# Patient Record
Sex: MALE | Race: WHITE | Employment: OTHER
[De-identification: names, ages, dates, MRNs, and addresses within clinical notes are randomized per-mention and may not be internally consistent; named-entity substitution may affect disease eponyms.]

---

## 2017-01-06 ENCOUNTER — OFFICE VISIT (OUTPATIENT)
Dept: FAMILY MEDICINE CLINIC | Facility: CLINIC | Age: 72
End: 2017-01-06

## 2017-01-06 VITALS
HEART RATE: 68 BPM | HEIGHT: 70 IN | WEIGHT: 198 LBS | BODY MASS INDEX: 28.35 KG/M2 | OXYGEN SATURATION: 97 % | TEMPERATURE: 97.8 F | DIASTOLIC BLOOD PRESSURE: 90 MMHG | SYSTOLIC BLOOD PRESSURE: 152 MMHG

## 2017-01-06 DIAGNOSIS — N40.1 BENIGN NON-NODULAR PROSTATIC HYPERPLASIA WITH LOWER URINARY TRACT SYMPTOMS: ICD-10-CM

## 2017-01-06 DIAGNOSIS — R39.11 URINARY HESITANCY: ICD-10-CM

## 2017-01-06 DIAGNOSIS — R39.15 URINARY URGENCY: Primary | ICD-10-CM

## 2017-01-06 LAB
BILIRUBIN, POC: NORMAL
BLOOD URINE, POC: NORMAL
CLARITY, POC: CLEAR
COLOR, POC: NORMAL
GLUCOSE URINE, POC: NORMAL
KETONES, POC: NORMAL
LEUKOCYTE EST, POC: NORMAL
NITRITE, POC: NORMAL
PH, POC: 6
PROTEIN, POC: NORMAL
SPECIFIC GRAVITY, POC: 1.01
UROBILINOGEN, POC: NORMAL

## 2017-01-06 PROCEDURE — 81002 URINALYSIS NONAUTO W/O SCOPE: CPT | Performed by: NURSE PRACTITIONER

## 2017-01-06 PROCEDURE — 99214 OFFICE O/P EST MOD 30 MIN: CPT | Performed by: NURSE PRACTITIONER

## 2017-01-06 RX ORDER — HYDROCODONE BITARTRATE AND ACETAMINOPHEN 5; 325 MG/1; MG/1
1 TABLET ORAL
COMMUNITY
End: 2017-01-06 | Stop reason: SDUPTHER

## 2017-01-06 RX ORDER — AMLODIPINE BESYLATE 5 MG/1
TABLET ORAL
COMMUNITY
Start: 2004-01-19 | End: 2017-05-18 | Stop reason: SDUPTHER

## 2017-01-06 RX ORDER — TAMSULOSIN HYDROCHLORIDE 0.4 MG/1
0.8 CAPSULE ORAL DAILY
Qty: 60 CAPSULE | Refills: 2 | Status: SHIPPED | OUTPATIENT
Start: 2017-01-06 | End: 2017-05-18 | Stop reason: SDUPTHER

## 2017-01-06 ASSESSMENT — ENCOUNTER SYMPTOMS
VOMITING: 0
NAUSEA: 0

## 2017-01-09 ENCOUNTER — OFFICE VISIT (OUTPATIENT)
Dept: UROLOGY | Facility: CLINIC | Age: 72
End: 2017-01-09

## 2017-01-09 ENCOUNTER — TELEPHONE (OUTPATIENT)
Dept: FAMILY MEDICINE CLINIC | Facility: CLINIC | Age: 72
End: 2017-01-09

## 2017-01-09 VITALS
DIASTOLIC BLOOD PRESSURE: 86 MMHG | BODY MASS INDEX: 29.35 KG/M2 | WEIGHT: 205 LBS | SYSTOLIC BLOOD PRESSURE: 140 MMHG | HEIGHT: 70 IN

## 2017-01-09 DIAGNOSIS — N13.8 BPH WITH OBSTRUCTION/LOWER URINARY TRACT SYMPTOMS: ICD-10-CM

## 2017-01-09 DIAGNOSIS — N41.0 ACUTE PROSTATITIS: Primary | ICD-10-CM

## 2017-01-09 DIAGNOSIS — N40.1 BPH WITH OBSTRUCTION/LOWER URINARY TRACT SYMPTOMS: ICD-10-CM

## 2017-01-09 PROCEDURE — 51798 US URINE CAPACITY MEASURE: CPT | Performed by: PHYSICIAN ASSISTANT

## 2017-01-09 PROCEDURE — 99214 OFFICE O/P EST MOD 30 MIN: CPT | Performed by: PHYSICIAN ASSISTANT

## 2017-01-09 RX ORDER — NAPROXEN 500 MG/1
500 TABLET ORAL 2 TIMES DAILY WITH MEALS
Qty: 14 TABLET | Refills: 0 | Status: SHIPPED | OUTPATIENT
Start: 2017-01-09 | End: 2017-12-30 | Stop reason: ALTCHOICE

## 2017-01-09 RX ORDER — CIPROFLOXACIN 500 MG/1
500 TABLET, FILM COATED ORAL 2 TIMES DAILY
Qty: 56 TABLET | Refills: 0 | Status: SHIPPED | OUTPATIENT
Start: 2017-01-09 | End: 2017-02-06

## 2017-01-09 ASSESSMENT — ENCOUNTER SYMPTOMS
NAUSEA: 0
ABDOMINAL PAIN: 0
COLOR CHANGE: 0
DIARRHEA: 0
CONSTIPATION: 0
VOMITING: 0
SHORTNESS OF BREATH: 0
ABDOMINAL DISTENTION: 0
RECTAL PAIN: 1

## 2017-01-10 ENCOUNTER — OFFICE VISIT (OUTPATIENT)
Dept: SURGERY | Facility: CLINIC | Age: 72
End: 2017-01-10

## 2017-01-10 VITALS
BODY MASS INDEX: 29.35 KG/M2 | HEIGHT: 70 IN | HEART RATE: 76 BPM | RESPIRATION RATE: 18 BRPM | DIASTOLIC BLOOD PRESSURE: 89 MMHG | SYSTOLIC BLOOD PRESSURE: 142 MMHG | WEIGHT: 205 LBS

## 2017-01-10 DIAGNOSIS — K29.30 CHRONIC SUPERFICIAL GASTRITIS WITHOUT BLEEDING: ICD-10-CM

## 2017-01-10 DIAGNOSIS — Z98.890 S/P ENDOSCOPY: Primary | ICD-10-CM

## 2017-01-10 PROCEDURE — G8427 DOCREV CUR MEDS BY ELIG CLIN: HCPCS | Performed by: SURGERY

## 2017-01-10 PROCEDURE — 1036F TOBACCO NON-USER: CPT | Performed by: SURGERY

## 2017-01-10 PROCEDURE — 4040F PNEUMOC VAC/ADMIN/RCVD: CPT | Performed by: SURGERY

## 2017-01-10 PROCEDURE — G8420 CALC BMI NORM PARAMETERS: HCPCS | Performed by: SURGERY

## 2017-01-10 PROCEDURE — G8484 FLU IMMUNIZE NO ADMIN: HCPCS | Performed by: SURGERY

## 2017-01-10 PROCEDURE — 99213 OFFICE O/P EST LOW 20 MIN: CPT | Performed by: SURGERY

## 2017-01-10 PROCEDURE — 3017F COLORECTAL CA SCREEN DOC REV: CPT | Performed by: SURGERY

## 2017-01-10 PROCEDURE — 1123F ACP DISCUSS/DSCN MKR DOCD: CPT | Performed by: SURGERY

## 2017-04-24 ENCOUNTER — OFFICE VISIT (OUTPATIENT)
Dept: UROLOGY | Age: 72
End: 2017-04-24
Payer: MEDICARE

## 2017-04-24 VITALS — WEIGHT: 202 LBS | BODY MASS INDEX: 28.98 KG/M2 | SYSTOLIC BLOOD PRESSURE: 140 MMHG | DIASTOLIC BLOOD PRESSURE: 84 MMHG

## 2017-04-24 DIAGNOSIS — N13.8 BPH WITH OBSTRUCTION/LOWER URINARY TRACT SYMPTOMS: ICD-10-CM

## 2017-04-24 DIAGNOSIS — N40.1 BPH WITH OBSTRUCTION/LOWER URINARY TRACT SYMPTOMS: ICD-10-CM

## 2017-04-24 DIAGNOSIS — N41.0 ACUTE PROSTATITIS: Primary | ICD-10-CM

## 2017-04-24 PROCEDURE — 99213 OFFICE O/P EST LOW 20 MIN: CPT | Performed by: PHYSICIAN ASSISTANT

## 2017-04-24 PROCEDURE — 51798 US URINE CAPACITY MEASURE: CPT | Performed by: PHYSICIAN ASSISTANT

## 2017-04-24 PROCEDURE — G8420 CALC BMI NORM PARAMETERS: HCPCS | Performed by: PHYSICIAN ASSISTANT

## 2017-04-24 PROCEDURE — 1123F ACP DISCUSS/DSCN MKR DOCD: CPT | Performed by: PHYSICIAN ASSISTANT

## 2017-04-24 PROCEDURE — 4040F PNEUMOC VAC/ADMIN/RCVD: CPT | Performed by: PHYSICIAN ASSISTANT

## 2017-04-24 PROCEDURE — G8427 DOCREV CUR MEDS BY ELIG CLIN: HCPCS | Performed by: PHYSICIAN ASSISTANT

## 2017-04-24 PROCEDURE — 3017F COLORECTAL CA SCREEN DOC REV: CPT | Performed by: PHYSICIAN ASSISTANT

## 2017-04-24 PROCEDURE — 1036F TOBACCO NON-USER: CPT | Performed by: PHYSICIAN ASSISTANT

## 2017-05-10 RX ORDER — LEVOTHYROXINE SODIUM 0.07 MG/1
TABLET ORAL
Qty: 90 TABLET | Refills: 1 | Status: SHIPPED | OUTPATIENT
Start: 2017-05-10 | End: 2017-11-13 | Stop reason: SDUPTHER

## 2017-05-18 ENCOUNTER — OFFICE VISIT (OUTPATIENT)
Dept: FAMILY MEDICINE CLINIC | Age: 72
End: 2017-05-18
Payer: MEDICARE

## 2017-05-18 VITALS
SYSTOLIC BLOOD PRESSURE: 136 MMHG | BODY MASS INDEX: 28.06 KG/M2 | HEIGHT: 70 IN | HEART RATE: 88 BPM | WEIGHT: 196 LBS | DIASTOLIC BLOOD PRESSURE: 80 MMHG | OXYGEN SATURATION: 98 % | TEMPERATURE: 98.1 F

## 2017-05-18 DIAGNOSIS — E78.00 PURE HYPERCHOLESTEROLEMIA: ICD-10-CM

## 2017-05-18 DIAGNOSIS — N40.1 BENIGN NON-NODULAR PROSTATIC HYPERPLASIA WITH LOWER URINARY TRACT SYMPTOMS: ICD-10-CM

## 2017-05-18 DIAGNOSIS — I10 ESSENTIAL HYPERTENSION, BENIGN: ICD-10-CM

## 2017-05-18 DIAGNOSIS — E11.9 CONTROLLED TYPE 2 DIABETES MELLITUS WITHOUT COMPLICATION, WITHOUT LONG-TERM CURRENT USE OF INSULIN (HCC): ICD-10-CM

## 2017-05-18 DIAGNOSIS — K21.9 GASTROESOPHAGEAL REFLUX DISEASE WITHOUT ESOPHAGITIS: ICD-10-CM

## 2017-05-18 DIAGNOSIS — E03.9 ACQUIRED HYPOTHYROIDISM: ICD-10-CM

## 2017-05-18 DIAGNOSIS — M06.9 RHEUMATOID ARTHRITIS, INVOLVING UNSPECIFIED SITE, UNSPECIFIED RHEUMATOID FACTOR PRESENCE: Primary | ICD-10-CM

## 2017-05-18 LAB — HBA1C MFR BLD: 6.2 %

## 2017-05-18 PROCEDURE — 3017F COLORECTAL CA SCREEN DOC REV: CPT | Performed by: NURSE PRACTITIONER

## 2017-05-18 PROCEDURE — 99214 OFFICE O/P EST MOD 30 MIN: CPT | Performed by: NURSE PRACTITIONER

## 2017-05-18 PROCEDURE — 1036F TOBACCO NON-USER: CPT | Performed by: NURSE PRACTITIONER

## 2017-05-18 PROCEDURE — G8427 DOCREV CUR MEDS BY ELIG CLIN: HCPCS | Performed by: NURSE PRACTITIONER

## 2017-05-18 PROCEDURE — 1123F ACP DISCUSS/DSCN MKR DOCD: CPT | Performed by: NURSE PRACTITIONER

## 2017-05-18 PROCEDURE — 4040F PNEUMOC VAC/ADMIN/RCVD: CPT | Performed by: NURSE PRACTITIONER

## 2017-05-18 PROCEDURE — 83036 HEMOGLOBIN GLYCOSYLATED A1C: CPT | Performed by: NURSE PRACTITIONER

## 2017-05-18 PROCEDURE — 3044F HG A1C LEVEL LT 7.0%: CPT | Performed by: NURSE PRACTITIONER

## 2017-05-18 PROCEDURE — G8420 CALC BMI NORM PARAMETERS: HCPCS | Performed by: NURSE PRACTITIONER

## 2017-05-18 RX ORDER — SIMVASTATIN 20 MG
20 TABLET ORAL NIGHTLY
Qty: 90 TABLET | Refills: 1 | Status: SHIPPED | OUTPATIENT
Start: 2017-05-18 | End: 2017-11-13 | Stop reason: SDUPTHER

## 2017-05-18 RX ORDER — TAMSULOSIN HYDROCHLORIDE 0.4 MG/1
0.4 CAPSULE ORAL DAILY
Qty: 90 CAPSULE | Refills: 1 | Status: SHIPPED | OUTPATIENT
Start: 2017-05-18 | End: 2017-11-13 | Stop reason: SDUPTHER

## 2017-05-18 RX ORDER — PANTOPRAZOLE SODIUM 40 MG/1
40 TABLET, DELAYED RELEASE ORAL
Qty: 180 TABLET | Refills: 1 | Status: SHIPPED | OUTPATIENT
Start: 2017-05-18 | End: 2017-11-13 | Stop reason: SDUPTHER

## 2017-05-18 RX ORDER — AMLODIPINE BESYLATE 5 MG/1
5 TABLET ORAL DAILY
Qty: 90 TABLET | Refills: 1 | Status: SHIPPED | OUTPATIENT
Start: 2017-05-18 | End: 2017-11-13 | Stop reason: SDUPTHER

## 2017-05-18 ASSESSMENT — ENCOUNTER SYMPTOMS
CHOKING: 0
SHORTNESS OF BREATH: 0
SORE THROAT: 1
BLOOD IN STOOL: 0
HEARTBURN: 1
DIARRHEA: 1
COUGH: 1

## 2017-05-18 ASSESSMENT — PATIENT HEALTH QUESTIONNAIRE - PHQ9
SUM OF ALL RESPONSES TO PHQ QUESTIONS 1-9: 0
1. LITTLE INTEREST OR PLEASURE IN DOING THINGS: 0
SUM OF ALL RESPONSES TO PHQ9 QUESTIONS 1 & 2: 0
2. FEELING DOWN, DEPRESSED OR HOPELESS: 0

## 2017-11-06 ENCOUNTER — HOSPITAL ENCOUNTER (OUTPATIENT)
Age: 72
Discharge: HOME | End: 2017-11-06
Payer: MEDICARE

## 2017-11-06 DIAGNOSIS — E03.9 ACQUIRED HYPOTHYROIDISM: ICD-10-CM

## 2017-11-06 DIAGNOSIS — K21.9 GASTROESOPHAGEAL REFLUX DISEASE WITHOUT ESOPHAGITIS: ICD-10-CM

## 2017-11-06 DIAGNOSIS — E78.00 PURE HYPERCHOLESTEROLEMIA: ICD-10-CM

## 2017-11-06 LAB
ABSOLUTE EOS #: 0.2 K/UL (ref 0–0.4)
ABSOLUTE IMMATURE GRANULOCYTE: NORMAL K/UL (ref 0–0.3)
ABSOLUTE LYMPH #: 2.34 K/UL (ref 1–4.8)
ABSOLUTE MONO #: 0.65 K/UL (ref 0–1)
ALBUMIN SERPL-MCNC: 4.2 G/DL (ref 3.5–5.2)
ALBUMIN/GLOBULIN RATIO: ABNORMAL (ref 1–2.5)
ALP BLD-CCNC: 87 U/L (ref 40–129)
ALT SERPL-CCNC: 34 U/L (ref 5–41)
ANION GAP SERPL CALCULATED.3IONS-SCNC: 11 MMOL/L (ref 9–17)
AST SERPL-CCNC: 21 U/L
BASOPHILS # BLD: NORMAL %
BASOPHILS ABSOLUTE: NORMAL K/UL (ref 0–0.2)
BILIRUB SERPL-MCNC: 0.41 MG/DL (ref 0.3–1.2)
BUN BLDV-MCNC: 17 MG/DL (ref 8–23)
BUN/CREAT BLD: 22 (ref 9–20)
CALCIUM SERPL-MCNC: 9.8 MG/DL (ref 8.6–10.4)
CHLORIDE BLD-SCNC: 103 MMOL/L (ref 98–107)
CHOLESTEROL/HDL RATIO: 4
CHOLESTEROL: 123 MG/DL
CO2: 27 MMOL/L (ref 20–31)
CREAT SERPL-MCNC: 0.76 MG/DL (ref 0.7–1.2)
DIFFERENTIAL TYPE: NORMAL
EOSINOPHILS RELATIVE PERCENT: 3 %
GFR AFRICAN AMERICAN: >60 ML/MIN
GFR NON-AFRICAN AMERICAN: >60 ML/MIN
GFR SERPL CREATININE-BSD FRML MDRD: ABNORMAL ML/MIN/{1.73_M2}
GFR SERPL CREATININE-BSD FRML MDRD: ABNORMAL ML/MIN/{1.73_M2}
GLUCOSE BLD-MCNC: 118 MG/DL (ref 70–99)
HCT VFR BLD CALC: 49.7 % (ref 41–53)
HDLC SERPL-MCNC: 31 MG/DL
HEMOGLOBIN: 16.7 G/DL (ref 13.5–17.5)
IMMATURE GRANULOCYTES: NORMAL %
LDL CHOLESTEROL: 65 MG/DL (ref 0–130)
LYMPHOCYTES # BLD: 36 %
MCH RBC QN AUTO: 30.5 PG (ref 26–34)
MCHC RBC AUTO-ENTMCNC: 33.6 G/DL (ref 31–37)
MCV RBC AUTO: 90.9 FL (ref 80–100)
MONOCYTES # BLD: 10 %
MORPHOLOGY: NORMAL
PATIENT FASTING?: YES
PDW BLD-RTO: 12.9 % (ref 12.1–15.2)
PLATELET # BLD: 221 K/UL (ref 140–450)
PLATELET ESTIMATE: NORMAL
PMV BLD AUTO: NORMAL FL (ref 6–12)
POTASSIUM SERPL-SCNC: 4.7 MMOL/L (ref 3.7–5.3)
RBC # BLD: 5.47 M/UL (ref 4.5–5.9)
RBC # BLD: NORMAL 10*6/UL
SEG NEUTROPHILS: 51 %
SEGMENTED NEUTROPHILS ABSOLUTE COUNT: 3.31 K/UL (ref 2.1–6.5)
SODIUM BLD-SCNC: 141 MMOL/L (ref 135–144)
TOTAL PROTEIN: 7.6 G/DL (ref 6.4–8.3)
TRIGL SERPL-MCNC: 136 MG/DL
TSH SERPL DL<=0.05 MIU/L-ACNC: 4.76 MIU/L (ref 0.3–5)
VLDLC SERPL CALC-MCNC: ABNORMAL MG/DL (ref 1–30)
WBC # BLD: 6.5 K/UL (ref 3.5–11)
WBC # BLD: NORMAL 10*3/UL

## 2017-11-06 PROCEDURE — 84443 ASSAY THYROID STIM HORMONE: CPT

## 2017-11-06 PROCEDURE — 80061 LIPID PANEL: CPT

## 2017-11-06 PROCEDURE — 36415 COLL VENOUS BLD VENIPUNCTURE: CPT

## 2017-11-06 PROCEDURE — 85025 COMPLETE CBC W/AUTO DIFF WBC: CPT

## 2017-11-06 PROCEDURE — 80053 COMPREHEN METABOLIC PANEL: CPT

## 2017-11-13 ENCOUNTER — OFFICE VISIT (OUTPATIENT)
Dept: FAMILY MEDICINE CLINIC | Age: 72
End: 2017-11-13
Payer: MEDICARE

## 2017-11-13 VITALS — SYSTOLIC BLOOD PRESSURE: 120 MMHG | BODY MASS INDEX: 28.98 KG/M2 | WEIGHT: 202 LBS | DIASTOLIC BLOOD PRESSURE: 74 MMHG

## 2017-11-13 DIAGNOSIS — Z23 NEED FOR INFLUENZA VACCINATION: ICD-10-CM

## 2017-11-13 DIAGNOSIS — Z13.6 ENCOUNTER FOR ABDOMINAL AORTIC ANEURYSM (AAA) SCREENING: ICD-10-CM

## 2017-11-13 DIAGNOSIS — E78.00 PURE HYPERCHOLESTEROLEMIA: ICD-10-CM

## 2017-11-13 DIAGNOSIS — N40.1 BENIGN NON-NODULAR PROSTATIC HYPERPLASIA WITH LOWER URINARY TRACT SYMPTOMS: ICD-10-CM

## 2017-11-13 DIAGNOSIS — K58.0 IRRITABLE BOWEL SYNDROME WITH DIARRHEA: ICD-10-CM

## 2017-11-13 DIAGNOSIS — I10 ESSENTIAL HYPERTENSION, BENIGN: Primary | ICD-10-CM

## 2017-11-13 DIAGNOSIS — K21.9 GASTROESOPHAGEAL REFLUX DISEASE WITHOUT ESOPHAGITIS: ICD-10-CM

## 2017-11-13 PROCEDURE — 90686 IIV4 VACC NO PRSV 0.5 ML IM: CPT | Performed by: FAMILY MEDICINE

## 2017-11-13 PROCEDURE — 3017F COLORECTAL CA SCREEN DOC REV: CPT | Performed by: FAMILY MEDICINE

## 2017-11-13 PROCEDURE — 1123F ACP DISCUSS/DSCN MKR DOCD: CPT | Performed by: FAMILY MEDICINE

## 2017-11-13 PROCEDURE — 99214 OFFICE O/P EST MOD 30 MIN: CPT | Performed by: FAMILY MEDICINE

## 2017-11-13 PROCEDURE — G0009 ADMIN PNEUMOCOCCAL VACCINE: HCPCS | Performed by: FAMILY MEDICINE

## 2017-11-13 PROCEDURE — G0008 ADMIN INFLUENZA VIRUS VAC: HCPCS | Performed by: FAMILY MEDICINE

## 2017-11-13 PROCEDURE — G8484 FLU IMMUNIZE NO ADMIN: HCPCS | Performed by: FAMILY MEDICINE

## 2017-11-13 PROCEDURE — G8427 DOCREV CUR MEDS BY ELIG CLIN: HCPCS | Performed by: FAMILY MEDICINE

## 2017-11-13 PROCEDURE — 1036F TOBACCO NON-USER: CPT | Performed by: FAMILY MEDICINE

## 2017-11-13 PROCEDURE — G8419 CALC BMI OUT NRM PARAM NOF/U: HCPCS | Performed by: FAMILY MEDICINE

## 2017-11-13 PROCEDURE — 90670 PCV13 VACCINE IM: CPT | Performed by: FAMILY MEDICINE

## 2017-11-13 PROCEDURE — 4040F PNEUMOC VAC/ADMIN/RCVD: CPT | Performed by: FAMILY MEDICINE

## 2017-11-13 RX ORDER — SIMVASTATIN 20 MG
20 TABLET ORAL NIGHTLY
Qty: 90 TABLET | Refills: 1 | Status: SHIPPED | OUTPATIENT
Start: 2017-11-13 | End: 2018-06-04 | Stop reason: SDUPTHER

## 2017-11-13 RX ORDER — PANTOPRAZOLE SODIUM 40 MG/1
40 TABLET, DELAYED RELEASE ORAL 2 TIMES DAILY
Qty: 180 TABLET | Refills: 1 | Status: SHIPPED | OUTPATIENT
Start: 2017-11-13 | End: 2018-06-04 | Stop reason: SDUPTHER

## 2017-11-13 RX ORDER — AMLODIPINE BESYLATE 5 MG/1
5 TABLET ORAL DAILY
Qty: 90 TABLET | Refills: 1 | Status: SHIPPED | OUTPATIENT
Start: 2017-11-13 | End: 2018-06-04 | Stop reason: SDUPTHER

## 2017-11-13 RX ORDER — TAMSULOSIN HYDROCHLORIDE 0.4 MG/1
0.4 CAPSULE ORAL DAILY
Qty: 90 CAPSULE | Refills: 1 | Status: SHIPPED | OUTPATIENT
Start: 2017-11-13 | End: 2018-01-03 | Stop reason: SDUPTHER

## 2017-11-13 RX ORDER — LEVOTHYROXINE SODIUM 0.07 MG/1
TABLET ORAL
Qty: 90 TABLET | Refills: 1 | Status: SHIPPED | OUTPATIENT
Start: 2017-11-13 | End: 2018-06-04 | Stop reason: SDUPTHER

## 2017-11-13 ASSESSMENT — ENCOUNTER SYMPTOMS
EYE REDNESS: 0
SHORTNESS OF BREATH: 0
CONSTIPATION: 0
FACIAL SWELLING: 0
BLURRED VISION: 0
DIARRHEA: 1
WHEEZING: 0
ABDOMINAL PAIN: 0
VOMITING: 0
NAUSEA: 0
TROUBLE SWALLOWING: 0
EYE DISCHARGE: 0
COUGH: 0
BLOOD IN STOOL: 0

## 2017-11-13 NOTE — PROGRESS NOTES
swelling and trouble swallowing. Eyes: Negative for blurred vision, discharge, redness and visual disturbance. Respiratory: Negative for cough, shortness of breath and wheezing. Cardiovascular: Negative for chest pain and palpitations. Gastrointestinal: Positive for diarrhea. Negative for abdominal pain, blood in stool, constipation, dysphagia, nausea and vomiting. Genitourinary: Negative for dysuria and hematuria. Musculoskeletal: Negative for arthralgias and joint swelling. Skin: Negative for pallor and rash. Neurological: Negative for dizziness, tremors, light-headedness and headaches. Psychiatric/Behavioral: Negative for confusion and sleep disturbance. Physical Exam:     Physical Exam   Constitutional: He is oriented to person, place, and time. He appears well-developed and well-nourished. HENT:   Head: Normocephalic and atraumatic. Eyes: Conjunctivae are normal. Pupils are equal, round, and reactive to light. Right eye exhibits no discharge. Left eye exhibits no discharge. Neck: Neck supple. No thyromegaly present. Cardiovascular: Normal rate, regular rhythm and normal heart sounds. Pulmonary/Chest: Effort normal and breath sounds normal. No respiratory distress. He has no wheezes. Abdominal: Soft. Bowel sounds are normal. He exhibits no distension. There is no tenderness. There is no rebound. Musculoskeletal: He exhibits no edema. Lymphadenopathy:     He has no cervical adenopathy. Neurological: He is alert and oriented to person, place, and time. Skin: Skin is warm and dry. No rash noted. Psychiatric: He has a normal mood and affect. His behavior is normal.   Vitals reviewed.       Vitals:  /74   Wt 202 lb (91.6 kg)   BMI 28.98 kg/m²       Data:     Lab Results   Component Value Date     11/06/2017    K 4.7 11/06/2017     11/06/2017    CO2 27 11/06/2017    BUN 17 11/06/2017    CREATININE 0.76 11/06/2017    GLUCOSE 118 11/06/2017    GLUCOSE 117 04/03/2012    PROT 7.6 11/06/2017    LABALBU 4.2 11/06/2017    LABALBU 4.5 04/03/2012    BILITOT 0.41 11/06/2017    ALKPHOS 87 11/06/2017    AST 21 11/06/2017    ALT 34 11/06/2017     Lab Results   Component Value Date    WBC 6.5 11/06/2017    RBC 5.47 11/06/2017    HGB 16.7 11/06/2017    HCT 49.7 11/06/2017    MCV 90.9 11/06/2017    MCH 30.5 11/06/2017    MCHC 33.6 11/06/2017    RDW 12.9 11/06/2017     11/06/2017    MPV NOT REPORTED 11/06/2017     Lab Results   Component Value Date    TSH 4.76 11/06/2017     Lab Results   Component Value Date    CHOL 123 11/06/2017    HDL 31 11/06/2017    PSA 2.92 01/06/2017    LABA1C 6.2 05/18/2017          Assessment/Plan:       1. Irritable bowel syndrome with diarrhea  Pt to try Eluxadoline 100mg one BID and call if working and needs a 90d course     2. Essential hypertension, benign  Stable on norvasc,     3. Pure hypercholesterolemia  Stable on the zocor and labs at the [facility]  - simvastatin (ZOCOR) 20 MG tablet; Take 1 tablet by mouth nightly  Dispense: 90 tablet; Refill: 1    4. Gastroesophageal reflux disease without esophagitis  Stable on the protonix  - pantoprazole (PROTONIX) 40 MG tablet; Take 1 tablet by mouth 2 times daily  Dispense: 180 tablet; Refill: 1    5. Benign non-nodular prostatic hyperplasia with lower urinary tract symptoms  Stable on the flomax  - tamsulosin (FLOMAX) 0.4 MG capsule; Take 1 capsule by mouth daily  Dispense: 90 capsule; Refill: 1    6. Need for influenza vaccination  Shot given  - INFLUENZA, QUADV, 3 YRS AND OLDER, IM, PF, PREFILL SYR OR SDV, 0.5ML (FLUZONE QUADV, PF)    7. Encounter for abdominal aortic aneurysm (AAA) screening  Order placed  - Carli AAA; Future    Julio received counseling on the following healthy behaviors: nutrition and exercise  Reviewed prior labs and health maintenance  Continue current medications, diet and exercise. Discussed use, benefit, and side effects of prescribed medications.

## 2017-11-13 NOTE — PATIENT INSTRUCTIONS
SURVEY:    You may be receiving a survey from [facility] regarding your visit today. Please complete the survey to enable us to provide the highest quality of care to you and your family. If you cannot score us a very good on any question, please call the office to discuss how we could of made your experience a very good one. Thank you. DATE: MEDICATIONS TAKEN TODAY? BLOOD PRESSURE READING: HEART RATE/PULSE: BLOOD SUGAR #/FASTING?  COMMENTS

## 2017-12-13 ENCOUNTER — HOSPITAL ENCOUNTER (OUTPATIENT)
Dept: ULTRASOUND IMAGING | Age: 72
Discharge: HOME | End: 2017-12-13
Payer: MEDICARE

## 2017-12-13 DIAGNOSIS — Z13.6 ENCOUNTER FOR ABDOMINAL AORTIC ANEURYSM (AAA) SCREENING: ICD-10-CM

## 2017-12-13 PROCEDURE — 76706 US ABDL AORTA SCREEN AAA: CPT

## 2017-12-27 ENCOUNTER — OFFICE VISIT (OUTPATIENT)
Dept: PRIMARY CARE CLINIC | Age: 72
End: 2017-12-27
Payer: MEDICARE

## 2017-12-27 VITALS
HEART RATE: 92 BPM | DIASTOLIC BLOOD PRESSURE: 106 MMHG | RESPIRATION RATE: 24 BRPM | HEIGHT: 70 IN | BODY MASS INDEX: 28.63 KG/M2 | TEMPERATURE: 97.3 F | SYSTOLIC BLOOD PRESSURE: 172 MMHG | OXYGEN SATURATION: 97 % | WEIGHT: 200 LBS

## 2017-12-27 DIAGNOSIS — R33.9 RETENTION, URINE: ICD-10-CM

## 2017-12-27 DIAGNOSIS — N30.00 ACUTE CYSTITIS WITHOUT HEMATURIA: Primary | ICD-10-CM

## 2017-12-27 LAB
BILIRUBIN, POC: NORMAL
BLOOD URINE, POC: NORMAL
CLARITY, POC: CLEAR
COLOR, POC: YELLOW
GLUCOSE URINE, POC: NORMAL
KETONES, POC: NORMAL
LEUKOCYTE EST, POC: NORMAL
NITRITE, POC: NORMAL
PH, POC: 6
PROTEIN, POC: NORMAL
SPECIFIC GRAVITY, POC: 1.01
UROBILINOGEN, POC: 0.2

## 2017-12-27 PROCEDURE — G8419 CALC BMI OUT NRM PARAM NOF/U: HCPCS | Performed by: NURSE PRACTITIONER

## 2017-12-27 PROCEDURE — G8484 FLU IMMUNIZE NO ADMIN: HCPCS | Performed by: NURSE PRACTITIONER

## 2017-12-27 PROCEDURE — G8427 DOCREV CUR MEDS BY ELIG CLIN: HCPCS | Performed by: NURSE PRACTITIONER

## 2017-12-27 PROCEDURE — 3017F COLORECTAL CA SCREEN DOC REV: CPT | Performed by: NURSE PRACTITIONER

## 2017-12-27 PROCEDURE — 1123F ACP DISCUSS/DSCN MKR DOCD: CPT | Performed by: NURSE PRACTITIONER

## 2017-12-27 PROCEDURE — 4040F PNEUMOC VAC/ADMIN/RCVD: CPT | Performed by: NURSE PRACTITIONER

## 2017-12-27 PROCEDURE — 1036F TOBACCO NON-USER: CPT | Performed by: NURSE PRACTITIONER

## 2017-12-27 PROCEDURE — 81003 URINALYSIS AUTO W/O SCOPE: CPT | Performed by: NURSE PRACTITIONER

## 2017-12-27 PROCEDURE — 99213 OFFICE O/P EST LOW 20 MIN: CPT | Performed by: NURSE PRACTITIONER

## 2017-12-27 RX ORDER — CIPROFLOXACIN 500 MG/1
500 TABLET, FILM COATED ORAL 2 TIMES DAILY
Qty: 20 TABLET | Refills: 0 | Status: SHIPPED | OUTPATIENT
Start: 2017-12-27 | End: 2018-01-03 | Stop reason: SDUPTHER

## 2017-12-27 ASSESSMENT — ENCOUNTER SYMPTOMS
DIARRHEA: 1
NAUSEA: 1
COUGH: 0
CONSTIPATION: 0
WHEEZING: 0
VOMITING: 0
SHORTNESS OF BREATH: 0

## 2017-12-27 NOTE — PROGRESS NOTES
tamsulosin (FLOMAX) 0.4 MG capsule Take 1 capsule by mouth daily 90 capsule 1    HYDROcodone-acetaminophen (NORCO) 5-325 MG per tablet every 6 hours as needed  .  lidocaine (LIDODERM) 5 % Place 1 patch onto the skin      hydrocortisone (WESTCORT) 0.2 % cream Apply topically 2 times daily. 30 g 1    Multiple Vitamins-Minerals (THERAPEUTIC MULTIVITAMIN-MINERALS) tablet Take 1 tablet by mouth daily.  gabapentin (NEURONTIN) 300 MG capsule Take 300 mg by mouth daily.  aspirin 81 MG tablet Take 81 mg by mouth daily.  gabapentin (NEURONTIN) 600 MG tablet Take 600 mg by mouth 4 times daily.  Eluxadoline 100 MG TABS Take 100 mg by mouth 2 times daily . 60 tablet 1    naproxen (NAPROSYN) 500 MG tablet Take 1 tablet by mouth 2 times daily (with meals) for 7 days 14 tablet 0     No current facility-administered medications for this visit. Allergies   Allergen Reactions    Cortisone Other (See Comments)     Irritates \"GERD\"        Subjective:      Review of Systems   Constitutional: Negative for appetite change, chills, diaphoresis, fatigue and fever. HENT: Negative for congestion. Respiratory: Negative for cough, shortness of breath and wheezing. Gastrointestinal: Positive for diarrhea and nausea. Negative for constipation and vomiting (irritable bowel syndrome). Genitourinary: Positive for dysuria, flank pain, frequency and urgency. Negative for decreased urine volume, discharge, hematuria, penile swelling, scrotal swelling and testicular pain. Skin: Negative for rash and wound. Neurological: Negative for dizziness, light-headedness and headaches. Objective:     Physical Exam   Constitutional: He is oriented to person, place, and time. He appears well-developed and well-nourished. He is cooperative. He appears ill (uncomfortable, reports similar symptoms to previous UTI). No distress. HENT:   Head: Normocephalic and atraumatic.    Right Ear: External ear normal.   Left Ear: External ear normal.   Eyes: Conjunctivae are normal. Right eye exhibits no discharge. Left eye exhibits no discharge. Neck: Neck supple. Cardiovascular: Normal rate, regular rhythm, S1 normal, S2 normal, normal heart sounds and intact distal pulses. Exam reveals no gallop and no friction rub. No murmur heard. Pulmonary/Chest: Effort normal and breath sounds normal. No accessory muscle usage. No respiratory distress. He has no decreased breath sounds. He has no wheezes. He has no rhonchi. He has no rales. Breath sounds clear B/L anterior and posterior lobes. Chest expansion symmetrical.  No audible wheezing or respiratory distress. No rales or rhonchi   Genitourinary:   Genitourinary Comments: No CVA or suprapubic tenderness. Musculoskeletal: Normal range of motion. Lymphadenopathy:     He has no cervical adenopathy. Right cervical: No superficial cervical and no posterior cervical adenopathy present. Left cervical: No superficial cervical and no posterior cervical adenopathy present. He has no axillary adenopathy. Right axillary: No pectoral and no lateral adenopathy present. Left axillary: No pectoral and no lateral adenopathy present. Neurological: He is alert and oriented to person, place, and time. Skin: Skin is warm and dry. No rash noted. He is not diaphoretic. No erythema. Psychiatric: He has a normal mood and affect. His behavior is normal.   Nursing note and vitals reviewed. BP (!) 172/106   Pulse 92   Temp 97.3 °F (36.3 °C) (Oral)   Resp 24   Ht 5' 10\" (1.778 m)   Wt 200 lb (90.7 kg)   SpO2 97%   BMI 28.70 kg/m²      POCT Urine:  Negative Nitrites, Negative Blood and Negative Leukocytes. Assessment:     1. Acute cystitis without hematuria  POCT Urinalysis No Micro (Auto)    ciprofloxacin (CIPRO) 500 MG tablet    Urine Culture   2.  Retention, urine  POCT Urinalysis No Micro (Auto)       Plan:      Return if symptoms worsen or fail to

## 2017-12-27 NOTE — PATIENT INSTRUCTIONS
Patient Education   Patient Education          ciprofloxacin (oral)  Pronunciation:  NAVEED johanna FLOX a sin  Brand:  Cipro, Cipro XR, Proquin XR  What is the most important information I should know about ciprofloxacin? Ciprofloxacin may cause swelling or tearing of a tendon, especially if you are over 60, if you take steroid medication, or if you have had a kidney, heart, or lung transplant. You may not be able to use ciprofloxacin if you have a muscle disorder. Tell your doctor if you have a history of myasthenia gravis. You should not use this medication if you are also taking tizanidine. What is ciprofloxacin? Ciprofloxacin is a fluoroquinolone (gwel-a-ASRG-o-lone) antibiotic that fights bacteria in the body. Ciprofloxacin is used to treat different types of bacterial infections. Ciprofloxacin is also used to treat people who have been exposed to anthrax or certain types of plague. Fluoroquinolone antibiotics can cause serious or disabling side effects. Ciprofloxacin should be used only for infections that cannot be treated with a safer antibiotic. Ciprofloxacin may also be used for purposes not listed in this medication guide. What should I discuss with my healthcare provider before taking ciprofloxacin? You should not use ciprofloxacin if you are allergic to a fluoroquinolone antibiotic, or if you are also taking tizanidine (Zanaflex).   To make sure ciprofloxacin is safe for you, tell your doctor if you have:  · tendon problems, arthritis or other joint problems (especially in children);  · a history of myasthenia gravis or other nerve-muscle disorder;  · a heart rhythm disorder (especially if you take medication to treat it) or history of long QT syndrome;  · trouble swallowing pills;  · liver or kidney disease;  · a history of seizures;  · diabetes (especially if you take oral diabetes medication);  · low levels of potassium in your blood (hypokalemia); or  · if you use a blood thinner (warfarin, Coumadin, Jantoven) and have \"INR\" or prothrombin time tests. Ciprofloxacin may cause swelling or tearing of a tendon (the fiber that connects bones to muscles in the body), especially in the Achilles' tendon of the heel. This can happen during treatment or up to several months after you stop taking ciprofloxacin. Tendon problems may be more likely to occur if you are over 60, if you take steroid medication, or if you have had a kidney, heart, or lung transplant. Do not give this medicine to a child without medical advice. Tendon and joint problems may be more likely in a child taking ciprofloxacin. It is not known whether this medicine will harm an unborn baby. Tell your doctor if you are pregnant or plan to become pregnant. Ciprofloxacin can pass into breast milk and may harm a nursing baby. You should not breast-feed while using this medicine. How should I take ciprofloxacin? Follow all directions on your prescription label. Do not take this medicine in larger or smaller amounts or for longer than recommended. Take ciprofloxacin with water, and drink extra fluids to keep your kidneys working properly. Ciprofloxacin may be taken with or without food, but take it at the same time each day. Shake the oral suspension (liquid) for at least 15 seconds just before you measure a dose. Measure the liquid with a special dose-measuring spoon or medicine cup. If you do not have a dose-measuring device, ask your pharmacist for one. Do not give ciprofloxacin oral suspension through a feeding tube. Do not crush, chew, or break an extended-release tablet. Swallow it whole. Use this medicine for the full prescribed length of time. Your symptoms may improve before the infection is completely cleared. Skipping doses may also increase your risk of further infection that is resistant to antibiotics. Ciprofloxacin will not treat a viral infection such as the flu or a common cold.   Do not share this medicine with another sunscreen (SPF 30 or higher) when you are outdoors. Call your doctor if you have severe burning, redness, itching, rash, or swelling after being in the sun. What are the possible side effects of ciprofloxacin? Get emergency medical help if you have signs of an allergic reaction: hives, or the first sign of a skin rash; fast heartbeat, difficult breathing; swelling of your face, lips, tongue, or throat. Ciprofloxacin may cause swelling or tearing of (rupture) a tendon. Ciprofloxacin can also have serious effects on your nerves, and may cause permanent nerve damage. Stop using ciprofloxacin and call your doctor at once if you have:  · severe stomach pain, diarrhea that is watery or bloody;  · headache with chest pain and severe dizziness, fainting, fast or pounding heartbeats;  · muscle pain or weakness;  · a seizure (convulsions);  · signs of tendon rupture --sudden pain, swelling, bruising, tenderness, stiffness, movement problems, or a snapping or popping sound in any of your joints (rest the joint until you receive medical care or instructions);  · nerve symptoms --numbness, weakness, tingling, burning, pain, or being more sensitive to temperature, light touch, or the sense of your body position;  · changes in mood or behavior --depression, confusion, hallucinations, paranoia, tremors, feeling restless or anxious, unusual thoughts or behavior, insomnia, nightmares;  · liver problems --upper stomach pain, loss of appetite, dark urine, markus-colored stools, jaundice (yellowing of the skin or eyes);  · increased pressure inside the skull --severe headaches, ringing in your ears, vision problems, pain behind your eyes; or  · severe skin reaction --skin pain followed by a red or purple skin rash that spreads (especially in the face or upper body) and causes blistering and peeling. Common side effects may include:  · nausea, vomiting, diarrhea;  · rash; or  · abnormal liver function tests.   This is not a complete list of side effects and others may occur. Call your doctor for medical advice about side effects. You may report side effects to FDA at 2-703-CUC-8823. What other drugs will affect ciprofloxacin? Tell your doctor about all your current medicines and any you start or stop using, especially:  · cyclosporine, methotrexate, metoclopramide, omeprazole, pentoxifylline, phenytoin, probenecid, ropinirole, sildenafil, theophylline;  · a diuretic or \"water pill\";  · heart rhythm medication --amiodarone, disopyramide, dofetilide, dronedarone, procainamide, quinidine, sotalol, and others;  · medicine to treat depression or mental illness --amitriptylline, clomipramine, clozapine, desipramine, duloxetine, iloperidone, imipramine, nortriptyline, and others; or  · NSAIDs (nonsteroidal anti-inflammatory drugs) --aspirin, ibuprofen (Advil, Motrin), naproxen (Aleve), celecoxib, diclofenac, indomethacin, meloxicam, and others. This list is not complete. Other drugs may interact with ciprofloxacin, including prescription and over-the-counter medicines, vitamins, and herbal products. Not all possible interactions are listed in this medication guide. Where can I get more information? Your pharmacist can provide more information about ciprofloxacin. Remember, keep this and all other medicines out of the reach of children, never share your medicines with others, and use this medication only for the indication prescribed. Every effort has been made to ensure that the information provided by [address] is accurate, up-to-date, and complete, but no guarantee is made to that effect. Drug information contained herein may be time sensitive. [facility] information has been compiled for use by healthcare practitioners and consumers in the [place] and therefore [facility] does not warrant that uses outside of the [place] are appropriate, unless specifically indicated otherwise.  [facility]'s drug information does not endorse drugs, diagnose patients or recommend therapy. [facility]'s drug information is an informational resource designed to assist licensed healthcare practitioners in caring for their patients and/or to serve consumers viewing this service as a supplement to, and not a substitute for, the expertise, skill, knowledge and judgment of healthcare practitioners. The absence of a warning for a given drug or drug combination in no way should be construed to indicate that the drug or drug combination is safe, effective or appropriate for any given patient. [facility] does not assume any responsibility for any aspect of healthcare administered with the aid of information [facility] provides. The information contained herein is not intended to cover all possible uses, directions, precautions, warnings, drug interactions, allergic reactions, or adverse effects. If you have questions about the drugs you are taking, check with your doctor, nurse or pharmacist.  Copyright 2765-0590 [address]: 18.01. Revision date: 6/30/2016. Care instructions adapted under license by [facility] ([facility]). If you have questions about a medical condition or this instruction, always ask your healthcare professional. [place] [address] any warranty or liability for your use of this information. Urinary Tract Infections in Men: Care Instructions  Your Care Instructions    A urinary tract infection, or UTI, is a general term for an infection anywhere between the kidneys and the tip of the penis. UTIs can also be a result of a prostate problem. Most cause pain or burning when you urinate. Most UTIs are caused by bacteria and can be cured with antibiotics. It is important to complete your treatment so that the infection does not get worse. Follow-up care is a key part of your treatment and safety. Be sure to make and go to all appointments, and call your doctor if you are having problems.  It's also a good idea to know your test results and keep a list of the medicines you take. How can you care for yourself at home? · Take your antibiotics as prescribed. Do not stop taking them just because you feel better. You need to take the full course of antibiotics. · Take your medicines exactly as prescribed. Your doctor may have prescribed a medicine, such as phenazopyridine (Pyridium), to help relieve pain when you urinate. This turns your urine orange. You may stop taking it when your symptoms get better. But be sure to take all of your antibiotics, which treat the infection. · Drink extra water for the next day or two. This will help make the urine less concentrated and help wash out the bacteria causing the infection. (If you have kidney, heart, or liver disease and have to limit your fluids, talk with your doctor before you increase your fluid intake.)  · Avoid drinks that are carbonated or have caffeine. They can irritate the bladder. · Urinate often. Try to empty your bladder each time. · To relieve pain, take a hot bath or lay a heating pad (set on low) over your lower belly or genital area. Never go to sleep with a heating pad in place. To help prevent UTIs  · Drink plenty of fluids, enough so that your urine is light yellow or clear like water. If you have kidney, heart, or liver disease and have to limit fluids, talk with your doctor before you increase the amount of fluids you drink. · Urinate when you have the urge. Do not hold your urine for a long time. Urinate before you go to sleep. · Keep your penis clean. Catheter care  If you have a drainage tube (catheter) in place, the following steps will help you care for it. · Always wash your hands before and after touching your catheter. · Check the area around the urethra for inflammation or signs of infection. Signs of infection include irritated, swollen, red, or tender skin, or pus around the catheter.   · Clean the area around the catheter with soap and water two times a day. Dry with a clean towel afterward. · Do not apply powder or lotion to the skin around the catheter. To empty the urine collection bag  · Wash your hands with soap and water. · Without touching the drain spout, remove the spout from its sleeve at the bottom of the collection bag. Open the valve on the spout. · Let the urine flow out of the bag and into the toilet or a container. Do not let the tubing or drain spout touch anything. · After you empty the bag, clean the end of the drain spout with tissue and water. Close the valve and put the drain spout back into its sleeve at the bottom of the collection bag. · Wash your hands with soap and water. When should you call for help? Call your doctor now or seek immediate medical care if:  ? · Symptoms such as a fever, chills, nausea, or vomiting get worse or happen for the first time. ? · You have new pain in your back just below your rib cage. This is called flank pain. ? · There is new blood or pus in your urine. ? · You are not able to take or keep down your antibiotics. ? Watch closely for changes in your health, and be sure to contact your doctor if:  ? · You are not getting better after taking an antibiotic for 2 days. ? · Your symptoms go away but then come back. Where can you learn more? Go to https://[employer].pesalmaeweb.[employer]. org and sign in to your [employer] account. Enter J631 in the [facility] [facility] box to learn more about \"Urinary Tract Infections in Men: Care Instructions. \"     If you do not have an account, please click on the \"Sign Up Now\" link. Current as of: May 12, 2017  Content Version: 11.4  © 7788-4608 [employer]. Care instructions adapted under license by [facility] ([facility]). If you have questions about a medical condition or this instruction, always ask your healthcare professional. [address] any warranty or liability for your use of this information.        · Encouraged to increase fluids and to eat nutritiously. · Avoid full bladder, bubble baths, bath oils and hot tubs. · Wipe front to back and void after sexual intercourse. · Continue antibiotic as prescribed until all doses are completed  · Probiotic OTC or greek yogurt daily while on antibiotic  · Will call with urine culture results once obtained. · Patient instructions given for urinary tract infection and ciprofloxacin. · To ER or call 911 if any difficulty breathing, shortness of breath, inability to swallow, hives, rash, facial/tongue swelling or temp greater than 103 degrees. · Follow up with PCP or [facility] if symptoms worsen or do not improve.

## 2017-12-28 ENCOUNTER — HOSPITAL ENCOUNTER (OUTPATIENT)
Age: 72
Setting detail: SPECIMEN
Discharge: HOME | End: 2017-12-28
Payer: MEDICARE

## 2017-12-28 PROCEDURE — 87086 URINE CULTURE/COLONY COUNT: CPT

## 2017-12-29 LAB
CULTURE: NO GROWTH
CULTURE: NORMAL
Lab: NORMAL
SPECIMEN DESCRIPTION: NORMAL
SPECIMEN DESCRIPTION: NORMAL
STATUS: NORMAL

## 2017-12-30 ENCOUNTER — APPOINTMENT (OUTPATIENT)
Dept: CT IMAGING | Age: 72
End: 2017-12-30
Payer: MEDICARE

## 2017-12-30 ENCOUNTER — APPOINTMENT (OUTPATIENT)
Dept: GENERAL RADIOLOGY | Age: 72
End: 2017-12-30
Payer: MEDICARE

## 2017-12-30 ENCOUNTER — HOSPITAL ENCOUNTER (EMERGENCY)
Age: 72
Discharge: HOME | End: 2017-12-30
Attending: FAMILY MEDICINE
Payer: MEDICARE

## 2017-12-30 VITALS
OXYGEN SATURATION: 94 % | TEMPERATURE: 99.2 F | RESPIRATION RATE: 18 BRPM | SYSTOLIC BLOOD PRESSURE: 131 MMHG | WEIGHT: 200 LBS | DIASTOLIC BLOOD PRESSURE: 80 MMHG | BODY MASS INDEX: 28.7 KG/M2 | HEART RATE: 94 BPM

## 2017-12-30 DIAGNOSIS — N32.0 BLADDER OUTLET OBSTRUCTION: Primary | ICD-10-CM

## 2017-12-30 LAB
-: NORMAL
ABSOLUTE EOS #: 0.1 K/UL (ref 0–0.4)
ABSOLUTE IMMATURE GRANULOCYTE: ABNORMAL K/UL (ref 0–0.3)
ABSOLUTE LYMPH #: 1.3 K/UL (ref 1–4.8)
ABSOLUTE MONO #: 1.2 K/UL (ref 0–1)
ALBUMIN SERPL-MCNC: 4.2 G/DL (ref 3.5–5.2)
ALBUMIN/GLOBULIN RATIO: ABNORMAL (ref 1–2.5)
ALP BLD-CCNC: 89 U/L (ref 40–129)
ALT SERPL-CCNC: 46 U/L (ref 5–41)
AMORPHOUS: NORMAL
AMYLASE: 47 U/L (ref 28–100)
ANION GAP SERPL CALCULATED.3IONS-SCNC: 16 MMOL/L (ref 9–17)
AST SERPL-CCNC: 42 U/L
BACTERIA: NORMAL
BASOPHILS # BLD: 1 % (ref 0–2)
BASOPHILS ABSOLUTE: 0.1 K/UL (ref 0–0.2)
BILIRUB SERPL-MCNC: 0.69 MG/DL (ref 0.3–1.2)
BILIRUBIN URINE: NEGATIVE
BUN BLDV-MCNC: 19 MG/DL (ref 8–23)
BUN/CREAT BLD: 25 (ref 9–20)
CALCIUM SERPL-MCNC: 9.7 MG/DL (ref 8.6–10.4)
CASTS UA: NORMAL /LPF
CHLORIDE BLD-SCNC: 101 MMOL/L (ref 98–107)
CO2: 26 MMOL/L (ref 20–31)
COLOR: YELLOW
COMMENT UA: ABNORMAL
CREAT SERPL-MCNC: 0.76 MG/DL (ref 0.7–1.2)
CRYSTALS, UA: NORMAL /HPF
DIFFERENTIAL TYPE: YES
EOSINOPHILS RELATIVE PERCENT: 1 % (ref 0–5)
EPITHELIAL CELLS UA: NORMAL /HPF
GFR AFRICAN AMERICAN: >60 ML/MIN
GFR NON-AFRICAN AMERICAN: >60 ML/MIN
GFR SERPL CREATININE-BSD FRML MDRD: ABNORMAL ML/MIN/{1.73_M2}
GFR SERPL CREATININE-BSD FRML MDRD: ABNORMAL ML/MIN/{1.73_M2}
GLUCOSE BLD-MCNC: 128 MG/DL (ref 70–99)
GLUCOSE URINE: NEGATIVE
HCT VFR BLD CALC: 48.1 % (ref 41–53)
HEMOGLOBIN: 15.8 G/DL (ref 13.5–17.5)
IMMATURE GRANULOCYTES: ABNORMAL %
KETONES, URINE: NEGATIVE
LEUKOCYTE ESTERASE, URINE: NEGATIVE
LIPASE: 23 U/L (ref 13–60)
LYMPHOCYTES # BLD: 11 % (ref 13–44)
MCH RBC QN AUTO: 29.2 PG (ref 26–34)
MCHC RBC AUTO-ENTMCNC: 32.9 G/DL (ref 31–37)
MCV RBC AUTO: 89 FL (ref 80–100)
MONOCYTES # BLD: 11 % (ref 5–9)
MUCUS: NORMAL
NITRITE, URINE: NEGATIVE
OTHER OBSERVATIONS UA: NORMAL
PDW BLD-RTO: 13.6 % (ref 12.1–15.2)
PH UA: 5 (ref 5–8)
PLATELET # BLD: 247 K/UL (ref 140–450)
PLATELET ESTIMATE: ABNORMAL
PMV BLD AUTO: ABNORMAL FL (ref 6–12)
POTASSIUM SERPL-SCNC: 3.5 MMOL/L (ref 3.7–5.3)
PROTEIN UA: NEGATIVE
RBC # BLD: 5.41 M/UL (ref 4.5–5.9)
RBC # BLD: ABNORMAL 10*6/UL
RBC UA: NORMAL /HPF (ref 0–2)
RENAL EPITHELIAL, UA: NORMAL /HPF
SEG NEUTROPHILS: 76 % (ref 39–75)
SEGMENTED NEUTROPHILS ABSOLUTE COUNT: 8.8 K/UL (ref 2.1–6.5)
SODIUM BLD-SCNC: 143 MMOL/L (ref 135–144)
SPECIFIC GRAVITY UA: 1.02 (ref 1–1.03)
TOTAL PROTEIN: 7.9 G/DL (ref 6.4–8.3)
TRICHOMONAS: NORMAL
TURBIDITY: CLEAR
URINE HGB: ABNORMAL
UROBILINOGEN, URINE: NORMAL
WBC # BLD: 11.5 K/UL (ref 3.5–11)
WBC # BLD: ABNORMAL 10*3/UL
WBC UA: NORMAL /HPF
YEAST: NORMAL

## 2017-12-30 PROCEDURE — 81001 URINALYSIS AUTO W/SCOPE: CPT

## 2017-12-30 PROCEDURE — 74176 CT ABD & PELVIS W/O CONTRAST: CPT

## 2017-12-30 PROCEDURE — 36415 COLL VENOUS BLD VENIPUNCTURE: CPT

## 2017-12-30 PROCEDURE — 6360000002 HC RX W HCPCS: Performed by: FAMILY MEDICINE

## 2017-12-30 PROCEDURE — 99284 EMERGENCY DEPT VISIT MOD MDM: CPT

## 2017-12-30 PROCEDURE — 85025 COMPLETE CBC W/AUTO DIFF WBC: CPT

## 2017-12-30 PROCEDURE — 83690 ASSAY OF LIPASE: CPT

## 2017-12-30 PROCEDURE — 80053 COMPREHEN METABOLIC PANEL: CPT

## 2017-12-30 PROCEDURE — 72110 X-RAY EXAM L-2 SPINE 4/>VWS: CPT

## 2017-12-30 PROCEDURE — 82150 ASSAY OF AMYLASE: CPT

## 2017-12-30 PROCEDURE — 96376 TX/PRO/DX INJ SAME DRUG ADON: CPT

## 2017-12-30 PROCEDURE — 51702 INSERT TEMP BLADDER CATH: CPT

## 2017-12-30 PROCEDURE — 96374 THER/PROPH/DIAG INJ IV PUSH: CPT

## 2017-12-30 RX ADMIN — HYDROMORPHONE HYDROCHLORIDE 1 MG: 1 INJECTION, SOLUTION INTRAMUSCULAR; INTRAVENOUS; SUBCUTANEOUS at 19:11

## 2017-12-30 RX ADMIN — HYDROMORPHONE HYDROCHLORIDE 1 MG: 1 INJECTION, SOLUTION INTRAMUSCULAR; INTRAVENOUS; SUBCUTANEOUS at 18:10

## 2017-12-30 ASSESSMENT — PAIN SCALES - GENERAL
PAINLEVEL_OUTOF10: 5
PAINLEVEL_OUTOF10: 5
PAINLEVEL_OUTOF10: 4
PAINLEVEL_OUTOF10: 10
PAINLEVEL_OUTOF10: 10

## 2017-12-30 ASSESSMENT — PAIN DESCRIPTION - LOCATION
LOCATION: FLANK
LOCATION: FLANK;BACK

## 2017-12-30 ASSESSMENT — PAIN DESCRIPTION - PAIN TYPE
TYPE: ACUTE PAIN

## 2017-12-31 NOTE — ED PROVIDER NOTES
20 MG tablet Take 1 tablet by mouth nightly 90 tablet 1    tamsulosin (FLOMAX) 0.4 MG capsule Take 1 capsule by mouth daily 90 capsule 1    HYDROcodone-acetaminophen (NORCO) 5-325 MG per tablet every 6 hours as needed  .  lidocaine (LIDODERM) 5 % Place 1 patch onto the skin      hydrocortisone (WESTCORT) 0.2 % cream Apply topically 2 times daily. 30 g 1    Multiple Vitamins-Minerals (THERAPEUTIC MULTIVITAMIN-MINERALS) tablet Take 1 tablet by mouth daily.  gabapentin (NEURONTIN) 300 MG capsule Take 300 mg by mouth daily.  aspirin 81 MG tablet Take 81 mg by mouth daily.  gabapentin (NEURONTIN) 600 MG tablet Take 600 mg by mouth 4 times daily.  ciprofloxacin (CIPRO) 500 MG tablet Take 1 tablet by mouth 2 times daily for 10 days 20 tablet 0       ALLERGIES    Allergies   Allergen Reactions    Cortisone Other (See Comments)     Irritates \"GERD\"        FAMILY HISTORY    Family History   Problem Relation Age of Onset    Cancer Mother      bone    Heart Disease Father      MI       SOCIAL HISTORY    Social History     Social History    Marital status:      Spouse name: N/A    Number of children: N/A    Years of education: N/A     Social History Main Topics    Smoking status: Former Smoker     Packs/day: 1.00     Years: 40.00     Quit date: 2/28/2014    Smokeless tobacco: Never Used    Alcohol use Yes      Comment: seldom    Drug use: No    Sexual activity: Not Asked     Other Topics Concern    None     Social History Narrative    None       PHYSICAL EXAM    VITAL SIGNS: /80   Pulse 94   Temp 99.2 °F (37.3 °C) (Oral)   Resp 18   Wt 200 lb (90.7 kg)   SpO2 94%   BMI 28.70 kg/m²    Constitutional:  Well developed, well nourished, moderate acute distress, non-toxic appearance HENT:  Atraumatic, external ears normal, nose normal, oropharynx moist. Neck- normal range of motion, no tenderness, supple   Respiratory:  No respiratory distress, normal breath sounds. Cardiovascular:  Normal rate, normal rhythm, no murmurs, no gallops, no rubs   GI:  Soft, nondistended, nontender, no organomegaly, no mass, no rebound, no guarding   :  No costovertebral angle tenderness   Musculoskeletal:  No edema, no tenderness, no deformities. Back- Normal  Integument:  Well hydrated   Neurologic:  Mostly intact    EKG        RADIOLOGY/PROCEDURES    CT of abdomen and pelvis revealed mild to moderate bilateral hydronephrosis and hydroureter with marked urinary bladder distention. No urinary tract calculi. Findings suggest bladder outlet obstruction likely related to patient's enlarged prostate gland. Moderate amount of stool within the ascending colon. No evidence for bowel obstruction    ED COURSE & MEDICAL DECISION MAKING    Pertinent Labs & Imaging studies reviewed. (See chart for details)  Discussed the case with Dr. Sarath Marcus. He recommended placing a urinary catheter. This was done in the ER. Patient was stable on discharge. FINAL IMPRESSION    1. Bladder outlet obstruction  2. Summation      Patient Course: CT of abdomen and pelvis revealed mild to moderate bilateral hydronephrosis and hydroureter with marked urinary bladder distention. No urinary tract calculi. Findings suggest bladder outlet obstruction likely related to patient's enlarged prostate gland. Moderate amount of stool within the ascending colon. No evidence for bowel obstruction. Patient was discussed with Dr. Sarath Marcus. He recommended placing a urinary catheter. This was done in the ER. Patient was stable on discharge.     ED Medications administered this visit:    Medications   HYDROmorphone (DILAUDID) injection 1 mg (1 mg Intravenous Given 12/30/17 1810)   HYDROmorphone (DILAUDID) injection 1 mg (1 mg Intravenous Given 12/30/17 1911)       New Prescriptions from this visit:    Discharge Medication List as of 12/30/2017  6:48 PM          Follow-up:  Melony Ahumada, MD [address]

## 2018-01-02 ENCOUNTER — TELEPHONE (OUTPATIENT)
Dept: UROLOGY | Age: 73
End: 2018-01-02

## 2018-01-03 ENCOUNTER — OFFICE VISIT (OUTPATIENT)
Dept: UROLOGY | Age: 73
End: 2018-01-03
Payer: MEDICARE

## 2018-01-03 VITALS
WEIGHT: 201 LBS | TEMPERATURE: 98.8 F | HEIGHT: 70 IN | SYSTOLIC BLOOD PRESSURE: 128 MMHG | BODY MASS INDEX: 28.77 KG/M2 | DIASTOLIC BLOOD PRESSURE: 80 MMHG

## 2018-01-03 DIAGNOSIS — N41.0 ACUTE PROSTATITIS: Primary | ICD-10-CM

## 2018-01-03 DIAGNOSIS — R33.9 URINARY RETENTION: ICD-10-CM

## 2018-01-03 DIAGNOSIS — N40.1 BPH WITH OBSTRUCTION/LOWER URINARY TRACT SYMPTOMS: ICD-10-CM

## 2018-01-03 DIAGNOSIS — K59.00 CONSTIPATION, UNSPECIFIED CONSTIPATION TYPE: ICD-10-CM

## 2018-01-03 DIAGNOSIS — N13.8 BPH WITH OBSTRUCTION/LOWER URINARY TRACT SYMPTOMS: ICD-10-CM

## 2018-01-03 DIAGNOSIS — N40.1 BENIGN NON-NODULAR PROSTATIC HYPERPLASIA WITH LOWER URINARY TRACT SYMPTOMS: ICD-10-CM

## 2018-01-03 PROCEDURE — 1036F TOBACCO NON-USER: CPT | Performed by: NURSE PRACTITIONER

## 2018-01-03 PROCEDURE — 99214 OFFICE O/P EST MOD 30 MIN: CPT | Performed by: NURSE PRACTITIONER

## 2018-01-03 PROCEDURE — G8427 DOCREV CUR MEDS BY ELIG CLIN: HCPCS | Performed by: NURSE PRACTITIONER

## 2018-01-03 PROCEDURE — 1123F ACP DISCUSS/DSCN MKR DOCD: CPT | Performed by: NURSE PRACTITIONER

## 2018-01-03 PROCEDURE — 3017F COLORECTAL CA SCREEN DOC REV: CPT | Performed by: NURSE PRACTITIONER

## 2018-01-03 PROCEDURE — 4040F PNEUMOC VAC/ADMIN/RCVD: CPT | Performed by: NURSE PRACTITIONER

## 2018-01-03 PROCEDURE — G8484 FLU IMMUNIZE NO ADMIN: HCPCS | Performed by: NURSE PRACTITIONER

## 2018-01-03 PROCEDURE — G8419 CALC BMI OUT NRM PARAM NOF/U: HCPCS | Performed by: NURSE PRACTITIONER

## 2018-01-03 RX ORDER — TAMSULOSIN HYDROCHLORIDE 0.4 MG/1
0.4 CAPSULE ORAL 2 TIMES DAILY
Qty: 90 CAPSULE | Refills: 1 | Status: SHIPPED | OUTPATIENT
Start: 2018-01-03 | End: 2018-03-31 | Stop reason: SDUPTHER

## 2018-01-03 RX ORDER — CIPROFLOXACIN 500 MG/1
500 TABLET, FILM COATED ORAL 2 TIMES DAILY
Qty: 28 TABLET | Refills: 0 | Status: SHIPPED | OUTPATIENT
Start: 2018-01-03 | End: 2018-01-17

## 2018-01-03 RX ORDER — POLYETHYLENE GLYCOL 3350 17 G/17G
17 POWDER, FOR SOLUTION ORAL DAILY
Qty: 510 G | Refills: 11 | Status: SHIPPED | OUTPATIENT
Start: 2018-01-03 | End: 2018-02-02

## 2018-01-03 ASSESSMENT — ENCOUNTER SYMPTOMS
NAUSEA: 0
COUGH: 0
WHEEZING: 0
SHORTNESS OF BREATH: 0
BACK PAIN: 0
EYE REDNESS: 0
COLOR CHANGE: 0
VOMITING: 0
EYE PAIN: 0
CONSTIPATION: 0
ABDOMINAL PAIN: 0

## 2018-01-03 NOTE — PROGRESS NOTES
Subjective:      Patient ID: Charlie Fletcher is a 81 y.o. male. HPI  Patient is a 81 y.o. male in no acute distress and alert and oriented to person, place and time. History  2014 Evaluated for hydrocele. Never opted for surgery and was lost to follow-up.     01/2016 sudden onset LUTS x 1 wk. IPSS 35 QOL 6 (compared to baseline 8/1). POC UA negative. PCP increased his Flomax to 0.8mg daily without much improvement. Does have perineal pain and pressure in the rectum. Has some pain with urination. Pt has never had prostatitis in past. . Recent PSA 1/6/16 - 2.92   Treated with 4 wks abx & 1 wk NSAIDs for suspected prostatitis. TODAY  Here today for ER follow-up for urinary retention. Patient was evaluated in the ER on 12/30/17 because he was unable to urinate. He did have a Mejía catheter placed with over 1200 mL returned. Prior to presenting to the ER he was evaluated in the urgent care for decreased urination. He was started on empiric Cipro. Urine culture from 12/28/17 was negative for infection. Patient does not recall any perineal or rectal pain, but he did have suprapubic pain and the pain radiated to his right flank. While in the ER they did complete a CT scan. This was independently reviewed at today's visit and does show bilateral hydronephrosis and hydroureter. There is no evidence of  calcifications. The bladder is distended and there is a markedly enlarged prostate gland. There is a moderate amount of stool as well. Patient admits to only having a bowel movement every 3 days. He is taking Flomax daily for history of BPH. He denies any gross hematuria and he is tolerating the Mejía catheter without complaints.   His wife is concerned because they're planning on leaving for [place] on 1/18/18  Past Medical History:   Diagnosis Date    Arthritis     Cancer ([facility] [address]     multiple myeloma    Fibromyalgia     GERD (gastroesophageal reflux disease)     History of blood transfusion explain the importance of regular, daily bowel movements. He will return in 1 week for a void trial.  In the near future we will plan for cystoscopy to evaluate for possible PVP greenlight. Discussed risks and benefits of PVP Greenlight to include, but not limited: risk of anesthesia, bleeding, infection, injury to the  tract, and retrograde ejaculation. We also discussed the need for post-operative pena catheter. We discussed a healing period of 6-10 weeks, but he understand that each case is different and may take more or less healing time. I did send patient home with education material regarding  PVP greenlight.

## 2018-01-04 ENCOUNTER — TELEPHONE (OUTPATIENT)
Dept: UROLOGY | Age: 73
End: 2018-01-04

## 2018-01-04 NOTE — TELEPHONE ENCOUNTER
Patient called to report that he is having pink colored urine. Has a catheter, denies fevers/chills. Scheduled to have void trial 1/9/17 and cysto 1/15/17 . Patient advised to increase his water intake and to make sure catheter is secure and not getting tugged on. To call the office or go to the ER if having increased bleeding and/or fevers/chills. Patient voiced understanding.

## 2018-01-09 ENCOUNTER — TELEPHONE (OUTPATIENT)
Dept: UROLOGY | Age: 73
End: 2018-01-09

## 2018-01-09 ENCOUNTER — OFFICE VISIT (OUTPATIENT)
Dept: UROLOGY | Age: 73
End: 2018-01-09
Payer: MEDICARE

## 2018-01-09 VITALS
TEMPERATURE: 97.9 F | WEIGHT: 201 LBS | HEIGHT: 70 IN | BODY MASS INDEX: 28.77 KG/M2 | DIASTOLIC BLOOD PRESSURE: 90 MMHG | SYSTOLIC BLOOD PRESSURE: 138 MMHG

## 2018-01-09 DIAGNOSIS — N40.1 BPH WITH OBSTRUCTION/LOWER URINARY TRACT SYMPTOMS: ICD-10-CM

## 2018-01-09 DIAGNOSIS — R33.9 URINARY RETENTION: Primary | ICD-10-CM

## 2018-01-09 DIAGNOSIS — N13.8 BPH WITH OBSTRUCTION/LOWER URINARY TRACT SYMPTOMS: ICD-10-CM

## 2018-01-09 DIAGNOSIS — K59.00 CONSTIPATION, UNSPECIFIED CONSTIPATION TYPE: ICD-10-CM

## 2018-01-09 DIAGNOSIS — N41.0 ACUTE PROSTATITIS: ICD-10-CM

## 2018-01-09 PROCEDURE — G8427 DOCREV CUR MEDS BY ELIG CLIN: HCPCS | Performed by: NURSE PRACTITIONER

## 2018-01-09 PROCEDURE — G8484 FLU IMMUNIZE NO ADMIN: HCPCS | Performed by: NURSE PRACTITIONER

## 2018-01-09 PROCEDURE — 1123F ACP DISCUSS/DSCN MKR DOCD: CPT | Performed by: NURSE PRACTITIONER

## 2018-01-09 PROCEDURE — 1036F TOBACCO NON-USER: CPT | Performed by: NURSE PRACTITIONER

## 2018-01-09 PROCEDURE — 4040F PNEUMOC VAC/ADMIN/RCVD: CPT | Performed by: NURSE PRACTITIONER

## 2018-01-09 PROCEDURE — 3017F COLORECTAL CA SCREEN DOC REV: CPT | Performed by: NURSE PRACTITIONER

## 2018-01-09 PROCEDURE — 99213 OFFICE O/P EST LOW 20 MIN: CPT | Performed by: NURSE PRACTITIONER

## 2018-01-09 PROCEDURE — G8419 CALC BMI OUT NRM PARAM NOF/U: HCPCS | Performed by: NURSE PRACTITIONER

## 2018-01-09 ASSESSMENT — ENCOUNTER SYMPTOMS
COUGH: 0
ABDOMINAL PAIN: 0
EYE REDNESS: 0
EYE PAIN: 0
NAUSEA: 0
SHORTNESS OF BREATH: 0
COLOR CHANGE: 0
VOMITING: 0
CONSTIPATION: 0
BACK PAIN: 0
WHEEZING: 0

## 2018-01-09 NOTE — PROGRESS NOTES
the skin      hydrocortisone (WESTCORT) 0.2 % cream Apply topically 2 times daily. 30 g 1    Multiple Vitamins-Minerals (THERAPEUTIC MULTIVITAMIN-MINERALS) tablet Take 1 tablet by mouth daily.  gabapentin (NEURONTIN) 300 MG capsule Take 300 mg by mouth daily.  aspirin 81 MG tablet Take 81 mg by mouth daily.  gabapentin (NEURONTIN) 600 MG tablet Take 600 mg by mouth 4 times daily. No current facility-administered medications on file prior to visit. Outpatient Encounter Prescriptions as of 1/9/2018   Medication Sig Dispense Refill    ciprofloxacin (CIPRO) 500 MG tablet Take 1 tablet by mouth 2 times daily for 14 days 28 tablet 0    tamsulosin (FLOMAX) 0.4 MG capsule Take 1 capsule by mouth 2 times daily 90 capsule 1    polyethylene glycol (MIRALAX) powder Take 17 g by mouth daily 510 g 11    amLODIPine (NORVASC) 5 MG tablet Take 1 tablet by mouth daily 90 tablet 1    levothyroxine (SYNTHROID) 75 MCG tablet TAKE 1 TABLET DAILY 90 tablet 1    pantoprazole (PROTONIX) 40 MG tablet Take 1 tablet by mouth 2 times daily 180 tablet 1    simvastatin (ZOCOR) 20 MG tablet Take 1 tablet by mouth nightly 90 tablet 1    HYDROcodone-acetaminophen (NORCO) 5-325 MG per tablet every 6 hours as needed  .  lidocaine (LIDODERM) 5 % Place 1 patch onto the skin      hydrocortisone (WESTCORT) 0.2 % cream Apply topically 2 times daily. 30 g 1    Multiple Vitamins-Minerals (THERAPEUTIC MULTIVITAMIN-MINERALS) tablet Take 1 tablet by mouth daily.  gabapentin (NEURONTIN) 300 MG capsule Take 300 mg by mouth daily.  aspirin 81 MG tablet Take 81 mg by mouth daily.  gabapentin (NEURONTIN) 600 MG tablet Take 600 mg by mouth 4 times daily. No facility-administered encounter medications on file as of 1/9/2018.       Cortisone  History   Smoking Status    Former Smoker    Packs/day: 1.00    Years: 40.00    Quit date: 2/28/2014   Smokeless Tobacco    Never Used       History   Alcohol plenty of fluids, aim for 80 oz daily for the next few days. · It is normal to feel a little discomfort in the penis the next few times you void. · Try to urinate every 2 hours for the next 6 hours (even if you don't feel like you have to void). · If you DO urinate, please record the amount. · If you do NOT urinate within about 6 hours OR if you feel the strong uncomfortable urge to void but are not able - you'll need to come back to the office to get the catheter replaced. · Either way, call our office around 3:30 pm and let us know the void amounts (if you are urinating) or let us know that you're on your way back to the office (if you are not urinating). · Continue flomax, cipro, and miralax as prescribed.

## 2018-01-10 ENCOUNTER — HOSPITAL ENCOUNTER (OUTPATIENT)
Age: 73
Setting detail: SPECIMEN
Discharge: HOME | End: 2018-01-10
Payer: MEDICARE

## 2018-01-10 ENCOUNTER — NURSE ONLY (OUTPATIENT)
Dept: UROLOGY | Age: 73
End: 2018-01-10
Payer: MEDICARE

## 2018-01-10 ENCOUNTER — TELEPHONE (OUTPATIENT)
Dept: UROLOGY | Age: 73
End: 2018-01-10

## 2018-01-10 VITALS
DIASTOLIC BLOOD PRESSURE: 84 MMHG | SYSTOLIC BLOOD PRESSURE: 122 MMHG | TEMPERATURE: 97.5 F | BODY MASS INDEX: 28.63 KG/M2 | HEIGHT: 70 IN | WEIGHT: 200 LBS

## 2018-01-10 DIAGNOSIS — N41.0 ACUTE PROSTATITIS: ICD-10-CM

## 2018-01-10 DIAGNOSIS — N40.1 BPH WITH OBSTRUCTION/LOWER URINARY TRACT SYMPTOMS: ICD-10-CM

## 2018-01-10 DIAGNOSIS — R35.0 URINARY FREQUENCY: ICD-10-CM

## 2018-01-10 DIAGNOSIS — R33.9 URINARY RETENTION: Primary | ICD-10-CM

## 2018-01-10 DIAGNOSIS — N13.8 BPH WITH OBSTRUCTION/LOWER URINARY TRACT SYMPTOMS: ICD-10-CM

## 2018-01-10 DIAGNOSIS — R39.15 URGENCY OF URINATION: ICD-10-CM

## 2018-01-10 DIAGNOSIS — R33.9 URINARY RETENTION: ICD-10-CM

## 2018-01-10 LAB
-: NORMAL
AMORPHOUS: NORMAL
BACTERIA: NORMAL
BILIRUBIN URINE: NEGATIVE
CASTS UA: NORMAL /LPF
COLOR: YELLOW
COMMENT UA: NORMAL
CRYSTALS, UA: NORMAL /HPF
EPITHELIAL CELLS UA: NORMAL /HPF (ref 0–5)
GLUCOSE URINE: NEGATIVE
KETONES, URINE: NEGATIVE
LEUKOCYTE ESTERASE, URINE: NEGATIVE
MUCUS: NORMAL
NITRITE, URINE: NEGATIVE
OTHER OBSERVATIONS UA: NORMAL
PH UA: 6 (ref 5–9)
PROTEIN UA: NEGATIVE
RBC UA: NORMAL /HPF (ref 0–2)
RENAL EPITHELIAL, UA: NORMAL /HPF
SPECIFIC GRAVITY UA: 1.01 (ref 1.01–1.02)
TRICHOMONAS: NORMAL
TURBIDITY: CLEAR
URINE HGB: NEGATIVE
UROBILINOGEN, URINE: NORMAL
WBC UA: NORMAL /HPF (ref 0–5)
YEAST: NORMAL

## 2018-01-10 PROCEDURE — 87086 URINE CULTURE/COLONY COUNT: CPT

## 2018-01-10 PROCEDURE — 99214 OFFICE O/P EST MOD 30 MIN: CPT | Performed by: NURSE PRACTITIONER

## 2018-01-10 PROCEDURE — 81001 URINALYSIS AUTO W/SCOPE: CPT

## 2018-01-10 PROCEDURE — 51798 US URINE CAPACITY MEASURE: CPT | Performed by: NURSE PRACTITIONER

## 2018-01-10 ASSESSMENT — ENCOUNTER SYMPTOMS
COUGH: 0
SHORTNESS OF BREATH: 0
ABDOMINAL PAIN: 0
EYE REDNESS: 0
BACK PAIN: 0
COLOR CHANGE: 0
WHEEZING: 0
NAUSEA: 0
EYE PAIN: 0
VOMITING: 0
CONSTIPATION: 0

## 2018-01-10 NOTE — PROGRESS NOTES
Bladderscan performed in office today:  Pt voided - 100 mL, PVR - 999 mL    New 16F  Coude pena catheter placed under sterile technique without difficulty, with 10 mL sterile water instilled into balloon. Return of 920 mL urine. Pt tolerated catheter insertion well. Pt was instructed on catheter care and sent home with printed information. Pt advised to call office if develops pain or fever, leaking around catheter, if catheter stops draining, or for any concerns.

## 2018-01-10 NOTE — PROGRESS NOTES
day.  He denies fevers, gross hematuria, or dysuria. PVR 999ml. Past Medical History:   Diagnosis Date    Arthritis     Cancer ([facility] [address]     multiple myeloma    Fibromyalgia     GERD (gastroesophageal reflux disease)     History of blood transfusion     At birth     Hyperlipidemia     Hypertension     Hypothyroidism     IBS (irritable bowel syndrome)     Osteoarthritis     Thyroid disease     Hypothyroid      Past Surgical History:   Procedure Laterality Date    ABDOMEN SURGERY  2004    Bowel Resection following \"fall on the ice\"; Adonis Soliz MD    APPENDECTOMY      COLONOSCOPY      COLONOSCOPY  09-    Dr. Green Prom      Hiatal Hernia repair, Esophageal repair     HERNIA REPAIR Bilateral     Inguinal     TONSILLECTOMY      UPPER GASTROINTESTINAL ENDOSCOPY      UPPER GASTROINTESTINAL ENDOSCOPY  09-    Dr. Thomas Patricia  12/14/2016    Kunal Peacock MD     Family History   Problem Relation Age of Onset    Cancer Mother      bone    Heart Disease Father      MI     Current Outpatient Prescriptions on File Prior to Visit   Medication Sig Dispense Refill    ciprofloxacin (CIPRO) 500 MG tablet Take 1 tablet by mouth 2 times daily for 14 days 28 tablet 0    tamsulosin (FLOMAX) 0.4 MG capsule Take 1 capsule by mouth 2 times daily 90 capsule 1    polyethylene glycol (MIRALAX) powder Take 17 g by mouth daily 510 g 11    amLODIPine (NORVASC) 5 MG tablet Take 1 tablet by mouth daily 90 tablet 1    levothyroxine (SYNTHROID) 75 MCG tablet TAKE 1 TABLET DAILY 90 tablet 1    pantoprazole (PROTONIX) 40 MG tablet Take 1 tablet by mouth 2 times daily 180 tablet 1    simvastatin (ZOCOR) 20 MG tablet Take 1 tablet by mouth nightly 90 tablet 1    HYDROcodone-acetaminophen (NORCO) 5-325 MG per tablet every 6 hours as needed  .       lidocaine (LIDODERM) 5 % Place 1 patch onto the skin      hydrocortisone (WESTCORT) 0.2 % cream Apply topically 2 times daily. 30 g 1    Multiple Vitamins-Minerals (THERAPEUTIC MULTIVITAMIN-MINERALS) tablet Take 1 tablet by mouth daily.  gabapentin (NEURONTIN) 300 MG capsule Take 300 mg by mouth daily.  aspirin 81 MG tablet Take 81 mg by mouth daily.  gabapentin (NEURONTIN) 600 MG tablet Take 600 mg by mouth 4 times daily. No current facility-administered medications on file prior to visit. Outpatient Encounter Prescriptions as of 1/10/2018   Medication Sig Dispense Refill    ciprofloxacin (CIPRO) 500 MG tablet Take 1 tablet by mouth 2 times daily for 14 days 28 tablet 0    tamsulosin (FLOMAX) 0.4 MG capsule Take 1 capsule by mouth 2 times daily 90 capsule 1    polyethylene glycol (MIRALAX) powder Take 17 g by mouth daily 510 g 11    amLODIPine (NORVASC) 5 MG tablet Take 1 tablet by mouth daily 90 tablet 1    levothyroxine (SYNTHROID) 75 MCG tablet TAKE 1 TABLET DAILY 90 tablet 1    pantoprazole (PROTONIX) 40 MG tablet Take 1 tablet by mouth 2 times daily 180 tablet 1    simvastatin (ZOCOR) 20 MG tablet Take 1 tablet by mouth nightly 90 tablet 1    HYDROcodone-acetaminophen (NORCO) 5-325 MG per tablet every 6 hours as needed  .  lidocaine (LIDODERM) 5 % Place 1 patch onto the skin      hydrocortisone (WESTCORT) 0.2 % cream Apply topically 2 times daily. 30 g 1    Multiple Vitamins-Minerals (THERAPEUTIC MULTIVITAMIN-MINERALS) tablet Take 1 tablet by mouth daily.  gabapentin (NEURONTIN) 300 MG capsule Take 300 mg by mouth daily.  aspirin 81 MG tablet Take 81 mg by mouth daily.  gabapentin (NEURONTIN) 600 MG tablet Take 600 mg by mouth 4 times daily. No facility-administered encounter medications on file as of 1/10/2018.       Cortisone  History   Smoking Status    Former Smoker    Packs/day: 1.00    Years: 40.00    Quit date: 2/28/2014   Smokeless Tobacco    Never Used       History   Alcohol Use    Yes     Comment:

## 2018-01-12 ENCOUNTER — TELEPHONE (OUTPATIENT)
Dept: UROLOGY | Age: 73
End: 2018-01-12

## 2018-01-15 ENCOUNTER — PROCEDURE VISIT (OUTPATIENT)
Dept: UROLOGY | Age: 73
End: 2018-01-15
Payer: MEDICARE

## 2018-01-15 ENCOUNTER — HOSPITAL ENCOUNTER (OUTPATIENT)
Age: 73
Discharge: HOME | End: 2018-01-15
Payer: MEDICARE

## 2018-01-15 VITALS
HEIGHT: 70 IN | DIASTOLIC BLOOD PRESSURE: 80 MMHG | WEIGHT: 203 LBS | BODY MASS INDEX: 29.06 KG/M2 | SYSTOLIC BLOOD PRESSURE: 142 MMHG

## 2018-01-15 DIAGNOSIS — N40.1 BPH WITH OBSTRUCTION/LOWER URINARY TRACT SYMPTOMS: Primary | ICD-10-CM

## 2018-01-15 DIAGNOSIS — R33.9 URINARY RETENTION: ICD-10-CM

## 2018-01-15 DIAGNOSIS — N13.8 BPH WITH OBSTRUCTION/LOWER URINARY TRACT SYMPTOMS: Primary | ICD-10-CM

## 2018-01-15 LAB
EKG ATRIAL RATE: 82 BPM
EKG P AXIS: 24 DEGREES
EKG P-R INTERVAL: 150 MS
EKG Q-T INTERVAL: 372 MS
EKG QRS DURATION: 84 MS
EKG QTC CALCULATION (BAZETT): 434 MS
EKG R AXIS: 96 DEGREES
EKG T AXIS: 21 DEGREES
EKG VENTRICULAR RATE: 82 BPM

## 2018-01-15 PROCEDURE — 4040F PNEUMOC VAC/ADMIN/RCVD: CPT | Performed by: UROLOGY

## 2018-01-15 PROCEDURE — G8427 DOCREV CUR MEDS BY ELIG CLIN: HCPCS | Performed by: UROLOGY

## 2018-01-15 PROCEDURE — 1123F ACP DISCUSS/DSCN MKR DOCD: CPT | Performed by: UROLOGY

## 2018-01-15 PROCEDURE — 99214 OFFICE O/P EST MOD 30 MIN: CPT | Performed by: UROLOGY

## 2018-01-15 PROCEDURE — 52000 CYSTOURETHROSCOPY: CPT | Performed by: UROLOGY

## 2018-01-15 PROCEDURE — 3017F COLORECTAL CA SCREEN DOC REV: CPT | Performed by: UROLOGY

## 2018-01-15 PROCEDURE — G8419 CALC BMI OUT NRM PARAM NOF/U: HCPCS | Performed by: UROLOGY

## 2018-01-15 PROCEDURE — 1036F TOBACCO NON-USER: CPT | Performed by: UROLOGY

## 2018-01-15 PROCEDURE — 93005 ELECTROCARDIOGRAM TRACING: CPT

## 2018-01-15 PROCEDURE — G8484 FLU IMMUNIZE NO ADMIN: HCPCS | Performed by: UROLOGY

## 2018-01-15 ASSESSMENT — ENCOUNTER SYMPTOMS
COUGH: 0
EYE REDNESS: 0
SHORTNESS OF BREATH: 0
VOMITING: 0
BACK PAIN: 0
EYE PAIN: 0
WHEEZING: 0
NAUSEA: 0
COLOR CHANGE: 0
ABDOMINAL PAIN: 0

## 2018-01-15 NOTE — PROGRESS NOTES
capsule Take 1 capsule by mouth 2 times daily 90 capsule 1    polyethylene glycol (MIRALAX) powder Take 17 g by mouth daily 510 g 11    amLODIPine (NORVASC) 5 MG tablet Take 1 tablet by mouth daily 90 tablet 1    levothyroxine (SYNTHROID) 75 MCG tablet TAKE 1 TABLET DAILY 90 tablet 1    pantoprazole (PROTONIX) 40 MG tablet Take 1 tablet by mouth 2 times daily 180 tablet 1    simvastatin (ZOCOR) 20 MG tablet Take 1 tablet by mouth nightly 90 tablet 1    HYDROcodone-acetaminophen (NORCO) 5-325 MG per tablet every 6 hours as needed  .  lidocaine (LIDODERM) 5 % Place 1 patch onto the skin      hydrocortisone (WESTCORT) 0.2 % cream Apply topically 2 times daily. 30 g 1    Multiple Vitamins-Minerals (THERAPEUTIC MULTIVITAMIN-MINERALS) tablet Take 1 tablet by mouth daily.  gabapentin (NEURONTIN) 300 MG capsule Take 300 mg by mouth daily.  aspirin 81 MG tablet Take 81 mg by mouth daily.  gabapentin (NEURONTIN) 600 MG tablet Take 600 mg by mouth 4 times daily. No facility-administered encounter medications on file as of 1/15/2018. Cortisone  History   Smoking Status    Former Smoker    Packs/day: 1.00    Years: 40.00    Quit date: 2/28/2014   Smokeless Tobacco    Never Used       History   Alcohol Use    Yes     Comment: seldom       There were no vitals filed for this visit. Constitutional: Patient in no acute distress; Neuro: alert and oriented to person place and time. Psych: Mood and affect normal.  Skin: Normal  Lungs: Respiratory effort normal  Cardiovascular:  Normal peripheral pulses  Abdomen: Soft, non-tender, non-distended with no CVA, flank pain, hepatosplenomegaly or hernia. Kidneys normal.  Bladder non-tender and not distended.   Lymphatics: no palpable lymphadenopathy  Penis normal  Urethral meatus normal  Scrotal exam normal  Testicles normal bilaterally  Epididymis normal bilaterally  No evidence of inguinal hernia      Lab Results   Component Value Date

## 2018-01-16 ENCOUNTER — TELEPHONE (OUTPATIENT)
Dept: UROLOGY | Age: 73
End: 2018-01-16

## 2018-01-16 ENCOUNTER — ANESTHESIA EVENT (OUTPATIENT)
Dept: OPERATING ROOM | Age: 73
End: 2018-01-16
Payer: MEDICARE

## 2018-01-22 ENCOUNTER — OFFICE VISIT (OUTPATIENT)
Dept: FAMILY MEDICINE CLINIC | Age: 73
End: 2018-01-22
Payer: MEDICARE

## 2018-01-22 VITALS
WEIGHT: 201 LBS | TEMPERATURE: 98.1 F | BODY MASS INDEX: 28.77 KG/M2 | HEIGHT: 70 IN | SYSTOLIC BLOOD PRESSURE: 122 MMHG | DIASTOLIC BLOOD PRESSURE: 76 MMHG | HEART RATE: 76 BPM

## 2018-01-22 DIAGNOSIS — Z01.818 PRE-OPERATIVE CLEARANCE: ICD-10-CM

## 2018-01-22 DIAGNOSIS — N13.8 BPH WITH OBSTRUCTION/LOWER URINARY TRACT SYMPTOMS: Primary | ICD-10-CM

## 2018-01-22 DIAGNOSIS — N40.1 BPH WITH OBSTRUCTION/LOWER URINARY TRACT SYMPTOMS: Primary | ICD-10-CM

## 2018-01-22 PROCEDURE — 4040F PNEUMOC VAC/ADMIN/RCVD: CPT | Performed by: FAMILY MEDICINE

## 2018-01-22 PROCEDURE — G8419 CALC BMI OUT NRM PARAM NOF/U: HCPCS | Performed by: FAMILY MEDICINE

## 2018-01-22 PROCEDURE — 1123F ACP DISCUSS/DSCN MKR DOCD: CPT | Performed by: FAMILY MEDICINE

## 2018-01-22 PROCEDURE — 99213 OFFICE O/P EST LOW 20 MIN: CPT | Performed by: FAMILY MEDICINE

## 2018-01-22 PROCEDURE — 3017F COLORECTAL CA SCREEN DOC REV: CPT | Performed by: FAMILY MEDICINE

## 2018-01-22 PROCEDURE — 4004F PT TOBACCO SCREEN RCVD TLK: CPT | Performed by: FAMILY MEDICINE

## 2018-01-22 PROCEDURE — G8427 DOCREV CUR MEDS BY ELIG CLIN: HCPCS | Performed by: FAMILY MEDICINE

## 2018-01-22 PROCEDURE — G8484 FLU IMMUNIZE NO ADMIN: HCPCS | Performed by: FAMILY MEDICINE

## 2018-01-22 ASSESSMENT — ENCOUNTER SYMPTOMS
TROUBLE SWALLOWING: 0
SHORTNESS OF BREATH: 0
BLOOD IN STOOL: 0
DIARRHEA: 0
FACIAL SWELLING: 0
EYE REDNESS: 0
VOMITING: 0
COUGH: 0
CONSTIPATION: 0
EYE DISCHARGE: 0
ABDOMINAL PAIN: 0
NAUSEA: 0

## 2018-01-22 NOTE — PROGRESS NOTES
reports that he has been smoking. He has a 40.00 pack-year smoking history. He has never used smokeless tobacco.  Alcohol:      reports that he drinks alcohol. Drug Use:  reports that he does not use drugs. Family History:     Family History   Problem Relation Age of Onset    Cancer Mother      bone    Heart Disease Father      MI       Review of Systems:       Review of Systems   Constitutional: Negative for chills, fatigue and fever. HENT: Negative for congestion, facial swelling and trouble swallowing. Eyes: Negative for discharge and redness. Respiratory: Negative for cough and shortness of breath. Cardiovascular: Negative for chest pain and palpitations. Gastrointestinal: Negative for abdominal pain, blood in stool, constipation, diarrhea, nausea and vomiting. Genitourinary: Positive for difficulty urinating. Negative for dysuria and hematuria. Musculoskeletal: Negative for joint swelling and neck stiffness. Skin: Negative for pallor and rash. Neurological: Negative for dizziness, light-headedness and headaches. Psychiatric/Behavioral: Negative for confusion and sleep disturbance. Physical Exam:     Physical Exam   Constitutional: He is oriented to person, place, and time. He appears well-developed and well-nourished. HENT:   Head: Normocephalic and atraumatic. Eyes: Conjunctivae are normal. Pupils are equal, round, and reactive to light. Right eye exhibits no discharge. Left eye exhibits no discharge. Neck: Neck supple. No thyromegaly present. Cardiovascular: Normal rate, regular rhythm and normal heart sounds. No murmur heard. Pulmonary/Chest: Effort normal and breath sounds normal. No respiratory distress. He has no wheezes. Abdominal: Soft. Bowel sounds are normal. He exhibits no distension. There is no tenderness. Musculoskeletal: He exhibits no edema. Lymphadenopathy:     He has no cervical adenopathy.    Neurological: He is alert and oriented to

## 2018-01-25 ENCOUNTER — HOSPITAL ENCOUNTER (OUTPATIENT)
Age: 73
Setting detail: OUTPATIENT SURGERY
Discharge: HOME | End: 2018-01-25
Attending: UROLOGY | Admitting: UROLOGY
Payer: MEDICARE

## 2018-01-25 ENCOUNTER — ANESTHESIA (OUTPATIENT)
Dept: OPERATING ROOM | Age: 73
End: 2018-01-25
Payer: MEDICARE

## 2018-01-25 VITALS
TEMPERATURE: 98.3 F | DIASTOLIC BLOOD PRESSURE: 71 MMHG | OXYGEN SATURATION: 95 % | RESPIRATION RATE: 16 BRPM | SYSTOLIC BLOOD PRESSURE: 146 MMHG | BODY MASS INDEX: 29.33 KG/M2 | HEIGHT: 69 IN | HEART RATE: 80 BPM | WEIGHT: 198 LBS

## 2018-01-25 VITALS
RESPIRATION RATE: 14 BRPM | SYSTOLIC BLOOD PRESSURE: 100 MMHG | DIASTOLIC BLOOD PRESSURE: 63 MMHG | TEMPERATURE: 98.6 F | OXYGEN SATURATION: 99 %

## 2018-01-25 PROCEDURE — 6360000002 HC RX W HCPCS: Performed by: UROLOGY

## 2018-01-25 PROCEDURE — 2580000003 HC RX 258: Performed by: REGISTERED NURSE

## 2018-01-25 PROCEDURE — 3700000001 HC ADD 15 MINUTES (ANESTHESIA): Performed by: UROLOGY

## 2018-01-25 PROCEDURE — 2500000003 HC RX 250 WO HCPCS: Performed by: REGISTERED NURSE

## 2018-01-25 PROCEDURE — 3700000000 HC ANESTHESIA ATTENDED CARE: Performed by: UROLOGY

## 2018-01-25 PROCEDURE — 7100000011 HC PHASE II RECOVERY - ADDTL 15 MIN: Performed by: UROLOGY

## 2018-01-25 PROCEDURE — 3600000014 HC SURGERY LEVEL 4 ADDTL 15MIN: Performed by: UROLOGY

## 2018-01-25 PROCEDURE — 6370000000 HC RX 637 (ALT 250 FOR IP): Performed by: UROLOGY

## 2018-01-25 PROCEDURE — 7100000010 HC PHASE II RECOVERY - FIRST 15 MIN: Performed by: UROLOGY

## 2018-01-25 PROCEDURE — 2580000003 HC RX 258: Performed by: UROLOGY

## 2018-01-25 PROCEDURE — 6360000002 HC RX W HCPCS: Performed by: REGISTERED NURSE

## 2018-01-25 PROCEDURE — 3600000004 HC SURGERY LEVEL 4 BASE: Performed by: UROLOGY

## 2018-01-25 RX ORDER — PROPOFOL 10 MG/ML
INJECTION, EMULSION INTRAVENOUS PRN
Status: DISCONTINUED | OUTPATIENT
Start: 2018-01-25 | End: 2018-01-25 | Stop reason: SDUPTHER

## 2018-01-25 RX ORDER — MIDAZOLAM HYDROCHLORIDE 1 MG/ML
INJECTION INTRAMUSCULAR; INTRAVENOUS PRN
Status: DISCONTINUED | OUTPATIENT
Start: 2018-01-25 | End: 2018-01-25 | Stop reason: SDUPTHER

## 2018-01-25 RX ORDER — SODIUM CHLORIDE, SODIUM LACTATE, POTASSIUM CHLORIDE, CALCIUM CHLORIDE 600; 310; 30; 20 MG/100ML; MG/100ML; MG/100ML; MG/100ML
INJECTION, SOLUTION INTRAVENOUS CONTINUOUS
Status: DISCONTINUED | OUTPATIENT
Start: 2018-01-25 | End: 2018-01-25 | Stop reason: HOSPADM

## 2018-01-25 RX ORDER — LIDOCAINE HYDROCHLORIDE 10 MG/ML
INJECTION, SOLUTION INFILTRATION; PERINEURAL PRN
Status: DISCONTINUED | OUTPATIENT
Start: 2018-01-25 | End: 2018-01-25 | Stop reason: SDUPTHER

## 2018-01-25 RX ORDER — SODIUM CHLORIDE 0.9 % (FLUSH) 0.9 %
10 SYRINGE (ML) INJECTION PRN
Status: DISCONTINUED | OUTPATIENT
Start: 2018-01-25 | End: 2018-01-25 | Stop reason: HOSPADM

## 2018-01-25 RX ORDER — DOCUSATE SODIUM 100 MG/1
100 CAPSULE, LIQUID FILLED ORAL 2 TIMES DAILY
Qty: 20 CAPSULE | Refills: 0 | Status: SHIPPED | OUTPATIENT
Start: 2018-01-25 | End: 2018-02-01 | Stop reason: ALTCHOICE

## 2018-01-25 RX ORDER — SODIUM CHLORIDE, SODIUM LACTATE, POTASSIUM CHLORIDE, CALCIUM CHLORIDE 600; 310; 30; 20 MG/100ML; MG/100ML; MG/100ML; MG/100ML
INJECTION, SOLUTION INTRAVENOUS CONTINUOUS PRN
Status: DISCONTINUED | OUTPATIENT
Start: 2018-01-25 | End: 2018-01-25 | Stop reason: SDUPTHER

## 2018-01-25 RX ORDER — SODIUM CHLORIDE 0.9 % (FLUSH) 0.9 %
10 SYRINGE (ML) INJECTION EVERY 12 HOURS SCHEDULED
Status: DISCONTINUED | OUTPATIENT
Start: 2018-01-25 | End: 2018-01-25 | Stop reason: HOSPADM

## 2018-01-25 RX ORDER — CIPROFLOXACIN 500 MG/1
500 TABLET, FILM COATED ORAL 2 TIMES DAILY
Qty: 14 TABLET | Refills: 0 | Status: SHIPPED | OUTPATIENT
Start: 2018-01-25 | End: 2018-02-01

## 2018-01-25 RX ORDER — FENTANYL CITRATE 50 UG/ML
INJECTION, SOLUTION INTRAMUSCULAR; INTRAVENOUS PRN
Status: DISCONTINUED | OUTPATIENT
Start: 2018-01-25 | End: 2018-01-25 | Stop reason: SDUPTHER

## 2018-01-25 RX ORDER — CIPROFLOXACIN 2 MG/ML
400 INJECTION, SOLUTION INTRAVENOUS
Status: COMPLETED | OUTPATIENT
Start: 2018-01-25 | End: 2018-01-25

## 2018-01-25 RX ORDER — ONDANSETRON 2 MG/ML
INJECTION INTRAMUSCULAR; INTRAVENOUS PRN
Status: DISCONTINUED | OUTPATIENT
Start: 2018-01-25 | End: 2018-01-25 | Stop reason: SDUPTHER

## 2018-01-25 RX ADMIN — SODIUM CHLORIDE, POTASSIUM CHLORIDE, SODIUM LACTATE AND CALCIUM CHLORIDE: 600; 310; 30; 20 INJECTION, SOLUTION INTRAVENOUS at 08:24

## 2018-01-25 RX ADMIN — PROPOFOL 150 MG: 10 INJECTION, EMULSION INTRAVENOUS at 08:29

## 2018-01-25 RX ADMIN — MIDAZOLAM HYDROCHLORIDE 2 MG: 2 INJECTION, SOLUTION INTRAMUSCULAR; INTRAVENOUS at 08:27

## 2018-01-25 RX ADMIN — LIDOCAINE HYDROCHLORIDE 50 MG: 10 INJECTION, SOLUTION INFILTRATION; PERINEURAL at 08:28

## 2018-01-25 RX ADMIN — ONDANSETRON 4 MG: 2 INJECTION, SOLUTION INTRAMUSCULAR; INTRAVENOUS at 08:25

## 2018-01-25 RX ADMIN — CIPROFLOXACIN 400 MG: 2 INJECTION, SOLUTION INTRAVENOUS at 07:32

## 2018-01-25 RX ADMIN — FENTANYL CITRATE 100 MCG: 50 INJECTION, SOLUTION INTRAMUSCULAR; INTRAVENOUS at 08:28

## 2018-01-25 RX ADMIN — SODIUM CHLORIDE, POTASSIUM CHLORIDE, SODIUM LACTATE AND CALCIUM CHLORIDE: 600; 310; 30; 20 INJECTION, SOLUTION INTRAVENOUS at 07:30

## 2018-01-25 ASSESSMENT — PAIN DESCRIPTION - DESCRIPTORS: DESCRIPTORS: BURNING

## 2018-01-25 ASSESSMENT — PAIN DESCRIPTION - LOCATION: LOCATION: PENIS

## 2018-01-25 ASSESSMENT — PAIN DESCRIPTION - PAIN TYPE: TYPE: SURGICAL PAIN

## 2018-01-25 ASSESSMENT — PAIN SCALES - GENERAL
PAINLEVEL_OUTOF10: 2
PAINLEVEL_OUTOF10: 2

## 2018-01-25 NOTE — ANESTHESIA PRE PROCEDURE
infusion   Intravenous Continuous Hayden Merrill  mL/hr at 01/25/18 0730      sodium chloride flush 0.9 % injection 10 mL  10 mL Intravenous 2 times per day Hayden Merrill MD        sodium chloride flush 0.9 % injection 10 mL  10 mL Intravenous PRN Hayden Merrill MD        ciprofloxacin (CIPRO) IVPB 400 mg  400 mg Intravenous On Call to Maria Jimenez  mL/hr at 01/25/18 0732 400 mg at 01/25/18 0732       Allergies: Allergies   Allergen Reactions    Cortisone Other (See Comments)     Irritates \"GERD\"        Problem List:    Patient Active Problem List   Diagnosis Code    Rheumatoid arthritis ([facility] M06.9    Myalgia and myositis [id number]    Generalized osteoarthrosis, unspecified site M15.9    Esophageal reflux K21.9    Irritable bowel syndrome K58.9    Diaphragmatic hernia K44.9    Essential hypertension, benign I10    BPH with obstruction/lower urinary tract symptoms N40.1, N13.8    Tubular adenoma of colon D12.6    Hypothyroid E03.9    Pure hypercholesterolemia E78.00    Abscess of intestine K63.0    Fibromyalgia M79.7    Monoclonal gammopathy D47.2    Monoclonal paraproteinemia D47.2    Epigastric pain R10.13    BPH with obstruction/lower urinary tract symptoms N40.1, N13.8    Constipation K59.00    Urinary retention R33.9    Urgency of urination R39.15    Urinary frequency R35.0    Acute prostatitis N41.0       Past Medical History:        Diagnosis Date    Arthritis     Cancer ([facility]     multiple myeloma    Fibromyalgia     GERD (gastroesophageal reflux disease)     History of blood transfusion     At birth     Hyperlipidemia     Hypertension     Hypothyroidism     IBS (irritable bowel syndrome)     Osteoarthritis     Thyroid disease     Hypothyroid        Past Surgical History:        Procedure Laterality Date    ABDOMEN SURGERY  2004    Bowel Resection following \"fall on the ice\";  Carter Sanders MD    APPENDECTOMY      COLONOSCOPY      COLONOSCOPY 09-    Dr. Delmar Angelucci      Hiatal Hernia repair, Esophageal repair     HERNIA REPAIR Bilateral     Inguinal     TONSILLECTOMY      UPPER GASTROINTESTINAL ENDOSCOPY      UPPER GASTROINTESTINAL ENDOSCOPY  09-    Dr. Fidel Lealestic ENDOSCOPY  12/14/2016    Amelia Live MD       Social History:    Social History   Substance Use Topics    Smoking status: Current Some Day Smoker     Packs/day: 1.00     Years: 40.00     Last attempt to quit: 2/28/2014    Smokeless tobacco: Never Used    Alcohol use Yes      Comment: seldom                                Ready to quit: Not Answered  Counseling given: Yes      Vital Signs (Current):   Vitals:    01/25/18 0715   BP: (!) 143/98   Pulse: 94   Resp: 20   Temp: 36.8 °C (98.3 °F)   SpO2: 97%   Weight: 198 lb (89.8 kg)   Height: 5' 9\" (1.753 m)                                              BP Readings from Last 3 Encounters:   01/25/18 (!) 143/98   01/22/18 122/76   01/15/18 (!) 142/80       NPO Status: Time of last liquid consumption: 2300                        Time of last solid consumption: 2000                        Date of last liquid consumption: 01/24/18                        Date of last solid food consumption: 01/24/18    BMI:   Wt Readings from Last 3 Encounters:   01/25/18 198 lb (89.8 kg)   01/22/18 201 lb (91.2 kg)   01/15/18 203 lb (92.1 kg)     Body mass index is 29.24 kg/m².     CBC:   Lab Results   Component Value Date    WBC 11.5 12/30/2017    RBC 5.41 12/30/2017    HGB 15.8 12/30/2017    HCT 48.1 12/30/2017    MCV 89.0 12/30/2017    RDW 13.6 12/30/2017     12/30/2017       CMP:   Lab Results   Component Value Date     12/30/2017    K 3.5 12/30/2017     12/30/2017    CO2 26 12/30/2017    BUN 19 12/30/2017    CREATININE 0.76 12/30/2017    GFRAA >60 12/30/2017    LABGLOM >60 12/30/2017    GLUCOSE 128 12/30/2017    GLUCOSE 117 04/03/2012

## 2018-01-25 NOTE — PROGRESS NOTES
Mejía catheter patent with tension applied with leg strap. Drains clear peach colored urine qs. Says burning in op site tolerable.

## 2018-01-30 ENCOUNTER — TELEPHONE (OUTPATIENT)
Dept: UROLOGY | Age: 73
End: 2018-01-30

## 2018-01-30 ENCOUNTER — OFFICE VISIT (OUTPATIENT)
Dept: UROLOGY | Age: 73
End: 2018-01-30
Payer: MEDICARE

## 2018-01-30 VITALS — WEIGHT: 201 LBS | BODY MASS INDEX: 29.68 KG/M2 | DIASTOLIC BLOOD PRESSURE: 74 MMHG | SYSTOLIC BLOOD PRESSURE: 118 MMHG

## 2018-01-30 DIAGNOSIS — N13.8 BPH WITH OBSTRUCTION/LOWER URINARY TRACT SYMPTOMS: Primary | ICD-10-CM

## 2018-01-30 DIAGNOSIS — N40.1 BPH WITH OBSTRUCTION/LOWER URINARY TRACT SYMPTOMS: Primary | ICD-10-CM

## 2018-01-30 PROCEDURE — 99999 PR OFFICE/OUTPT VISIT,PROCEDURE ONLY: CPT | Performed by: NURSE PRACTITIONER

## 2018-01-30 PROCEDURE — 51700 IRRIGATION OF BLADDER: CPT | Performed by: NURSE PRACTITIONER

## 2018-01-30 NOTE — PATIENT INSTRUCTIONS
Thank you for enrolling in [address] Please follow the instructions below to securely access your online medical record. [employer] allows you to send messages to your doctor, view your test results, renew your prescriptions, schedule appointments, and more. How Do I Sign Up? 1. In your Internet browser, go to [email or web address]  2. Click on the Sign Up Now link in the Sign In box. You will see the New Member Sign Up page. 3. Enter your [employer] Access Code exactly as it appears below. You will not need to use this code after youve completed the sign-up process. If you do not sign up before the expiration date, you must request a new code. [employer] Access Code: Activation code not generated  Current [employer] Status: Active    4. Enter your Social Security Number ([id number]) and Date of Birth (mm/dd/yyyy) as indicated and click Submit. You will be taken to the next sign-up page. 5. Create a [employer] ID. This will be your [employer] login ID and cannot be changed, so think of one that is secure and easy to remember. 6. Create a [employer] password. You can change your password at any time. 7. Enter your Password Reset Question and Answer. This can be used at a later time if you forget your password. 8. Enter your e-mail address. You will receive e-mail notification when new information is available in [address] 9. Click Sign Up. You can now view your medical record. Additional Information  If you have questions, please contact your physician practice where you receive care. Remember, [employer] is NOT to be used for urgent needs. For medical emergencies, dial 911.

## 2018-02-01 ENCOUNTER — OFFICE VISIT (OUTPATIENT)
Dept: UROLOGY | Age: 73
End: 2018-02-01

## 2018-02-01 VITALS — SYSTOLIC BLOOD PRESSURE: 110 MMHG | BODY MASS INDEX: 29.83 KG/M2 | WEIGHT: 202 LBS | DIASTOLIC BLOOD PRESSURE: 84 MMHG

## 2018-02-01 DIAGNOSIS — R33.9 URINARY RETENTION: ICD-10-CM

## 2018-02-01 DIAGNOSIS — N40.1 BPH WITH OBSTRUCTION/LOWER URINARY TRACT SYMPTOMS: Primary | ICD-10-CM

## 2018-02-01 DIAGNOSIS — N13.8 BPH WITH OBSTRUCTION/LOWER URINARY TRACT SYMPTOMS: Primary | ICD-10-CM

## 2018-02-01 PROCEDURE — 99024 POSTOP FOLLOW-UP VISIT: CPT | Performed by: UROLOGY

## 2018-03-15 ENCOUNTER — OFFICE VISIT (OUTPATIENT)
Dept: UROLOGY | Age: 73
End: 2018-03-15

## 2018-03-15 VITALS — WEIGHT: 201 LBS | SYSTOLIC BLOOD PRESSURE: 140 MMHG | BODY MASS INDEX: 29.68 KG/M2 | DIASTOLIC BLOOD PRESSURE: 82 MMHG

## 2018-03-15 DIAGNOSIS — N13.8 BPH WITH OBSTRUCTION/LOWER URINARY TRACT SYMPTOMS: Primary | ICD-10-CM

## 2018-03-15 DIAGNOSIS — R33.9 URINARY RETENTION: ICD-10-CM

## 2018-03-15 DIAGNOSIS — Z98.890 POST-OPERATIVE STATE: ICD-10-CM

## 2018-03-15 DIAGNOSIS — N40.1 BPH WITH OBSTRUCTION/LOWER URINARY TRACT SYMPTOMS: Primary | ICD-10-CM

## 2018-03-15 PROCEDURE — 99024 POSTOP FOLLOW-UP VISIT: CPT | Performed by: NURSE PRACTITIONER

## 2018-03-15 RX ORDER — POLYETHYLENE GLYCOL 3350 17 G/17G
17 POWDER, FOR SOLUTION ORAL PRN
COMMUNITY
End: 2019-08-21

## 2018-03-31 DIAGNOSIS — N40.1 BENIGN NON-NODULAR PROSTATIC HYPERPLASIA WITH LOWER URINARY TRACT SYMPTOMS: ICD-10-CM

## 2018-04-02 RX ORDER — TAMSULOSIN HYDROCHLORIDE 0.4 MG/1
CAPSULE ORAL
Qty: 180 CAPSULE | Refills: 1 | Status: SHIPPED | OUTPATIENT
Start: 2018-04-02 | End: 2018-08-20 | Stop reason: SDUPTHER

## 2018-04-26 ENCOUNTER — OFFICE VISIT (OUTPATIENT)
Dept: UROLOGY | Age: 73
End: 2018-04-26
Payer: MEDICARE

## 2018-04-26 VITALS
DIASTOLIC BLOOD PRESSURE: 78 MMHG | WEIGHT: 210 LBS | HEIGHT: 70 IN | BODY MASS INDEX: 30.06 KG/M2 | SYSTOLIC BLOOD PRESSURE: 138 MMHG

## 2018-04-26 DIAGNOSIS — N40.1 BPH WITH OBSTRUCTION/LOWER URINARY TRACT SYMPTOMS: Primary | ICD-10-CM

## 2018-04-26 DIAGNOSIS — N13.8 BPH WITH OBSTRUCTION/LOWER URINARY TRACT SYMPTOMS: Primary | ICD-10-CM

## 2018-04-26 PROCEDURE — 4040F PNEUMOC VAC/ADMIN/RCVD: CPT | Performed by: UROLOGY

## 2018-04-26 PROCEDURE — 4004F PT TOBACCO SCREEN RCVD TLK: CPT | Performed by: UROLOGY

## 2018-04-26 PROCEDURE — 3017F COLORECTAL CA SCREEN DOC REV: CPT | Performed by: UROLOGY

## 2018-04-26 PROCEDURE — G8427 DOCREV CUR MEDS BY ELIG CLIN: HCPCS | Performed by: UROLOGY

## 2018-04-26 PROCEDURE — 99214 OFFICE O/P EST MOD 30 MIN: CPT | Performed by: UROLOGY

## 2018-04-26 PROCEDURE — G8417 CALC BMI ABV UP PARAM F/U: HCPCS | Performed by: UROLOGY

## 2018-04-26 PROCEDURE — 1123F ACP DISCUSS/DSCN MKR DOCD: CPT | Performed by: UROLOGY

## 2018-04-26 ASSESSMENT — ENCOUNTER SYMPTOMS
WHEEZING: 0
COUGH: 0
VOMITING: 0
SHORTNESS OF BREATH: 0
EYE PAIN: 0
COLOR CHANGE: 0
EYE REDNESS: 0
NAUSEA: 0
BACK PAIN: 0
ABDOMINAL PAIN: 0

## 2018-05-11 DIAGNOSIS — Z12.11 COLON CANCER SCREENING: Primary | ICD-10-CM

## 2018-06-04 DIAGNOSIS — K21.9 GASTROESOPHAGEAL REFLUX DISEASE WITHOUT ESOPHAGITIS: ICD-10-CM

## 2018-06-04 DIAGNOSIS — E78.00 PURE HYPERCHOLESTEROLEMIA: ICD-10-CM

## 2018-06-05 RX ORDER — AMLODIPINE BESYLATE 5 MG/1
TABLET ORAL
Qty: 90 TABLET | Refills: 1 | Status: SHIPPED | OUTPATIENT
Start: 2018-06-05 | End: 2018-10-24 | Stop reason: SDUPTHER

## 2018-06-05 RX ORDER — SIMVASTATIN 20 MG
TABLET ORAL
Qty: 90 TABLET | Refills: 1 | Status: SHIPPED | OUTPATIENT
Start: 2018-06-05 | End: 2018-10-24 | Stop reason: SDUPTHER

## 2018-06-05 RX ORDER — PANTOPRAZOLE SODIUM 40 MG/1
TABLET, DELAYED RELEASE ORAL
Qty: 180 TABLET | Refills: 1 | Status: SHIPPED | OUTPATIENT
Start: 2018-06-05 | End: 2018-10-24 | Stop reason: SDUPTHER

## 2018-06-05 RX ORDER — LEVOTHYROXINE SODIUM 0.07 MG/1
TABLET ORAL
Qty: 90 TABLET | Refills: 1 | Status: SHIPPED | OUTPATIENT
Start: 2018-06-05 | End: 2018-10-24 | Stop reason: SDUPTHER

## 2018-06-26 ENCOUNTER — OFFICE VISIT (OUTPATIENT)
Dept: FAMILY MEDICINE CLINIC | Age: 73
End: 2018-06-26
Payer: MEDICARE

## 2018-06-26 VITALS
HEART RATE: 110 BPM | OXYGEN SATURATION: 96 % | SYSTOLIC BLOOD PRESSURE: 136 MMHG | WEIGHT: 203 LBS | BODY MASS INDEX: 29.13 KG/M2 | DIASTOLIC BLOOD PRESSURE: 80 MMHG

## 2018-06-26 DIAGNOSIS — M77.8 LEFT ELBOW TENDONITIS: ICD-10-CM

## 2018-06-26 DIAGNOSIS — E78.00 PURE HYPERCHOLESTEROLEMIA: ICD-10-CM

## 2018-06-26 DIAGNOSIS — M06.9 RHEUMATOID ARTHRITIS, INVOLVING UNSPECIFIED SITE, UNSPECIFIED RHEUMATOID FACTOR PRESENCE: ICD-10-CM

## 2018-06-26 DIAGNOSIS — S46.812A TRAPEZIUS STRAIN, LEFT, INITIAL ENCOUNTER: ICD-10-CM

## 2018-06-26 DIAGNOSIS — I10 ESSENTIAL HYPERTENSION, BENIGN: Primary | ICD-10-CM

## 2018-06-26 DIAGNOSIS — K21.9 GASTROESOPHAGEAL REFLUX DISEASE, ESOPHAGITIS PRESENCE NOT SPECIFIED: ICD-10-CM

## 2018-06-26 DIAGNOSIS — E03.9 ACQUIRED HYPOTHYROIDISM: ICD-10-CM

## 2018-06-26 DIAGNOSIS — R73.9 HYPERGLYCEMIA: ICD-10-CM

## 2018-06-26 PROCEDURE — 1123F ACP DISCUSS/DSCN MKR DOCD: CPT | Performed by: FAMILY MEDICINE

## 2018-06-26 PROCEDURE — 4040F PNEUMOC VAC/ADMIN/RCVD: CPT | Performed by: FAMILY MEDICINE

## 2018-06-26 PROCEDURE — 4004F PT TOBACCO SCREEN RCVD TLK: CPT | Performed by: FAMILY MEDICINE

## 2018-06-26 PROCEDURE — G8427 DOCREV CUR MEDS BY ELIG CLIN: HCPCS | Performed by: FAMILY MEDICINE

## 2018-06-26 PROCEDURE — G8417 CALC BMI ABV UP PARAM F/U: HCPCS | Performed by: FAMILY MEDICINE

## 2018-06-26 PROCEDURE — 99214 OFFICE O/P EST MOD 30 MIN: CPT | Performed by: FAMILY MEDICINE

## 2018-06-26 PROCEDURE — 3017F COLORECTAL CA SCREEN DOC REV: CPT | Performed by: FAMILY MEDICINE

## 2018-06-26 ASSESSMENT — PATIENT HEALTH QUESTIONNAIRE - PHQ9
2. FEELING DOWN, DEPRESSED OR HOPELESS: 0
SUM OF ALL RESPONSES TO PHQ9 QUESTIONS 1 & 2: 0
SUM OF ALL RESPONSES TO PHQ QUESTIONS 1-9: 0
1. LITTLE INTEREST OR PLEASURE IN DOING THINGS: 0

## 2018-06-26 ASSESSMENT — ENCOUNTER SYMPTOMS
SHORTNESS OF BREATH: 0
VOMITING: 0
BLURRED VISION: 0
TROUBLE SWALLOWING: 0
NAUSEA: 0
BLOOD IN STOOL: 0
SORE THROAT: 0
CHOKING: 0
DIARRHEA: 0
EYE DISCHARGE: 0
CONSTIPATION: 0
COUGH: 0
HEARTBURN: 0
EYE REDNESS: 0
ABDOMINAL PAIN: 0

## 2018-07-26 ENCOUNTER — HOSPITAL ENCOUNTER (OUTPATIENT)
Age: 73
Setting detail: SPECIMEN
Discharge: HOME | End: 2018-07-26
Payer: MEDICARE

## 2018-07-26 ENCOUNTER — OFFICE VISIT (OUTPATIENT)
Dept: UROLOGY | Age: 73
End: 2018-07-26
Payer: MEDICARE

## 2018-07-26 VITALS
WEIGHT: 202 LBS | SYSTOLIC BLOOD PRESSURE: 140 MMHG | DIASTOLIC BLOOD PRESSURE: 90 MMHG | HEIGHT: 70 IN | BODY MASS INDEX: 28.92 KG/M2

## 2018-07-26 DIAGNOSIS — N40.1 BPH WITH OBSTRUCTION/LOWER URINARY TRACT SYMPTOMS: Primary | ICD-10-CM

## 2018-07-26 DIAGNOSIS — N13.8 BPH WITH OBSTRUCTION/LOWER URINARY TRACT SYMPTOMS: Primary | ICD-10-CM

## 2018-07-26 DIAGNOSIS — N40.1 BPH WITH OBSTRUCTION/LOWER URINARY TRACT SYMPTOMS: ICD-10-CM

## 2018-07-26 DIAGNOSIS — N13.8 BPH WITH OBSTRUCTION/LOWER URINARY TRACT SYMPTOMS: ICD-10-CM

## 2018-07-26 LAB
-: ABNORMAL
AMORPHOUS: ABNORMAL
BACTERIA: ABNORMAL
BILIRUBIN URINE: NEGATIVE
CASTS UA: ABNORMAL /LPF
COLOR: YELLOW
COMMENT UA: ABNORMAL
CRYSTALS, UA: ABNORMAL /HPF
EPITHELIAL CELLS UA: ABNORMAL /HPF
GLUCOSE URINE: NEGATIVE
KETONES, URINE: NEGATIVE
LEUKOCYTE ESTERASE, URINE: NEGATIVE
MUCUS: ABNORMAL
NITRITE, URINE: NEGATIVE
OTHER OBSERVATIONS UA: ABNORMAL
PH UA: 6 (ref 5–8)
PROTEIN UA: ABNORMAL
RBC UA: ABNORMAL /HPF (ref 0–2)
RENAL EPITHELIAL, UA: ABNORMAL /HPF
SPECIFIC GRAVITY UA: 1.02 (ref 1–1.03)
TRICHOMONAS: ABNORMAL
TURBIDITY: CLEAR
URINE HGB: NEGATIVE
UROBILINOGEN, URINE: NORMAL
WBC UA: ABNORMAL /HPF
YEAST: ABNORMAL

## 2018-07-26 PROCEDURE — 3017F COLORECTAL CA SCREEN DOC REV: CPT | Performed by: NURSE PRACTITIONER

## 2018-07-26 PROCEDURE — 51798 US URINE CAPACITY MEASURE: CPT | Performed by: NURSE PRACTITIONER

## 2018-07-26 PROCEDURE — 1123F ACP DISCUSS/DSCN MKR DOCD: CPT | Performed by: NURSE PRACTITIONER

## 2018-07-26 PROCEDURE — 81001 URINALYSIS AUTO W/SCOPE: CPT

## 2018-07-26 PROCEDURE — 1101F PT FALLS ASSESS-DOCD LE1/YR: CPT | Performed by: NURSE PRACTITIONER

## 2018-07-26 PROCEDURE — 4004F PT TOBACCO SCREEN RCVD TLK: CPT | Performed by: NURSE PRACTITIONER

## 2018-07-26 PROCEDURE — G8417 CALC BMI ABV UP PARAM F/U: HCPCS | Performed by: NURSE PRACTITIONER

## 2018-07-26 PROCEDURE — 87086 URINE CULTURE/COLONY COUNT: CPT

## 2018-07-26 PROCEDURE — 99213 OFFICE O/P EST LOW 20 MIN: CPT | Performed by: NURSE PRACTITIONER

## 2018-07-26 PROCEDURE — 4040F PNEUMOC VAC/ADMIN/RCVD: CPT | Performed by: NURSE PRACTITIONER

## 2018-07-26 PROCEDURE — G8427 DOCREV CUR MEDS BY ELIG CLIN: HCPCS | Performed by: NURSE PRACTITIONER

## 2018-07-26 ASSESSMENT — ENCOUNTER SYMPTOMS
CONSTIPATION: 0
ABDOMINAL PAIN: 0
COUGH: 0
EYE REDNESS: 0
COLOR CHANGE: 0
SHORTNESS OF BREATH: 0
WHEEZING: 0
EYE PAIN: 0
VOMITING: 0
NAUSEA: 0
BACK PAIN: 0

## 2018-07-26 NOTE — PROGRESS NOTES
Bladderscan performed in office today:  Pt voided - 70 mL, PVR - 13 mL
resolved. No recent gross hematuria or dysuria. He denies frequency, urgency, incontinence, or nocturia. IPSS Questionnaire (AUA-7): Over the past month    1)  How often have you had a sensation of not emptying your bladder completely after you finish urinating? 0 - Not at all   2)  How often have you had to urinate again less than two hours after you finished urinating? 1 - Less than 1 time in 5   3)  How often have you found you stopped and started again several times when you urinated? 0 - Not at all   4) How difficult have you found it to postpone urination? 0 - Not at all   5) How often have you had a weak urinary stream?  0 - Not at all   6) How often have you had to push or strain to begin urination? 0 - Not at all   7) How many times did you most typically get up to urinate from the time you went to bed until the time you got up in the morning? 1 - 1 time   Total 2   QOL 1       Past Medical History:   Diagnosis Date    Arthritis     Cancer ([facility] [address]     multiple myeloma    Fibromyalgia     GERD (gastroesophageal reflux disease)     History of blood transfusion     At birth     Hyperlipidemia     Hypertension     Hypothyroidism     IBS (irritable bowel syndrome)     Osteoarthritis     Thyroid disease     Hypothyroid      Past Surgical History:   Procedure Laterality Date    ABDOMEN SURGERY  2004    Bowel Resection following \"fall on the ice\";  Darlyn Eddy MD    APPENDECTOMY      COLONOSCOPY      COLONOSCOPY  09-    Dr. Colten Copeland      Hiatal Hernia repair, Esophageal repair     HERNIA REPAIR Bilateral     Inguinal     [place] LASER VAPORIZATION SURGERY PROSTATE, COMPLETE N/A 1/25/2018    CYSTOSCOPY TRANSURETHRAL RESECTION PROSTATE LASER performed by Mary Hernandez MD at [address] ENDOSCOPY      UPPER GASTROINTESTINAL ENDOSCOPY  09-    Dr. Rocha [place]

## 2018-07-27 LAB
CULTURE: NORMAL
Lab: NORMAL
SPECIMEN DESCRIPTION: NORMAL
STATUS: NORMAL

## 2018-10-18 ENCOUNTER — IMMUNIZATION (OUTPATIENT)
Dept: FAMILY MEDICINE CLINIC | Age: 73
End: 2018-10-18
Payer: MEDICARE

## 2018-10-18 DIAGNOSIS — Z23 NEED FOR INFLUENZA VACCINATION: Primary | ICD-10-CM

## 2018-10-18 PROCEDURE — 90686 IIV4 VACC NO PRSV 0.5 ML IM: CPT | Performed by: FAMILY MEDICINE

## 2018-10-18 PROCEDURE — G0008 ADMIN INFLUENZA VIRUS VAC: HCPCS | Performed by: FAMILY MEDICINE

## 2018-10-24 DIAGNOSIS — K21.9 GASTROESOPHAGEAL REFLUX DISEASE WITHOUT ESOPHAGITIS: ICD-10-CM

## 2018-10-24 DIAGNOSIS — E78.00 PURE HYPERCHOLESTEROLEMIA: ICD-10-CM

## 2018-10-24 RX ORDER — SIMVASTATIN 20 MG
TABLET ORAL
Qty: 90 TABLET | Refills: 1 | Status: SHIPPED | OUTPATIENT
Start: 2018-10-24 | End: 2019-05-02 | Stop reason: SDUPTHER

## 2018-10-24 RX ORDER — AMLODIPINE BESYLATE 5 MG/1
TABLET ORAL
Qty: 90 TABLET | Refills: 1 | Status: SHIPPED | OUTPATIENT
Start: 2018-10-24 | End: 2019-05-02 | Stop reason: SDUPTHER

## 2018-10-24 RX ORDER — LEVOTHYROXINE SODIUM 0.07 MG/1
TABLET ORAL
Qty: 90 TABLET | Refills: 1 | Status: SHIPPED | OUTPATIENT
Start: 2018-10-24 | End: 2019-05-02 | Stop reason: SDUPTHER

## 2018-10-24 RX ORDER — PANTOPRAZOLE SODIUM 40 MG/1
TABLET, DELAYED RELEASE ORAL
Qty: 180 TABLET | Refills: 1 | Status: SHIPPED | OUTPATIENT
Start: 2018-10-24 | End: 2019-05-02 | Stop reason: SDUPTHER

## 2018-12-26 ENCOUNTER — HOSPITAL ENCOUNTER (OUTPATIENT)
Age: 73
Discharge: HOME | End: 2018-12-26
Payer: MEDICARE

## 2018-12-26 DIAGNOSIS — R73.9 HYPERGLYCEMIA: ICD-10-CM

## 2018-12-26 DIAGNOSIS — E78.00 PURE HYPERCHOLESTEROLEMIA: ICD-10-CM

## 2018-12-26 DIAGNOSIS — E03.9 ACQUIRED HYPOTHYROIDISM: ICD-10-CM

## 2018-12-26 DIAGNOSIS — I10 ESSENTIAL HYPERTENSION, BENIGN: ICD-10-CM

## 2018-12-26 LAB
ALBUMIN SERPL-MCNC: 4.3 G/DL (ref 3.5–5.2)
ALBUMIN/GLOBULIN RATIO: ABNORMAL (ref 1–2.5)
ALP BLD-CCNC: 95 U/L (ref 40–129)
ALT SERPL-CCNC: 44 U/L (ref 5–41)
ANION GAP SERPL CALCULATED.3IONS-SCNC: 11 MMOL/L (ref 9–17)
AST SERPL-CCNC: 27 U/L
BILIRUB SERPL-MCNC: 0.34 MG/DL (ref 0.3–1.2)
BUN BLDV-MCNC: 19 MG/DL (ref 8–23)
BUN/CREAT BLD: 26 (ref 9–20)
CALCIUM SERPL-MCNC: 9.8 MG/DL (ref 8.6–10.4)
CHLORIDE BLD-SCNC: 100 MMOL/L (ref 98–107)
CHOLESTEROL/HDL RATIO: 3.9
CHOLESTEROL: 130 MG/DL
CO2: 26 MMOL/L (ref 20–31)
CREAT SERPL-MCNC: 0.74 MG/DL (ref 0.7–1.2)
ESTIMATED AVERAGE GLUCOSE: 131 MG/DL
GFR AFRICAN AMERICAN: >60 ML/MIN
GFR NON-AFRICAN AMERICAN: >60 ML/MIN
GFR SERPL CREATININE-BSD FRML MDRD: ABNORMAL ML/MIN/{1.73_M2}
GFR SERPL CREATININE-BSD FRML MDRD: ABNORMAL ML/MIN/{1.73_M2}
GLUCOSE BLD-MCNC: 151 MG/DL (ref 70–99)
HBA1C MFR BLD: 6.2 % (ref 4.8–5.9)
HDLC SERPL-MCNC: 33 MG/DL
LDL CHOLESTEROL: 69 MG/DL (ref 0–130)
PATIENT FASTING?: YES
POTASSIUM SERPL-SCNC: 4.7 MMOL/L (ref 3.7–5.3)
SODIUM BLD-SCNC: 137 MMOL/L (ref 135–144)
THYROXINE, FREE: 1.07 NG/DL (ref 0.93–1.7)
TOTAL PROTEIN: 7.8 G/DL (ref 6.4–8.3)
TRIGL SERPL-MCNC: 142 MG/DL
TSH SERPL DL<=0.05 MIU/L-ACNC: 4.48 MIU/L (ref 0.3–5)
VLDLC SERPL CALC-MCNC: ABNORMAL MG/DL (ref 1–30)

## 2018-12-26 PROCEDURE — 80061 LIPID PANEL: CPT

## 2018-12-26 PROCEDURE — 80053 COMPREHEN METABOLIC PANEL: CPT

## 2018-12-26 PROCEDURE — 84443 ASSAY THYROID STIM HORMONE: CPT

## 2018-12-26 PROCEDURE — 83036 HEMOGLOBIN GLYCOSYLATED A1C: CPT

## 2018-12-26 PROCEDURE — 36415 COLL VENOUS BLD VENIPUNCTURE: CPT

## 2018-12-26 PROCEDURE — 84439 ASSAY OF FREE THYROXINE: CPT

## 2019-01-03 ENCOUNTER — OFFICE VISIT (OUTPATIENT)
Dept: FAMILY MEDICINE CLINIC | Age: 74
End: 2019-01-03
Payer: MEDICARE

## 2019-01-03 VITALS
SYSTOLIC BLOOD PRESSURE: 132 MMHG | OXYGEN SATURATION: 98 % | WEIGHT: 205 LBS | DIASTOLIC BLOOD PRESSURE: 80 MMHG | HEART RATE: 96 BPM | BODY MASS INDEX: 29.41 KG/M2

## 2019-01-03 DIAGNOSIS — R73.9 HYPERGLYCEMIA: Primary | ICD-10-CM

## 2019-01-03 DIAGNOSIS — M06.9 RHEUMATOID ARTHRITIS, INVOLVING UNSPECIFIED SITE, UNSPECIFIED RHEUMATOID FACTOR PRESENCE: ICD-10-CM

## 2019-01-03 DIAGNOSIS — E03.9 ACQUIRED HYPOTHYROIDISM: ICD-10-CM

## 2019-01-03 DIAGNOSIS — I10 ESSENTIAL HYPERTENSION, BENIGN: ICD-10-CM

## 2019-01-03 DIAGNOSIS — E78.00 PURE HYPERCHOLESTEROLEMIA: ICD-10-CM

## 2019-01-03 DIAGNOSIS — K21.00 GASTROESOPHAGEAL REFLUX DISEASE WITH ESOPHAGITIS: ICD-10-CM

## 2019-01-03 PROCEDURE — 4004F PT TOBACCO SCREEN RCVD TLK: CPT | Performed by: FAMILY MEDICINE

## 2019-01-03 PROCEDURE — 4040F PNEUMOC VAC/ADMIN/RCVD: CPT | Performed by: FAMILY MEDICINE

## 2019-01-03 PROCEDURE — G8482 FLU IMMUNIZE ORDER/ADMIN: HCPCS | Performed by: FAMILY MEDICINE

## 2019-01-03 PROCEDURE — G8417 CALC BMI ABV UP PARAM F/U: HCPCS | Performed by: FAMILY MEDICINE

## 2019-01-03 PROCEDURE — G8427 DOCREV CUR MEDS BY ELIG CLIN: HCPCS | Performed by: FAMILY MEDICINE

## 2019-01-03 PROCEDURE — 1123F ACP DISCUSS/DSCN MKR DOCD: CPT | Performed by: FAMILY MEDICINE

## 2019-01-03 PROCEDURE — 1101F PT FALLS ASSESS-DOCD LE1/YR: CPT | Performed by: FAMILY MEDICINE

## 2019-01-03 PROCEDURE — 99214 OFFICE O/P EST MOD 30 MIN: CPT | Performed by: FAMILY MEDICINE

## 2019-01-03 PROCEDURE — 3017F COLORECTAL CA SCREEN DOC REV: CPT | Performed by: FAMILY MEDICINE

## 2019-01-03 RX ORDER — TAMSULOSIN HYDROCHLORIDE 0.4 MG/1
CAPSULE ORAL
Qty: 90 CAPSULE | Refills: 1 | Status: SHIPPED | OUTPATIENT
Start: 2019-01-03 | End: 2019-08-01 | Stop reason: SDUPTHER

## 2019-01-03 ASSESSMENT — ENCOUNTER SYMPTOMS
SHORTNESS OF BREATH: 0
HEARTBURN: 0
CHOKING: 0
COUGH: 0
HOARSE VOICE: 1

## 2019-01-04 ASSESSMENT — ENCOUNTER SYMPTOMS
VOMITING: 0
NAUSEA: 0
DIARRHEA: 0
CONSTIPATION: 0
FACIAL SWELLING: 0
ABDOMINAL PAIN: 0
EYE REDNESS: 0
BLOOD IN STOOL: 0
TROUBLE SWALLOWING: 0
EYE DISCHARGE: 0

## 2019-05-02 DIAGNOSIS — K21.9 GASTROESOPHAGEAL REFLUX DISEASE WITHOUT ESOPHAGITIS: ICD-10-CM

## 2019-05-02 DIAGNOSIS — E78.00 PURE HYPERCHOLESTEROLEMIA: ICD-10-CM

## 2019-05-03 RX ORDER — PANTOPRAZOLE SODIUM 40 MG/1
TABLET, DELAYED RELEASE ORAL
Qty: 180 TABLET | Refills: 1 | Status: SHIPPED | OUTPATIENT
Start: 2019-05-03 | End: 2019-08-21

## 2019-05-03 RX ORDER — LEVOTHYROXINE SODIUM 0.07 MG/1
TABLET ORAL
Qty: 90 TABLET | Refills: 1 | Status: SHIPPED | OUTPATIENT
Start: 2019-05-03 | End: 2019-08-21

## 2019-05-03 RX ORDER — AMLODIPINE BESYLATE 5 MG/1
TABLET ORAL
Qty: 90 TABLET | Refills: 1 | Status: SHIPPED | OUTPATIENT
Start: 2019-05-03 | End: 2019-08-21

## 2019-05-03 RX ORDER — SIMVASTATIN 20 MG
TABLET ORAL
Qty: 90 TABLET | Refills: 1 | Status: SHIPPED | OUTPATIENT
Start: 2019-05-03 | End: 2019-08-21

## 2019-07-16 ENCOUNTER — TELEPHONE (OUTPATIENT)
Dept: FAMILY MEDICINE CLINIC | Age: 74
End: 2019-07-16

## 2019-07-16 DIAGNOSIS — E03.9 ACQUIRED HYPOTHYROIDISM: ICD-10-CM

## 2019-07-16 DIAGNOSIS — R73.9 HYPERGLYCEMIA: ICD-10-CM

## 2019-07-16 DIAGNOSIS — I10 ESSENTIAL HYPERTENSION, BENIGN: Primary | ICD-10-CM

## 2019-07-16 NOTE — TELEPHONE ENCOUNTER
Patient made a check up appointment for 08/21 - do you want labs done prior to appointment? Health Maintenance   Topic Date Due    DTaP/Tdap/Td vaccine (1 - Tdap) 07/29/1966    Low dose CT lung screening  07/29/2002    Annual Wellness Visit (AWV)  07/29/2010    Shingles Vaccine (2 of 3) 03/26/2012    Flu vaccine (1) 09/01/2019    A1C test (Diabetic or Prediabetic)  12/26/2019    TSH testing  12/26/2019    Potassium monitoring  12/26/2019    Creatinine monitoring  12/26/2019    Lipid screen  12/26/2023    Colon cancer screen colonoscopy  12/14/2026    Pneumococcal 65+ years Vaccine  Completed    AAA screen  Completed    Hepatitis C screen  Addressed             (applicable per patient's age: Cancer Screenings, Depression Screening, Fall Risk Screening, Immunizations)    Hemoglobin A1C (%)   Date Value   12/26/2018 6.2 (H)   05/18/2017 6.2   11/23/2016 6.1 (H)     Microalb/Crt.  Ratio (mcg/mg creat)   Date Value   11/23/2016 11     LDL Cholesterol (mg/dL)   Date Value   12/26/2018 69     AST (U/L)   Date Value   12/26/2018 27     ALT (U/L)   Date Value   12/26/2018 44 (H)     BUN (mg/dL)   Date Value   12/26/2018 19      (goal A1C is < 7)   (goal LDL is <100) need 30-50% reduction from baseline     BP Readings from Last 3 Encounters:   01/03/19 132/80   07/26/18 (!) 140/90   06/26/18 136/80    (goal /80)      All Future Testing planned in [facility]:  Lab Frequency Next Occurrence       Next Visit Date:  Future Appointments   Date Time Provider Vasyl Yu   8/1/2019  1:00 PM Jessica Magdaleno MD [facility]   8/21/2019  9:30 AM MD Valerio Campbell [facility]            Patient Active Problem List:     Rheumatoid arthritis ([address]     Myalgia and myositis     Esophageal reflux     Irritable bowel syndrome     Diaphragmatic hernia     Essential hypertension, benign     BPH with obstruction/lower urinary tract symptoms     Tubular adenoma of colon     Hypothyroid     Pure

## 2019-08-01 ENCOUNTER — OFFICE VISIT (OUTPATIENT)
Dept: UROLOGY | Age: 74
End: 2019-08-01
Payer: MEDICARE

## 2019-08-01 VITALS
WEIGHT: 198 LBS | DIASTOLIC BLOOD PRESSURE: 76 MMHG | BODY MASS INDEX: 28.35 KG/M2 | HEIGHT: 70 IN | SYSTOLIC BLOOD PRESSURE: 138 MMHG

## 2019-08-01 DIAGNOSIS — N13.8 BPH WITH OBSTRUCTION/LOWER URINARY TRACT SYMPTOMS: Primary | ICD-10-CM

## 2019-08-01 DIAGNOSIS — N40.1 BPH WITH OBSTRUCTION/LOWER URINARY TRACT SYMPTOMS: Primary | ICD-10-CM

## 2019-08-01 DIAGNOSIS — R39.11 URINARY HESITANCY: ICD-10-CM

## 2019-08-01 PROCEDURE — 99213 OFFICE O/P EST LOW 20 MIN: CPT | Performed by: NURSE PRACTITIONER

## 2019-08-01 PROCEDURE — 3017F COLORECTAL CA SCREEN DOC REV: CPT | Performed by: NURSE PRACTITIONER

## 2019-08-01 PROCEDURE — G8417 CALC BMI ABV UP PARAM F/U: HCPCS | Performed by: NURSE PRACTITIONER

## 2019-08-01 PROCEDURE — 1123F ACP DISCUSS/DSCN MKR DOCD: CPT | Performed by: NURSE PRACTITIONER

## 2019-08-01 PROCEDURE — 51798 US URINE CAPACITY MEASURE: CPT | Performed by: NURSE PRACTITIONER

## 2019-08-01 PROCEDURE — 4004F PT TOBACCO SCREEN RCVD TLK: CPT | Performed by: NURSE PRACTITIONER

## 2019-08-01 PROCEDURE — G8427 DOCREV CUR MEDS BY ELIG CLIN: HCPCS | Performed by: NURSE PRACTITIONER

## 2019-08-01 PROCEDURE — 4040F PNEUMOC VAC/ADMIN/RCVD: CPT | Performed by: NURSE PRACTITIONER

## 2019-08-01 RX ORDER — TAMSULOSIN HYDROCHLORIDE 0.4 MG/1
CAPSULE ORAL
Qty: 90 CAPSULE | Refills: 3 | Status: SHIPPED | OUTPATIENT
Start: 2019-08-01 | End: 2019-08-21

## 2019-08-01 ASSESSMENT — ENCOUNTER SYMPTOMS
WHEEZING: 0
COUGH: 0
VOMITING: 0
COLOR CHANGE: 0
BACK PAIN: 0
SHORTNESS OF BREATH: 0
NAUSEA: 0
EYE REDNESS: 0
EYE PAIN: 0
CONSTIPATION: 0
ABDOMINAL PAIN: 0

## 2019-08-20 ENCOUNTER — HOSPITAL ENCOUNTER (OUTPATIENT)
Age: 74
Discharge: HOME | End: 2019-08-20
Payer: MEDICARE

## 2019-08-20 DIAGNOSIS — E03.9 ACQUIRED HYPOTHYROIDISM: ICD-10-CM

## 2019-08-20 DIAGNOSIS — R73.9 HYPERGLYCEMIA: ICD-10-CM

## 2019-08-20 DIAGNOSIS — R39.11 URINARY HESITANCY: ICD-10-CM

## 2019-08-20 DIAGNOSIS — N13.8 BPH WITH OBSTRUCTION/LOWER URINARY TRACT SYMPTOMS: ICD-10-CM

## 2019-08-20 DIAGNOSIS — N40.1 BPH WITH OBSTRUCTION/LOWER URINARY TRACT SYMPTOMS: ICD-10-CM

## 2019-08-20 DIAGNOSIS — I10 ESSENTIAL HYPERTENSION, BENIGN: ICD-10-CM

## 2019-08-20 LAB
-: ABNORMAL
ALBUMIN SERPL-MCNC: 4.3 G/DL (ref 3.5–5.2)
ALBUMIN/GLOBULIN RATIO: ABNORMAL (ref 1–2.5)
ALP BLD-CCNC: 87 U/L (ref 40–129)
ALT SERPL-CCNC: 36 U/L (ref 5–41)
AMORPHOUS: ABNORMAL
ANION GAP SERPL CALCULATED.3IONS-SCNC: 11 MMOL/L (ref 9–17)
AST SERPL-CCNC: 22 U/L
BACTERIA: ABNORMAL
BILIRUB SERPL-MCNC: 0.31 MG/DL (ref 0.3–1.2)
BILIRUBIN URINE: NEGATIVE
BUN BLDV-MCNC: 13 MG/DL (ref 8–23)
BUN/CREAT BLD: 20 (ref 9–20)
CALCIUM SERPL-MCNC: 10 MG/DL (ref 8.6–10.4)
CASTS UA: ABNORMAL /LPF
CHLORIDE BLD-SCNC: 104 MMOL/L (ref 98–107)
CHOLESTEROL/HDL RATIO: 3.4
CHOLESTEROL: 123 MG/DL
CO2: 25 MMOL/L (ref 20–31)
COLOR: YELLOW
COMMENT UA: ABNORMAL
CREAT SERPL-MCNC: 0.66 MG/DL (ref 0.7–1.2)
CRYSTALS, UA: ABNORMAL /HPF
EPITHELIAL CELLS UA: ABNORMAL /HPF
ESTIMATED AVERAGE GLUCOSE: 134 MG/DL
GFR AFRICAN AMERICAN: >60 ML/MIN
GFR NON-AFRICAN AMERICAN: >60 ML/MIN
GFR SERPL CREATININE-BSD FRML MDRD: ABNORMAL ML/MIN/{1.73_M2}
GFR SERPL CREATININE-BSD FRML MDRD: ABNORMAL ML/MIN/{1.73_M2}
GLUCOSE BLD-MCNC: 123 MG/DL (ref 70–99)
GLUCOSE URINE: NEGATIVE
HBA1C MFR BLD: 6.3 % (ref 4.8–5.9)
HDLC SERPL-MCNC: 36 MG/DL
KETONES, URINE: NEGATIVE
LDL CHOLESTEROL: 59 MG/DL (ref 0–130)
LEUKOCYTE ESTERASE, URINE: NEGATIVE
MUCUS: ABNORMAL
NITRITE, URINE: NEGATIVE
OTHER OBSERVATIONS UA: ABNORMAL
PATIENT FASTING?: YES
PH UA: 6 (ref 5–8)
POTASSIUM SERPL-SCNC: 4.3 MMOL/L (ref 3.7–5.3)
PROTEIN UA: ABNORMAL
RBC UA: ABNORMAL /HPF (ref 0–2)
RENAL EPITHELIAL, UA: ABNORMAL /HPF
SODIUM BLD-SCNC: 140 MMOL/L (ref 135–144)
SPECIFIC GRAVITY UA: 1.02 (ref 1–1.03)
TOTAL PROTEIN: 7.9 G/DL (ref 6.4–8.3)
TRICHOMONAS: ABNORMAL
TRIGL SERPL-MCNC: 138 MG/DL
TSH SERPL DL<=0.05 MIU/L-ACNC: 4.71 MIU/L (ref 0.3–5)
TURBIDITY: CLEAR
URINE HGB: NEGATIVE
UROBILINOGEN, URINE: NORMAL
VLDLC SERPL CALC-MCNC: ABNORMAL MG/DL (ref 1–30)
WBC UA: ABNORMAL /HPF
YEAST: ABNORMAL

## 2019-08-20 PROCEDURE — 87086 URINE CULTURE/COLONY COUNT: CPT

## 2019-08-20 PROCEDURE — 80061 LIPID PANEL: CPT

## 2019-08-20 PROCEDURE — 84443 ASSAY THYROID STIM HORMONE: CPT

## 2019-08-20 PROCEDURE — 80053 COMPREHEN METABOLIC PANEL: CPT

## 2019-08-20 PROCEDURE — 83036 HEMOGLOBIN GLYCOSYLATED A1C: CPT

## 2019-08-20 PROCEDURE — 81001 URINALYSIS AUTO W/SCOPE: CPT

## 2019-08-20 PROCEDURE — 36415 COLL VENOUS BLD VENIPUNCTURE: CPT

## 2019-08-21 ENCOUNTER — TELEPHONE (OUTPATIENT)
Dept: UROLOGY | Age: 74
End: 2019-08-21

## 2019-08-21 ENCOUNTER — OFFICE VISIT (OUTPATIENT)
Dept: FAMILY MEDICINE CLINIC | Age: 74
End: 2019-08-21
Payer: MEDICARE

## 2019-08-21 VITALS
HEART RATE: 80 BPM | DIASTOLIC BLOOD PRESSURE: 78 MMHG | OXYGEN SATURATION: 97 % | SYSTOLIC BLOOD PRESSURE: 134 MMHG | HEIGHT: 70 IN | WEIGHT: 197.1 LBS | BODY MASS INDEX: 28.22 KG/M2

## 2019-08-21 DIAGNOSIS — E78.00 PURE HYPERCHOLESTEROLEMIA: ICD-10-CM

## 2019-08-21 DIAGNOSIS — I83.91 ASYMPTOMATIC VARICOSE VEINS OF RIGHT LOWER EXTREMITY: ICD-10-CM

## 2019-08-21 DIAGNOSIS — K21.9 GASTROESOPHAGEAL REFLUX DISEASE WITHOUT ESOPHAGITIS: Primary | ICD-10-CM

## 2019-08-21 DIAGNOSIS — M54.50 ACUTE RIGHT-SIDED LOW BACK PAIN WITHOUT SCIATICA: ICD-10-CM

## 2019-08-21 DIAGNOSIS — E03.9 ACQUIRED HYPOTHYROIDISM: ICD-10-CM

## 2019-08-21 DIAGNOSIS — R73.9 HYPERGLYCEMIA: ICD-10-CM

## 2019-08-21 DIAGNOSIS — L23.7 ALLERGIC DERMATITIS DUE TO POISON IVY: ICD-10-CM

## 2019-08-21 DIAGNOSIS — I10 ESSENTIAL HYPERTENSION, BENIGN: ICD-10-CM

## 2019-08-21 LAB
CULTURE: NO GROWTH
Lab: NORMAL
SPECIMEN DESCRIPTION: NORMAL

## 2019-08-21 PROCEDURE — 4004F PT TOBACCO SCREEN RCVD TLK: CPT | Performed by: FAMILY MEDICINE

## 2019-08-21 PROCEDURE — 4040F PNEUMOC VAC/ADMIN/RCVD: CPT | Performed by: FAMILY MEDICINE

## 2019-08-21 PROCEDURE — 99214 OFFICE O/P EST MOD 30 MIN: CPT | Performed by: FAMILY MEDICINE

## 2019-08-21 PROCEDURE — G8427 DOCREV CUR MEDS BY ELIG CLIN: HCPCS | Performed by: FAMILY MEDICINE

## 2019-08-21 PROCEDURE — 3017F COLORECTAL CA SCREEN DOC REV: CPT | Performed by: FAMILY MEDICINE

## 2019-08-21 PROCEDURE — 1123F ACP DISCUSS/DSCN MKR DOCD: CPT | Performed by: FAMILY MEDICINE

## 2019-08-21 PROCEDURE — G8417 CALC BMI ABV UP PARAM F/U: HCPCS | Performed by: FAMILY MEDICINE

## 2019-08-21 RX ORDER — TAMSULOSIN HYDROCHLORIDE 0.4 MG/1
0.4 CAPSULE ORAL DAILY
COMMUNITY
End: 2020-04-09 | Stop reason: SDUPTHER

## 2019-08-21 RX ORDER — METHYLPREDNISOLONE 4 MG/1
TABLET ORAL
Qty: 1 KIT | Refills: 0 | Status: SHIPPED | OUTPATIENT
Start: 2019-08-21 | End: 2019-08-27

## 2019-08-21 RX ORDER — GABAPENTIN 300 MG/1
600 CAPSULE ORAL NIGHTLY
COMMUNITY

## 2019-08-21 RX ORDER — SIMVASTATIN 20 MG
20 TABLET ORAL NIGHTLY
COMMUNITY
End: 2019-09-24 | Stop reason: SDUPTHER

## 2019-08-21 RX ORDER — PANTOPRAZOLE SODIUM 40 MG/1
40 TABLET, DELAYED RELEASE ORAL DAILY
COMMUNITY
End: 2019-09-24 | Stop reason: SDUPTHER

## 2019-08-21 RX ORDER — LIDOCAINE 40 MG/G
CREAM TOPICAL PRN
COMMUNITY

## 2019-08-21 RX ORDER — GABAPENTIN 600 MG/1
300 TABLET ORAL 4 TIMES DAILY
COMMUNITY

## 2019-08-21 RX ORDER — AMLODIPINE BESYLATE 5 MG/1
5 TABLET ORAL DAILY
COMMUNITY
End: 2019-09-24 | Stop reason: SDUPTHER

## 2019-08-21 RX ORDER — LEVOTHYROXINE SODIUM 0.07 MG/1
75 TABLET ORAL DAILY
COMMUNITY
End: 2019-09-24 | Stop reason: SDUPTHER

## 2019-08-21 RX ORDER — HYDROCODONE BITARTRATE AND ACETAMINOPHEN 5; 325 MG/1; MG/1
1 TABLET ORAL EVERY 6 HOURS PRN
COMMUNITY

## 2019-08-21 RX ORDER — POLYETHYLENE GLYCOL 3350 17 G/17G
17 POWDER, FOR SOLUTION ORAL PRN
COMMUNITY

## 2019-08-21 ASSESSMENT — ENCOUNTER SYMPTOMS
CHOKING: 0
NAUSEA: 0
SORE THROAT: 0
HEARTBURN: 1
BACK PAIN: 1
COUGH: 0

## 2019-08-21 ASSESSMENT — PATIENT HEALTH QUESTIONNAIRE - PHQ9
SUM OF ALL RESPONSES TO PHQ QUESTIONS 1-9: 0
SUM OF ALL RESPONSES TO PHQ QUESTIONS 1-9: 0
2. FEELING DOWN, DEPRESSED OR HOPELESS: 0
SUM OF ALL RESPONSES TO PHQ9 QUESTIONS 1 & 2: 0
1. LITTLE INTEREST OR PLEASURE IN DOING THINGS: 0

## 2019-08-23 ASSESSMENT — ENCOUNTER SYMPTOMS
DIARRHEA: 0
CONSTIPATION: 0
ABDOMINAL PAIN: 0
WHEEZING: 0
BOWEL INCONTINENCE: 0
BLOOD IN STOOL: 0
VOMITING: 0
EYE REDNESS: 0
EYE DISCHARGE: 0
SHORTNESS OF BREATH: 0
TROUBLE SWALLOWING: 0

## 2019-09-25 RX ORDER — PANTOPRAZOLE SODIUM 40 MG/1
TABLET, DELAYED RELEASE ORAL
Qty: 180 TABLET | Refills: 1 | Status: SHIPPED | OUTPATIENT
Start: 2019-09-25 | End: 2020-02-12

## 2019-09-25 RX ORDER — SIMVASTATIN 20 MG
TABLET ORAL
Qty: 90 TABLET | Refills: 1 | Status: SHIPPED | OUTPATIENT
Start: 2019-09-25 | End: 2020-02-12

## 2019-09-25 RX ORDER — AMLODIPINE BESYLATE 5 MG/1
TABLET ORAL
Qty: 90 TABLET | Refills: 1 | Status: SHIPPED | OUTPATIENT
Start: 2019-09-25 | End: 2020-02-12

## 2019-09-25 RX ORDER — LEVOTHYROXINE SODIUM 0.07 MG/1
TABLET ORAL
Qty: 90 TABLET | Refills: 1 | Status: SHIPPED | OUTPATIENT
Start: 2019-09-25 | End: 2020-02-12

## 2019-10-04 ENCOUNTER — TELEPHONE (OUTPATIENT)
Dept: FAMILY MEDICINE CLINIC | Age: 74
End: 2019-10-04

## 2019-11-05 ENCOUNTER — IMMUNIZATION (OUTPATIENT)
Dept: FAMILY MEDICINE CLINIC | Age: 74
End: 2019-11-05
Payer: MEDICARE

## 2019-11-05 DIAGNOSIS — Z23 NEED FOR INFLUENZA VACCINATION: Primary | ICD-10-CM

## 2019-11-05 PROCEDURE — G0008 ADMIN INFLUENZA VIRUS VAC: HCPCS | Performed by: FAMILY MEDICINE

## 2019-11-05 PROCEDURE — 90686 IIV4 VACC NO PRSV 0.5 ML IM: CPT | Performed by: FAMILY MEDICINE

## 2019-12-19 ENCOUNTER — OFFICE VISIT (OUTPATIENT)
Dept: FAMILY MEDICINE CLINIC | Age: 74
End: 2019-12-19
Payer: MEDICARE

## 2019-12-19 VITALS
BODY MASS INDEX: 28.12 KG/M2 | DIASTOLIC BLOOD PRESSURE: 80 MMHG | SYSTOLIC BLOOD PRESSURE: 138 MMHG | OXYGEN SATURATION: 98 % | HEART RATE: 88 BPM | WEIGHT: 196 LBS

## 2019-12-19 DIAGNOSIS — E03.9 ACQUIRED HYPOTHYROIDISM: ICD-10-CM

## 2019-12-19 DIAGNOSIS — K21.9 GASTROESOPHAGEAL REFLUX DISEASE WITHOUT ESOPHAGITIS: ICD-10-CM

## 2019-12-19 DIAGNOSIS — I10 ESSENTIAL HYPERTENSION, BENIGN: ICD-10-CM

## 2019-12-19 DIAGNOSIS — R73.9 HYPERGLYCEMIA: Primary | ICD-10-CM

## 2019-12-19 DIAGNOSIS — E78.00 PURE HYPERCHOLESTEROLEMIA: ICD-10-CM

## 2019-12-19 LAB — HBA1C MFR BLD: 6.3 %

## 2019-12-19 PROCEDURE — 83036 HEMOGLOBIN GLYCOSYLATED A1C: CPT | Performed by: FAMILY MEDICINE

## 2019-12-19 PROCEDURE — 4040F PNEUMOC VAC/ADMIN/RCVD: CPT | Performed by: FAMILY MEDICINE

## 2019-12-19 PROCEDURE — 1123F ACP DISCUSS/DSCN MKR DOCD: CPT | Performed by: FAMILY MEDICINE

## 2019-12-19 PROCEDURE — G8417 CALC BMI ABV UP PARAM F/U: HCPCS | Performed by: FAMILY MEDICINE

## 2019-12-19 PROCEDURE — G8427 DOCREV CUR MEDS BY ELIG CLIN: HCPCS | Performed by: FAMILY MEDICINE

## 2019-12-19 PROCEDURE — 4004F PT TOBACCO SCREEN RCVD TLK: CPT | Performed by: FAMILY MEDICINE

## 2019-12-19 PROCEDURE — G8482 FLU IMMUNIZE ORDER/ADMIN: HCPCS | Performed by: FAMILY MEDICINE

## 2019-12-19 PROCEDURE — 3017F COLORECTAL CA SCREEN DOC REV: CPT | Performed by: FAMILY MEDICINE

## 2019-12-19 PROCEDURE — 99214 OFFICE O/P EST MOD 30 MIN: CPT | Performed by: FAMILY MEDICINE

## 2019-12-19 RX ORDER — VARENICLINE TARTRATE 0.5 MG/1
.5-1 TABLET, FILM COATED ORAL SEE ADMIN INSTRUCTIONS
Qty: 57 TABLET | Refills: 0 | Status: SHIPPED | OUTPATIENT
Start: 2019-12-19 | End: 2020-07-01

## 2019-12-19 RX ORDER — VARENICLINE TARTRATE 1 MG/1
1 TABLET, FILM COATED ORAL 2 TIMES DAILY
Qty: 60 TABLET | Refills: 4 | Status: SHIPPED | OUTPATIENT
Start: 2019-12-19 | End: 2020-02-17

## 2019-12-19 SDOH — ECONOMIC STABILITY: FOOD INSECURITY: WITHIN THE PAST 12 MONTHS, YOU WORRIED THAT YOUR FOOD WOULD RUN OUT BEFORE YOU GOT MONEY TO BUY MORE.: NEVER TRUE

## 2019-12-19 SDOH — ECONOMIC STABILITY: INCOME INSECURITY: HOW HARD IS IT FOR YOU TO PAY FOR THE VERY BASICS LIKE FOOD, HOUSING, MEDICAL CARE, AND HEATING?: NOT HARD AT ALL

## 2019-12-19 SDOH — ECONOMIC STABILITY: FOOD INSECURITY: WITHIN THE PAST 12 MONTHS, THE FOOD YOU BOUGHT JUST DIDN'T LAST AND YOU DIDN'T HAVE MONEY TO GET MORE.: NEVER TRUE

## 2019-12-19 ASSESSMENT — ENCOUNTER SYMPTOMS
TROUBLE SWALLOWING: 0
VOMITING: 0
ABDOMINAL PAIN: 0
EYE REDNESS: 0
CONSTIPATION: 0
CHANGE IN BOWEL HABIT: 0
COUGH: 0
BLOOD IN STOOL: 0
FACIAL SWELLING: 0
DIARRHEA: 0
EYE DISCHARGE: 0
NAUSEA: 0
SHORTNESS OF BREATH: 0

## 2020-02-12 RX ORDER — SIMVASTATIN 20 MG
TABLET ORAL
Qty: 90 TABLET | Refills: 1 | Status: SHIPPED | OUTPATIENT
Start: 2020-02-12 | End: 2020-06-18

## 2020-02-12 RX ORDER — PANTOPRAZOLE SODIUM 40 MG/1
TABLET, DELAYED RELEASE ORAL
Qty: 180 TABLET | Refills: 1 | Status: SHIPPED | OUTPATIENT
Start: 2020-02-12 | End: 2020-06-18

## 2020-02-12 RX ORDER — LEVOTHYROXINE SODIUM 0.07 MG/1
TABLET ORAL
Qty: 90 TABLET | Refills: 1 | Status: SHIPPED | OUTPATIENT
Start: 2020-02-12 | End: 2020-06-18

## 2020-02-12 RX ORDER — AMLODIPINE BESYLATE 5 MG/1
TABLET ORAL
Qty: 90 TABLET | Refills: 1 | Status: SHIPPED | OUTPATIENT
Start: 2020-02-12 | End: 2020-06-18

## 2020-02-12 NOTE — TELEPHONE ENCOUNTER
Last OV: 12/19/2019 with instruction to return in 6 months around 6/19/20  Last RX: Amlodipine, levothyroxine, pantoprazole and simvistatin start date 9/25/19  Next scheduled apt: Visit date not found

## 2020-04-09 ENCOUNTER — TELEMEDICINE (OUTPATIENT)
Dept: UROLOGY | Age: 75
End: 2020-04-09
Payer: MEDICARE

## 2020-04-09 PROCEDURE — 1123F ACP DISCUSS/DSCN MKR DOCD: CPT | Performed by: NURSE PRACTITIONER

## 2020-04-09 PROCEDURE — 3017F COLORECTAL CA SCREEN DOC REV: CPT | Performed by: NURSE PRACTITIONER

## 2020-04-09 PROCEDURE — 99213 OFFICE O/P EST LOW 20 MIN: CPT | Performed by: NURSE PRACTITIONER

## 2020-04-09 PROCEDURE — G8427 DOCREV CUR MEDS BY ELIG CLIN: HCPCS | Performed by: NURSE PRACTITIONER

## 2020-04-09 PROCEDURE — 4040F PNEUMOC VAC/ADMIN/RCVD: CPT | Performed by: NURSE PRACTITIONER

## 2020-04-09 RX ORDER — TAMSULOSIN HYDROCHLORIDE 0.4 MG/1
0.4 CAPSULE ORAL DAILY
Qty: 90 CAPSULE | Refills: 3 | Status: SHIPPED | OUTPATIENT
Start: 2020-04-09 | End: 2021-03-11

## 2020-04-09 NOTE — PROGRESS NOTES
2020    TELEHEALTH EVALUATION -- Audio/Visual (During [id number] public health emergency)    HPI:    Marco Antonio Del Toro (:  1945) has requested an audio/video evaluation for the following concern(s):    History   Evaluated for hydrocele. Never opted for surgery and was lost to follow-up.      2016 sudden onset LUTS x 1 wk. IPSS 35 QOL 6 (compared to baseline ). POC UA negative. PCP increased his Flomax to 0.8mg daily without much improvement. Does have perineal pain and pressure in the rectum. Has some pain with urination. Pt has never had prostatitis in past. . Recent PSA 16 - 2.92              Treated with 4 wks abx & 1 wk NSAIDs for suspected prostatitis.    1/3/2018 follow-up for urinary retention. Benny Napier was evaluated in the ER on 17 because he was unable to urinate. Trista Garcia did have a Mejía catheter placed with over 1200 mL returned. Eileen East to presenting to the ER he was evaluated in the urgent care for decreased urination. Trista Garcia was started on empiric Cipro.  Urine culture from 17 was negative for infection.  Patient does not recall any perineal or rectal pain, but he did have suprapubic pain and the pain radiated to his right flank.  While in the ER they did complete a CT scan shows bilateral hydronephrosis and hydroureter. Marli Nam is no evidence of  calcifications.  The bladder is distended and there is a markedly enlarged prostate gland. Marli Nam is a moderate amount of stool as well.  Patient admits to only having a bowel movement every 3 days. Trista Garcia is taking Flomax daily for history of BPH.  He denies any gross hematuria and he is tolerating the Mejía catheter without complaints.  His wife is concerned because they're planning on leaving for [place] on 18.        2018 PVP greenlight    2019 Resumed flomax due to post-void dribbling    Today  Here today to follow-up for BPH. He is taking flomax daily. He denies frequency, urgency, nocturia or post-void dribbling. He has had worsening urinary hesitancy. He does have BMs. He denies a split or spraying stream. He denies dysuria or gross hematuria. Review of Systems   Constitutional: Negative for appetite change, chills and fever. Eyes: Negative for pain, redness and visual disturbance. Respiratory: Negative for cough, shortness of breath and wheezing. Cardiovascular: Negative for chest pain and leg swelling. Gastrointestinal: Negative for abdominal pain, constipation, nausea and vomiting. Genitourinary: Negative for decreased urine volume, difficulty urinating, discharge, dysuria, enuresis, flank pain, frequency, hematuria, penile pain, scrotal swelling, testicular pain and urgency. Musculoskeletal: Negative for back pain, joint swelling and myalgias. Skin: Negative for color change, rash and wound. Neurological: Negative for dizziness, tremors and numbness. Hematological: Negative for adenopathy. Does not bruise/bleed easily. Allergies   Allergen Reactions    Cortisone Other (See Comments)     Irritates \"GERD\"        Prior to Visit Medications    Medication Sig Taking?  Authorizing Provider   tamsulosin (FLOMAX) 0.4 MG capsule Take 1 capsule by mouth daily Yes KERWIN Antunez - CNP   varenicline (CHANTIX) 1 MG tablet TAKE AS DIRECTED ON BOX  Patient not taking: Reported on 4/9/2020  Servando Parmar MD   simvastatin (ZOCOR) 20 MG tablet TAKE 1 TABLET EVERY NIGHT  Servando Parmar MD   pantoprazole (PROTONIX) 40 MG tablet TAKE 1 TABLET TWICE DAILY  Servando Parmar MD   levothyroxine (SYNTHROID) 75 MCG tablet TAKE 1 TABLET EVERY DAY  Servando Parmar MD   amLODIPine (NORVASC) 5 MG tablet TAKE 1 TABLET EVERY DAY  Servando Parmar MD   varenicline (CHANTIX) 0.5 MG tablet Take 1-2 tablets by mouth See Admin Instructions 0.5mg DAILY for 3 days followed by 0.5mg TWICE DAILY for 4 days followed by 1mg TWICE DAILY  Patient not taking: Reported on 4/9/2020  Servando Parmar MD   hydrocortisone 2.5 % cream Apply topically 2 times daily Apply topically 2 times daily. Historical Provider, MD   aspirin 81 MG tablet Take 81 mg by mouth daily  Historical Provider, MD   gabapentin (NEURONTIN) 300 MG capsule Take 300 mg by mouth nightly. Historical Provider, MD   gabapentin (NEURONTIN) 600 MG tablet Take 600 mg by mouth 4 times daily. Historical Provider, MD   HYDROcodone-acetaminophen (NORCO) 5-325 MG per tablet Take 1 tablet by mouth every 6 hours as needed for Pain. Historical Provider, MD   pyrethrins-piperonyl butoxide 0.5 % bottle Apply topically once Apply topically once. Historical Provider, MD   lidocaine (LMX) 4 % cream Apply topically as needed for Pain Apply topically as needed. Historical Provider, MD   Multiple Vitamins-Minerals (MULTI COMPLETE PO) Take by mouth  Historical Provider, MD   polyethylene glycol (GLYCOLAX) powder Take 17 g by mouth as needed   Historical Provider, MD       Social History     Tobacco Use    Smoking status: Current Some Day Smoker     Packs/day: 1.00     Years: 40.00     Pack years: 40.00     Last attempt to quit: 2014     Years since quittin.1    Smokeless tobacco: Never Used   Substance Use Topics    Alcohol use: Yes     Comment: seldom    Drug use: No        Past Medical History:   Diagnosis Date    Arthritis     Cancer ([facility] [address]     multiple myeloma    Fibromyalgia     GERD (gastroesophageal reflux disease)     History of blood transfusion     At birth     Hyperlipidemia     Hypertension     Hypothyroidism     IBS (irritable bowel syndrome)     Osteoarthritis     Thyroid disease     Hypothyroid        Past Surgical History:   Procedure Laterality Date    ABDOMEN SURGERY      Bowel Resection following \"fall on the ice\";  Xochitl Santoro MD    APPENDECTOMY      COLONOSCOPY      COLONOSCOPY  2014    Dr. Lynn Nair  [id number]    HEMORRHOID SURGERY      HERNIA REPAIR      Hiatal Hernia repair, Esophageal repair     HERNIA

## 2020-04-20 ASSESSMENT — ENCOUNTER SYMPTOMS
COLOR CHANGE: 0
ABDOMINAL PAIN: 0
BACK PAIN: 0
WHEEZING: 0
EYE PAIN: 0
VOMITING: 0
SHORTNESS OF BREATH: 0
CONSTIPATION: 0
COUGH: 0
EYE REDNESS: 0
NAUSEA: 0

## 2020-06-18 RX ORDER — LEVOTHYROXINE SODIUM 0.07 MG/1
TABLET ORAL
Qty: 90 TABLET | Refills: 1 | Status: SHIPPED | OUTPATIENT
Start: 2020-06-18 | End: 2020-11-13

## 2020-06-18 RX ORDER — AMLODIPINE BESYLATE 5 MG/1
TABLET ORAL
Qty: 90 TABLET | Refills: 1 | Status: SHIPPED | OUTPATIENT
Start: 2020-06-18 | End: 2020-11-13

## 2020-06-18 RX ORDER — PANTOPRAZOLE SODIUM 40 MG/1
TABLET, DELAYED RELEASE ORAL
Qty: 180 TABLET | Refills: 1 | Status: SHIPPED | OUTPATIENT
Start: 2020-06-18 | End: 2020-11-13

## 2020-06-18 RX ORDER — SIMVASTATIN 20 MG
TABLET ORAL
Qty: 90 TABLET | Refills: 1 | Status: SHIPPED | OUTPATIENT
Start: 2020-06-18 | End: 2020-11-13

## 2020-06-18 NOTE — TELEPHONE ENCOUNTER
Vitamins-Minerals (MULTI COMPLETE PO) Take by mouth    Historical Provider, MD   polyethylene glycol (GLYCOLAX) powder Take 17 g by mouth as needed     Historical Provider, MD       Allergies:  Cortisone    Hemoglobin A1C (%)   Date Value   12/19/2019 6.3   08/20/2019 6.3 (H)   12/26/2018 6.2 (H)             ( goal A1C is < 7)   Microalb/Crt.  Ratio (mcg/mg creat)   Date Value   11/23/2016 11     LDL Cholesterol (mg/dL)   Date Value   08/20/2019 59   12/26/2018 69       (goal LDL is <100)   AST (U/L)   Date Value   08/20/2019 22     ALT (U/L)   Date Value   08/20/2019 36     BUN (mg/dL)   Date Value   08/20/2019 13     BP Readings from Last 3 Encounters:   12/19/19 138/80   08/21/19 134/78   08/01/19 138/76          (goal 120/80)

## 2020-06-25 ENCOUNTER — TELEPHONE (OUTPATIENT)
Dept: FAMILY MEDICINE CLINIC | Age: 75
End: 2020-06-25

## 2020-06-25 NOTE — TELEPHONE ENCOUNTER
Patient would like to know if he needs lab work before his appt on 7/1/20. Last labs were 8/20/19. Please let patient know. Health Maintenance   Topic Date Due    DTaP/Tdap/Td vaccine (1 - Tdap) 07/29/1966    Low dose CT lung screening  07/29/2002    Shingles Vaccine (2 of 3) 03/26/2012    Annual Wellness Visit (AWV)  05/29/2019    Lipid screen  08/20/2020    TSH testing  08/20/2020    Potassium monitoring  08/20/2020    Creatinine monitoring  08/20/2020    A1C test (Diabetic or Prediabetic)  12/19/2020    Colon cancer screen colonoscopy  12/14/2026    Flu vaccine  Completed    Pneumococcal 65+ years Vaccine  Completed    AAA screen  Completed    Hepatitis C screen  Addressed    Hepatitis A vaccine  Aged Out    Hepatitis B vaccine  Aged Out    Hib vaccine  Aged Out    Meningococcal (ACWY) vaccine  Aged Out             (applicable per patient's age: Cancer Screenings, Depression Screening, Fall Risk Screening, Immunizations)    Hemoglobin A1C (%)   Date Value   12/19/2019 6.3   08/20/2019 6.3 (H)   12/26/2018 6.2 (H)     Microalb/Crt.  Ratio (mcg/mg creat)   Date Value   11/23/2016 11     LDL Cholesterol (mg/dL)   Date Value   08/20/2019 59     AST (U/L)   Date Value   08/20/2019 22     ALT (U/L)   Date Value   08/20/2019 36     BUN (mg/dL)   Date Value   08/20/2019 13      (goal A1C is < 7)   (goal LDL is <100) need 30-50% reduction from baseline     BP Readings from Last 3 Encounters:   12/19/19 138/80   08/21/19 134/78   08/01/19 138/76    (goal /80)      All Future Testing planned in [facility]:  Lab Frequency Next Occurrence       Next Visit Date:  Future Appointments   Date Time Provider Vasyl Yu   7/1/2020  9:20 AM MD Mark Anthony Vasquez [facility]   11/12/2020  2:00 PM MD Belkys Fuentes [facility]            Patient Active Problem List:     Rheumatoid arthritis ([address]     Myalgia and myositis     Esophageal reflux     Irritable bowel syndrome     Diaphragmatic hernia     Essential hypertension, benign     Tubular adenoma of colon     Hypothyroid     Pure hypercholesterolemia     Abscess of intestine     Fibromyalgia     Monoclonal gammopathy     Monoclonal paraproteinemia     Epigastric pain     BPH with obstruction/lower urinary tract symptoms     Constipation     Urinary retention     Urgency of urination     Urinary frequency     Acute prostatitis

## 2020-06-29 ENCOUNTER — HOSPITAL ENCOUNTER (OUTPATIENT)
Age: 75
Discharge: HOME | End: 2020-06-29
Payer: MEDICARE

## 2020-06-29 LAB
ESTIMATED AVERAGE GLUCOSE: 134 MG/DL
HBA1C MFR BLD: 6.3 % (ref 4–6)
TSH SERPL DL<=0.05 MIU/L-ACNC: 4.3 MIU/L (ref 0.3–5)

## 2020-06-29 PROCEDURE — 80053 COMPREHEN METABOLIC PANEL: CPT

## 2020-06-29 PROCEDURE — 36415 COLL VENOUS BLD VENIPUNCTURE: CPT

## 2020-06-29 PROCEDURE — 80061 LIPID PANEL: CPT

## 2020-06-29 PROCEDURE — 84443 ASSAY THYROID STIM HORMONE: CPT

## 2020-06-29 PROCEDURE — 83036 HEMOGLOBIN GLYCOSYLATED A1C: CPT

## 2020-06-30 LAB
ALBUMIN SERPL-MCNC: 4.7 G/DL (ref 3.5–5.2)
ALBUMIN/GLOBULIN RATIO: ABNORMAL (ref 1–2.5)
ALP BLD-CCNC: 95 U/L (ref 40–129)
ALT SERPL-CCNC: 32 U/L (ref 5–41)
ANION GAP SERPL CALCULATED.3IONS-SCNC: 12 MMOL/L (ref 9–17)
AST SERPL-CCNC: 24 U/L
BILIRUB SERPL-MCNC: 0.29 MG/DL (ref 0.3–1.2)
BUN BLDV-MCNC: 15 MG/DL (ref 8–23)
BUN/CREAT BLD: 22 (ref 9–20)
CALCIUM SERPL-MCNC: 10.2 MG/DL (ref 8.6–10.4)
CHLORIDE BLD-SCNC: 103 MMOL/L (ref 98–107)
CO2: 23 MMOL/L (ref 20–31)
CREAT SERPL-MCNC: 0.67 MG/DL (ref 0.7–1.2)
GFR AFRICAN AMERICAN: >60 ML/MIN
GFR NON-AFRICAN AMERICAN: >60 ML/MIN
GFR SERPL CREATININE-BSD FRML MDRD: ABNORMAL ML/MIN/{1.73_M2}
GFR SERPL CREATININE-BSD FRML MDRD: ABNORMAL ML/MIN/{1.73_M2}
GLUCOSE BLD-MCNC: 111 MG/DL (ref 70–99)
POTASSIUM SERPL-SCNC: 4.4 MMOL/L (ref 3.7–5.3)
SODIUM BLD-SCNC: 138 MMOL/L (ref 135–144)
TOTAL PROTEIN: 8.2 G/DL (ref 6.4–8.3)

## 2020-07-01 ENCOUNTER — OFFICE VISIT (OUTPATIENT)
Dept: FAMILY MEDICINE CLINIC | Age: 75
End: 2020-07-01
Payer: MEDICARE

## 2020-07-01 VITALS
HEART RATE: 80 BPM | WEIGHT: 194 LBS | OXYGEN SATURATION: 96 % | HEIGHT: 70 IN | BODY MASS INDEX: 27.77 KG/M2 | SYSTOLIC BLOOD PRESSURE: 136 MMHG | TEMPERATURE: 98.7 F | DIASTOLIC BLOOD PRESSURE: 82 MMHG

## 2020-07-01 LAB
CHOLESTEROL/HDL RATIO: 4.4
CHOLESTEROL: 133 MG/DL
HDLC SERPL-MCNC: 30 MG/DL
LDL CHOLESTEROL: 54 MG/DL (ref 0–130)
TRIGL SERPL-MCNC: 243 MG/DL
VLDLC SERPL CALC-MCNC: ABNORMAL MG/DL (ref 1–30)

## 2020-07-01 PROCEDURE — 3017F COLORECTAL CA SCREEN DOC REV: CPT | Performed by: FAMILY MEDICINE

## 2020-07-01 PROCEDURE — 1123F ACP DISCUSS/DSCN MKR DOCD: CPT | Performed by: FAMILY MEDICINE

## 2020-07-01 PROCEDURE — 4004F PT TOBACCO SCREEN RCVD TLK: CPT | Performed by: FAMILY MEDICINE

## 2020-07-01 PROCEDURE — G8417 CALC BMI ABV UP PARAM F/U: HCPCS | Performed by: FAMILY MEDICINE

## 2020-07-01 PROCEDURE — G8427 DOCREV CUR MEDS BY ELIG CLIN: HCPCS | Performed by: FAMILY MEDICINE

## 2020-07-01 PROCEDURE — 4040F PNEUMOC VAC/ADMIN/RCVD: CPT | Performed by: FAMILY MEDICINE

## 2020-07-01 PROCEDURE — 99214 OFFICE O/P EST MOD 30 MIN: CPT | Performed by: FAMILY MEDICINE

## 2020-07-01 ASSESSMENT — ENCOUNTER SYMPTOMS
FACIAL SWELLING: 0
HEARTBURN: 0
CHOKING: 0
NAUSEA: 0
CONSTIPATION: 0
BLOOD IN STOOL: 0
EYE REDNESS: 0
EYE DISCHARGE: 0
TROUBLE SWALLOWING: 0
ABDOMINAL PAIN: 0
DIARRHEA: 0
COUGH: 0
SHORTNESS OF BREATH: 0
VOMITING: 0

## 2020-07-01 ASSESSMENT — PATIENT HEALTH QUESTIONNAIRE - PHQ9
SUM OF ALL RESPONSES TO PHQ9 QUESTIONS 1 & 2: 0
1. LITTLE INTEREST OR PLEASURE IN DOING THINGS: 0
SUM OF ALL RESPONSES TO PHQ QUESTIONS 1-9: 0
2. FEELING DOWN, DEPRESSED OR HOPELESS: 0
SUM OF ALL RESPONSES TO PHQ QUESTIONS 1-9: 0

## 2020-07-01 NOTE — PATIENT INSTRUCTIONS
SURVEY:    You may be receiving a survey from [employer] regarding your visit today. Please complete the survey to enable us to provide the highest quality of care to you and your family. If you cannot score us a very good on any question, please call the office to discuss how we could of made your experience a very good one. Thank you.

## 2020-07-01 NOTE — PROGRESS NOTES
unchanged. Pertinent negatives include no chills, dysuria, hematuria, nausea or vomiting. Past treatments include tamsulosin. The treatment provided significant relief. Shoulder Pain    The pain is present in the right shoulder (radiates into the Rt upper arm). This is a new problem. The current episode started more than 1 month ago. There has been no history of extremity trauma (Pt has known injury but hurting more since he got back from [place] in spring. BUT pt has been doing alot of outdoors work -- shoveling, raised garden, landscaping plus house work since wife had second hip surgery on June 2nd. ). The problem occurs daily. The problem has been gradually worsening. The quality of the pain is described as aching. Associated symptoms include a limited range of motion. Pertinent negatives include no fever, itching, joint swelling, numbness or tingling. Associated symptoms comments: Certain movements. He has tried oral narcotics, rest, heat and cold for the symptoms. Family history includes rheumatoid arthritis. His past medical history is significant for diabetes. Hypothyroidism- Chronic/stable, Patient denies changes in weight,bowels,palpitations. Energy is good Last TSH on 6/29/20- 4.30.     Past Medical History:     Past Medical History:   Diagnosis Date    Arthritis     Cancer ([facility] [address]     multiple myeloma    Fibromyalgia     GERD (gastroesophageal reflux disease)     History of blood transfusion     At birth     Hyperlipidemia     Hypertension     Hypothyroidism     IBS (irritable bowel syndrome)     Osteoarthritis     Thyroid disease     Hypothyroid       Reviewed all health maintenance requirements and ordered appropriate tests  Health Maintenance Due   Topic Date Due    DTaP/Tdap/Td vaccine (1 - Tdap) 07/29/1966    Low dose CT lung screening  07/29/2002    Shingles Vaccine (2 of 3) 03/26/2012    Annual Wellness Visit (AWV)  05/29/2019       Past Surgical History:     Past Surgical History: Procedure Laterality Date    ABDOMEN SURGERY  2004    Bowel Resection following \"fall on the ice\"; Salvador Ramírez MD    APPENDECTOMY      COLONOSCOPY      COLONOSCOPY  09-    Dr. Mark Roca      Hiatal Hernia repair, Esophageal repair     HERNIA REPAIR Bilateral     Inguinal     [place] LASER VAPORIZATION SURGERY PROSTATE, COMPLETE N/A 1/25/2018    CYSTOSCOPY TRANSURETHRAL RESECTION PROSTATE LASER performed by Alejo Chester MD at [address] [place] [address] ENDOSCOPY      UPPER GASTROINTESTINAL ENDOSCOPY  09-    Dr. Guillermo Heart  12/14/2016    Ronell Dandy MD        Medications:       Prior to Admission medications    Medication Sig Start Date End Date Taking? Authorizing Provider   amLODIPine (NORVASC) 5 MG tablet TAKE 1 TABLET EVERY DAY 6/18/20  Yes Quang Callejas MD   levothyroxine (SYNTHROID) 75 MCG tablet TAKE 1 TABLET EVERY DAY 6/18/20  Yes Quang Callejas MD   pantoprazole (PROTONIX) 40 MG tablet TAKE 1 TABLET TWICE DAILY 6/18/20  Yes Quang Callejas MD   simvastatin (ZOCOR) 20 MG tablet TAKE 1 TABLET EVERY NIGHT 6/18/20  Yes Quang Callejas MD   tamsulosin [facility]) 0.4 MG capsule Take 1 capsule by mouth daily 4/9/20  Yes KERWIN Adrian - CNP   aspirin 81 MG tablet Take 81 mg by mouth daily   Yes Historical Provider, MD   gabapentin (NEURONTIN) 300 MG capsule Take 300 mg by mouth nightly. Yes Historical Provider, MD   gabapentin (NEURONTIN) 600 MG tablet Take 600 mg by mouth 4 times daily. Yes Historical Provider, MD   HYDROcodone-acetaminophen (NORCO) 5-325 MG per tablet Take 1 tablet by mouth every 6 hours as needed for Pain.    Yes Historical Provider, MD   Multiple Vitamins-Minerals (MULTI COMPLETE PO) Take by mouth   Yes Historical Provider, MD   polyethylene glycol (GLYCOLAX) powder Take 17 g by mouth as needed    Yes Historical Provider, MD Left eye: No discharge. Conjunctiva/sclera: Conjunctivae normal.      Pupils: Pupils are equal, round, and reactive to light. Neck:      Musculoskeletal: Neck supple. Thyroid: No thyromegaly. Vascular: No carotid bruit. Cardiovascular:      Rate and Rhythm: Normal rate and regular rhythm. Heart sounds: No murmur. Pulmonary:      Effort: Pulmonary effort is normal.      Breath sounds: Normal breath sounds. Abdominal:      General: Bowel sounds are normal.      Palpations: Abdomen is soft. Tenderness: There is no abdominal tenderness. Musculoskeletal:      Right shoulder: He exhibits decreased range of motion and tenderness. He exhibits no swelling, no effusion and normal strength. Right lower leg: No edema. Left lower leg: No edema. Comments: Positive pain Rt shoulder with hawking and empty bucket test. Decreased internal rotation and abduction. Lymphadenopathy:      Cervical: No cervical adenopathy. Skin:     General: Skin is warm and dry. Findings: No rash. Neurological:      Mental Status: He is alert and oriented to person, place, and time.    Psychiatric:         Mood and Affect: Mood normal.         Behavior: Behavior normal.         Vitals:  /82   Pulse 80   Temp 98.7 °F (37.1 °C)   Ht 5' 10\" (1.778 m)   Wt 194 lb (88 kg)   SpO2 96%   BMI 27.84 kg/m²       Data:     Lab Results   Component Value Date     06/29/2020    K 4.4 06/29/2020     06/29/2020    CO2 23 06/29/2020    BUN 15 06/29/2020    CREATININE 0.67 06/29/2020    GLUCOSE 111 06/29/2020    GLUCOSE 117 04/03/2012    PROT 8.2 06/29/2020    LABALBU 4.7 06/29/2020    LABALBU 4.5 04/03/2012    BILITOT 0.29 06/29/2020    ALKPHOS 95 06/29/2020    AST 24 06/29/2020    ALT 32 06/29/2020     Lab Results   Component Value Date    WBC 11.5 12/30/2017    RBC 5.41 12/30/2017    HGB 15.8 12/30/2017    HCT 48.1 12/30/2017    MCV 89.0 12/30/2017    MCH 29.2 12/30/2017    [facility] 32.9 12/30/2017    RDW 13.6 12/30/2017     12/30/2017    MPV NOT REPORTED 12/30/2017     Lab Results   Component Value Date    TSH 4.30 06/29/2020     Lab Results   Component Value Date    CHOL 123 08/20/2019    HDL 36 08/20/2019    PSA 2.92 01/06/2017    LABA1C 6.3 06/29/2020          Assessment/Plan:        1. Essential hypertension, benign  Stable on Norvasc and     2. Pure hypercholesterolemia  Stable on zocor    3. Gastroesophageal reflux disease without esophagitis  Stable on protonix    4. Benign prostatic hyperplasia without lower urinary tract symptoms  Stable flomax     5. Acute pain of right shoulder  Pt told heat, stretches (handout given) and ice daily and when pt ready to see ortho he will call for Dr Iesha Michael referral    6. Rheumatoid arthritis, involving unspecified site, unspecified rheumatoid factor presence ([facility] [address]  Pt sees rheumatologist in [place] who controls his pain meds    7. Acquired hypothyroidism  Stable on synthroid        Julio received counseling on the following healthy behaviors: nutrition and exercise  Reviewed prior labs and health maintenance  Continue current medications, diet and exercise. Discussed use, benefit, and side effects of prescribed medications. Barriers to medication compliance addressed. Patient given educational materials - see patient instructions  Was a self-tracking handout given in paper form or via [employer]? Yes    Requested Prescriptions      No prescriptions requested or ordered in this encounter       All patient questions answered. Patient voiced understanding. Quality Measures    Body mass index is 27.84 kg/m². Normal. Weight control planned discussed Healthy diet and regular exercise. BP: 136/82. Blood pressure is normal. Treatment plan consists of No treatment change needed.     Fall Risk 8/21/2019 6/26/2018 5/18/2017 5/2/2016 7/30/2014   2 or more falls in past year? no no no no no   Fall with injury in past year? no no no no no     The patient does not have a history of falls. I did not - not indicated , complete a risk assessment for falls. A plan of care for falls No Treatment plan indicated    Lab Results   Component Value Date    LDLCHOLESTEROL 59 08/20/2019    (goal LDL reduction with dx if diabetes is 50% LDL reduction)    PHQ Scores 7/1/2020 8/21/2019 6/26/2018 5/18/2017 7/30/2014   PHQ2 Score 0 0 0 0 0   PHQ9 Score 0 0 0 0 0     Interpretation of Total Score Depression Severity: 1-4 = Minimal depression, 5-9 = Mild depression, 10-14 = Moderate depression, 15-19 = Moderately severe depression, 20-27 = Severe depression        Return in about 6 months (around 1/1/2021) for DM, HTN, Hyperlipidemia, gerd, BPH.       Electronically signed by Caryl Blackmon MD on 7/1/2020 at 11:59 AM

## 2020-08-05 ENCOUNTER — HOSPITAL ENCOUNTER (OUTPATIENT)
Age: 75
Discharge: HOME | End: 2020-08-07
Payer: MEDICARE

## 2020-08-05 ENCOUNTER — HOSPITAL ENCOUNTER (OUTPATIENT)
Dept: GENERAL RADIOLOGY | Age: 75
Discharge: HOME | End: 2020-08-07
Payer: MEDICARE

## 2020-08-05 PROCEDURE — 73030 X-RAY EXAM OF SHOULDER: CPT

## 2020-08-12 ENCOUNTER — HOSPITAL ENCOUNTER (OUTPATIENT)
Dept: MRI IMAGING | Age: 75
Discharge: HOME | End: 2020-08-14
Payer: MEDICARE

## 2020-08-12 PROCEDURE — 73221 MRI JOINT UPR EXTREM W/O DYE: CPT

## 2020-09-16 ENCOUNTER — TELEPHONE (OUTPATIENT)
Dept: FAMILY MEDICINE CLINIC | Age: 75
End: 2020-09-16

## 2020-09-23 ENCOUNTER — HOSPITAL ENCOUNTER (OUTPATIENT)
Dept: GENERAL RADIOLOGY | Age: 75
Discharge: HOME | End: 2020-09-25
Payer: MEDICARE

## 2020-09-23 ENCOUNTER — HOSPITAL ENCOUNTER (OUTPATIENT)
Age: 75
Discharge: HOME | End: 2020-09-23
Payer: MEDICARE

## 2020-09-23 ENCOUNTER — HOSPITAL ENCOUNTER (OUTPATIENT)
Dept: PREADMISSION TESTING | Age: 75
Discharge: HOME | End: 2020-09-23
Payer: MEDICARE

## 2020-09-23 ENCOUNTER — HOSPITAL ENCOUNTER (OUTPATIENT)
Age: 75
Discharge: HOME | End: 2020-09-25
Payer: MEDICARE

## 2020-09-23 LAB
ANION GAP SERPL CALCULATED.3IONS-SCNC: 10 MMOL/L (ref 9–17)
BUN BLDV-MCNC: 13 MG/DL (ref 8–23)
BUN/CREAT BLD: 15 (ref 9–20)
CALCIUM SERPL-MCNC: 9.5 MG/DL (ref 8.6–10.4)
CHLORIDE BLD-SCNC: 102 MMOL/L (ref 98–107)
CO2: 25 MMOL/L (ref 20–31)
CREAT SERPL-MCNC: 0.85 MG/DL (ref 0.7–1.2)
EKG ATRIAL RATE: 74 BPM
EKG P AXIS: 24 DEGREES
EKG P-R INTERVAL: 172 MS
EKG Q-T INTERVAL: 384 MS
EKG QRS DURATION: 82 MS
EKG QTC CALCULATION (BAZETT): 426 MS
EKG R AXIS: 109 DEGREES
EKG T AXIS: 23 DEGREES
EKG VENTRICULAR RATE: 74 BPM
GFR AFRICAN AMERICAN: >60 ML/MIN
GFR NON-AFRICAN AMERICAN: >60 ML/MIN
GFR SERPL CREATININE-BSD FRML MDRD: ABNORMAL ML/MIN/{1.73_M2}
GFR SERPL CREATININE-BSD FRML MDRD: ABNORMAL ML/MIN/{1.73_M2}
GLUCOSE BLD-MCNC: 120 MG/DL (ref 70–99)
HCT VFR BLD CALC: 48 % (ref 41–53)
HEMOGLOBIN: 16.1 G/DL (ref 13.5–17.5)
MCH RBC QN AUTO: 29.9 PG (ref 26–34)
MCHC RBC AUTO-ENTMCNC: 33.5 G/DL (ref 31–37)
MCV RBC AUTO: 89.1 FL (ref 80–100)
NRBC AUTOMATED: NORMAL PER 100 WBC
PDW BLD-RTO: 13.8 % (ref 12.1–15.2)
PLATELET # BLD: 230 K/UL (ref 140–450)
PMV BLD AUTO: NORMAL FL (ref 6–12)
POTASSIUM SERPL-SCNC: 4.3 MMOL/L (ref 3.7–5.3)
RBC # BLD: 5.39 M/UL (ref 4.5–5.9)
SODIUM BLD-SCNC: 137 MMOL/L (ref 135–144)
WBC # BLD: 5 K/UL (ref 3.5–11)

## 2020-09-23 PROCEDURE — 80048 BASIC METABOLIC PNL TOTAL CA: CPT

## 2020-09-23 PROCEDURE — 93010 ELECTROCARDIOGRAM REPORT: CPT | Performed by: INTERNAL MEDICINE

## 2020-09-23 PROCEDURE — 71046 X-RAY EXAM CHEST 2 VIEWS: CPT

## 2020-09-23 PROCEDURE — 93005 ELECTROCARDIOGRAM TRACING: CPT | Performed by: ORTHOPAEDIC SURGERY

## 2020-09-23 PROCEDURE — 85027 COMPLETE CBC AUTOMATED: CPT

## 2020-09-23 PROCEDURE — 36415 COLL VENOUS BLD VENIPUNCTURE: CPT

## 2020-09-23 NOTE — PROGRESS NOTES
PAT visit complete, bottle of soap provided as well as all pre-op instructions, pt states understanding as well;

## 2020-10-14 ENCOUNTER — HOSPITAL ENCOUNTER (OUTPATIENT)
Dept: PREADMISSION TESTING | Age: 75
Setting detail: SPECIMEN
Discharge: HOME | End: 2020-10-14
Payer: MEDICARE

## 2020-10-14 ENCOUNTER — ANESTHESIA EVENT (OUTPATIENT)
Dept: OPERATING ROOM | Age: 75
End: 2020-10-14
Payer: MEDICARE

## 2020-10-14 ENCOUNTER — HOSPITAL ENCOUNTER (OUTPATIENT)
Age: 75
Setting detail: OUTPATIENT SURGERY
Discharge: HOME | End: 2020-10-14
Attending: ORTHOPAEDIC SURGERY | Admitting: ORTHOPAEDIC SURGERY
Payer: MEDICARE

## 2020-10-14 ENCOUNTER — ANESTHESIA (OUTPATIENT)
Dept: OPERATING ROOM | Age: 75
End: 2020-10-14
Payer: MEDICARE

## 2020-10-14 VITALS
TEMPERATURE: 98.6 F | SYSTOLIC BLOOD PRESSURE: 105 MMHG | OXYGEN SATURATION: 96 % | RESPIRATION RATE: 8 BRPM | DIASTOLIC BLOOD PRESSURE: 57 MMHG

## 2020-10-14 VITALS
SYSTOLIC BLOOD PRESSURE: 129 MMHG | HEART RATE: 69 BPM | BODY MASS INDEX: 28.58 KG/M2 | RESPIRATION RATE: 18 BRPM | OXYGEN SATURATION: 92 % | TEMPERATURE: 96.3 F | WEIGHT: 193 LBS | HEIGHT: 69 IN | DIASTOLIC BLOOD PRESSURE: 78 MMHG

## 2020-10-14 PROBLEM — M75.101 RIGHT ROTATOR CUFF TEAR: Status: ACTIVE | Noted: 2020-10-14

## 2020-10-14 LAB
SARS-COV-2, RAPID: NOT DETECTED
SARS-COV-2: NORMAL
SARS-COV-2: NORMAL
SOURCE: NORMAL

## 2020-10-14 PROCEDURE — 6370000000 HC RX 637 (ALT 250 FOR IP): Performed by: ORTHOPAEDIC SURGERY

## 2020-10-14 PROCEDURE — 2720000010 HC SURG SUPPLY STERILE: Performed by: ORTHOPAEDIC SURGERY

## 2020-10-14 PROCEDURE — 6360000002 HC RX W HCPCS

## 2020-10-14 PROCEDURE — C9803 HOPD COVID-19 SPEC COLLECT: HCPCS

## 2020-10-14 PROCEDURE — 3600000004 HC SURGERY LEVEL 4 BASE: Performed by: ORTHOPAEDIC SURGERY

## 2020-10-14 PROCEDURE — 2500000003 HC RX 250 WO HCPCS: Performed by: NURSE ANESTHETIST, CERTIFIED REGISTERED

## 2020-10-14 PROCEDURE — 6360000002 HC RX W HCPCS: Performed by: NURSE ANESTHETIST, CERTIFIED REGISTERED

## 2020-10-14 PROCEDURE — 2580000003 HC RX 258: Performed by: ORTHOPAEDIC SURGERY

## 2020-10-14 PROCEDURE — 7100000010 HC PHASE II RECOVERY - FIRST 15 MIN: Performed by: ORTHOPAEDIC SURGERY

## 2020-10-14 PROCEDURE — 64415 NJX AA&/STRD BRCH PLXS IMG: CPT | Performed by: NURSE ANESTHETIST, CERTIFIED REGISTERED

## 2020-10-14 PROCEDURE — 2709999900 HC NON-CHARGEABLE SUPPLY: Performed by: ORTHOPAEDIC SURGERY

## 2020-10-14 PROCEDURE — C1713 ANCHOR/SCREW BN/BN,TIS/BN: HCPCS | Performed by: ORTHOPAEDIC SURGERY

## 2020-10-14 PROCEDURE — 7100000011 HC PHASE II RECOVERY - ADDTL 15 MIN: Performed by: ORTHOPAEDIC SURGERY

## 2020-10-14 PROCEDURE — 6360000002 HC RX W HCPCS: Performed by: ORTHOPAEDIC SURGERY

## 2020-10-14 PROCEDURE — 3600000014 HC SURGERY LEVEL 4 ADDTL 15MIN: Performed by: ORTHOPAEDIC SURGERY

## 2020-10-14 PROCEDURE — U0002 COVID-19 LAB TEST NON-CDC: HCPCS

## 2020-10-14 PROCEDURE — 3700000000 HC ANESTHESIA ATTENDED CARE: Performed by: ORTHOPAEDIC SURGERY

## 2020-10-14 PROCEDURE — 3700000001 HC ADD 15 MINUTES (ANESTHESIA): Performed by: ORTHOPAEDIC SURGERY

## 2020-10-14 DEVICE — ANCHOR SUTURE BIOCOMP 4.75X19.1 MM SWIVELOCK C: Type: IMPLANTABLE DEVICE | Site: SHOULDER | Status: FUNCTIONAL

## 2020-10-14 RX ORDER — OXYCODONE HYDROCHLORIDE 5 MG/1
5 TABLET ORAL EVERY 4 HOURS PRN
Status: CANCELLED | OUTPATIENT
Start: 2020-10-14

## 2020-10-14 RX ORDER — PROPOFOL 10 MG/ML
INJECTION, EMULSION INTRAVENOUS PRN
Status: DISCONTINUED | OUTPATIENT
Start: 2020-10-14 | End: 2020-10-14 | Stop reason: SDUPTHER

## 2020-10-14 RX ORDER — ROPIVACAINE HYDROCHLORIDE 5 MG/ML
INJECTION, SOLUTION EPIDURAL; INFILTRATION; PERINEURAL
Status: COMPLETED | OUTPATIENT
Start: 2020-10-14 | End: 2020-10-14

## 2020-10-14 RX ORDER — ONDANSETRON 2 MG/ML
INJECTION INTRAMUSCULAR; INTRAVENOUS PRN
Status: DISCONTINUED | OUTPATIENT
Start: 2020-10-14 | End: 2020-10-14 | Stop reason: SDUPTHER

## 2020-10-14 RX ORDER — SODIUM CHLORIDE 0.9 % (FLUSH) 0.9 %
10 SYRINGE (ML) INJECTION PRN
Status: DISCONTINUED | OUTPATIENT
Start: 2020-10-14 | End: 2020-10-14 | Stop reason: HOSPADM

## 2020-10-14 RX ORDER — SODIUM CHLORIDE, SODIUM LACTATE, POTASSIUM CHLORIDE, CALCIUM CHLORIDE 600; 310; 30; 20 MG/100ML; MG/100ML; MG/100ML; MG/100ML
INJECTION, SOLUTION INTRAVENOUS CONTINUOUS
Status: DISCONTINUED | OUTPATIENT
Start: 2020-10-14 | End: 2020-10-14 | Stop reason: HOSPADM

## 2020-10-14 RX ORDER — LIDOCAINE HYDROCHLORIDE 10 MG/ML
INJECTION, SOLUTION EPIDURAL; INFILTRATION; INTRACAUDAL; PERINEURAL PRN
Status: DISCONTINUED | OUTPATIENT
Start: 2020-10-14 | End: 2020-10-14 | Stop reason: SDUPTHER

## 2020-10-14 RX ORDER — SUCCINYLCHOLINE CHLORIDE 20 MG/ML
INJECTION INTRAMUSCULAR; INTRAVENOUS PRN
Status: DISCONTINUED | OUTPATIENT
Start: 2020-10-14 | End: 2020-10-14 | Stop reason: SDUPTHER

## 2020-10-14 RX ORDER — FENTANYL CITRATE 50 UG/ML
INJECTION, SOLUTION INTRAMUSCULAR; INTRAVENOUS PRN
Status: DISCONTINUED | OUTPATIENT
Start: 2020-10-14 | End: 2020-10-14 | Stop reason: SDUPTHER

## 2020-10-14 RX ORDER — MIDAZOLAM HYDROCHLORIDE 1 MG/ML
INJECTION INTRAMUSCULAR; INTRAVENOUS PRN
Status: DISCONTINUED | OUTPATIENT
Start: 2020-10-14 | End: 2020-10-14 | Stop reason: SDUPTHER

## 2020-10-14 RX ORDER — DEXAMETHASONE SODIUM PHOSPHATE 10 MG/ML
INJECTION INTRAMUSCULAR; INTRAVENOUS PRN
Status: DISCONTINUED | OUTPATIENT
Start: 2020-10-14 | End: 2020-10-14 | Stop reason: SDUPTHER

## 2020-10-14 RX ORDER — KETOROLAC TROMETHAMINE 30 MG/ML
INJECTION, SOLUTION INTRAMUSCULAR; INTRAVENOUS PRN
Status: DISCONTINUED | OUTPATIENT
Start: 2020-10-14 | End: 2020-10-14 | Stop reason: SDUPTHER

## 2020-10-14 RX ORDER — OXYCODONE HYDROCHLORIDE 5 MG/1
10 TABLET ORAL EVERY 4 HOURS PRN
Status: CANCELLED | OUTPATIENT
Start: 2020-10-14

## 2020-10-14 RX ORDER — GINSENG 100 MG
CAPSULE ORAL PRN
Status: DISCONTINUED | OUTPATIENT
Start: 2020-10-14 | End: 2020-10-14 | Stop reason: ALTCHOICE

## 2020-10-14 RX ORDER — CEFAZOLIN SODIUM 2 G/50ML
2 SOLUTION INTRAVENOUS
Status: COMPLETED | OUTPATIENT
Start: 2020-10-14 | End: 2020-10-14

## 2020-10-14 RX ORDER — SODIUM CHLORIDE 0.9 % (FLUSH) 0.9 %
10 SYRINGE (ML) INJECTION EVERY 12 HOURS SCHEDULED
Status: DISCONTINUED | OUTPATIENT
Start: 2020-10-14 | End: 2020-10-14 | Stop reason: HOSPADM

## 2020-10-14 RX ADMIN — ONDANSETRON 4 MG: 2 INJECTION INTRAMUSCULAR; INTRAVENOUS at 13:01

## 2020-10-14 RX ADMIN — DEXAMETHASONE SODIUM PHOSPHATE 10 MG: 10 INJECTION INTRAMUSCULAR; INTRAVENOUS at 13:01

## 2020-10-14 RX ADMIN — LIDOCAINE HYDROCHLORIDE 5 ML: 10 INJECTION, SOLUTION EPIDURAL; INFILTRATION; INTRACAUDAL; PERINEURAL at 13:01

## 2020-10-14 RX ADMIN — MIDAZOLAM 100 MG: 1 INJECTION INTRAMUSCULAR; INTRAVENOUS at 12:48

## 2020-10-14 RX ADMIN — PROPOFOL 200 MG: 10 INJECTION, EMULSION INTRAVENOUS at 13:01

## 2020-10-14 RX ADMIN — FENTANYL CITRATE 12.5 MCG: 50 INJECTION, SOLUTION INTRAMUSCULAR; INTRAVENOUS at 13:58

## 2020-10-14 RX ADMIN — CEFAZOLIN SODIUM 2 G: 2 SOLUTION INTRAVENOUS at 12:48

## 2020-10-14 RX ADMIN — KETOROLAC TROMETHAMINE 30 MG: 30 INJECTION, SOLUTION INTRAMUSCULAR at 14:12

## 2020-10-14 RX ADMIN — SODIUM CHLORIDE, POTASSIUM CHLORIDE, SODIUM LACTATE AND CALCIUM CHLORIDE: 600; 310; 30; 20 INJECTION, SOLUTION INTRAVENOUS at 13:29

## 2020-10-14 RX ADMIN — FENTANYL CITRATE 25 MCG: 50 INJECTION, SOLUTION INTRAMUSCULAR; INTRAVENOUS at 13:50

## 2020-10-14 RX ADMIN — SUCCINYLCHOLINE CHLORIDE 5 MG: 20 INJECTION INTRAMUSCULAR; INTRAVENOUS at 13:45

## 2020-10-14 RX ADMIN — ROPIVACAINE HYDROCHLORIDE 20 ML: 5 INJECTION, SOLUTION EPIDURAL; INFILTRATION; PERINEURAL at 12:40

## 2020-10-14 RX ADMIN — MIDAZOLAM 2 MG: 1 INJECTION INTRAMUSCULAR; INTRAVENOUS at 12:45

## 2020-10-14 ASSESSMENT — PAIN SCALES - GENERAL
PAINLEVEL_OUTOF10: 0

## 2020-10-14 ASSESSMENT — PAIN - FUNCTIONAL ASSESSMENT: PAIN_FUNCTIONAL_ASSESSMENT: 0-10

## 2020-10-14 NOTE — PROGRESS NOTES
Time out performed per policy. Procedure and patient signature verified from consent prior to initiating block. Right interscalene block per Cedric Munroe CRNA. Monitors applied.

## 2020-10-14 NOTE — ANESTHESIA PROCEDURE NOTES
Right interscalene block     Patient location during procedure: pre-op  Start time: 10/14/2020 12:45 PM  End time: 10/14/2020 1:00 PM  Staffing  Resident/CRNA: KERWIN Riec CRNA  Preanesthetic Checklist  Completed: patient identified, site marked, surgical consent, pre-op evaluation, timeout performed, IV checked, risks and benefits discussed, monitors and equipment checked, anesthesia consent given, oxygen available and patient being monitored  Peripheral Block  Patient position: sitting  Prep: ChloraPrep  Patient monitoring: cardiac monitor, continuous pulse ox, IV access and frequent blood pressure checks  Block type: Brachial plexus  Laterality: right  Injection technique: single-shot  Procedures: ultrasound guided and nerve stimulator  Interscalene  Provider prep: mask and sterile gloves  Needle  Needle type:  Other   Needle gauge: 22 G  Needle length: 5 cm  Needle localization: ultrasound guidanceOther needle type: pajunk  Assessment  Injection assessment: negative aspiration for heme, local visualized surrounding nerve on ultrasound and no paresthesia on injection  Slow fractionated injection: yes  Hemodynamics: stable  Medications Administered  Ropivacaine (NAROPIN) injection 0.5%, 20 mL  Reason for block: post-op pain management and at surgeon's request

## 2020-10-14 NOTE — ANESTHESIA POSTPROCEDURE EVALUATION
Department of Anesthesiology  Postprocedure Note    Patient: Brayan Magallon  MRN: [id number]  YOB: 1945  Date of evaluation: 10/14/2020  Time:  2:53 PM     Procedure Summary     Date:  10/14/20 Room / Location:  [address] [place] [address]    Anesthesia Start:  5756 Anesthesia Stop:  0064    Procedure:  RIGHT SHOULDER ARTHROSCPY PROBABLE RCR. Shoulder Scope Subacromial Decompression. Partial Distal Claviclectomy. Debreidement. Mini RCR Open. Bicep Tenodecis (Right Shoulder) Diagnosis:  (RIGHT SHOULDER ROTATOR CUFF TEAR)    Surgeon:  Pablo Mckenna DO Responsible Provider:  Annetta Hodgkin, APRN - CRNA    Anesthesia Type:  general, regional ASA Status:  3          Anesthesia Type: general, regional    Jerry Phase I: Jerry Score: 10    Jerry Phase II: Jerry Score: 9    Last vitals: Reviewed and per EMR flowsheets.        Anesthesia Post Evaluation    Patient location during evaluation: bedside  Patient participation: complete - patient participated  Level of consciousness: awake and alert  Pain score: 0  Airway patency: patent  Nausea & Vomiting: no nausea and no vomiting  Complications: no  Cardiovascular status: blood pressure returned to baseline and hemodynamically stable  Respiratory status: acceptable, nonlabored ventilation and spontaneous ventilation  Hydration status: euvolemic

## 2020-10-14 NOTE — PROGRESS NOTES
Discharge Criteria  Outpatients must meet criteria 1 through 7. Up to restroom, void sufficient amount. Yes: Pt ambulates to BR for void. 1.  Minimum 30 minutes after last dose of sedative medication, minimum 120 minutes after last dose of reversal agent. Yes    2. Systolic BP stable within 20 mmHg for 30 minutes & systolic BP between 90 & 088 or within 10 mmHg of baseline. Yes    3. Pulse between 60 and 100 or within 10 bpm of baseline. Yes    4. Spontaneous respiratory rate >/= 10 per minute. Yes    5. SaO2 >/= 95 or  >/= baseline. Yes    6. Able to cough and swallow or return to baseline function. Yes    7. Alert and oriented or return to baseline mental status. Yes    8. Demonstrates controlled, coordinated movements, ambulates with steady gait, or return to baseline activity function. Yes    9. Minimal or no pain or nausea, or at a level tolerable and acceptable to patient. Yes    10. Takes and retains oral fluids as allowed. Yes    11. Procedural / perioperative site stable. Minimal or no bleeding. Yes;dressing to rt shoulder C/D/I w/o drainage        12. If GI endoscopy procedure, minimal or no abdominal distention or passing flatus. Yes    13. Written discharge instructions and emergency telephone number provided. Yes    14. Accompanied by a responsible adult. Yes    Adult patient discharged from facility without responsible person meets above criteria plus the following:   a) remains awake without stimulus for 30 minutes     b) oriented appropriate for age      c) all vital signs stable   d) no significant risk of losing protective reflexes      e) able to maintain pre-procedure mobility without assistance   f) no nausea or dizziness      g) transportation arrangements that do not require patient to operate motor   Vehicle.   Yes

## 2020-10-14 NOTE — BRIEF OP NOTE
Brief Postoperative Note      Patient: Madeleine Thomas  YOB: 1945  MRN: [id number]    Date of Procedure: 10/14/2020    Pre-Op Diagnosis: RIGHT SHOULDER ROTATOR CUFF TEAR    Post-Op Diagnosis: same plus bicep high grade partial tear       Procedure(s):  RIGHT SHOULDER ARTHROSCPY PROBABLE RCR. Shoulder Scope Subacromial Decompression. Partial Distal Claviclectomy. Debreidement. Mini RCR Open. Bicep Tenodecis    Surgeon(s):  James Glover DO    Assistant:  EAMON boykin  Anesthesia: Regional/gen    Estimated Blood Loss (mL): 25 cc    Complications: 0    Specimens:   0  Implants:  Implant Name Type Inv.  Item Serial No.  Lot No. LRB No. Used Action   ANCHOR SUTURE SWIVELOCK 4.75X19.1 BIOCOMPOSITE MIN 5EA Fastener ANCHOR SUTURE SWIVELOCK 4.75X19.1 BIOCOMPOSITE MIN 5EA  [employer] [id number] Right 1 Implanted   ANCHOR SUTURE SWIVELOCK 4.75X19.1 BIOCOMPOSITE MIN 5EA Fastener ANCHOR SUTURE SWIVELOCK 4.75X19.1 BIOCOMPOSITE MIN 5EA  [employer] [id number] Right 1 Implanted         Drains:   Urethral Catheter Latex 22 fr (Active)       Findings: see dictation    Electronically signed by Nettie Gaines DO on 10/14/2020 at 2:28 PM

## 2020-10-14 NOTE — ANESTHESIA PRE PROCEDURE
Department of Anesthesiology  Preprocedure Note       Name:  John Parekh   Age:  70 y.o.  :  1945                                          MRN:  [id number]         Date:  10/14/2020      Surgeon: Alexandre Bran):  Heladio Pollard DO    Procedure: Procedure(s):  RIGHT SHOULDER ARTHROSCPY PROBABLE RCR    Medications prior to admission:   Prior to Admission medications    Medication Sig Start Date End Date Taking? Authorizing Provider   amLODIPine (NORVASC) 5 MG tablet TAKE 1 TABLET EVERY DAY 20  Yes Phu Celaya MD   levothyroxine (SYNTHROID) 75 MCG tablet TAKE 1 TABLET EVERY DAY 20  Yes Phu Celaya MD   pantoprazole (PROTONIX) 40 MG tablet TAKE 1 TABLET TWICE DAILY 20  Yes Phu Celaya MD   simvastatin (ZOCOR) 20 MG tablet TAKE 1 TABLET EVERY NIGHT 20  Yes Phu Celaya MD   tamsulosin [facility]) 0.4 MG capsule Take 1 capsule by mouth daily 20  Yes KERWIN Tavera - CNP   hydrocortisone 2.5 % cream Apply topically 2 times daily Apply topically 2 times daily. Yes Historical Provider, MD   aspirin 81 MG tablet Take 81 mg by mouth daily   Yes Historical Provider, MD   gabapentin (NEURONTIN) 300 MG capsule Take 600 mg by mouth nightly. Yes Historical Provider, MD   gabapentin (NEURONTIN) 600 MG tablet Take 600 mg by mouth 4 times daily. Yes Historical Provider, MD   HYDROcodone-acetaminophen (NORCO) 5-325 MG per tablet Take 1 tablet by mouth every 6 hours as needed for Pain. Yes Historical Provider, MD   Multiple Vitamins-Minerals (MULTI COMPLETE PO) Take by mouth   Yes Historical Provider, MD   polyethylene glycol (GLYCOLAX) powder Take 17 g by mouth as needed    Yes Historical Provider, MD   pyrethrins-piperonyl butoxide 0.5 % bottle Apply topically once Apply topically once. Historical Provider, MD   lidocaine (LMX) 4 % cream Apply topically as needed for Pain Apply topically as needed.     Historical Provider, MD       Current medications: COLONOSCOPY  2014    Dr. Marco Maldonadouty      cataract bilateral    GASTRIC FUNDOPLICATION  0491    HEMORRHOID SURGERY      HERNIA REPAIR      Hiatal Hernia repair, Esophageal repair     HERNIA REPAIR Bilateral     Inguinal     FL LASER VAPORIZATION SURGERY PROSTATE, COMPLETE N/A 2018    CYSTOSCOPY TRANSURETHRAL RESECTION PROSTATE LASER performed by Aida Munson MD at [address] ENDOSCOPY      UPPER GASTROINTESTINAL ENDOSCOPY  2014    Dr. Martinez Asp ENDOSCOPY  2016    Corinna Gutierrez MD       Social History:    Social History     Tobacco Use    Smoking status: Current Some Day Smoker     Packs/day: 1.00     Years: 40.00     Pack years: 40.00     Last attempt to quit: 2014     Years since quittin.6    Smokeless tobacco: Never Used   Substance Use Topics    Alcohol use: Yes     Comment: seldom                                Ready to quit: Not Answered  Counseling given: Not Answered      Vital Signs (Current):   Vitals:    10/14/20 1040   BP: (!) 147/79   Pulse: 83   Resp: 18   Temp: 36.3 °C (97.3 °F)   TempSrc: Oral   SpO2: 99%   Weight: 193 lb (87.5 kg)   Height: 5' 8.5\" (1.74 m)                                              BP Readings from Last 3 Encounters:   10/14/20 (!) 147/79   20 136/82   19 138/80       NPO Status: Time of last liquid consumption: 0745(small sip with am meds)                        Time of last solid consumption:                         Date of last liquid consumption: 10/14/20                        Date of last solid food consumption: 10/13/20    BMI:   Wt Readings from Last 3 Encounters:   10/14/20 193 lb (87.5 kg)   20 194 lb (88 kg)   19 196 lb (88.9 kg)     Body mass index is 28.92 kg/m².     CBC:   Lab Results   Component Value Date    WBC 5.0 2020    RBC 5.39 2020    HGB 16.1 2020    HCT 48.0 2020    MCV 89.1 2020 administered. Anesthetic plan and risks discussed with patient. Use of blood products discussed with patient whom. Plan discussed with attending.                   KERWIN Laureano - MARTIN   10/14/2020

## 2020-10-15 NOTE — OP NOTE
[place] [address]                                OPERATIVE REPORT    PATIENT NAME: SOFIA MOTT                  :        1945  MED REC NO:   [id number]                              ROOM:  ACCOUNT NO:   [de-identified]                           ADMIT DATE: 10/14/2020  PROVIDER:     Jeannie Copeland      DATE OF PROCEDURE:  10/14/2020    SURGEON:  Dr. Jeannie Copeland. ASSISTANT:  Jv Mas CFA. ANESTHESIA:  Ricardo Tresa, CRNA; general and interscalene combination. 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 PROCEDURE:  Right shoulder arthroscopy, extensive glenohumeral  debridement, subacromial decompression, partial distal claviculectomy,  mini open rotator cuff repair with biceps tenodesis, sling application. EBL:  25 mL. SPECIMEN:  None. IMPLANTS:  Two Arthrex 4.75 SwiveLocks bioabsorbable. COMPLICATIONS:  None. HISTORY AND INDICATIONS:  The patient is a 77-year-old pleasant male  with progressive right shoulder pain over the last year or two with  increasing pain, night disruption. He has failed conservative injection  care. Please see office notes, H and P. Site was marked  preoperatively. MRI was reviewed preoperatively. PROCEDURE IN DETAIL:  The patient was taken to the operating suite and  placed in supine position. Anesthesia provided. Consent form signed  and charted. Antibiotics were provided weight-based per protocol. He  was positioned on the [employer] chair table. Shoulder was  examined without adhesive or instability component.   The shoulder was  prepped and draped in sterile fashion with ChloraPrep. Time-out  procedure occurred consistent with the consent form, H and P,  preoperative marked sites. Landmarks identified. Vertical posterior  portal incision was made and the arthroscope entered into the shoulder  joint. General inspection showed mild degenerative changes in the  glenoid. Labrum had degenerative fraying. Humeral head was intact. Axillary pouch was negative. Subscapularis had mild tendinopathy, no  tear. Long head of the biceps had a high-grade partial tear hanging on  by a few fibers. Tenotomy was performed through an anterior portal and  further repaired later to the rotator cuff repair. The supraspinatus  had approximately 2.5 cm tear with retraction. Through an anterior  portal, extensive debridement was performed of the frayed labrum, the  biceps tendon stump as well as frayed rotator cuff with tendon stump. Pre and post pictures were taken. Attention was then directed to the  subacromial space, which had a moderate amount of subacromial bursitis. There was a type 2 acromion. There was a moderate to large hypertrophic  AC joint. From a lateral portal incision, utilizing a 90-degree _____  wand as well as a 5.0 bone cutter, subtotal bursectomy with  acromioplasty was performed, partial distal claviculectomy was  performed, taking off more than 8 mm. Anterior portal was utilized for  further augmentation. Once adequate decompression was achieved, it was  copiously irrigated out. Final pictures were taken. One simple 4-0  nylon suture was placed in each of the 3 portal incisions. An  anterolateral 1-inch incision was made, of which all bleeders were  cauterized. Deltoid was split vertically. Self-retaining retractor was  applied. Rotator cuff tear was identified. Stump was debrided with a  #15 blade. Rongeur was utilized for the greater tuberosity. The biceps  tendon was held with a Starla Ogden. Utilizing the Scorpion needle with #2  FiberTape and TigerTape, horizontal mattress standard suture repair was  performed; this incorporated the biceps tendon. Quantity two Arthrex  4.75 SwiveLocks were tapped, punched, and appropriately placed with good  tension and repair anatomically of the rotator cuff. The FiberWire  suture was further utilized for further augmentation of the biceps  tenodesis with suture techniques with a free needle. Stable margins  were achieved, and adequate decompression was achieved. It was  copiously irrigated out. Anatomic repair without tension was achieved  of the tendon and stump. It was copiously irrigated out. The deltoid  layer was closed with 0 Vicryl suture, 3-0 Vicryl suture closed the  subcutaneous tissues, 4-0 Monocryl intracuticular suture was placed with  benzoin and Steri-Strips. Bacitracin, Adaptic, well-padded sterile soft  dressing with UltraSling provided from our office _____. The patient  was awakened from anesthesia and transferred to the recovery room in  stable, satisfactory condition. CASE:  Clean and elective. SPONGE AND NEEDLE COUNT:  Correct. PATIENT CONDITION:  Satisfactory.         CHRISTA PAIGE    D: 10/14/2020 14:19:03       T: 10/14/2020 21:19:45     [email or web address]  Job#: [id number]     Doc#: [id number]    CC:  Luis Daniel Fatima

## 2020-10-28 ENCOUNTER — HOSPITAL ENCOUNTER (OUTPATIENT)
Dept: GENERAL RADIOLOGY | Age: 75
Discharge: HOME | End: 2020-10-30
Payer: MEDICARE

## 2020-10-28 ENCOUNTER — HOSPITAL ENCOUNTER (OUTPATIENT)
Age: 75
Discharge: HOME | End: 2020-10-30
Payer: MEDICARE

## 2020-10-28 PROCEDURE — 73030 X-RAY EXAM OF SHOULDER: CPT

## 2020-11-03 ENCOUNTER — HOSPITAL ENCOUNTER (OUTPATIENT)
Dept: PHYSICAL THERAPY | Age: 75
Setting detail: THERAPIES SERIES
Discharge: HOME | End: 2020-11-03
Payer: MEDICARE

## 2020-11-03 PROCEDURE — 97163 PT EVAL HIGH COMPLEX 45 MIN: CPT

## 2020-11-03 ASSESSMENT — PAIN SCALES - GENERAL: PAINLEVEL_OUTOF10: 5

## 2020-11-03 NOTE — PROGRESS NOTES
Phone: [phone] [employer]         Fax: [phone]                      Outpatient Physical Therapy                                                                      Evaluation    Date: 11/3/2020  Patient: Shauna Peraza  : 1945  ACCT #: [de-identified]    Referring Practitioner: Christopher Miles    Referral Date : 10/28/20    Diagnosis: R shoulder A-scope with RCR       Onset Date: 10/14/20  PT Insurance Information: [facility]  Total # of Visits Approved: 18 Per Physician Order  Total # of Visits to Date: 1  No Show: 0  Canceled Appointment: 0     Subjective  Subjective: Goes by [employer]"  Additional Pertinent Hx: Pt was a  for many years. Pt reports underwent sx on 10/14/20. Pt reports he does wear his sling. Pt reports he is typically 5/10 with his other medical issues, has been . Power recliner is where pt has been sleeping. Pt enjoys yard work and has an orchard. Pt reports he does have numbness of 2nd and 3rd fingers of R hand but this was present prior to sx and attributes to other health issues. Pain Screening  Patient Currently in Pain: Yes  Pain Assessment  Pain Assessment: 0-10  Pain Level: 5    Objective  Observation/Palpation  Posture: Fair     Strength RUE  Strength RUE: (N/t due to protocol)    PROM RUE (degrees)  R Shoulder Flex  0-180: 65deg  R Shoulder ABduction 0-180: 65deg  R Shoulder Int Rotation  0-70: to stomach  R Shoulder Ext Rotation  0-90: @45ABD=neutral       Assessment  Body structures, Functions, Activity limitations: Decreased functional mobility , Decreased ADL status, Decreased ROM, Decreased strength, Decreased endurance, Decreased sensation, Decreased balance, Decreased high-level IADLs, Increased pain, Decreased posture  Assessment: Pt to benefit from ther ex for ROM and strengthening per protocol.     Prognosis: Good  Decision Making: High Complexity  History: multiple myeloma, Fibromyalgia  Exam: UEFS-11/80    Clinical Presentation:  Evolving  The Following Comorbities will impact the patients progression and Plan of Care:   Cardiac Disease/Pacemaker, Previous Orthopedic Injury/Surgery and Fibromyalgia, Multiple Myeloma       Activity Tolerance: Patient Tolerated treatment well    Education: POC; HEP: Pulleys for AAROM flexion     Barriers to Learning: None    Goals  Short term goals  Time Frame for Short term goals: 9 visits  Short term goal 1: Pt to report independence and compliance with HEP for ROM. Short term goal 2: Pt to have PROM Flexion 120deg to prepare for overhead reaching. Short term goal 3: Pt to have 55deg of ER to improve adam to upper body dressing. Long term goals  Time Frame for Long term goals : 18 visits  Long term goal 1: Pt to score >22/80 UEFS to improve ADLs  Long term goal 2: Pt to complete 3rd to 4th shelf reach with 1# to ensure pt able to reach dishes. Long term goal 3: Pt to have 4/5 horiz ABD to improve pt posture. Long term goal 4: Pt to report able to tolerate sleeping in bed without waking due to shoulder pain x4 consecutive nights.     Patient's Goal:  Patient goals : Be able to return to yard work    Timed Code Treatment Minutes: 0 Minutes  Total Treatment Time: 50  Time In: 1410  Time Out: [address], PT Date: 11/3/2020

## 2020-11-03 NOTE — PLAN OF CARE
[facility] MAKENZIE CUMMINGS       Phone: [phone]   Date: 11/3/2020                      Outpatient Physical Therapy  Fax: [phone]    ACCT #: [de-identified]                     Plan of Care  [facility]#: [id number]  Patient: Duran Jiménez  : 1945    Referring Practitioner: Mary Alice Miles    Referral Date : 10/28/20    Diagnosis: R shoulder A-scope with RCR  Onset Date: 10/14/20  Treatment Diagnosis: R shoulder pain s/p RTC repair    Assessment  Body structures, Functions, Activity limitations: Decreased functional mobility , Decreased ADL status, Decreased ROM, Decreased strength, Decreased endurance, Decreased sensation, Decreased balance, Decreased high-level IADLs, Increased pain, Decreased posture  Assessment: Pt to benefit from ther ex for ROM and strengthening per protocol. Prognosis: Good    Treatment Plan   Days: 3 times per week Weeks: 6 weeks Total # of Visits Approved: 18    Patient Education/HEP, Therapeutic Exercise, Manual Therapy: Myofacial Release/Cupping, Manual Therapy: Mobilization/Manipulation, HP/CP    Goals  Short term goals  Time Frame for Short term goals: 9 visits  Short term goal 1: Pt to report independence and compliance with HEP for ROM. Short term goal 2: Pt to have PROM Flexion 120deg to prepare for overhead reaching. Short term goal 3: Pt to have 55deg of ER to improve adam to upper body dressing. Long term goals  Time Frame for Long term goals : 18 visits  Long term goal 1: Pt to score >22/80 UEFS to improve ADLs  Long term goal 2: Pt to complete 3rd to 4th shelf reach with 1# to ensure pt able to reach dishes. Long term goal 3: Pt to have 4/5 horiz ABD to improve pt posture. Long term goal 4: Pt to report able to tolerate sleeping in bed without waking due to shoulder pain x4 consecutive nights.      Speedy Shipley, PT   Date: 11/3/2020    ______________________________________ Date: 11/3/2020  Physician Signature  By signing above or cosigning electronically, I have reviewed this Plan of Care and certify a need for medically necessary rehabilitation services.

## 2020-11-04 ENCOUNTER — HOSPITAL ENCOUNTER (OUTPATIENT)
Dept: PHYSICAL THERAPY | Age: 75
Setting detail: THERAPIES SERIES
Discharge: HOME | End: 2020-11-04
Payer: MEDICARE

## 2020-11-04 PROCEDURE — 97110 THERAPEUTIC EXERCISES: CPT

## 2020-11-04 NOTE — PROGRESS NOTES
1315        Timed Code Treatment Minutes: 30 Minutes  Total Treatment Time: 30 Minutes    Alex Rubin PTA     Date: 11/4/2020

## 2020-11-06 ENCOUNTER — HOSPITAL ENCOUNTER (OUTPATIENT)
Dept: PHYSICAL THERAPY | Age: 75
Setting detail: THERAPIES SERIES
Discharge: HOME | End: 2020-11-06
Payer: MEDICARE

## 2020-11-06 PROCEDURE — 97110 THERAPEUTIC EXERCISES: CPT

## 2020-11-06 ASSESSMENT — PAIN SCALES - GENERAL: PAINLEVEL_OUTOF10: 6

## 2020-11-06 NOTE — PROGRESS NOTES
Phone: [address]      Fax: [phone]                            Outpatient Physical Therapy                                                                            Daily Note    Date: 2020  Patient Name: Albaro Bowling        MRN: [id number]   ACCT#:  [de-identified]  : 1945  (70 y.o.)    Referring Practitioner: Rodríguez Miles    Referral Date : 10/28/20    Diagnosis: R shoulder A-scope with RCR  Treatment Diagnosis: R shoulder pain s/p RTC repair    Onset Date: 10/14/20  PT Insurance Information: [facility]  Total # of Visits Approved: 18 Per Physician Order  Total # of Visits to Date: 3  No Show: 0  Canceled Appointment: 0  Plan of Care/Certification Expiration Date: 12/15/20    Pre-Treatment Pain:  6/10  Subjective: Goes by Margarito Dugan"  Assessment  Assessment: Pt reports good adam to ex last visit. Cont with ex per protocal.  PROM improved to 90 deg abd and 20 deg ER at 45 deg abd. Chart Reviewed: Yes    Plan  Plan: Continue with current plan    Exercises/Modalities/Manual:  See DocFlow Sheet     Barriers to Learning: None    Goals  (Total # of Visits to Date: 3)   Short Term Goals - Time Frame for Short term goals: 9 visits  Short term goal 1: Pt to report independence and compliance with HEP for ROM. -met  Short term goal 2: Pt to have PROM Flexion 120deg to prepare for overhead reaching. Short term goal 3: Pt to have 55deg of ER to improve adam to upper body dressing. Long Term Goals - Time Frame for Long term goals : 18 visits  Long term goal 1: Pt to score >22/80 UEFS to improve ADLs  Long term goal 2: Pt to complete 3rd to 4th shelf reach with 1# to ensure pt able to reach dishes. Long term goal 3: Pt to have 4/5 horiz ABD to improve pt posture. Long term goal 4: Pt to report able to tolerate sleeping in bed without waking due to shoulder pain x4 consecutive nights.        Post Treatment Pain:  6/10    Time In: 1420    Time Out : 1455        Timed Code Treatment Minutes: 35 Minutes  Total Treatment Time: 35 Minutes    Fidencio Rubin PTA    Date: 11/6/2020

## 2020-11-09 ENCOUNTER — HOSPITAL ENCOUNTER (OUTPATIENT)
Dept: PHYSICAL THERAPY | Age: 75
Setting detail: THERAPIES SERIES
Discharge: HOME | End: 2020-11-09
Payer: MEDICARE

## 2020-11-09 PROCEDURE — 97110 THERAPEUTIC EXERCISES: CPT

## 2020-11-09 ASSESSMENT — PAIN SCALES - GENERAL: PAINLEVEL_OUTOF10: 5

## 2020-11-09 NOTE — PROGRESS NOTES
Phone: [address]      Fax: [phone]                            Outpatient Physical Therapy                                                                            Daily Note    Date: 2020  Patient Name: Shauna Peraza        MRN: [id number]   ACCT#:  [de-identified]  : 1945  (70 y.o.)    Referring Practitioner: Christopher Miles    Referral Date : 10/28/20    Diagnosis: R shoulder A-scope with RCR  Treatment Diagnosis: R shoulder pain s/p RTC repair    Onset Date: 10/14/20  PT Insurance Information: [facility]  Total # of Visits Approved: 18 Per Physician Order  Total # of Visits to Date: 4  No Show: 0  Canceled Appointment: 0  Plan of Care/Certification Expiration Date: 12/15/20    Pre-Treatment Pain:  5/10  Subjective: Goes by [employer]"  Assessment  Assessment: Patient reports R shoulder pain is a 5/10 this afternoon. Completed PROM exercises as outlined with good tolerance from patient. R shoulder PROM: flex = 115 deg, ABD = 90 deg, and ER @45 = 32 deg. Following session patient reports pain is a 5/10. Chart Reviewed: Yes    Plan  Plan: Continue with current plan    Exercises/Modalities/Manual:  See DocFlow Sheet    Education:      Barriers to Learning: None    Goals  (Total # of Visits to Date: 4)   Short Term Goals - Time Frame for Short term goals: 9 visits  Short term goal 1: Pt to report independence and compliance with HEP for ROM. Short term goal 2: Pt to have PROM Flexion 120deg to prepare for overhead reaching. Short term goal 3: Pt to have 55deg of ER to improve adam to upper body dressing. Long Term Goals - Time Frame for Long term goals : 18 visits  Long term goal 1: Pt to score >22/80 UEFS to improve ADLs  Long term goal 2: Pt to complete 3rd to 4th shelf reach with 1# to ensure pt able to reach dishes. Long term goal 3: Pt to have 4/5 horiz ABD to improve pt posture.   Long term goal 4: Pt to report able to tolerate sleeping in bed without waking due to shoulder pain x4 consecutive nights.     Post Treatment Pain:  5/10    Time In: 1432    Time Out : 1507   Timed Code Treatment Minutes: 35 Minutes  Total Treatment Time: Giacomo [address] [place] [address]     Date: 11/9/2020

## 2020-11-11 ENCOUNTER — HOSPITAL ENCOUNTER (OUTPATIENT)
Dept: PHYSICAL THERAPY | Age: 75
Setting detail: THERAPIES SERIES
Discharge: HOME | End: 2020-11-11
Payer: MEDICARE

## 2020-11-11 PROCEDURE — 97110 THERAPEUTIC EXERCISES: CPT

## 2020-11-11 ASSESSMENT — PAIN SCALES - GENERAL: PAINLEVEL_OUTOF10: 6

## 2020-11-11 NOTE — PRE-CERTIFICATION NOTE
Medicare Cap     [] Physical Therapy  [] Speech Therapy  [] Occupational therapy    *PT and Speech caps combine      $1940 Cap limit < kx modifier needed < $0439 requires pre-cert     Patient Name: Lorraine Pepper  YOB: 1945     Date of [address] Name $$$ charge Daily Charge YTD   Total $   11/3/20 Eval, Ex 113.90 113.90 113.90   11/4/20 Ex x 3 90.24 90.24 --   11/6/20 Ex x 3 90.24 90.24 --   11/9/20 Ex x 3 90.24 90.24 --   11/11/20 Ex x 2 60.16 60.16 444.78

## 2020-11-12 ENCOUNTER — VIRTUAL VISIT (OUTPATIENT)
Dept: UROLOGY | Age: 75
End: 2020-11-12
Payer: MEDICARE

## 2020-11-12 PROCEDURE — 99213 OFFICE O/P EST LOW 20 MIN: CPT | Performed by: PHYSICIAN ASSISTANT

## 2020-11-12 PROCEDURE — 3017F COLORECTAL CA SCREEN DOC REV: CPT | Performed by: PHYSICIAN ASSISTANT

## 2020-11-12 PROCEDURE — G8427 DOCREV CUR MEDS BY ELIG CLIN: HCPCS | Performed by: PHYSICIAN ASSISTANT

## 2020-11-12 PROCEDURE — 4004F PT TOBACCO SCREEN RCVD TLK: CPT | Performed by: PHYSICIAN ASSISTANT

## 2020-11-12 PROCEDURE — G8484 FLU IMMUNIZE NO ADMIN: HCPCS | Performed by: PHYSICIAN ASSISTANT

## 2020-11-12 PROCEDURE — 4040F PNEUMOC VAC/ADMIN/RCVD: CPT | Performed by: PHYSICIAN ASSISTANT

## 2020-11-12 PROCEDURE — G8417 CALC BMI ABV UP PARAM F/U: HCPCS | Performed by: PHYSICIAN ASSISTANT

## 2020-11-12 PROCEDURE — 1123F ACP DISCUSS/DSCN MKR DOCD: CPT | Performed by: PHYSICIAN ASSISTANT

## 2020-11-12 ASSESSMENT — ENCOUNTER SYMPTOMS
BACK PAIN: 0
COUGH: 0
EYE REDNESS: 0
SHORTNESS OF BREATH: 0
CONSTIPATION: 0
COLOR CHANGE: 0
WHEEZING: 0
VOMITING: 0
NAUSEA: 0
ABDOMINAL PAIN: 0

## 2020-11-12 NOTE — PROGRESS NOTES
2020    TELEHEALTH EVALUATION -- Audio/Visual (During NTRQQ-16 public health emergency)    HPI:    Geoff Tamayo (:  1945) has requested an audio/video evaluation for the following concern(s):    History   Evaluated for hydrocele. Never opted for surgery and was lost to follow-up.      2016 sudden onset LUTS x 1 wk. IPSS 35 QOL 6 (compared to baseline ). POC UA negative. PCP increased his Flomax to 0.8mg daily without much improvement. Does have perineal pain and pressure in the rectum. Has some pain with urination. Pt has never had prostatitis in past. . Recent PSA 16 - 2.92              Treated with 4 wks abx & 1 wk NSAIDs for suspected prostatitis.    1/3/2018 follow-up for urinary retention. Lima Barragan was evaluated in the ER on 17 because he was unable to urinate. Marco Antonio Fiore did have a Mejía catheter placed with over 1200 mL returned. Mikki Reading to presenting to the ER he was evaluated in the urgent care for decreased urination. Marco Antonio Fiore was started on empiric Cipro.  Urine culture from 17 was negative for infection.  Patient does not recall any perineal or rectal pain, but he did have suprapubic pain and the pain radiated to his right flank.  While in the ER they did complete a CT scan shows bilateral hydronephrosis and hydroureter. Miki Melgar is no evidence of  calcifications.  The bladder is distended and there is a markedly enlarged prostate gland. Miki Melgar is a moderate amount of stool as well.  Patient admits to only having a bowel movement every 3 days. Marco Antonio Fiore is taking Flomax daily for history of BPH.  He denies any gross hematuria and he is tolerating the Mejía catheter without complaints.  His wife is concerned because they're planning on leaving for [place] on 18.        2018 PVP Savision    2019 Resumed flomax due to post-void dribbling    Today  Patient is seen today via virtual visit for follow-up BPH. Patient continues to take Flomax. He does have nocturia x1. He denies any post void dribbling. He denies any fever, chills, dysuria, gross hematuria, flank pain. Patient is overall happy with his urinary symptoms. Patient does not have a daily bowel movement but does state that he uses MiraLAX as needed. Review of Systems   Constitutional: Negative for appetite change, chills and fever. Eyes: Negative for redness and visual disturbance. Respiratory: Negative for cough, shortness of breath and wheezing. Cardiovascular: Negative for chest pain and leg swelling. Gastrointestinal: Negative for abdominal pain, constipation, nausea and vomiting. Genitourinary: Negative for decreased urine volume, difficulty urinating, discharge, dysuria, enuresis, flank pain, frequency, hematuria, penile pain, scrotal swelling, testicular pain and urgency. Positive for nocturia x1   Musculoskeletal: Negative for back pain, joint swelling and myalgias. Skin: Negative for color change, rash and wound. Neurological: Negative for dizziness, tremors and numbness. Weakness: .diag. Hematological: Negative for adenopathy. Does not bruise/bleed easily. Allergies   Allergen Reactions    Cortisone Other (See Comments)     Irritates \"GERD\"        Prior to Visit Medications    Medication Sig Taking? Authorizing Provider   amLODIPine (NORVASC) 5 MG tablet TAKE 1 TABLET EVERY DAY  Macy Wylie MD   levothyroxine (SYNTHROID) 75 MCG tablet TAKE 1 TABLET EVERY DAY  Macy Wylie MD   pantoprazole (PROTONIX) 40 MG tablet TAKE 1 TABLET TWICE DAILY  Macy Wylie MD   simvastatin (ZOCOR) 20 MG tablet TAKE 1 TABLET EVERY NIGHT  Macy Wylie MD   tamsulosin (FLOMAX) 0.4 MG capsule Take 1 capsule by mouth daily  KERWIN Harley - CNP   hydrocortisone 2.5 % cream Apply topically 2 times daily Apply topically 2 times daily.   Historical Provider, MD   aspirin 81 MG tablet Take 81 mg by mouth daily  Historical Provider, MD   gabapentin (NEURONTIN) 300 MG capsule Take 600 mg by mouth nightly. Historical Provider, MD   gabapentin (NEURONTIN) 600 MG tablet Take 600 mg by mouth 4 times daily. Historical Provider, MD   HYDROcodone-acetaminophen (NORCO) 5-325 MG per tablet Take 1 tablet by mouth every 6 hours as needed for Pain. Historical Provider, MD   pyrethrins-piperonyl butoxide 0.5 % bottle Apply topically once Apply topically once. Historical Provider, MD   lidocaine (LMX) 4 % cream Apply topically as needed for Pain Apply topically as needed. Historical Provider, MD   Multiple Vitamins-Minerals (MULTI COMPLETE PO) Take by mouth  Historical Provider, MD   polyethylene glycol (GLYCOLAX) powder Take 17 g by mouth as needed   Historical Provider, MD       Social History     Tobacco Use    Smoking status: Current Some Day Smoker     Packs/day: 1.00     Years: 40.00     Pack years: 40.00     Last attempt to quit: 2014     Years since quittin.7    Smokeless tobacco: Never Used   Substance Use Topics    Alcohol use: Yes     Comment: seldom    Drug use: No        Past Medical History:   Diagnosis Date    Arthritis     Cancer ([facility] [address]     multiple myeloma    Fibromyalgia     GERD (gastroesophageal reflux disease)     History of blood transfusion     At birth     Hyperlipidemia     Hypertension     Hypothyroidism     IBS (irritable bowel syndrome)     Osteoarthritis     Thyroid disease     Hypothyroid        Past Surgical History:   Procedure Laterality Date    ABDOMEN SURGERY      Bowel Resection following \"fall on the ice\";  Digna Toussaint MD    APPENDECTOMY      COLONOSCOPY      COLONOSCOPY  2014    Dr. Arron Mejía      cataract bilateral    GASTRIC FUNDOPLICATION  3206    HEMORRHOID SURGERY      HERNIA REPAIR      Hiatal Hernia repair, Esophageal repair     HERNIA REPAIR Bilateral     Inguinal     SD LASER VAPORIZATION SURGERY PROSTATE, COMPLETE N/A 2018    CYSTOSCOPY TRANSURETHRAL RESECTION PROSTATE LASER performed by Stevo Vizcaino MD at [facility] ARTHROSCOPY Right 10/14/2020    RIGHT SHOULDER ARTHROSCPY PROBABLE RCR. Shoulder Scope Subacromial Decompression. Partial Distal Claviclectomy. Debreidement. Mini RCR Open. Bicep Tenodecis performed by Xenia Medina DO at [address] ENDOSCOPY      UPPER GASTROINTESTINAL ENDOSCOPY  09-    Dr. Saintclair Harry  12/14/2016    Eden Mccarthy MD       Family History   Problem Relation Age of Onset   Unk [place] Cancer Mother         bone    Heart Disease Father         MI       PHYSICAL EXAMINATION:  Vital Signs: (As obtained by patient/caregiver or practitioner observation)    Blood pressure-  Heart rate-    Respiratory rate-    Temperature-  Pulse oximetry-     Constitutional: [x] Appears well-developed and well-nourished [x] No apparent distress      [] Abnormal-   Mental status  [x] Alert and awake  [x] Oriented to person/place/time [x]Able to follow commands      Eyes:  EOM    [x]  Normal  [] Abnormal-  Sclera  [x]  Normal  [] Abnormal -         Discharge [x]  None visible  [] Abnormal -    HENT:   [x] Normocephalic, atraumatic.   [] Abnormal   [x] Mouth/Throat: Mucous membranes are moist.     External Ears [x] Normal  [] Abnormal-     Neck: [x] No visualized mass     Pulmonary/Chest: [x] Respiratory effort normal.  [x] No visualized signs of difficulty breathing or respiratory distress        [] Abnormal-      Musculoskeletal:   [] Normal gait with no signs of ataxia         [x] Normal range of motion of neck        [] Abnormal-       Neurological:        [x] No Facial Asymmetry (Cranial nerve 7 motor function) (limited exam to video visit)          [x] No gaze palsy        [] Abnormal-         Skin:        [x] No significant exanthematous lesions or discoloration noted on facial skin         [] Abnormal-            Psychiatric:       [x] Normal Affect [] No Hallucinations        [] Abnormal-

## 2020-11-13 ENCOUNTER — HOSPITAL ENCOUNTER (OUTPATIENT)
Dept: PHYSICAL THERAPY | Age: 75
Setting detail: THERAPIES SERIES
Discharge: HOME | End: 2020-11-13
Payer: MEDICARE

## 2020-11-13 PROCEDURE — 97110 THERAPEUTIC EXERCISES: CPT

## 2020-11-13 RX ORDER — PANTOPRAZOLE SODIUM 40 MG/1
TABLET, DELAYED RELEASE ORAL
Qty: 180 TABLET | Refills: 1 | Status: SHIPPED | OUTPATIENT
Start: 2020-11-13 | End: 2021-03-31

## 2020-11-13 RX ORDER — AMLODIPINE BESYLATE 5 MG/1
TABLET ORAL
Qty: 90 TABLET | Refills: 1 | Status: SHIPPED | OUTPATIENT
Start: 2020-11-13 | End: 2021-03-31

## 2020-11-13 RX ORDER — SIMVASTATIN 20 MG
TABLET ORAL
Qty: 90 TABLET | Refills: 1 | Status: SHIPPED | OUTPATIENT
Start: 2020-11-13 | End: 2021-03-31

## 2020-11-13 RX ORDER — LEVOTHYROXINE SODIUM 0.07 MG/1
TABLET ORAL
Qty: 90 TABLET | Refills: 1 | Status: SHIPPED | OUTPATIENT
Start: 2020-11-13 | End: 2021-03-31

## 2020-11-13 ASSESSMENT — PAIN SCALES - GENERAL: PAINLEVEL_OUTOF10: 5

## 2020-11-13 NOTE — PROGRESS NOTES
Phone: [address]      Fax: [phone]                            Outpatient Physical Therapy                                                                            Daily Note    Date: 2020  Patient Name: Reva Denver        MRN: [id number]   ACCT#:  [de-identified]  : 1945  (70 y.o.)    Referring Practitioner: Frieda Miles    Referral Date : 10/28/20    Diagnosis: R shoulder A-scope with RCR  Treatment Diagnosis: R shoulder pain s/p RTC repair    Onset Date: 10/14/20  PT Insurance Information: [facility]  Total # of Visits Approved: 18 Per Physician Order  Total # of Visits to Date: 6  No Show: 0  Canceled Appointment: 0  Plan of Care/Certification Expiration Date: 12/15/20    Pre-Treatment Pain:  5/10  Subjective: Goes by Mer Avila"  Assessment  Assessment: Initiated standing AAROM cane Flex and ABD this date with fair adam. Also added Sub Iso Ext with thick towel roll to prevent Ext >10deg. Pt reports increased soreness this AM -7/10, now pain si 10. PROM: [email or web address], [email or web address]  Chart Reviewed: Yes    Plan  Plan: Continue with current plan    Exercises/Modalities/Manual:  See DocFlow Sheet    Education: AAROM standing cane     Barriers to Learning: None    Goals  (Total # of Visits to Date: 6)   Short Term Goals - Time Frame for Short term goals: 9 visits  Short term goal 1: Pt to report independence and compliance with HEP for ROM. - MET  Short term goal 2: Pt to have PROM Flexion 120deg to prepare for overhead reaching. - MET  Short term goal 3: Pt to have 55deg of ER to improve adam to upper body dressing. Long Term Goals - Time Frame for Long term goals : 18 visits  Long term goal 1: Pt to score >22/80 UEFS to improve ADLs  Long term goal 2: Pt to complete 3rd to 4th shelf reach with 1# to ensure pt able to reach dishes. Long term goal 3: Pt to have 4/5 horiz ABD to improve pt posture.   Long term goal 4: Pt to report able to tolerate

## 2020-11-13 NOTE — TELEPHONE ENCOUNTER
Last visit:  7/1/2020  Next Visit Date:    Future Appointments   Date Time Provider Vasyl Freemanisti   11/13/2020  2:30 PM Ilsa Dudley PT [facility] PT [facility]   11/16/2020 11:15  [address] [facility] PT [facility]   11/18/2020 11:15  [address] [facility] PT [facility]   11/20/2020 11:15 AM Narciso Maradiaga [facility] PT [facility]   1/5/2021 10:20 AM MD Joe Rene Maid MED [facility]   5/13/2021  2:30 PM MD Keisha Santos [facility]     Last Med refill:    Medication List:  Prior to Admission medications    Medication Sig Start Date End Date Taking? Authorizing Provider   amLODIPine (NORVASC) 5 MG tablet TAKE 1 TABLET EVERY DAY 6/18/20   Isadora Sanchez MD   levothyroxine (SYNTHROID) 75 MCG tablet TAKE 1 TABLET EVERY DAY 6/18/20   Isadora Sanchez MD   pantoprazole (PROTONIX) 40 MG tablet TAKE 1 TABLET TWICE DAILY 6/18/20   Isadora Sanchez MD   simvastatin (ZOCOR) 20 MG tablet TAKE 1 TABLET EVERY NIGHT 6/18/20   Isadora Sanchez MD   tamsulosin [facility]) 0.4 MG capsule Take 1 capsule by mouth daily 4/9/20   Lynnette Cheese, APRN - CNP   hydrocortisone 2.5 % cream Apply topically 2 times daily Apply topically 2 times daily. Historical Provider, MD   aspirin 81 MG tablet Take 81 mg by mouth daily    Historical Provider, MD   gabapentin (NEURONTIN) 300 MG capsule Take 600 mg by mouth nightly. Historical Provider, MD   gabapentin (NEURONTIN) 600 MG tablet Take 600 mg by mouth 4 times daily. Historical Provider, MD   HYDROcodone-acetaminophen (NORCO) 5-325 MG per tablet Take 1 tablet by mouth every 6 hours as needed for Pain. Historical Provider, MD   pyrethrins-piperonyl butoxide 0.5 % bottle Apply topically once Apply topically once. Historical Provider, MD   lidocaine (LMX) 4 % cream Apply topically as needed for Pain Apply topically as needed.     Historical Provider, MD   Multiple Vitamins-Minerals (MULTI COMPLETE PO) Take by mouth    Historical Provider, MD polyethylene glycol (GLYCOLAX) powder Take 17 g by mouth as needed     Historical Provider, MD       Allergies:  Cortisone    Hemoglobin A1C (%)   Date Value   06/29/2020 6.3 (H)   12/19/2019 6.3   08/20/2019 6.3 (H)             ( goal A1C is < 7)   Microalb/Crt.  Ratio (mcg/mg creat)   Date Value   11/23/2016 11     LDL Cholesterol (mg/dL)   Date Value   06/29/2020 54   08/20/2019 59       (goal LDL is <100)   AST (U/L)   Date Value   06/29/2020 24     ALT (U/L)   Date Value   06/29/2020 32     BUN (mg/dL)   Date Value   09/23/2020 13     BP Readings from Last 3 Encounters:   10/14/20 129/78   10/14/20 (!) 105/57   07/01/20 136/82          (goal 120/80)

## 2020-11-16 ENCOUNTER — HOSPITAL ENCOUNTER (OUTPATIENT)
Dept: PHYSICAL THERAPY | Age: 75
Setting detail: THERAPIES SERIES
Discharge: HOME | End: 2020-11-16
Payer: MEDICARE

## 2020-11-16 PROCEDURE — 97110 THERAPEUTIC EXERCISES: CPT

## 2020-11-16 ASSESSMENT — PAIN SCALES - GENERAL: PAINLEVEL_OUTOF10: 5

## 2020-11-16 NOTE — PRE-CERTIFICATION NOTE
Medicare Cap     [] Physical Therapy  [] Speech Therapy  [] Occupational therapy    *PT and Speech caps combine      $1940 Cap limit < kx modifier needed < $9946 requires pre-cert     Patient Name: Lina Pepper  YOB: 1945     Date of [address] Name $$$ charge Daily Charge YTD   Total $   11/3/20 Eval, Ex 113.90 113.90 113.90   11/4/20 Ex x 3 90.24 90.24 --   11/6/20 Ex x 3 90.24 90.24 --   11/9/20 Ex x 3 90.24 90.24 --   11/11/20 Ex x 2 60.16 60.16 444.78   11/16/20 Ex x 3 30.08 x 3 90.24 535.02

## 2020-11-16 NOTE — PRE-CERTIFICATION NOTE
Medicare Cap     [] Physical Therapy  [] Speech Therapy  [] Occupational therapy    *PT and Speech caps combine      $1940 Cap limit < kx modifier needed < $5952 requires pre-cert     Patient Name: Lay Pepper  YOB: 1945     Date of [address] Name $$$ charge Daily Charge YTD   Total $   11/3/20 Eval, Ex 113.90 113.90 113.90   11/4/20 Ex x 3 90.24 90.24 --   11/6/20 Ex x 3 90.24 90.24 --   11/9/20 Ex x 3 90.24 90.24 --   11/11/20 Ex x 2 60.16 60.16 444.78   11/16/20 Ex x 3

## 2020-11-16 NOTE — PROGRESS NOTES
Out : 1110        Timed Code Treatment Minutes: 38 Minutes  Total Treatment Time: 38 Minutes    Mayo Rubin  PTA   Date: 11/16/2020

## 2020-11-18 ENCOUNTER — HOSPITAL ENCOUNTER (OUTPATIENT)
Dept: PHYSICAL THERAPY | Age: 75
Setting detail: THERAPIES SERIES
Discharge: HOME | End: 2020-11-18
Payer: MEDICARE

## 2020-11-18 PROCEDURE — 97110 THERAPEUTIC EXERCISES: CPT

## 2020-11-18 ASSESSMENT — PAIN SCALES - GENERAL: PAINLEVEL_OUTOF10: 4

## 2020-11-18 NOTE — PRE-CERTIFICATION NOTE
Medicare Cap     [] Physical Therapy  [] Speech Therapy  [] Occupational therapy    *PT and Speech caps combine      $1940 Cap limit < kx modifier needed < $3429 requires pre-cert     Patient Name: Lay Pepper  YOB: 1945     Date of [address] Name $$$ charge Daily Charge YTD   Total $   11/3/20 Eval, Ex 113.90 113.90 113.90   11/4/20 Ex x 3 90.24 90.24 --   11/6/20 Ex x 3 90.24 90.24 --   11/9/20 Ex x 3 90.24 90.24 --   11/11/20 Ex x 2 60.16 60.16 444.78   11/16/20 Ex x 3 30.08 x 3 90.24 535.02   11/18/20 Ex x 2 30.08 x 2 60.16 595.18

## 2020-11-18 NOTE — PROGRESS NOTES
Pain:  4/10    Time In: 1113    Time Out : 1148        Timed Code Treatment Minutes: 35 Minutes  Total Treatment Time: 35 Minutes    Saad Rubin  PTA   Date: 11/18/2020

## 2020-11-18 NOTE — PRE-CERTIFICATION NOTE
Medicare Cap     [] Physical Therapy  [] Speech Therapy  [] Occupational therapy    *PT and Speech caps combine      $1940 Cap limit < kx modifier needed < $6180 requires pre-cert     Patient Name: Kiarra Pepper  YOB: 1945     Date of [address] Name $$$ charge Daily Charge YTD   Total $   11/3/20 Eval, Ex 113.90 113.90 113.90   11/4/20 Ex x 3 90.24 90.24 --   11/6/20 Ex x 3 90.24 90.24 --   11/9/20 Ex x 3 90.24 90.24 --   11/11/20 Ex x 2 60.16 60.16 444.78   11/16/20 Ex x 3 30.08 x 3 90.24 535.02   11/18/20 Ex x 2

## 2020-11-20 ENCOUNTER — HOSPITAL ENCOUNTER (OUTPATIENT)
Dept: PHYSICAL THERAPY | Age: 75
Setting detail: THERAPIES SERIES
Discharge: HOME | End: 2020-11-20
Payer: MEDICARE

## 2020-11-20 PROCEDURE — 97110 THERAPEUTIC EXERCISES: CPT

## 2020-11-20 NOTE — PROGRESS NOTES
Phone: [address]      Fax: [phone]                            Outpatient Physical Therapy                                                                            Daily Note    Date: 2020  Patient Name: Angela Cyr        MRN: [id number]   ACCT#:  [de-identified]  : 1945  (70 y.o.)    Referring Practitioner: Niru Miles    Referral Date : 10/28/20    Diagnosis: R shoulder A-scope with RCR  Treatment Diagnosis: R shoulder pain s/p RTC repair    Onset Date: 10/14/20  PT Insurance Information: [facility]  Total # of Visits Approved: 18 Per Physician Order  Total # of Visits to Date: 9  No Show: 0  Canceled Appointment: 0  Plan of Care/Certification Expiration Date: 12/15/20    Pre-Treatment Pain:  5/10  Subjective: Goes by Brendon Tellez"  Assessment  Assessment: Patient reports R shoulder pain is a 5/10. Added 0 rows and shoulder ext for strengthening. R shoulder PROM: flex = 135 deg, ABD = 90 deg, ER @45 = 38 deg, and ER @90 = 20 deg. Will continue to progress. Chart Reviewed: Yes    Plan  Plan: Continue with current plan    Exercises/Modalities/Manual:  See DocFlow Sheet    Education:      Barriers to Learning: None    Goals  (Total # of Visits to Date: 5)   Short Term Goals - Time Frame for Short term goals: 9 visits  Short term goal 1: Pt to report independence and compliance with HEP for ROM. - MET  Short term goal 2: Pt to have PROM Flexion 120deg to prepare for overhead reaching. - MET  Short term goal 3: Pt to have 55deg of ER to improve adam to upper body dressing.-not met    Long Term Goals - Time Frame for Long term goals : 18 visits  Long term goal 1: Pt to score >22/80 UEFS to improve ADLs  Long term goal 2: Pt to complete 3rd to 4th shelf reach with 1# to ensure pt able to reach dishes. Long term goal 3: Pt to have 4/5 horiz ABD to improve pt posture.   Long term goal 4: Pt to report able to tolerate sleeping in bed without waking due to shoulder pain x4 consecutive nights.     Post Treatment Pain:  5/10    Time In: 1115    Time Out : 1150   Timed Code Treatment Minutes: 35 Minutes  Total Treatment Time: 28 Minutes    Ninoska Burton     Date: 11/20/2020

## 2020-11-20 NOTE — PRE-CERTIFICATION NOTE
Medicare Cap     [] Physical Therapy  [] Speech Therapy  [] Occupational therapy    *PT and Speech caps combine      $1940 Cap limit < kx modifier needed < $4490 requires pre-cert     Patient Name: Sirena Pepper  YOB: 1945     Date of [address] Name $$$ charge Daily Charge YTD   Total $   11/3/20 Eval, Ex 113.90 113.90 113.90   11/4/20 Ex x 3 90.24 90.24 --   11/6/20 Ex x 3 90.24 90.24 --   11/9/20 Ex x 3 90.24 90.24 --   11/11/20 Ex x 2 60.16 60.16 444.78   11/16/20 Ex x 3 30.08 x 3 90.24 535.02   11/18/20 Ex x 2 30.08 x 2 60.16 595.18   11/20/20 Ex x 2 30.08 x 2 60.16 655.34

## 2020-11-23 ENCOUNTER — HOSPITAL ENCOUNTER (OUTPATIENT)
Dept: PHYSICAL THERAPY | Age: 75
Setting detail: THERAPIES SERIES
Discharge: HOME | End: 2020-11-23
Payer: MEDICARE

## 2020-11-23 PROCEDURE — 97110 THERAPEUTIC EXERCISES: CPT

## 2020-11-23 ASSESSMENT — PAIN SCALES - GENERAL: PAINLEVEL_OUTOF10: 7

## 2020-11-23 NOTE — PRE-CERTIFICATION NOTE
Medicare Cap     [] Physical Therapy  [] Speech Therapy  [] Occupational therapy    *PT and Speech caps combine      $1940 Cap limit < kx modifier needed < $9244 requires pre-cert     Patient Name: Sirena Pepper  YOB: 1945     Date of [address] Name $$$ charge Daily Charge YTD   Total $   11/3/20 Eval, Ex 113.90 113.90 113.90   11/4/20 Ex x 3 90.24 90.24 --   11/6/20 Ex x 3 90.24 90.24 --   11/9/20 Ex x 3 90.24 90.24 --   11/11/20 Ex x 2 60.16 60.16 444.78   11/23/20 Ex x 2 30.08 x 2 60.16 504.94

## 2020-11-23 NOTE — PROGRESS NOTES
Phone: [address]      Fax: [phone]                            Outpatient Physical Therapy                                                                            Daily Note    Date: 2020  Patient Name: Pako Riddle        MRN: [id number]   ACCT#:  [de-identified]  : 1945  (70 y.o.)    Referring Practitioner: Lily Miles    Referral Date : 10/28/20    Diagnosis: R shoulder A-scope with RCR  Treatment Diagnosis: R shoulder pain s/p RTC repair    Onset Date: 10/14/20  PT Insurance Information: [facility]  Total # of Visits Approved: 18 Per Physician Order  Total # of Visits to Date: 10  No Show: 0  Canceled Appointment: 0  Plan of Care/Certification Expiration Date: 12/15/20    Pre-Treatment Pain:  0/10  Subjective: Goes by Cristopher Valentino"  Assessment  Assessment: Pt reports he did isometrics the am with pain 10, advised to perform with less effort. Performed ex as outlined for scapular strength and ROM . Plan to progress program at end of week as he will be 6 weeks post op. PROM to 140 deg flexion. Chart Reviewed: Yes    Plan  Plan: Continue with current plan    Exercises/Modalities/Manual:  See DocFlow Sheet         Barriers to Learning: None    Goals  (Total # of Visits to Date: 10)   Short Term Goals - Time Frame for Short term goals: 9 visits  Short term goal 1: Pt to report independence and compliance with HEP for ROM. - MET  Short term goal 2: Pt to have PROM Flexion 120deg to prepare for overhead reaching. - MET  Short term goal 3: Pt to have 55deg of ER to improve adam to upper body dressing.-not met          Long Term Goals - Time Frame for Long term goals : 18 visits  Long term goal 1: Pt to score >22/80 UEFS to improve ADLs  Long term goal 2: Pt to complete 3rd to 4th shelf reach with 1# to ensure pt able to reach dishes. Long term goal 3: Pt to have 4/5 horiz ABD to improve pt posture.   Long term goal 4: Pt to report able to tolerate sleeping in bed without waking due to shoulder pain x4 consecutive nights.        Post Treatment Pain:  0/10    Time In: 1120    Time Out : 1155        Timed Code Treatment Minutes: 35 Minutes  Total Treatment Time: 35 Minutes    Patti Rubin PTA    Date: 11/23/2020

## 2020-11-23 NOTE — PRE-CERTIFICATION NOTE
Medicare Cap     [] Physical Therapy  [] Speech Therapy  [] Occupational therapy    *PT and Speech caps combine      $1940 Cap limit < kx modifier needed < $6809 requires pre-cert     Patient Name: Melissa Ppeper  YOB: 1945     Date of [address] Name $$$ charge Daily Charge YTD   Total $   11/3/20 Eval, Ex 113.90 113.90 113.90   11/4/20 Ex x 3 90.24 90.24 --   11/6/20 Ex x 3 90.24 90.24 --   11/9/20 Ex x 3 90.24 90.24 --   11/11/20 Ex x 2 60.16 60.16 444.78   11/23/20 Ex x 2

## 2020-11-24 ENCOUNTER — HOSPITAL ENCOUNTER (OUTPATIENT)
Dept: PHYSICAL THERAPY | Age: 75
Setting detail: THERAPIES SERIES
Discharge: HOME | End: 2020-11-24
Payer: MEDICARE

## 2020-11-24 PROCEDURE — 97110 THERAPEUTIC EXERCISES: CPT

## 2020-11-24 ASSESSMENT — PAIN SCALES - GENERAL: PAINLEVEL_OUTOF10: 5

## 2020-11-24 NOTE — PROGRESS NOTES
Phone: [address]      Fax: [phone]                            Outpatient Physical Therapy                                                                            Daily Note    Date: 2020  Patient Name: Carlos Eduardo Houser        MRN: [id number]   ACCT#:  [de-identified]  : 1945  (70 y.o.)    Referring Practitioner: Truman Miles    Referral Date : 10/28/20    Diagnosis: R shoulder A-scope with RCR  Treatment Diagnosis: R shoulder pain s/p RTC repair    Onset Date: 10/14/20  PT Insurance Information: [facility]  Total # of Visits Approved: 18 Per Physician Order  Total # of Visits to Date: 11  No Show: 0  Canceled Appointment: 0  Plan of Care/Certification Expiration Date: 12/15/20    Pre-Treatment Pain:  5/10  Subjective: Goes by Amber Foley"  Assessment  Assessment: Pt reports decreasing isometrics to 3 instead of 5 with improved adam. Pt completed isomestrics this AM already therefore did no complete in clinic. PROM: [email or web address], ER @90=30deg  Chart Reviewed: Yes    Plan  Plan: Continue with current plan    Exercises/Modalities/Manual:  See DocFlow Sheet    Education: Tband for scap retract, reviewed scap retract for HEP     Barriers to Learning: None    Goals  (Total # of Visits to Date: 6)   Short Term Goals - Time Frame for Short term goals: 9 visits  Short term goal 1: Pt to report independence and compliance with HEP for ROM. - MET  Short term goal 2: Pt to have PROM Flexion 120deg to prepare for overhead reaching. - MET  Short term goal 3: Pt to have 55deg of ER to improve adam to upper body dressing.-not met          Long Term Goals - Time Frame for Long term goals : 18 visits  Long term goal 1: Pt to score >22/80 UEFS to improve ADLs  Long term goal 2: Pt to complete 3rd to 4th shelf reach with 1# to ensure pt able to reach dishes. Long term goal 3: Pt to have 4/5 horiz ABD to improve pt posture.   Long term goal 4: Pt to report able to tolerate sleeping in bed without waking due to shoulder pain x4 consecutive nights.        Post Treatment Pain:  5/10    Time In: 0929  Time Out: 1003  Timed Code Treatment Minutes: 34 Minutes  Total Treatment Time: [address], PT     Date: 11/24/2020

## 2020-11-24 NOTE — PRE-CERTIFICATION NOTE
Medicare Cap     [] Physical Therapy  [] Speech Therapy  [] Occupational therapy    *PT and Speech caps combine      $1940 Cap limit < kx modifier needed < $3040 requires pre-cert     Patient Name: Jennefer Meckel McDiffitt  YOB: 1945     Date of [address] Name $$$ charge Daily Charge YTD   Total $   11/3/20 Eval, Ex 113.90 113.90 113.90   11/4/20 Ex x 3 90.24 90.24 --   11/6/20 Ex x 3 90.24 90.24 --   11/9/20 Ex x 3 90.24 90.24 --   11/11/20 Ex x 2 60.16 60.16 444.78   11/23/20 Ex x 2 30.08 x 2 60.16 504.94   11/24/20 Ex x 2 30.08 x 2 60.16 565.10

## 2020-11-24 NOTE — OP NOTE
[facility] MAKENZIE CUMMINGS          Phone: [phone]      Outpatient Physical Therapy  Fax: [phone]                                 10th Visit Report    Date: 11/13/20  ACCT #: [de-identified]      Referring Practitioner: Janet Miles    Referral Date : 10/28/20    Diagnosis: R shoulder A-scope with RCR      Treatment Diagnosis: R shoulder pain s/p RTC repair    Onset Date: 10/14/20  PT Insurance Information: [facility]  Total # of Visits Approved: 18 Per Physician Order  Total # of Visits to Date: 6  No Show: 0  Canceled Appointment: 0    Current Treatment:  Patient Education/HEP, Therapeutic Exercise and Manual Therapy: Myofacial Release/Cupping    Skilled Intervention:  Body structures, Functions, Activity limitations: Decreased functional mobility , Decreased ADL status, Decreased ROM, Decreased strength, Decreased endurance, Decreased sensation, Decreased balance, Decreased high-level IADLs, Increased pain, Decreased posture  Assessment: Initiated standing AAROM cane Flex and ABD this date with fair adam. Also added Sub Iso Ext with thick towel roll to prevent Ext >10deg. Pt reports increased soreness this AM 6-7/10, now pain si 5/10. PROM: [email or web address], [email or web address]  Prognosis: Good  Complete Daily Tasks Safely and Return to Prior Level of Function    Expectations for Improvement/Time Frame:  Plan to cont x 4-6 additional weeks toward LTGs    Plan  Plan: Continue with current plan    Goals  Short term goals  Time Frame for Short term goals: 9 visits  Short term goal 1: Pt to report independence and compliance with HEP for ROM. - MET  Short term goal 2: Pt to have PROM Flexion 120deg to prepare for overhead reaching. - MET  Short term goal 3: Pt to have 55deg of ER to improve adam to upper body dressing.     Long term goals  Time Frame for Long term goals : 18 visits  Long term goal 1: Pt to score >22/80 UEFS to improve ADLs  Long term goal 2: Pt to complete 3rd to 4th shelf reach with 1# to ensure pt able to reach dishes. Long term goal 3: Pt to have 4/5 horiz ABD to improve pt posture. Long term goal 4: Pt to report able to tolerate sleeping in bed without waking due to shoulder pain x4 consecutive nights.     Hyun Damon      Date:11/13/20

## 2020-11-27 ENCOUNTER — HOSPITAL ENCOUNTER (OUTPATIENT)
Dept: PHYSICAL THERAPY | Age: 75
Setting detail: THERAPIES SERIES
Discharge: HOME | End: 2020-11-27
Payer: MEDICARE

## 2020-11-27 PROCEDURE — 97110 THERAPEUTIC EXERCISES: CPT

## 2020-11-27 NOTE — PROGRESS NOTES
Phone: [address]      Fax: [phone]                            Outpatient Physical Therapy                                                                            Daily Note    Date: 2020  Patient Name: Duran Jiménez        MRN: [id number]   ACCT#:  [de-identified]  : 1945  (70 y.o.)    Referring Practitioner: Mary Alice Miles    Referral Date : 10/28/20    Diagnosis: R shoulder A-scope with RCR  Treatment Diagnosis: R shoulder pain s/p RTC repair    Onset Date: 10/14/20  PT Insurance Information: [facility]  Total # of Visits Approved: 18 Per Physician Order  Total # of Visits to Date: 12  No Show: 0  Canceled Appointment: 0  Plan of Care/Certification Expiration Date: 12/15/20    Pre-Treatment Pain:  6/10  Subjective: Goes by Clement Finger"  Assessment  Assessment: Pt 6 wks post op. Progressed with semi prone AROM and ER gentle stretching. HEP for doorway ER stretch. Will cont to progress with strengthening per protocal.   Chart Reviewed: Yes    Plan  Plan: Continue with current plan    Exercises/Modalities/Manual:  See DocFlow Sheet         Barriers to Learning: None    Goals  (Total # of Visits to Date: 15)   Short Term Goals - Time Frame for Short term goals: 9 visits  Short term goal 1: Pt to report independence and compliance with HEP for ROM. - MET  Short term goal 2: Pt to have PROM Flexion 120deg to prepare for overhead reaching. - MET  Short term goal 3: Pt to have 55deg of ER to improve adam to upper body dressing.-not met          Long Term Goals - Time Frame for Long term goals : 18 visits  Long term goal 1: Pt to score >22/80 UEFS to improve ADLs  Long term goal 2: Pt to complete 3rd to 4th shelf reach with 1# to ensure pt able to reach dishes. Long term goal 3: Pt to have 4/5 horiz ABD to improve pt posture. Long term goal 4: Pt to report able to tolerate sleeping in bed without waking due to shoulder pain x4 consecutive nights.        Post Treatment Pain:  6/10    Time In: 1114    Time Out : 1152        Timed Code Treatment Minutes: 37 Minutes  Total Treatment Time: 37 Minutes    Jacqulynn Ganser Chaffins PTA    Date: 11/27/2020

## 2020-11-27 NOTE — PRE-CERTIFICATION NOTE
Medicare Cap     [] Physical Therapy  [] Speech Therapy  [] Occupational therapy    *PT and Speech caps combine      $1940 Cap limit < kx modifier needed < $4115 requires pre-cert     Patient Name: Deshawn Pepper  YOB: 1945     Date of M[address] Name $$$ charge Daily Charge YTD   Total $   11/3/20 Eval, Ex 113.90 113.90 113.90   11/4/20 Ex x 3 90.24 90.24 --   11/6/20 Ex x 3 90.24 90.24 --   11/9/20 Ex x 3 90.24 90.24 --   11/11/20 Ex x 2 60.16 60.16 444.78   11/23/20 Ex x 2 30.08 x 2 60.16 504.94   11/24/20 Ex x 2 30.08 x 2 60.16 565.10   11/27/20 Ex x 2 30.08 x 2 60.16 625.26

## 2020-11-27 NOTE — PRE-CERTIFICATION NOTE
Medicare Cap     [] Physical Therapy  [] Speech Therapy  [] Occupational therapy    *PT and Speech caps combine      $1940 Cap limit < kx modifier needed < $3939 requires pre-cert     Patient Name: Dylan Pepper  YOB: 1945     Date of Mö[address] Name $$$ charge Daily Charge YTD   Total $   11/3/20 Eval, Ex 113.90 113.90 113.90   11/4/20 Ex x 3 90.24 90.24 --   11/6/20 Ex x 3 90.24 90.24 --   11/9/20 Ex x 3 90.24 90.24 --   11/11/20 Ex x 2 60.16 60.16 444.78   11/23/20 Ex x 2 30.08 x 2 60.16 504.94   11/24/20 Ex x 2 30.08 x 2 60.16 565.10   11/27/20 Ex x 2

## 2020-11-30 ENCOUNTER — HOSPITAL ENCOUNTER (OUTPATIENT)
Dept: PHYSICAL THERAPY | Age: 75
Setting detail: THERAPIES SERIES
Discharge: HOME | End: 2020-11-30
Payer: MEDICARE

## 2020-11-30 PROCEDURE — 97110 THERAPEUTIC EXERCISES: CPT

## 2020-11-30 PROCEDURE — 97140 MANUAL THERAPY 1/> REGIONS: CPT

## 2020-11-30 NOTE — PROGRESS NOTES
Phone: [address]      Fax: [phone]                            Outpatient Physical Therapy                                                                            Daily Note    Date: 2020  Patient Name: Luis E Herron        MRN: [id number]   ACCT#:  [de-identified]  : 1945  (70 y.o.)    Referring Practitioner: Dr, Welton Brunner    Referral Date : 10/28/20    Diagnosis: R shoulder A-scope with RCR  Treatment Diagnosis: R shoulder pain s/p RTC repair    Onset Date: 10/14/20  PT Insurance Information: [facility]  Total # of Visits Approved: 18 Per Physician Order  Total # of Visits to Date: 13  No Show: 0  Canceled Appointment: 0  Plan of Care/Certification Expiration Date: 12/15/20    Pre-Treatment Pain:  6/10  Subjective: Goes by Adiel Woody"  Assessment  Assessment: Added AROM shelf reach, when attempting 4th shelf noted scap compenation. Educated pt to cont with 3rd shelf reach at this time. PROM: ABD 100deg, [email or web address]  Chart Reviewed: Yes    Plan  Plan: Continue with current plan    Exercises/Modalities/Manual:  See DocFlow Sheet    Education: progression of therex and importance of scapular stability with movement. Barriers to Learning: None    Goals  (Total # of Visits to Date: 15)   Short Term Goals - Time Frame for Short term goals: 9 visits  Short term goal 1: Pt to report independence and compliance with HEP for ROM. - MET  Short term goal 2: Pt to have PROM Flexion 120deg to prepare for overhead reaching. - MET  Short term goal 3: Pt to have 55deg of ER to improve adam to upper body dressing.-not met          Long Term Goals - Time Frame for Long term goals : 18 visits  Long term goal 1: Pt to score >22/80 UEFS to improve ADLs  Long term goal 2: Pt to complete 3rd to 4th shelf reach with 1# to ensure pt able to reach dishes. Long term goal 3: Pt to have 4/5 horiz ABD to improve pt posture.   Long term goal 4: Pt to report able to tolerate sleeping in bed without waking due to shoulder pain x4 consecutive nights.        Post Treatment Pain:  7/10    Time In: 1320  Time Out: 1400  Timed Code Treatment Minutes: 40 Minutes  Total Treatment Time: Kd Ramirez PT     Date: 11/30/2020

## 2020-12-02 ENCOUNTER — HOSPITAL ENCOUNTER (OUTPATIENT)
Dept: PHYSICAL THERAPY | Age: 75
Setting detail: THERAPIES SERIES
Discharge: HOME | End: 2020-12-02
Payer: MEDICARE

## 2020-12-02 PROCEDURE — 97110 THERAPEUTIC EXERCISES: CPT

## 2020-12-02 PROCEDURE — 97140 MANUAL THERAPY 1/> REGIONS: CPT

## 2020-12-02 ASSESSMENT — PAIN SCALES - GENERAL: PAINLEVEL_OUTOF10: 5

## 2020-12-02 NOTE — PRE-CERTIFICATION NOTE
Medicare Cap     [] Physical Therapy  [] Speech Therapy  [] Occupational therapy    *PT and Speech caps combine      $1940 Cap limit < kx modifier needed < $5282 requires pre-cert     Patient Name: Isis Darling  YOB: 1945     Date of M[address] 63 Name $$$ charge Daily Charge YTD   Total $   11/3/20 Eval, Ex 113.90 113.90 113.90   11/4/20 Ex x 3 90.24 90.24 --   11/6/20 Ex x 3 90.24 90.24 --   11/9/20 Ex x 3 90.24 90.24 --   11/11/20 Ex x 2 60.16 60.16 444.78   11/23/20 Ex x 2 30.08 x 2 60.16 504.94   11/24/20 Ex x 2 30.08 x 2 60.16 565.10   11/27/20 Ex x 2 30.08 x 2 60.16 625.26   11/30/20 Ex x 2, man 30.08 x 2, 27.71 87.87 713.13   12/2/20 Ex x 2, man x 1

## 2020-12-02 NOTE — PROGRESS NOTES
Phone: [address]      Fax: [phone]                            Outpatient Physical Therapy                                                                            Daily Note    Date: 2020  Patient Name: Sunita Dillard        MRN: [id number]   ACCT#:  [de-identified]  : 1945  (70 y.o.)    Referring Practitioner: Dr, Merlinda Distad    Referral Date : 10/28/20    Diagnosis: R shoulder A-scope with RCR  Treatment Diagnosis: R shoulder pain s/p RTC repair    Onset Date: 10/14/20  PT Insurance Information: [facility]  Total # of Visits Approved: 18 Per Physician Order  Total # of Visits to Date: 14  No Show: 0  Canceled Appointment: 0  Plan of Care/Certification Expiration Date: 12/15/20    Pre-Treatment Pain:  5/10  Subjective: Goes by Maynor Luna"  Assessment  Assessment: Pt reports good adam to ex progression. Progressed again today with UBE and with sidelying AROM. Good adam to session. AROM to 130 deg flexion. Chart Reviewed: Yes    Plan  Plan: Continue with current plan    Exercises/Modalities/Manual:  See DocFlow Sheet       Barriers to Learning: None    Goals  (Total # of Visits to Date: 15)   Short Term Goals - Time Frame for Short term goals: 9 visits  Short term goal 1: Pt to report independence and compliance with HEP for ROM. - MET  Short term goal 2: Pt to have PROM Flexion 120deg to prepare for overhead reaching. - MET  Short term goal 3: Pt to have 55deg of ER to improve adam to upper body dressing.-not met          Long Term Goals - Time Frame for Long term goals : 18 visits  Long term goal 1: Pt to score >22/80 UEFS to improve ADLs  Long term goal 2: Pt to complete 3rd to 4th shelf reach with 1# to ensure pt able to reach dishes. Long term goal 3: Pt to have 4/5 horiz ABD to improve pt posture. Long term goal 4: Pt to report able to tolerate sleeping in bed without waking due to shoulder pain x4 consecutive nights.        Post Treatment Pain:  5/10    Time In: 1031    Time Out : 1111        Timed Code Treatment Minutes: 40 Minutes  Total Treatment Time: 40 Minutes    Mark Rubin PTA    Date: 12/2/2020

## 2020-12-02 NOTE — PRE-CERTIFICATION NOTE
Medicare Cap     [] Physical Therapy  [] Speech Therapy  [] Occupational therapy    *PT and Speech caps combine      $1940 Cap limit < kx modifier needed < $2328 requires pre-cert     Patient Name: Shawna Cleary  YOB: 1945     Date of M[address] Name $$$ charge Daily Charge YTD   Total $   11/3/20 Daniel, Ex 113.90 113.90 113.90   11/4/20 Ex x 3 90.24 90.24 --   11/6/20 Ex x 3 90.24 90.24 --   11/9/20 Ex x 3 90.24 90.24 --   11/11/20 Ex x 2 60.16 60.16 444.78   11/23/20 Ex x 2 30.08 x 2 60.16 504.94   11/24/20 Ex x 2 30.08 x 2 60.16 565.10   11/27/20 Ex x 2 30.08 x 2 60.16 625.26   11/30/20 Ex x 2, man 30.08 x 2, 27.71 87.87 713.13   12/2/20 Ex x 2, man x 1 30.08 x 2, 27.71 87.87 801.00

## 2020-12-04 ENCOUNTER — HOSPITAL ENCOUNTER (OUTPATIENT)
Dept: PHYSICAL THERAPY | Age: 75
Setting detail: THERAPIES SERIES
Discharge: HOME | End: 2020-12-04
Payer: MEDICARE

## 2020-12-04 PROCEDURE — 97110 THERAPEUTIC EXERCISES: CPT

## 2020-12-04 PROCEDURE — 97140 MANUAL THERAPY 1/> REGIONS: CPT

## 2020-12-04 ASSESSMENT — PAIN SCALES - GENERAL: PAINLEVEL_OUTOF10: 5

## 2020-12-04 NOTE — PROGRESS NOTES
Phone: [address]      Fax: [phone]                            Outpatient Physical Therapy                                                                            Daily Note    Date: 2020  Patient Name: Pako Riddle        MRN: [id number]   ACCT#:  [de-identified]  : 1945  (70 y.o.)    Referring Practitioner: Lily Miles    Referral Date : 10/28/20    Diagnosis: R shoulder A-scope with RCR  Treatment Diagnosis: R shoulder pain s/p RTC repair    Onset Date: 10/14/20  PT Insurance Information: [facility]  Total # of Visits Approved: 18 Per Physician Order  Total # of Visits to Date: 15  No Show: 0  Canceled Appointment: 0  Plan of Care/Certification Expiration Date: 12/15/20    Pre-Treatment Pain:  5/10  Subjective: Goes by Cristopher Valentino"  Assessment  Assessment: Pt reports mild soreness for 2 hours after last visit. Progressed reps today semiprone on ball as well as sidelying ex's. Also progressed to ball on wall to promote overhead reaching, as well as now able to reach top shelf in gym  with no sig compensation. Seated AROM 130 deg flex, IR to SI area and ER to C7. Will cont. Chart Reviewed: Yes    Plan  Plan: Continue with current plan    Exercises/Modalities/Manual:  See DocFlow Sheet         Barriers to Learning: None    Goals  (Total # of Visits to Date: 13)   Short Term Goals - Time Frame for Short term goals: 9 visits  Short term goal 1: Pt to report independence and compliance with HEP for ROM. - MET  Short term goal 2: Pt to have PROM Flexion 120deg to prepare for overhead reaching. - MET  Short term goal 3: Pt to have 55deg of ER to improve adam to upper body dressing.-not met          Long Term Goals - Time Frame for Long term goals : 18 visits  Long term goal 1: Pt to score >22/80 UEFS to improve ADLs  Long term goal 2: Pt to complete 3rd to 4th shelf reach with 1# to ensure pt able to reach dishes.   Long term goal 3: Pt to have 4/5 horiz ABD to improve pt posture. Long term goal 4: Pt to report able to tolerate sleeping in bed without waking due to shoulder pain x4 consecutive nights.        Post Treatment Pain:  5/10    Time In: 1030    Time Out : 1105        Timed Code Treatment Minutes: 35 Minutes  Total Treatment Time: 35 Minutes    Alec Rubin PTA   Date: 12/4/2020

## 2020-12-04 NOTE — PRE-CERTIFICATION NOTE
Medicare Cap     [] Physical Therapy  [] Speech Therapy  [] Occupational therapy    *PT and Speech caps combine      $1940 Cap limit < kx modifier needed < $4136 requires pre-cert     Patient Name: Link Parker  YOB: 1945     Date of [address] Name $$$ charge Daily Charge YTD   Total $   11/3/20 Daniel, Ex 113.90 113.90 113.90   11/4/20 Ex x 3 90.24 90.24 --   11/6/20 Ex x 3 90.24 90.24 --   11/9/20 Ex x 3 90.24 90.24 --   11/11/20 Ex x 2 60.16 60.16 444.78   11/23/20 Ex x 2 30.08 x 2 60.16 504.94   11/24/20 Ex x 2 30.08 x 2 60.16 565.10   11/27/20 Ex x 2 30.08 x 2 60.16 625.26   11/30/20 Ex x 2, man 30.08 x 2, 27.71 87.87 713.13   12/2/20 Ex x 2, man x 1 30.08 x 2, 27.71 87.87 801.00   12/4/20 Ex x 2, man x 1

## 2020-12-04 NOTE — PRE-CERTIFICATION NOTE
Medicare Cap     [] Physical Therapy  [] Speech Therapy  [] Occupational therapy    *PT and Speech caps combine      $1940 Cap limit < kx modifier needed < $5833 requires pre-cert     Patient Name: Iraida Bolaños  YOB: 1945     Date of Mö[address] Name $$$ charge Daily Charge YTD   Total $   11/3/20 Daniel, Ex 113.90 113.90 113.90   11/4/20 Ex x 3 90.24 90.24 --   11/6/20 Ex x 3 90.24 90.24 --   11/9/20 Ex x 3 90.24 90.24 --   11/11/20 Ex x 2 60.16 60.16 444.78   11/23/20 Ex x 2 30.08 x 2 60.16 504.94   11/24/20 Ex x 2 30.08 x 2 60.16 565.10   11/27/20 Ex x 2 30.08 x 2 60.16 625.26   11/30/20 Ex x 2, man 30.08 x 2, 27.71 87.87 713.13   12/2/20 Ex x 2, man x 1 30.08 x 2, 27.71 87.87 801.00   12/4/20 Ex x 2, man x 1 30.08 x 2, 27.71 87.87 888.87

## 2020-12-07 ENCOUNTER — HOSPITAL ENCOUNTER (OUTPATIENT)
Dept: PHYSICAL THERAPY | Age: 75
Setting detail: THERAPIES SERIES
Discharge: HOME | End: 2020-12-07
Payer: MEDICARE

## 2020-12-07 PROCEDURE — 97110 THERAPEUTIC EXERCISES: CPT

## 2020-12-07 PROCEDURE — 97140 MANUAL THERAPY 1/> REGIONS: CPT

## 2020-12-07 ASSESSMENT — PAIN SCALES - GENERAL: PAINLEVEL_OUTOF10: 5

## 2020-12-07 NOTE — PROGRESS NOTES
Phone: [address]      Fax: [phone]                            Outpatient Physical Therapy                                                                            Daily Note    Date: 2020  Patient Name: Claudia Stark        MRN: [id number]   ACCT#:  [de-identified]  : 1945  (70 y.o.)    Referring Practitioner: Regi Miles    Referral Date : 10/28/20    Diagnosis: R shoulder A-scope with RCR  Treatment Diagnosis: R shoulder pain s/p RTC repair    Onset Date: 10/14/20  PT Insurance Information: [facility]  Total # of Visits Approved: 18 Per Physician Order  Total # of Visits to Date: 16  No Show: 0  Canceled Appointment: 0  Plan of Care/Certification Expiration Date: 12/15/20    Pre-Treatment Pain:  5/10  Subjective: Goes by \"NLNN\"  magda 20  Assessment  Assessment: Pt reports less soreness after last visit, mild only for a few hours. PROM ER 52 and flex 152 deg. Progressed with 1# wt for overhead  reach and semiprone over ball. Good adam to session. Chart Reviewed: Yes    Plan  Plan: Continue with current plan    Exercises/Modalities/Manual:  See DocFlow Sheet       Barriers to Learning: None    Goals  (Total # of Visits to Date: 12)   Short Term Goals - Time Frame for Short term goals: 9 visits  Short term goal 1: Pt to report independence and compliance with HEP for ROM. - MET  Short term goal 2: Pt to have PROM Flexion 120deg to prepare for overhead reaching. - MET  Short term goal 3: Pt to have 55deg of ER to improve adam to upper body dressing.-not met          Long Term Goals - Time Frame for Long term goals : 18 visits  Long term goal 1: Pt to score >22/80 UEFS to improve ADLs  Long term goal 2: Pt to complete 3rd to 4th shelf reach with 1# to ensure pt able to reach dishes. Long term goal 3: Pt to have 4/5 horiz ABD to improve pt posture.   Long term goal 4: Pt to report able to tolerate sleeping in bed without waking due to shoulder pain x4 consecutive nights.        Post Treatment Pain:  5/10    Time In: 3837    Time Out : 1325        Timed Code Treatment Minutes: 40 Minutes  Total Treatment Time: 40 Minutes    Nasrin Rubin     Date: 12/7/2020

## 2020-12-09 ENCOUNTER — HOSPITAL ENCOUNTER (OUTPATIENT)
Dept: PHYSICAL THERAPY | Age: 75
Setting detail: THERAPIES SERIES
Discharge: HOME | End: 2020-12-09
Payer: MEDICARE

## 2020-12-09 PROCEDURE — 97140 MANUAL THERAPY 1/> REGIONS: CPT

## 2020-12-09 PROCEDURE — 97110 THERAPEUTIC EXERCISES: CPT

## 2020-12-09 ASSESSMENT — PAIN SCALES - GENERAL: PAINLEVEL_OUTOF10: 4

## 2020-12-09 NOTE — PROGRESS NOTES
[facility]      Fax: [phone]                            Outpatient Physical Therapy                                                                            Daily Note    Date: 2020  Patient Name: Julio Carpenter        MRN: [id number]   ACCT#:  [de-identified]  : 1945  (70 y.o.)    Referring Practitioner: Janet Miles    Referral Date : 10/28/20    Diagnosis: R shoulder A-scope with RCR  Treatment Diagnosis: R shoulder pain s/p RTC repair    Onset Date: 10/14/20  PT Insurance Information: [facility]  Total # of Visits Approved: 18 Per Physician Order  Total # of Visits to Date: 17  No Show: 0  Canceled Appointment: 0  Plan of Care/Certification Expiration Date: 12/15/20    Pre-Treatment Pain:  4/10  Subjective: Goes by \"RBQG\"  magda 20  Assessment  Assessment: Patient arrived stating pain is 4/10. Patient completed exercises per log demonstrating good tolerance. PROM as follows: ER = 46 degrees, flexion = 135 degrees Plan to continue to work on ROM and strength. Chart Reviewed: Yes    Plan  Plan: Continue with current plan    Exercises/Modalities/Manual:  See DocFlow Sheet    Education:      Barriers to Learning: None    Goals  (Total # of Visits to Date: 16)   Short Term Goals - Time Frame for Short term goals: 9 visits  Short term goal 1: Pt to report independence and compliance with HEP for ROM. - MET  Short term goal 2: Pt to have PROM Flexion 120deg to prepare for overhead reaching. - MET  Short term goal 3: Pt to have 55deg of ER to improve adam to upper body dressing.-not met          Long Term Goals - Time Frame for Long term goals : 18 visits  Long term goal 1: Pt to score >22/80 UEFS to improve ADLs  Long term goal 2: Pt to complete 3rd to 4th shelf reach with 1# to ensure pt able to reach dishes. Long term goal 3: Pt to have 4/5 horiz ABD to improve pt posture.   Long term goal 4: Pt to report able to tolerate sleeping in bed without waking due to shoulder pain x4 consecutive nights.        Post Treatment Pain:  4/10        Time In: 1305  Time Out: 1343  Timed Code Treatment Minutes: 38 Minutes  Total Treatment Time: 45 Minutes    Raiza Dempsey PTA     Date: 12/9/2020

## 2020-12-09 NOTE — PRE-CERTIFICATION NOTE
Medicare Cap     [] Physical Therapy  [] Speech Therapy  [] Occupational therapy    *PT and Speech caps combine      $1940 Cap limit < kx modifier needed < $6518 requires pre-cert     Patient Name: Geoff Tamayo  YOB: 1945     Date of M[address] Name $$$ charge Daily Charge YTD   Total $   11/3/20 Daniel, Ex 113.90 113.90 113.90   11/4/20 Ex x 3 90.24 90.24 --   11/6/20 Ex x 3 90.24 90.24 --   11/9/20 Ex x 3 90.24 90.24 --   11/11/20 Ex x 2 60.16 60.16 444.78   11/23/20 Ex x 2 30.08 x 2 60.16 504.94   11/24/20 Ex x 2 30.08 x 2 60.16 565.10   11/27/20 Ex x 2 30.08 x 2 60.16 625.26   11/30/20 Ex x 2, man 30.08 x 2, 27.71 87.87 713.13   12/2/20 Ex x 2, man x 1 30.08 x 2, 27.71 87.87 801.00   12/4/20 Ex x 2, man x 1 30.08 x 2, 27.71 87.87 888.87   12/7/20 Ex x 2, man x 1 30.08 x 2, 27.71 87.87 976.74   12/9/20 Ex x2 Man x1 30.08 x 2, 27.71 87.87 1064.61

## 2020-12-11 ENCOUNTER — HOSPITAL ENCOUNTER (OUTPATIENT)
Dept: PHYSICAL THERAPY | Age: 75
Setting detail: THERAPIES SERIES
Discharge: HOME | End: 2020-12-11
Payer: MEDICARE

## 2020-12-11 PROCEDURE — 97140 MANUAL THERAPY 1/> REGIONS: CPT

## 2020-12-11 PROCEDURE — 97110 THERAPEUTIC EXERCISES: CPT

## 2020-12-11 ASSESSMENT — PAIN SCALES - GENERAL: PAINLEVEL_OUTOF10: 5

## 2020-12-11 NOTE — PLAN OF CARE
[facility] [address]UMMINGS  Phone: [phone]   Date: 2020                  Outpatient Physical Therapy Fax: [phone]    ACCT #: [de-identified]                  Recertification  [facility]#: [id number]  Patient: Keyla Carvalho  : 1945    Referring Practitioner: Karri Miles    Referral Date : 10/28/20    Diagnosis: R shoulder A-scope with RCR  Onset Date: 10/14/20  Treatment Diagnosis: R shoulder pain s/p RTC repair    Assessment  Body structures, Functions, Activity limitations: Decreased functional mobility , Decreased ADL status, Decreased ROM, Decreased strength, Decreased endurance, Decreased sensation, Decreased balance, Decreased high-level IADLs, Increased pain, Decreased posture  Assessment: Plan to recert pt x3 weeks to cont to work on strength and progress AROM. MMT: Flexion=3+/5, Extension 4+/5, HorizABD=3+/5. PROM: ER 53deg, ABD=100deg. AROM: [id number]  Pt would like to continue PT to improve functional use of R Shoulder/Arm. Prognosis: Good    Treatment Plan   Days: 3 times per week     Weeks: 3 weeks Total # of Visits Approved: 27  Patient Education/HEP, Therapeutic Exercise, Manual Therapy: Myofacial Release/Cupping and Manual Therapy: Mobilization/Manipulation    Goals  Long term goals  Time Frame for Long term goals : 9 visits(27 total) POC exp 21  Long term goal 1: Pt to score >32/80 UEFS to improve ADLs  Long term goal 2: Pt to have 4/5 horiz ABD to improve pt posture  Long term goal 3: Pt to have 110deg of AROM ABD to improve upper body dressing.     Hyun Damon    Date: 2020        ______________________________________ Date: 2020  Physician Signature

## 2020-12-11 NOTE — PROGRESS NOTES
Phone: [address]      Fax: [phone]                            Outpatient Physical Therapy                                                                            Daily Note    Date: 2020  Patient Name: Duran Jiménez        MRN: [id number]   ACCT#:  [de-identified]  : 1945  (70 y.o.)    Referring Practitioner: Mary lAice Miles    Referral Date : 10/28/20    Diagnosis: R shoulder A-scope with RCR  Treatment Diagnosis: R shoulder pain s/p RTC repair    Onset Date: 10/14/20  PT Insurance Information: [facility]  Total # of Visits Approved: 18 Per Physician Order  Total # of Visits to Date: 18  No Show: 0  Canceled Appointment: 0  Plan of Care/Certification Expiration Date: 12/15/20    Pre-Treatment Pain:  5/10  Subjective: Goes by \"Nithin\"  magda 20  Assessment  Assessment: Plan to recert pt x3 weeks to cont to work on strength and progress AROM. MMT: Flexion=3+/5, Extension 4+/5, HorizABD=3+/5. PROM: ER 53deg, ABD=100deg. Pt would like to continue PT to improve functional use of R Shoulder/Arm. Chart Reviewed: Yes    Plan  Plan: Continue with current plan    Exercises/Modalities/Manual:  See DocFlow Sheet    Education: Plan to recert x3 wks     Barriers to Learning: None    Goals  (Total # of Visits to Date: 25)   Short Term Goals - Time Frame for Short term goals: 9 visits  Short term goal 1: Pt to report independence and compliance with HEP for ROM. - MET  Short term goal 2: Pt to have PROM Flexion 120deg to prepare for overhead reaching. - MET  Short term goal 3: Pt to have 55deg of ER to improve adam to upper body dressing.-not met          Long Term Goals - Time Frame for Long term goals : 18 visits  Long term goal 1: Pt to score >22/80 UEFS to improve ADLs-NOT MET  Long term goal 2: Pt to complete 3rd to 4th shelf reach with 1# to ensure pt able to reach dishes. -MET  Long term goal 3: Pt to have 4/5 horiz ABD to improve pt posture. -NOT MET  Long term goal 4: Pt to report able to tolerate sleeping in bed without waking due to shoulder pain x4 consecutive nights. -MET       Post Treatment Pain:  4/10    Time In: 1302    Time Out : 1348  Timed Code Treatment Minutes: 46 Minutes  Total Treatment Time: Kd Ramirez PT     Date: 12/11/2020

## 2020-12-14 ENCOUNTER — HOSPITAL ENCOUNTER (OUTPATIENT)
Dept: PHYSICAL THERAPY | Age: 75
Setting detail: THERAPIES SERIES
Discharge: HOME | End: 2020-12-14
Payer: MEDICARE

## 2020-12-14 PROCEDURE — 97140 MANUAL THERAPY 1/> REGIONS: CPT

## 2020-12-14 PROCEDURE — 97110 THERAPEUTIC EXERCISES: CPT

## 2020-12-14 ASSESSMENT — PAIN SCALES - GENERAL: PAINLEVEL_OUTOF10: 5

## 2020-12-14 NOTE — PRE-CERTIFICATION NOTE
Medicare Cap     [] Physical Therapy  [] Speech Therapy  [] Occupational therapy    *PT and Speech caps combine      $1940 Cap limit < kx modifier needed < $8596 requires pre-cert     Patient Name: Ernesto Pacheco  YOB: 1945     Date of Möhe 63 Name $$$ charge Daily Charge YTD   Total $   11/3/20 Daniel Ex 113.90 113.90 113.90   11/4/20 Ex x 3 90.24 90.24 --   11/6/20 Ex x 3 90.24 90.24 --   11/9/20 Ex x 3 90.24 90.24 --   11/11/20 Ex x 2 60.16 60.16 444.78   11/23/20 Ex x 2 30.08 x 2 60.16 504.94   11/24/20 Ex x 2 30.08 x 2 60.16 565.10   11/27/20 Ex x 2 30.08 x 2 60.16 625.26   11/30/20 Ex x 2, man 30.08 x 2, 27.71 87.87 713.13   12/2/20 Ex x 2, man x 1 30.08 x 2, 27.71 87.87 801.00   12/4/20 Ex x 2, man x 1 30.08 x 2, 27.71 87.87 888.87   12/7/20 Ex x 2, man x 1 30.08 x 2, 27.71 87.87 976.74   12/9/20 Ex x2 Man x1 30.08 x 2, 27.71 87.87 1064.61   12/11/20 Ex x2 Man x1 30.08 x 2, 27.71 87.87 ---   12/14/20 Ex x2 Man x1 30.08 x 2, 27.71 87.87 1240. 28

## 2020-12-16 ENCOUNTER — HOSPITAL ENCOUNTER (OUTPATIENT)
Dept: PHYSICAL THERAPY | Age: 75
Setting detail: THERAPIES SERIES
Discharge: HOME | End: 2020-12-16
Payer: MEDICARE

## 2020-12-16 PROCEDURE — 97140 MANUAL THERAPY 1/> REGIONS: CPT

## 2020-12-16 PROCEDURE — 97110 THERAPEUTIC EXERCISES: CPT

## 2020-12-16 ASSESSMENT — PAIN SCALES - GENERAL: PAINLEVEL_OUTOF10: 5

## 2020-12-16 NOTE — PRE-CERTIFICATION NOTE
Medicare Cap     [] Physical Therapy  [] Speech Therapy  [] Occupational therapy    *PT and Speech caps combine      $1940 Cap limit < kx modifier needed < $2943 requires pre-cert     Patient Name: Katherine Moscoso  YOB: 1945     Date of Möhe 63 Name $$$ charge Daily Charge YTD   Total $   11/3/20 Daniel, Ex 113.90 113.90 113.90   11/4/20 Ex x 3 90.24 90.24 --   11/6/20 Ex x 3 90.24 90.24 --   11/9/20 Ex x 3 90.24 90.24 --   11/11/20 Ex x 2 60.16 60.16 444.78   11/23/20 Ex x 2 30.08 x 2 60.16 504.94   11/24/20 Ex x 2 30.08 x 2 60.16 565.10   11/27/20 Ex x 2 30.08 x 2 60.16 625.26   11/30/20 Ex x 2, man 30.08 x 2, 27.71 87.87 713.13   12/2/20 Ex x 2, man x 1 30.08 x 2, 27.71 87.87 801.00   12/4/20 Ex x 2, man x 1 30.08 x 2, 27.71 87.87 888.87   12/7/20 Ex x 2, man x 1 30.08 x 2, 27.71 87.87 976.74   12/9/20 Ex x2 Man x1 30.08 x 2, 27.71 87.87 1064.61   12/11/20 Ex x2 Man x1 30.08 x 2, 27.71 87.87 ---   12/14/20 Ex x2 Man x1 30.08 x 2, 27.71 87.87 1240.35   12/16/20 Ex x 2,man x1

## 2020-12-16 NOTE — PRE-CERTIFICATION NOTE
Medicare Cap     [] Physical Therapy  [] Speech Therapy  [] Occupational therapy    *PT and Speech caps combine      $1940 Cap limit < kx modifier needed < $1410 requires pre-cert     Patient Name: John Parekh  YOB: 1945     Date of [address]el[address] 63 Name $$$ charge Daily Charge YTD   Total $   11/3/20 Daniel Ex 113.90 113.90 113.90   11/4/20 Ex x 3 90.24 90.24 --   11/6/20 Ex x 3 90.24 90.24 --   11/9/20 Ex x 3 90.24 90.24 --   11/11/20 Ex x 2 60.16 60.16 444.78   11/23/20 Ex x 2 30.08 x 2 60.16 504.94   11/24/20 Ex x 2 30.08 x 2 60.16 565.10   11/27/20 Ex x 2 30.08 x 2 60.16 625.26   11/30/20 Ex x 2, man 30.08 x 2, 27.71 87.87 713.13   12/2/20 Ex x 2, man x 1 30.08 x 2, 27.71 87.87 801.00   12/4/20 Ex x 2, man x 1 30.08 x 2, 27.71 87.87 888.87   12/7/20 Ex x 2, man x 1 30.08 x 2, 27.71 87.87 976.74   12/9/20 Ex x2 Man x1 30.08 x 2, 27.71 87.87 1064.61   12/11/20 Ex x2 Man x1 30.08 x 2, 27.71 87.87 ---   12/14/20 Ex x2 Man x1 30.08 x 2, 27.71 87.87 1240.35   12/16/20 Ex x 2,man x1 30.08 x 2, 27.71 87.87 1328.22

## 2020-12-16 NOTE — PROGRESS NOTES
Phone: [address]      Fax: [phone]                            Outpatient Physical Therapy                                                                            Daily Note    Date: 2020  Patient Name: Albaro Bowling        MRN: [id number]   ACCT#:  [de-identified]  : 1945  (70 y.o.)    Referring Practitioner: Rodríguez Miles    Referral Date : 10/28/20    Diagnosis: R shoulder A-scope with RCR  Treatment Diagnosis: R shoulder pain s/p RTC repair    Onset Date: 10/14/20  PT Insurance Information: [facility]  Total # of Visits Approved: 27 Per Physician Order  Total # of Visits to Date:   No Show: 0  Canceled Appointment: 0  Plan of Care/Certification Expiration Date: 12/15/20    Pre-Treatment Pain:  5/10  Subjective: Goes by \"EHDL\"  magda 20  Assessment  Assessment: Progressed reps of overhead reaching. Also added weight to standing abduction and supine flexion. PROM ER 45 and AROM to  to 100 deg abduction . Chart Reviewed: Yes    Plan  Plan: Continue with current plan    Exercises/Modalities/Manual:  See DocFlow Sheet         Barriers to Learning: None      Long Term Goals - Time Frame for Long term goals : 9 visits(27 total) POC exp 21  Long term goal 1: Pt to score >32/80 UEFS to improve ADLs  Long term goal 2: Pt to have 4/5 horiz ABD to improve pt posture  Long term goal 3: Pt to have 110deg of AROM ABD to improve upper body dressing.           Post Treatment Pain:  5/10    Time In: 0945    Time Out : 1025        Timed Code Treatment Minutes: 40 Minutes  Total Treatment Time: 40 Minutes    Heraclio Rubin PTA    Date: 2020

## 2020-12-17 ENCOUNTER — HOSPITAL ENCOUNTER (OUTPATIENT)
Dept: PHYSICAL THERAPY | Age: 75
Setting detail: THERAPIES SERIES
Discharge: HOME | End: 2020-12-17
Payer: MEDICARE

## 2020-12-17 PROCEDURE — 97110 THERAPEUTIC EXERCISES: CPT

## 2020-12-17 ASSESSMENT — PAIN SCALES - GENERAL: PAINLEVEL_OUTOF10: 6

## 2020-12-17 NOTE — PRE-CERTIFICATION NOTE
Medicare Cap     [] Physical Therapy  [] Speech Therapy  [] Occupational therapy    *PT and Speech caps combine      $1940 Cap limit < kx modifier needed < $2322 requires pre-cert     Patient Name: Ernesto Pacheco  YOB: 1945     Date of Möhe 63 Name $$$ charge Daily Charge YTD   Total $   11/3/20 Daniel Ex 113.90 113.90 113.90   11/4/20 Ex x 3 90.24 90.24 --   11/6/20 Ex x 3 90.24 90.24 --   11/9/20 Ex x 3 90.24 90.24 --   11/11/20 Ex x 2 60.16 60.16 444.78   11/23/20 Ex x 2 30.08 x 2 60.16 504.94   11/24/20 Ex x 2 30.08 x 2 60.16 565.10   11/27/20 Ex x 2 30.08 x 2 60.16 625.26   11/30/20 Ex x 2, man 30.08 x 2, 27.71 87.87 713.13   12/2/20 Ex x 2, man x 1 30.08 x 2, 27.71 87.87 801.00   12/4/20 Ex x 2, man x 1 30.08 x 2, 27.71 87.87 888.87   12/7/20 Ex x 2, man x 1 30.08 x 2, 27.71 87.87 976.74   12/9/20 Ex x2 Man x1 30.08 x 2, 27.71 87.87 1064.61   12/11/20 Ex x2 Man x1 30.08 x 2, 27.71 87.87 ---   12/14/20 Ex x2 Man x1 30.08 x 2, 27.71 87.87 1240.35   12/16/20 Ex x 2,man x1 30.08 x 2, 27.71 87.87 1328.22   12/17/20 Ex x 3 30.08 x 3 90.24 1418.46

## 2020-12-17 NOTE — PRE-CERTIFICATION NOTE
Medicare Cap     [] Physical Therapy  [] Speech Therapy  [] Occupational therapy    *PT and Speech caps combine      $1940 Cap limit < kx modifier needed < $3140 requires pre-cert     Patient Name: Josie Adame  YOB: 1945     Date of Möhe 63 Name $$$ charge Daily Charge YTD   Total $   11/3/20 Daniel, Ex 113.90 113.90 113.90   11/4/20 Ex x 3 90.24 90.24 --   11/6/20 Ex x 3 90.24 90.24 --   11/9/20 Ex x 3 90.24 90.24 --   11/11/20 Ex x 2 60.16 60.16 444.78   11/23/20 Ex x 2 30.08 x 2 60.16 504.94   11/24/20 Ex x 2 30.08 x 2 60.16 565.10   11/27/20 Ex x 2 30.08 x 2 60.16 625.26   11/30/20 Ex x 2, man 30.08 x 2, 27.71 87.87 713.13   12/2/20 Ex x 2, man x 1 30.08 x 2, 27.71 87.87 801.00   12/4/20 Ex x 2, man x 1 30.08 x 2, 27.71 87.87 888.87   12/7/20 Ex x 2, man x 1 30.08 x 2, 27.71 87.87 976.74   12/9/20 Ex x2 Man x1 30.08 x 2, 27.71 87.87 1064.61   12/11/20 Ex x2 Man x1 30.08 x 2, 27.71 87.87 ---   12/14/20 Ex x2 Man x1 30.08 x 2, 27.71 87.87 1240.35   12/16/20 Ex x 2,man x1 30.08 x 2, 27.71 87.87 1328.22   12/17/20 Ex x 3

## 2020-12-17 NOTE — PROGRESS NOTES
Phone: [address]      Fax: [phone]                            Outpatient Physical Therapy                                                                            Daily Note    Date: 2020  Patient Name: Linda Joshi        MRN: [id number]   ACCT#:  [de-identified]  : 1945  (70 y.o.)    Referring Practitioner: Maite Miles    Referral Date : 10/28/20    Diagnosis: R shoulder A-scope with RCR  Treatment Diagnosis: R shoulder pain s/p RTC repair    Onset Date: 10/14/20  PT Insurance Information: [facility]  Total # of Visits Approved: 27 Per Physician Order  Total # of Visits to Date: 21  No Show: 0  Canceled Appointment: 0  Plan of Care/Certification Expiration Date: 21    Pre-Treatment Pain:  6/10  Subjective: Goes by \"Nithin\"  magda 20  Assessment  Assessment: Patient reports R shoulder pain is a 6/10 this afternoon. Continued with strengthening and ROM exercises as outlined. R shoulder PROM: ABD = 120 deg and ER @90 = 55 deg. Chart Reviewed: Yes    Plan  Plan: Continue with current plan    Exercises/Modalities/Manual:  See DocFlow Sheet    Education:      Barriers to Learning: None    Goals  (Total # of Visits to Date: 24)   Short Term Goals -      Long Term Goals - Time Frame for Long term goals : 9 visits(27 total) POC exp 21  Long term goal 1: Pt to score >32/80 UEFS to improve ADLs  Long term goal 2: Pt to have 4/5 horiz ABD to improve pt posture  Long term goal 3: Pt to have 110deg of AROM ABD to improve upper body dressing.     Post Treatment Pain:  5-6/10    Time In: 1303    Time Out : 1345   Timed Code Treatment Minutes: 42 Minutes  Total Treatment Time: [address] [place], [place]     Date: 2020

## 2020-12-21 ENCOUNTER — HOSPITAL ENCOUNTER (OUTPATIENT)
Dept: PHYSICAL THERAPY | Age: 75
Setting detail: THERAPIES SERIES
Discharge: HOME | End: 2020-12-21
Payer: MEDICARE

## 2020-12-21 PROCEDURE — 97140 MANUAL THERAPY 1/> REGIONS: CPT

## 2020-12-21 PROCEDURE — 97110 THERAPEUTIC EXERCISES: CPT

## 2020-12-21 NOTE — PROGRESS NOTES
Phone: [address]      Fax: [phone]                            Outpatient Physical Therapy                                                                            Daily Note    Date: 2020  Patient Name: Keyla Carvalho        MRN: [id number]   ACCT#:  [de-identified]  : 1945  (70 y.o.)    Referring Practitioner: Karri Miles    Referral Date : 10/28/20    Diagnosis: R shoulder A-scope with RCR  Treatment Diagnosis: R shoulder pain s/p RTC repair    Onset Date: 10/14/20  PT Insurance Information: [facility]  Total # of Visits Approved: 27 Per Physician Order  Total # of Visits to Date: 22  No Show: 0  Canceled Appointment: 0  Plan of Care/Certification Expiration Date: 21    Pre-Treatment Pain:  5/10  Subjective: Goes by \"Nithin\"  magda 20  Assessment  Assessment: PROM: ER=50deg. Pt reports physician is pleased and encourages continued strengthening. Pt educated that will finish remaining time until 21 and then D/C to HEP for continued strengthening. Chart Reviewed: Yes    Plan  Plan: Continue with current plan    Exercises/Modalities/Manual:  See DocFlow Sheet    Education: See assessment     Barriers to Learning: None    Goals  (Total # of Visits to Date: 25)   Long Term Goals - Time Frame for Long term goals : 9 visits(27 total) POC exp 21  Long term goal 1: Pt to score >32/80 UEFS to improve ADLs  Long term goal 2: Pt to have 4/5 horiz ABD to improve pt posture  Long term goal 3: Pt to have 110deg of AROM ABD to improve upper body dressing.           Post Treatment Pain:  510    Time In: 0948  Time Out: 1028  Timed Code Treatment Minutes: 40 Minutes  Total Treatment Time: Kd Ramirez PT     Date: 2020

## 2020-12-22 ENCOUNTER — HOSPITAL ENCOUNTER (OUTPATIENT)
Dept: PHYSICAL THERAPY | Age: 75
Setting detail: THERAPIES SERIES
Discharge: HOME | End: 2020-12-22
Payer: MEDICARE

## 2020-12-22 PROCEDURE — 97110 THERAPEUTIC EXERCISES: CPT

## 2020-12-22 ASSESSMENT — PAIN SCALES - GENERAL: PAINLEVEL_OUTOF10: 5

## 2020-12-22 NOTE — PROGRESS NOTES
Phone: [address]      Fax: [phone]                            Outpatient Physical Therapy                                                                            Daily Note    Date: 2020  Patient Name: Josie Adame        MRN: [id number]   ACCT#:  [de-identified]  : 1945  (70 y.o.)    Referring Practitioner: Steffi Miles    Referral Date : 10/28/20    Diagnosis: R shoulder A-scope with RCR  Treatment Diagnosis: R shoulder pain s/p RTC repair    Onset Date: 10/14/20  PT Insurance Information: [facility]  Total # of Visits Approved: 27 Per Physician Order  Total # of Visits to Date: 23  No Show: 0  Canceled Appointment: 0  Plan of Care/Certification Expiration Date: 21    Pre-Treatment Pain:  5/10  Subjective: Goes by \"Nithin\"  magda 20  Assessment  Assessment: Patient reports R shoulder pain is a 5/10 this morning. Increased resistance to 2# this morning for most exercises with good tolerance from patient. R shoulder PROM: ER = 55 deg and AROM ABD = 100 deg. Plan to continue x3 visits and then D/C to HEP. Chart Reviewed: Yes    Plan  Plan: Continue with current plan    Exercises/Modalities/Manual:  See DocFlow Sheet    Education:      Barriers to Learning: None    Goals  (Total # of Visits to Date: 21)   Short Term Goals -      Long Term Goals - Time Frame for Long term goals : 9 visits(27 total) POC exp 21  Long term goal 1: Pt to score >32/80 UEFS to improve ADLs  Long term goal 2: Pt to have 4/5 horiz ABD to improve pt posture  Long term goal 3: Pt to have 110deg of AROM ABD to improve upper body dressing.     Post Treatment Pain:  5/10    Time In: 4248    Time Out : 1027   Timed Code Treatment Minutes: 40 Minutes  Total Treatment Time: 36 Minutes    [place]un [place], [place]     Date: 2020

## 2020-12-22 NOTE — PRE-CERTIFICATION NOTE
Medicare Cap     [] Physical Therapy  [] Speech Therapy  [] Occupational therapy    *PT and Speech caps combine      $1940 Cap limit < kx modifier needed < $8752 requires pre-cert     Patient Name: Jennefer Meckel McDiffitt  YOB: 1945     Date of Möhe 63 Name $$$ charge Daily Charge YTD   Total $   11/3/20 Daniel, Ex 113.90 113.90 113.90   11/4/20 Ex x 3 90.24 90.24 --   11/6/20 Ex x 3 90.24 90.24 --   11/9/20 Ex x 3 90.24 90.24 --   11/11/20 Ex x 2 60.16 60.16 444.78   11/23/20 Ex x 2 30.08 x 2 60.16 504.94   11/24/20 Ex x 2 30.08 x 2 60.16 565.10   11/27/20 Ex x 2 30.08 x 2 60.16 625.26   11/30/20 Ex x 2, man 30.08 x 2, 27.71 87.87 713.13   12/2/20 Ex x 2, man x 1 30.08 x 2, 27.71 87.87 801.00   12/4/20 Ex x 2, man x 1 30.08 x 2, 27.71 87.87 888.87   12/7/20 Ex x 2, man x 1 30.08 x 2, 27.71 87.87 976.74   12/9/20 Ex x2 Man x1 30.08 x 2, 27.71 87.87 1064.61   12/11/20 Ex x2 Man x1 30.08 x 2, 27.71 87.87 ---   12/14/20 Ex x2 Man x1 30.08 x 2, 27.71 87.87 1240.35   12/16/20 Ex x 2,man x1 30.08 x 2, 27.71 87.87 1328.22   12/17/20 Ex x 3 30.08 x 3 90.24 1418.46   12/21/20 Ex 2, man      12/22/20 Ex x 3

## 2020-12-22 NOTE — PRE-CERTIFICATION NOTE
Medicare Cap     [] Physical Therapy  [] Speech Therapy  [] Occupational therapy    *PT and Speech caps combine      $1940 Cap limit < kx modifier needed < $5797 requires pre-cert     Patient Name: Tarun Pepper  YOB: 1945     Date of Mö[address] Name $$$ charge Daily Charge YTD   Total $   11/3/20 Eval, Ex 113.90 113.90 113.90   11/4/20 Ex x 3 90.24 90.24 --   11/6/20 Ex x 3 90.24 90.24 --   11/9/20 Ex x 3 90.24 90.24 --   11/11/20 Ex x 2 60.16 60.16 444.78   11/23/20 Ex x 2 30.08 x 2 60.16 504.94   11/24/20 Ex x 2 30.08 x 2 60.16 565.10   11/27/20 Ex x 2 30.08 x 2 60.16 625.26   11/30/20 Ex x 2, man 30.08 x 2, 27.71 87.87 713.13   12/2/20 Ex x 2, man x 1 30.08 x 2, 27.71 87.87 801.00   12/4/20 Ex x 2, man x 1 30.08 x 2, 27.71 87.87 888.87   12/7/20 Ex x 2, man x 1 30.08 x 2, 27.71 87.87 976.74   12/9/20 Ex x2 Man x1 30.08 x 2, 27.71 87.87 1064.61   12/11/20 Ex x2 Man x1 30.08 x 2, 27.71 87.87 ---   12/14/20 Ex x2 Man x1 30.08 x 2, 27.71 87.87 1240.35   12/16/20 Ex x 2,man x1 30.08 x 2, 27.71 87.87 1328.22   12/17/20 Ex x 3 30.08 x 3 90.24 1418.46   12/21/20 Ex 2, man 30.08 x 2, 27.71 87.87 1506.33   12/22/20 Ex x 3 30.08 x 3 90.24 1596.57

## 2020-12-24 ENCOUNTER — HOSPITAL ENCOUNTER (OUTPATIENT)
Dept: PHYSICAL THERAPY | Age: 75
Setting detail: THERAPIES SERIES
Discharge: HOME | End: 2020-12-24
Payer: MEDICARE

## 2020-12-24 PROCEDURE — 97110 THERAPEUTIC EXERCISES: CPT

## 2020-12-24 ASSESSMENT — PAIN SCALES - GENERAL: PAINLEVEL_OUTOF10: 6

## 2020-12-24 NOTE — PRE-CERTIFICATION NOTE
Medicare Cap     [] Physical Therapy  [] Speech Therapy  [] Occupational therapy    *PT and Speech caps combine      $1940 Cap limit < kx modifier needed < $6688 requires pre-cert     Patient Name: Belen Pepper  YOB: 1945     Date of Mö[address] Name $$$ charge Daily Charge YTD   Total $   11/3/20 Kristyal, Ex 113.90 113.90 113.90   11/4/20 Ex x 3 90.24 90.24 --   11/6/20 Ex x 3 90.24 90.24 --   11/9/20 Ex x 3 90.24 90.24 --   11/11/20 Ex x 2 60.16 60.16 444.78   11/23/20 Ex x 2 30.08 x 2 60.16 504.94   11/24/20 Ex x 2 30.08 x 2 60.16 565.10   11/27/20 Ex x 2 30.08 x 2 60.16 625.26   11/30/20 Ex x 2, man 30.08 x 2, 27.71 87.87 713.13   12/2/20 Ex x 2, man x 1 30.08 x 2, 27.71 87.87 801.00   12/4/20 Ex x 2, man x 1 30.08 x 2, 27.71 87.87 888.87   12/7/20 Ex x 2, man x 1 30.08 x 2, 27.71 87.87 976.74   12/9/20 Ex x2 Man x1 30.08 x 2, 27.71 87.87 1064.61   12/11/20 Ex x2 Man x1 30.08 x 2, 27.71 87.87 ---   12/14/20 Ex x2 Man x1 30.08 x 2, 27.71 87.87 1240.35   12/16/20 Ex x 2,man x1 30.08 x 2, 27.71 87.87 1328.22   12/17/20 Ex x 3 30.08 x 3 90.24 1418.46   12/21/20 Ex 2, man 30.08 x 2, 27.71 87.87 1506.33   12/22/20 Ex x 3 30.08 x 3 90.24 1596.57   12/24/20 Ex x 3

## 2020-12-24 NOTE — PROGRESS NOTES
Phone: [address]      Fax: [phone]                            Outpatient Physical Therapy                                                                            Daily Note    Date: 2020  Patient Name: Linda Joshi        MRN: [id number]   ACCT#:  [de-identified]  : 1945  (70 y.o.)    Referring Practitioner: Maite Miles    Referral Date : 10/28/20    Diagnosis: R shoulder A-scope with RCR  Treatment Diagnosis: R shoulder pain s/p RTC repair    Onset Date: 10/14/20  PT Insurance Information: [facility]  Total # of Visits Approved: 27 Per Physician Order  Total # of Visits to Date: 24  No Show: 0  Canceled Appointment: 0  Plan of Care/Certification Expiration Date: 21    Pre-Treatment Pain:  6/10  Subjective: Goes by \"Nithin\"  magda 20  Assessment  Assessment: Patient reports due to the damp weather his R shoulder pain is a 6/10 this morning. Completed exercises as outlined with good tolerance from patient. Educated patient on shoulder strengthening exercises to continue with on independent basis after D/C next week. Plan to continue x2 visits and then D/C. Chart Reviewed: Yes    Plan  Plan: Continue with current plan    Exercises/Modalities/Manual:  See DocFlow Sheet    Education:      Barriers to Learning: None    Goals  (Total # of Visits to Date: 25)   Short Term Goals -      Long Term Goals - Time Frame for Long term goals : 9 visits(27 total) POC exp 21  Long term goal 1: Pt to score >32/80 UEFS to improve ADLs  Long term goal 2: Pt to have 4/5 horiz ABD to improve pt posture  Long term goal 3: Pt to have 110deg of AROM ABD to improve upper body dressing.     Post Treatment Pain:  6/10    Time In: 0945    Time Out : 1030   Timed Code Treatment Minutes: 45 Minutes  Total Treatment Time: [address] [place] [address]     Date: 2020

## 2020-12-28 ENCOUNTER — HOSPITAL ENCOUNTER (OUTPATIENT)
Dept: PHYSICAL THERAPY | Age: 75
Setting detail: THERAPIES SERIES
Discharge: HOME | End: 2020-12-28
Payer: MEDICARE

## 2020-12-28 ENCOUNTER — TELEPHONE (OUTPATIENT)
Dept: FAMILY MEDICINE CLINIC | Age: 75
End: 2020-12-28

## 2020-12-28 PROCEDURE — 97110 THERAPEUTIC EXERCISES: CPT

## 2020-12-28 ASSESSMENT — PAIN SCALES - GENERAL: PAINLEVEL_OUTOF10: 5

## 2020-12-28 NOTE — PRE-CERTIFICATION NOTE
Medicare Cap     [] Physical Therapy  [] Speech Therapy  [] Occupational therapy    *PT and Speech caps combine      $1940 Cap limit < kx modifier needed < $5026 requires pre-cert     Patient Name: Jimbo Pepper  YOB: 1945     Date of Möhe [address] Name $$$ charge Daily Charge YTD   Total $   11/3/20 Eval, Ex 113.90 113.90 113.90   11/4/20 Ex x 3 90.24 90.24 --   11/6/20 Ex x 3 90.24 90.24 --   11/9/20 Ex x 3 90.24 90.24 --   11/11/20 Ex x 2 60.16 60.16 444.78   11/23/20 Ex x 2 30.08 x 2 60.16 504.94   11/24/20 Ex x 2 30.08 x 2 60.16 565.10   11/27/20 Ex x 2 30.08 x 2 60.16 625.26   11/30/20 Ex x 2, man 30.08 x 2, 27.71 87.87 713.13   12/2/20 Ex x 2, man x 1 30.08 x 2, 27.71 87.87 801.00   12/4/20 Ex x 2, man x 1 30.08 x 2, 27.71 87.87 888.87   12/7/20 Ex x 2, man x 1 30.08 x 2, 27.71 87.87 976.74   12/9/20 Ex x2 Man x1 30.08 x 2, 27.71 87.87 1064.61   12/11/20 Ex x2 Man x1 30.08 x 2, 27.71 87.87 ---   12/14/20 Ex x2 Man x1 30.08 x 2, 27.71 87.87 1240.35   12/16/20 Ex x 2,man x1 30.08 x 2, 27.71 87.87 1328.22   12/17/20 Ex x 3 30.08 x 3 90.24 1418.46   12/21/20 Ex 2, man 30.08 x 2, 27.71 87.87 1506.33   12/22/20 Ex x 3 30.08 x 3 90.24 1596.57   12/24/20 Ex x 3      12/28/20 Ex x 3

## 2020-12-28 NOTE — TELEPHONE ENCOUNTER
Last OV: 7/1/2020   Next scheduled apt: 1/5/2021    Patient requesting labs to be done prior to appt  On 01/05/2020. Please order - thank you! Ok to wait until provider returns.

## 2020-12-28 NOTE — PROGRESS NOTES
Phone: [address]      Fax: [phone]                            Outpatient Physical Therapy                                                                            Daily Note    Date: 2020  Patient Name: Brunilda Rivas        MRN: [id number]   ACCT#:  [de-identified]  : 1945  (70 y.o.)    Referring Practitioner: Milagros Miles Organ    Referral Date : 10/28/20    Diagnosis: R shoulder A-scope with RCR  Treatment Diagnosis: R shoulder pain s/p RTC repair    Onset Date: 10/14/20  PT Insurance Information: [facility]  Total # of Visits Approved: 27 Per Physician Order  Total # of Visits to Date: 25  No Show: 0  Canceled Appointment: 0  Plan of Care/Certification Expiration Date: 21    Pre-Treatment Pain:  5/10  Subjective: Goes by \"Nithin\"  magda 20  Assessment  Assessment: Patient states R shoulder pain is a 5/10 because of weather and arthritis. Completed exercises as outlined followed by PROM. UEFS score = 76/80. B/L horiz ABD strength = 4/5. Plan to continue x1 visit and then D/C.   Chart Reviewed: Yes    Plan  Plan: Continue with current plan    Exercises/Modalities/Manual:  See DocFlow Sheet    Education:      Barriers to Learning: None    Goals  (Total # of Visits to Date: 22)   Short Term Goals -      Long Term Goals - Time Frame for Long term goals : 9 visits(27 total) POC exp 21  Long term goal 1: Pt to score >32/80 UEFS to improve ADLs - MET  Long term goal 2: Pt to have 4/5 horiz ABD to improve pt posture - MET  Long term goal 3: Pt to have 110deg of AROM ABD to improve upper body dressing. - NOT MET    Post Treatment Pain:  5/10    Time In: 0915    Time Out : 1000   Timed Code Treatment Minutes: 45 Minutes  Total Treatment Time: [address] [place] [address]     Date: 2020

## 2020-12-28 NOTE — PRE-CERTIFICATION NOTE
Medicare Cap     [] Physical Therapy  [] Speech Therapy  [] Occupational therapy    *PT and Speech caps combine      $1940 Cap limit < kx modifier needed < $2263 requires pre-cert     Patient Name: Sirena Pepper  YOB: 1945     Date of [address] Name $$$ charge Daily Charge YTD   Total $   11/3/20 Kristyal, Ex 113.90 113.90 113.90   11/4/20 Ex x 3 90.24 90.24 --   11/6/20 Ex x 3 90.24 90.24 --   11/9/20 Ex x 3 90.24 90.24 --   11/11/20 Ex x 2 60.16 60.16 444.78   11/23/20 Ex x 2 30.08 x 2 60.16 504.94   11/24/20 Ex x 2 30.08 x 2 60.16 565.10   11/27/20 Ex x 2 30.08 x 2 60.16 625.26   11/30/20 Ex x 2, man 30.08 x 2, 27.71 87.87 713.13   12/2/20 Ex x 2, man x 1 30.08 x 2, 27.71 87.87 801.00   12/4/20 Ex x 2, man x 1 30.08 x 2, 27.71 87.87 888.87   12/7/20 Ex x 2, man x 1 30.08 x 2, 27.71 87.87 976.74   12/9/20 Ex x2 Man x1 30.08 x 2, 27.71 87.87 1064.61   12/11/20 Ex x2 Man x1 30.08 x 2, 27.71 87.87 ---   12/14/20 Ex x2 Man x1 30.08 x 2, 27.71 87.87 1240.35   12/16/20 Ex x 2,man x1 30.08 x 2, 27.71 87.87 1328.22   12/17/20 Ex x 3 30.08 x 3 90.24 1418.46   12/21/20 Ex 2, man 30.08 x 2, 27.71 87.87 1506.33   12/22/20 Ex x 3 30.08 x 3 90.24 1596.57   12/24/20 Ex x 3 30.08 x 3 90.24 1686.81   12/28/20 Ex x 3 30.08 x 3 90.24 1777.05

## 2020-12-29 ENCOUNTER — HOSPITAL ENCOUNTER (OUTPATIENT)
Dept: PHYSICAL THERAPY | Age: 75
Setting detail: THERAPIES SERIES
Discharge: HOME | End: 2020-12-29
Payer: MEDICARE

## 2020-12-29 PROCEDURE — 97110 THERAPEUTIC EXERCISES: CPT

## 2020-12-29 NOTE — PRE-CERTIFICATION NOTE
Medicare Cap     [] Physical Therapy  [] Speech Therapy  [] Occupational therapy    *PT and Speech caps combine      $1940 Cap limit < kx modifier needed < $0597 requires pre-cert     Patient Name: Gemma Pepper  YOB: 1945     Date of [address] Name $$$ charge Daily Charge YTD   Total $   11/3/20 Daniel, Ex 113.90 113.90 113.90   11/4/20 Ex x 3 90.24 90.24 --   11/6/20 Ex x 3 90.24 90.24 --   11/9/20 Ex x 3 90.24 90.24 --   11/11/20 Ex x 2 60.16 60.16 444.78   11/23/20 Ex x 2 30.08 x 2 60.16 504.94   11/24/20 Ex x 2 30.08 x 2 60.16 565.10   11/27/20 Ex x 2 30.08 x 2 60.16 625.26   11/30/20 Ex x 2, man 30.08 x 2, 27.71 87.87 713.13   12/2/20 Ex x 2, man x 1 30.08 x 2, 27.71 87.87 801.00   12/4/20 Ex x 2, man x 1 30.08 x 2, 27.71 87.87 888.87   12/7/20 Ex x 2, man x 1 30.08 x 2, 27.71 87.87 976.74   12/9/20 Ex x2 Man x1 30.08 x 2, 27.71 87.87 1064.61   12/11/20 Ex x2 Man x1 30.08 x 2, 27.71 87.87 ---   12/14/20 Ex x2 Man x1 30.08 x 2, 27.71 87.87 1240.35   12/16/20 Ex x 2,man x1 30.08 x 2, 27.71 87.87 1328.22   12/17/20 Ex x 3 30.08 x 3 90.24 1418.46   12/21/20 Ex 2, man 30.08 x 2, 27.71 87.87 1506.33   12/22/20 Ex x 3 30.08 x 3 90.24 1596.57   12/24/20 Ex x 3 30.08 x 3 90.24 1686.81   12/28/20 Ex x 3 30.08 x 3 90.24 1777.05   12/29/20 Ex x 3

## 2021-01-04 ENCOUNTER — HOSPITAL ENCOUNTER (OUTPATIENT)
Age: 76
Discharge: HOME | End: 2021-01-04
Payer: MEDICARE

## 2021-01-04 DIAGNOSIS — R73.9 HYPERGLYCEMIA: ICD-10-CM

## 2021-01-04 DIAGNOSIS — I10 ESSENTIAL HYPERTENSION, BENIGN: ICD-10-CM

## 2021-01-04 LAB
ALBUMIN SERPL-MCNC: 4.6 G/DL (ref 3.5–5.2)
ALBUMIN/GLOBULIN RATIO: ABNORMAL (ref 1–2.5)
ALP BLD-CCNC: 91 U/L (ref 40–129)
ALT SERPL-CCNC: 33 U/L (ref 5–41)
ANION GAP SERPL CALCULATED.3IONS-SCNC: 9 MMOL/L (ref 9–17)
AST SERPL-CCNC: 24 U/L
BILIRUB SERPL-MCNC: 0.39 MG/DL (ref 0.3–1.2)
BUN BLDV-MCNC: 18 MG/DL (ref 8–23)
BUN/CREAT BLD: 27 (ref 9–20)
CALCIUM SERPL-MCNC: 9.3 MG/DL (ref 8.6–10.4)
CHLORIDE BLD-SCNC: 103 MMOL/L (ref 98–107)
CO2: 27 MMOL/L (ref 20–31)
CREAT SERPL-MCNC: 0.67 MG/DL (ref 0.7–1.2)
ESTIMATED AVERAGE GLUCOSE: 134 MG/DL
GFR AFRICAN AMERICAN: >60 ML/MIN
GFR NON-AFRICAN AMERICAN: >60 ML/MIN
GFR SERPL CREATININE-BSD FRML MDRD: ABNORMAL ML/MIN/{1.73_M2}
GFR SERPL CREATININE-BSD FRML MDRD: ABNORMAL ML/MIN/{1.73_M2}
GLUCOSE BLD-MCNC: 126 MG/DL (ref 70–99)
HBA1C MFR BLD: 6.3 % (ref 4–6)
POTASSIUM SERPL-SCNC: 4 MMOL/L (ref 3.7–5.3)
SODIUM BLD-SCNC: 139 MMOL/L (ref 135–144)
TOTAL PROTEIN: 7.4 G/DL (ref 6.4–8.3)

## 2021-01-04 PROCEDURE — 36415 COLL VENOUS BLD VENIPUNCTURE: CPT

## 2021-01-04 PROCEDURE — 80053 COMPREHEN METABOLIC PANEL: CPT

## 2021-01-04 PROCEDURE — 83036 HEMOGLOBIN GLYCOSYLATED A1C: CPT

## 2021-01-05 ENCOUNTER — OFFICE VISIT (OUTPATIENT)
Dept: FAMILY MEDICINE CLINIC | Age: 76
End: 2021-01-05
Payer: MEDICARE

## 2021-01-05 VITALS
DIASTOLIC BLOOD PRESSURE: 80 MMHG | WEIGHT: 198 LBS | SYSTOLIC BLOOD PRESSURE: 136 MMHG | OXYGEN SATURATION: 96 % | BODY MASS INDEX: 29.67 KG/M2 | HEART RATE: 108 BPM

## 2021-01-05 DIAGNOSIS — K21.9 GASTROESOPHAGEAL REFLUX DISEASE WITHOUT ESOPHAGITIS: ICD-10-CM

## 2021-01-05 DIAGNOSIS — E78.00 PURE HYPERCHOLESTEROLEMIA: ICD-10-CM

## 2021-01-05 DIAGNOSIS — I10 ESSENTIAL HYPERTENSION, BENIGN: Primary | ICD-10-CM

## 2021-01-05 DIAGNOSIS — N40.0 BENIGN PROSTATIC HYPERPLASIA WITHOUT LOWER URINARY TRACT SYMPTOMS: ICD-10-CM

## 2021-01-05 DIAGNOSIS — M19.90 ARTHRITIS: ICD-10-CM

## 2021-01-05 DIAGNOSIS — E11.9 CONTROLLED TYPE 2 DIABETES MELLITUS WITHOUT COMPLICATION, WITHOUT LONG-TERM CURRENT USE OF INSULIN (HCC): ICD-10-CM

## 2021-01-05 DIAGNOSIS — E03.9 ACQUIRED HYPOTHYROIDISM: ICD-10-CM

## 2021-01-05 PROCEDURE — 4040F PNEUMOC VAC/ADMIN/RCVD: CPT | Performed by: FAMILY MEDICINE

## 2021-01-05 PROCEDURE — 4004F PT TOBACCO SCREEN RCVD TLK: CPT | Performed by: FAMILY MEDICINE

## 2021-01-05 PROCEDURE — 3044F HG A1C LEVEL LT 7.0%: CPT | Performed by: FAMILY MEDICINE

## 2021-01-05 PROCEDURE — 3017F COLORECTAL CA SCREEN DOC REV: CPT | Performed by: FAMILY MEDICINE

## 2021-01-05 PROCEDURE — G8427 DOCREV CUR MEDS BY ELIG CLIN: HCPCS | Performed by: FAMILY MEDICINE

## 2021-01-05 PROCEDURE — G8484 FLU IMMUNIZE NO ADMIN: HCPCS | Performed by: FAMILY MEDICINE

## 2021-01-05 PROCEDURE — G8417 CALC BMI ABV UP PARAM F/U: HCPCS | Performed by: FAMILY MEDICINE

## 2021-01-05 PROCEDURE — 99214 OFFICE O/P EST MOD 30 MIN: CPT | Performed by: FAMILY MEDICINE

## 2021-01-05 PROCEDURE — 2022F DILAT RTA XM EVC RTNOPTHY: CPT | Performed by: FAMILY MEDICINE

## 2021-01-05 PROCEDURE — 1123F ACP DISCUSS/DSCN MKR DOCD: CPT | Performed by: FAMILY MEDICINE

## 2021-01-05 ASSESSMENT — ENCOUNTER SYMPTOMS
SORE THROAT: 0
VOMITING: 0
CHOKING: 0
HOARSE VOICE: 1
SHORTNESS OF BREATH: 0
HEARTBURN: 1
COUGH: 0
NAUSEA: 0
VISUAL CHANGE: 0

## 2021-01-05 ASSESSMENT — PATIENT HEALTH QUESTIONNAIRE - PHQ9
SUM OF ALL RESPONSES TO PHQ9 QUESTIONS 1 & 2: 0
1. LITTLE INTEREST OR PLEASURE IN DOING THINGS: 0
SUM OF ALL RESPONSES TO PHQ QUESTIONS 1-9: 0
SUM OF ALL RESPONSES TO PHQ QUESTIONS 1-9: 0
2. FEELING DOWN, DEPRESSED OR HOPELESS: 0

## 2021-01-05 NOTE — PROGRESS NOTES
He complains of heartburn and a hoarse voice. He reports no chest pain, no choking, no coughing, no dysphagia, no nausea or no sore throat. This is a chronic problem. The current episode started more than 1 year ago. The problem has been unchanged. The symptoms are aggravated by certain foods. Pertinent negatives include no melena or weight loss. Risk factors include smoking/tobacco exposure. He has tried a PPI for the symptoms. The treatment provided moderate relief. Benign Prostatic Hypertrophy  This is a chronic problem. The current episode started more than 1 year ago. The problem is unchanged (Pt seeing urology still). Irritative symptoms do not include frequency or urgency. Obstructive symptoms include a slower stream. Pertinent negatives include no chills, dysuria, nausea or vomiting. Past treatments include tamsulosin. The treatment provided significant relief. Diabetes  He presents for his follow-up diabetic visit. He has type 2 diabetes mellitus. His disease course has been stable. There are no hypoglycemic associated symptoms. Pertinent negatives for diabetes include no chest pain, no visual change and no weight loss. There are no hypoglycemic complications. Symptoms are stable. There are no diabetic complications. Risk factors for coronary artery disease include diabetes mellitus, dyslipidemia, male sex and hypertension. Current diabetic treatment includes diet. He is compliant with treatment most of the time. His weight is stable. He is following a generally healthy diet. There is no change (hgba1c 6.3) in his home blood glucose trend. Hypothyroidism- Chronic/stable, Patient denies changes in weight,bowels,palpitations. Energy is good Last TSH 4.30 6/29/20. Arthritis -    Rt shoulder and back pain worse right now. Pt has had Rt shoulder surgery in October. Pt is going to PT for shoulder and doing back exercises. Pt sees specialist in [facility] for arthritis and fibromyalgia. Pt on chronic pain medication. Past Medical History:     Past Medical History:   Diagnosis Date    Arthritis     Cancer ([address]     multiple myeloma    Fibromyalgia     GERD (gastroesophageal reflux disease)     History of blood transfusion     At birth     Hyperlipidemia     Hypertension     Hypothyroidism     IBS (irritable bowel syndrome)     Osteoarthritis     Thyroid disease     Hypothyroid       Reviewed all health maintenance requirements and ordered appropriate tests  Health Maintenance Due   Topic Date Due    DTaP/Tdap/Td vaccine (1 - Tdap) 07/29/1966    Low dose CT lung screening  07/29/2002    Shingles Vaccine (2 of 3) 03/26/2012    Annual Wellness Visit (AWV)  05/29/2019       Past Surgical History:     Past Surgical History:   Procedure Laterality Date    ABDOMEN SURGERY  2004    Bowel Resection following \"fall on the ice\"; Ana Lilia Rausch MD    APPENDECTOMY      COLONOSCOPY      COLONOSCOPY  09-    Dr. Shyam Arredondo      cataract bilateral    GASTRIC FUNDOPLICATION  0184    HEMORRHOID SURGERY      HERNIA REPAIR      Hiatal Hernia repair, Esophageal repair     HERNIA REPAIR Bilateral     Inguinal     [place] LASER VAPORIZATION SURGERY PROSTATE, COMPLETE N/A 1/25/2018    CYSTOSCOPY TRANSURETHRAL RESECTION PROSTATE LASER performed by Aurora Ennis MD at [facility] ARTHROSCOPY Right 10/14/2020    RIGHT SHOULDER ARTHROSCPY PROBABLE RCR. Shoulder Scope Subacromial Decompression. Partial Distal Claviclectomy. Debreidement. Mini RCR Open.   Bicep Tenodecis performed by Teresa Childers DO at [address] ENDOSCOPY      UPPER GASTROINTESTINAL ENDOSCOPY  09-    Dr. Iwona Levi  Heart Disease Father         MI       Review of Systems:       Review of Systems   Constitutional: Positive for malaise/fatigue. Negative for chills and weight loss. HENT: Positive for hoarse voice. Negative for sore throat. Respiratory: Negative for cough, choking and shortness of breath. Cardiovascular: Negative for chest pain and palpitations. Gastrointestinal: Positive for heartburn. Negative for dysphagia, melena, nausea and vomiting. Genitourinary: Negative for dysuria, frequency and urgency. Physical Exam:     Physical Exam  Vitals signs reviewed. Constitutional:       General: He is not in acute distress. Appearance: Normal appearance. He is well-developed. He is not ill-appearing. HENT:      Head: Normocephalic and atraumatic. Eyes:      General:         Right eye: No discharge. Left eye: No discharge. Conjunctiva/sclera: Conjunctivae normal.      Pupils: Pupils are equal, round, and reactive to light. Neck:      Musculoskeletal: Neck supple. Thyroid: No thyromegaly. Vascular: No carotid bruit. Cardiovascular:      Rate and Rhythm: Normal rate and regular rhythm. Heart sounds: Normal heart sounds. No murmur. Pulmonary:      Effort: Pulmonary effort is normal.      Breath sounds: Normal breath sounds. Abdominal:      General: Bowel sounds are normal. There is no distension. Palpations: Abdomen is soft. Tenderness: There is no abdominal tenderness. Musculoskeletal:      Right lower leg: No edema. Left lower leg: No edema. Lymphadenopathy:      Cervical: No cervical adenopathy. Skin:     Findings: No erythema or rash. Neurological:      Mental Status: He is alert and oriented to person, place, and time.    Psychiatric:         Mood and Affect: Mood normal.         Behavior: Behavior normal.         Vitals:  /80   Pulse 108   Wt 198 lb (89.8 kg)   SpO2 96%   BMI 29.67 kg/m²       Data:     Lab Results Component Value Date     01/04/2021    K 4.0 01/04/2021     01/04/2021    CO2 27 01/04/2021    BUN 18 01/04/2021    CREATININE 0.67 01/04/2021    GLUCOSE 126 01/04/2021    GLUCOSE 117 04/03/2012    PROT 7.4 01/04/2021    LABALBU 4.6 01/04/2021    LABALBU 4.5 04/03/2012    BILITOT 0.39 01/04/2021    ALKPHOS 91 01/04/2021    AST 24 01/04/2021    ALT 33 01/04/2021     Lab Results   Component Value Date    WBC 5.0 09/23/2020    RBC 5.39 09/23/2020    HGB 16.1 09/23/2020    HCT 48.0 09/23/2020    MCV 89.1 09/23/2020    MCH 29.9 09/23/2020    MCHC 33.5 09/23/2020    RDW 13.8 09/23/2020     09/23/2020    MPV NOT REPORTED 09/23/2020     Lab Results   Component Value Date    TSH 4.30 06/29/2020     Lab Results   Component Value Date    CHOL 133 06/29/2020    HDL 30 06/29/2020    PSA 2.92 01/06/2017    LABA1C 6.3 01/04/2021          Assessment/Plan:        1. Essential hypertension, benign  Stable on norvasc and   - Comprehensive Metabolic Panel; Future    2. Pure hypercholesterolemia  Stable on zocor and labs in 6mos   - Lipid Panel; Future    3. Gastroesophageal reflux disease without esophagitis  Stable on protonix     4. Benign prostatic hyperplasia without lower urinary tract symptoms  Stable on flomax    5. Controlled type 2 diabetes mellitus without complication, without long-term current use of insulin (HCC)  F/U 6mos, prior CMP, Lipids, HgbA1C. Do daily foot checks and yearly eye exams  Pt to continue DM diet control     6. Acquired hypothyroidism  Stable on current synthroid   - TSH without Reflex; Future    7. Arthritis  Stable and doing PT for shoulder and back and seeing specialist in January. On neurontin and norco         Hanny Romero received counseling on the following healthy behaviors: nutrition and exercise  Reviewed prior labs and health maintenance  Continue current medications, diet and exercise. Discussed use, benefit, and side effects of prescribed medications. Barriers to medication compliance addressed. Patient given educational materials - see patient instructions  Was a self-tracking handout given in paper form or via [employer]? Yes    Requested Prescriptions      No prescriptions requested or ordered in this encounter       All patient questions answered. Patient voiced understanding. Quality Measures    Body mass index is 29.67 kg/m². Elevated. Weight control planned discussed Healthy diet and regular exercise. BP: 136/80. Blood pressure is normal. Treatment plan consists of No treatment change needed. Fall Risk 1/5/2021 8/21/2019 6/26/2018 5/18/2017 5/2/2016 7/30/2014   2 or more falls in past year? no no no no no no   Fall with injury in past year? no no no no no no     The patient does not have a history of falls. I did not - not indicated , complete a risk assessment for falls. A plan of care for falls No Treatment plan indicated    Lab Results   Component Value Date    LDLCHOLESTEROL 54 06/29/2020    (goal LDL reduction with dx if diabetes is 50% LDL reduction)    PHQ Scores 1/5/2021 7/1/2020 8/21/2019 6/26/2018 5/18/2017 7/30/2014   PHQ2 Score 0 0 0 0 0 0   PHQ9 Score 0 0 0 0 0 0     Interpretation of Total Score Depression Severity: 1-4 = Minimal depression, 5-9 = Mild depression, 10-14 = Moderate depression, 15-19 = Moderately severe depression, 20-27 = Severe depression        Return in about 6 months (around 7/5/2021) for HTN, Hyperlipidemia, Hypothyroid, gerd, BPH, hyoerglycemia .       Electronically signed by Eryn Mederos MD on 1/5/2021 at 10:20 PM

## 2021-01-05 NOTE — PATIENT INSTRUCTIONS
SURVEY:    You may be receiving a survey from [employer] regarding your visit today. Please complete the survey to enable us to provide the highest quality of care to you and your family. If you cannot score us a very good (5 stars) on any question, please call the office to discuss how we could have made your experience a very good one. Thank you.     Clinical Care Team:  MD Veronica Sanders LPN    Clerical Team:  [facility]       Renuka Dedham

## 2021-03-31 RX ORDER — PANTOPRAZOLE SODIUM 40 MG/1
TABLET, DELAYED RELEASE ORAL
Qty: 180 TABLET | Refills: 1 | Status: SHIPPED | OUTPATIENT
Start: 2021-03-31 | End: 2021-08-19

## 2021-03-31 RX ORDER — LEVOTHYROXINE SODIUM 0.07 MG/1
TABLET ORAL
Qty: 90 TABLET | Refills: 1 | Status: SHIPPED | OUTPATIENT
Start: 2021-03-31 | End: 2021-08-19

## 2021-03-31 RX ORDER — AMLODIPINE BESYLATE 5 MG/1
TABLET ORAL
Qty: 90 TABLET | Refills: 1 | Status: SHIPPED | OUTPATIENT
Start: 2021-03-31 | End: 2021-08-19

## 2021-03-31 RX ORDER — SIMVASTATIN 20 MG
TABLET ORAL
Qty: 90 TABLET | Refills: 1 | Status: SHIPPED | OUTPATIENT
Start: 2021-03-31 | End: 2021-08-19

## 2021-03-31 NOTE — TELEPHONE ENCOUNTER
Last OV: 1/5/2021 Check up   Last RX:   Next scheduled apt: 7/6/2021        Sure scripts request      Rx's pending

## 2021-05-13 ENCOUNTER — OFFICE VISIT (OUTPATIENT)
Dept: UROLOGY | Age: 76
End: 2021-05-13
Payer: MEDICARE

## 2021-05-13 VITALS
SYSTOLIC BLOOD PRESSURE: 138 MMHG | BODY MASS INDEX: 30.21 KG/M2 | DIASTOLIC BLOOD PRESSURE: 80 MMHG | WEIGHT: 204 LBS | HEIGHT: 69 IN

## 2021-05-13 DIAGNOSIS — N40.1 BPH WITH OBSTRUCTION/LOWER URINARY TRACT SYMPTOMS: ICD-10-CM

## 2021-05-13 DIAGNOSIS — N13.8 BPH WITH OBSTRUCTION/LOWER URINARY TRACT SYMPTOMS: ICD-10-CM

## 2021-05-13 PROCEDURE — 1123F ACP DISCUSS/DSCN MKR DOCD: CPT | Performed by: PHYSICIAN ASSISTANT

## 2021-05-13 PROCEDURE — 51798 US URINE CAPACITY MEASURE: CPT | Performed by: PHYSICIAN ASSISTANT

## 2021-05-13 PROCEDURE — 99213 OFFICE O/P EST LOW 20 MIN: CPT | Performed by: PHYSICIAN ASSISTANT

## 2021-05-13 PROCEDURE — 4040F PNEUMOC VAC/ADMIN/RCVD: CPT | Performed by: PHYSICIAN ASSISTANT

## 2021-05-13 PROCEDURE — G8427 DOCREV CUR MEDS BY ELIG CLIN: HCPCS | Performed by: PHYSICIAN ASSISTANT

## 2021-05-13 PROCEDURE — 3017F COLORECTAL CA SCREEN DOC REV: CPT | Performed by: PHYSICIAN ASSISTANT

## 2021-05-13 PROCEDURE — G8417 CALC BMI ABV UP PARAM F/U: HCPCS | Performed by: PHYSICIAN ASSISTANT

## 2021-05-13 PROCEDURE — 4004F PT TOBACCO SCREEN RCVD TLK: CPT | Performed by: PHYSICIAN ASSISTANT

## 2021-05-13 RX ORDER — TAMSULOSIN HYDROCHLORIDE 0.4 MG/1
0.4 CAPSULE ORAL NIGHTLY
Qty: 90 CAPSULE | Refills: 3 | Status: SHIPPED | OUTPATIENT
Start: 2021-05-13 | End: 2021-05-18

## 2021-05-13 ASSESSMENT — ENCOUNTER SYMPTOMS
VOMITING: 0
EYE REDNESS: 0
CONSTIPATION: 0
NAUSEA: 0
WHEEZING: 0
COLOR CHANGE: 0
SHORTNESS OF BREATH: 0
ABDOMINAL PAIN: 0
COUGH: 0
BACK PAIN: 0

## 2021-05-13 NOTE — PROGRESS NOTES
happy with his urinary symptoms. Patient does not have a daily bowel movement. Patient voided - 250 mL, PVR -0  mL. He is happy. Past Medical History:   Diagnosis Date    Arthritis     Cancer ([address]     multiple myeloma    Fibromyalgia     GERD (gastroesophageal reflux disease)     History of blood transfusion     At birth     Hyperlipidemia     Hypertension     Hypothyroidism     IBS (irritable bowel syndrome)     Osteoarthritis     Thyroid disease     Hypothyroid      Past Surgical History:   Procedure Laterality Date    ABDOMEN SURGERY  2004    Bowel Resection following \"fall on the ice\"; Dmitriy Stone MD    APPENDECTOMY      COLONOSCOPY      COLONOSCOPY  09-    Dr. Lj Zaidi      cataract bilateral    GASTRIC FUNDOPLICATION  3135    HEMORRHOID SURGERY      HERNIA REPAIR      Hiatal Hernia repair, Esophageal repair     HERNIA REPAIR Bilateral     Inguinal     [place] LASER VAPORIZATION SURGERY PROSTATE, COMPLETE N/A 1/25/2018    CYSTOSCOPY TRANSURETHRAL RESECTION PROSTATE LASER performed by Amairani Frias MD at [facility] ARTHROSCOPY Right 10/14/2020    RIGHT SHOULDER ARTHROSCPY PROBABLE RCR. Shoulder Scope Subacromial Decompression. Partial Distal Claviclectomy. Debreidement. Mini RCR Open.   Bicep Tenodecis performed by Cathy Meyer DO at [address] ENDOSCOPY      UPPER GASTROINTESTINAL ENDOSCOPY  09-    Dr. Krystin Laureano  12/14/2016    Simeon Frankel MD     Outpatient Encounter Medications as of 5/13/2021   Medication Sig Dispense Refill    amLODIPine (NORVASC) 5 MG tablet TAKE 1 TABLET EVERY DAY 90 tablet 1    levothyroxine (SYNTHROID) 75 MCG tablet TAKE 1 TABLET EVERY DAY 90 tablet 1    pantoprazole (PROTONIX) 40 MG tablet TAKE 1 TABLET TWICE DAILY 180 tablet 1    simvastatin (ZOCOR) 20 MG tablet TAKE 1 TABLET EVERY NIGHT 90 tablet 1    tamsulosin (FLOMAX) 0.4 MG capsule TAKE 1 CAPSULE EVERY DAY 90 capsule 0    hydrocortisone 2.5 % cream Apply topically 2 times daily Apply topically 2 times daily.  aspirin 81 MG tablet Take 81 mg by mouth daily      gabapentin (NEURONTIN) 300 MG capsule Take 600 mg by mouth nightly.  gabapentin (NEURONTIN) 600 MG tablet Take 600 mg by mouth 4 times daily.  HYDROcodone-acetaminophen (NORCO) 5-325 MG per tablet Take 1 tablet by mouth every 6 hours as needed for Pain.  lidocaine (LMX) 4 % cream Apply topically as needed for Pain Apply topically as needed.  Multiple Vitamins-Minerals (MULTI COMPLETE PO) Take by mouth      polyethylene glycol (GLYCOLAX) powder Take 17 g by mouth as needed        No facility-administered encounter medications on file as of 5/13/2021. Current Outpatient Medications on File Prior to Visit   Medication Sig Dispense Refill    amLODIPine (NORVASC) 5 MG tablet TAKE 1 TABLET EVERY DAY 90 tablet 1    levothyroxine (SYNTHROID) 75 MCG tablet TAKE 1 TABLET EVERY DAY 90 tablet 1    pantoprazole (PROTONIX) 40 MG tablet TAKE 1 TABLET TWICE DAILY 180 tablet 1    simvastatin (ZOCOR) 20 MG tablet TAKE 1 TABLET EVERY NIGHT 90 tablet 1    tamsulosin (FLOMAX) 0.4 MG capsule TAKE 1 CAPSULE EVERY DAY 90 capsule 0    hydrocortisone 2.5 % cream Apply topically 2 times daily Apply topically 2 times daily.  aspirin 81 MG tablet Take 81 mg by mouth daily      gabapentin (NEURONTIN) 300 MG capsule Take 600 mg by mouth nightly.  gabapentin (NEURONTIN) 600 MG tablet Take 600 mg by mouth 4 times daily.  HYDROcodone-acetaminophen (NORCO) 5-325 MG per tablet Take 1 tablet by mouth every 6 hours as needed for Pain.  lidocaine (LMX) 4 % cream Apply topically as needed for Pain Apply topically as needed.       Multiple Vitamins-Minerals (MULTI COMPLETE PO) Take by mouth      polyethylene glycol (GLYCOLAX) powder Take 17 g by mouth as needed        No current facility-administered medications on file prior to visit. Cortisone, Adhesive tape, and Wound dressing adhesive  Family History   Problem Relation Age of Onset    Cancer Mother         bone    Heart Disease Father         MI     Social History     Tobacco Use   Smoking Status Current Some Day Smoker    Packs/day: 1.00    Years: 40.00    Pack years: 40.00    Types: Cigarettes    Last attempt to quit: 2014    Years since quittin.2   Smokeless Tobacco Never Used       Social History     Substance and Sexual Activity   Alcohol Use Yes    Comment: seldom       Review of Systems   Constitutional: Negative for appetite change, chills and fever. Eyes: Negative for redness and visual disturbance. Respiratory: Negative for cough, shortness of breath and wheezing. Cardiovascular: Negative for chest pain and leg swelling. Gastrointestinal: Negative for abdominal pain, constipation, nausea and vomiting. Genitourinary: Negative for decreased urine volume, difficulty urinating, discharge, dysuria, enuresis, flank pain, frequency, hematuria, penile pain, scrotal swelling, testicular pain and urgency. Positive for nocturia x1     Musculoskeletal: Negative for back pain, joint swelling and myalgias. Skin: Negative for color change, rash and wound. Neurological: Negative for dizziness, tremors and numbness. Hematological: Negative for adenopathy. Does not bruise/bleed easily. /80 (Site: Left Upper Arm, Position: Sitting, Cuff Size: Medium Adult)   Ht 5' 8.5\" (1.74 m)   Wt 204 lb (92.5 kg)   BMI 30.57 kg/m²       PHYSICAL EXAM:  Constitutional: Patient in no acute distress; Neuro: alert and oriented to person place and time.     Psych: Mood and affect normal.  Lungs: Respiratory effort normal  Abdomen: Soft, non-tender, non-distended  Rectal: Deferred      Lab Results   Component Value Date    BUN 18 2021     Lab Results   Component Value Date    CREATININE 0.67 (L) 2021     Lab Results   Component Value Date    PSA 2.92 01/06/2017    PSA 3.23 11/23/2016    PSA 2.36 08/05/2015       ASSESSMENT:   Diagnosis Orders   1. BPH with obstruction/lower urinary tract symptoms  OK MEASUREMENT,POST-VOID RESIDUAL VOLUME BY US,NON-IMAGING    tamsulosin (FLOMAX) 0.4 MG capsule     PLAN:  Continue Flomax    Follow-up in 1 year with PVR    If patient develops any new or worsening urinary symptoms he will contact us sooner.

## 2021-05-17 DIAGNOSIS — N40.1 BPH WITH OBSTRUCTION/LOWER URINARY TRACT SYMPTOMS: ICD-10-CM

## 2021-05-17 DIAGNOSIS — N13.8 BPH WITH OBSTRUCTION/LOWER URINARY TRACT SYMPTOMS: ICD-10-CM

## 2021-05-18 RX ORDER — TAMSULOSIN HYDROCHLORIDE 0.4 MG/1
CAPSULE ORAL
Qty: 90 CAPSULE | Refills: 3 | Status: SHIPPED | OUTPATIENT
Start: 2021-05-18 | End: 2022-02-24

## 2021-05-18 NOTE — TELEPHONE ENCOUNTER
Patient's chart was reviewed. Patient seen within the past year. Patient tolerates Flomax well. We will send refills.

## 2021-07-06 ENCOUNTER — OFFICE VISIT (OUTPATIENT)
Dept: FAMILY MEDICINE CLINIC | Age: 76
End: 2021-07-06
Payer: MEDICARE

## 2021-07-06 ENCOUNTER — HOSPITAL ENCOUNTER (OUTPATIENT)
Age: 76
Discharge: HOME | End: 2021-07-08
Payer: MEDICARE

## 2021-07-06 ENCOUNTER — HOSPITAL ENCOUNTER (OUTPATIENT)
Age: 76
Discharge: HOME | End: 2021-07-06
Payer: MEDICARE

## 2021-07-06 ENCOUNTER — HOSPITAL ENCOUNTER (OUTPATIENT)
Dept: GENERAL RADIOLOGY | Age: 76
Discharge: HOME | End: 2021-07-08
Payer: MEDICARE

## 2021-07-06 VITALS
DIASTOLIC BLOOD PRESSURE: 80 MMHG | SYSTOLIC BLOOD PRESSURE: 110 MMHG | HEART RATE: 90 BPM | HEIGHT: 69 IN | WEIGHT: 199 LBS | BODY MASS INDEX: 29.47 KG/M2 | OXYGEN SATURATION: 98 %

## 2021-07-06 DIAGNOSIS — M54.50 CHRONIC BILATERAL LOW BACK PAIN WITHOUT SCIATICA: ICD-10-CM

## 2021-07-06 DIAGNOSIS — K21.9 GASTROESOPHAGEAL REFLUX DISEASE WITHOUT ESOPHAGITIS: ICD-10-CM

## 2021-07-06 DIAGNOSIS — G89.29 CHRONIC BILATERAL LOW BACK PAIN WITHOUT SCIATICA: ICD-10-CM

## 2021-07-06 DIAGNOSIS — I10 ESSENTIAL HYPERTENSION, BENIGN: ICD-10-CM

## 2021-07-06 DIAGNOSIS — R73.9 HYPERGLYCEMIA: ICD-10-CM

## 2021-07-06 DIAGNOSIS — M19.90 ARTHRITIS: ICD-10-CM

## 2021-07-06 DIAGNOSIS — N40.0 BENIGN PROSTATIC HYPERPLASIA WITHOUT LOWER URINARY TRACT SYMPTOMS: ICD-10-CM

## 2021-07-06 DIAGNOSIS — Z00.00 ROUTINE GENERAL MEDICAL EXAMINATION AT A HEALTH CARE FACILITY: Primary | ICD-10-CM

## 2021-07-06 DIAGNOSIS — E03.9 ACQUIRED HYPOTHYROIDISM: ICD-10-CM

## 2021-07-06 DIAGNOSIS — E78.00 PURE HYPERCHOLESTEROLEMIA: ICD-10-CM

## 2021-07-06 LAB
CHOLESTEROL/HDL RATIO: 4.2
CHOLESTEROL: 135 MG/DL
HBA1C MFR BLD: 6.3 %
HDLC SERPL-MCNC: 32 MG/DL
LDL CHOLESTEROL: 76 MG/DL (ref 0–130)
PATIENT FASTING?: YES
TRIGL SERPL-MCNC: 134 MG/DL
TSH SERPL DL<=0.05 MIU/L-ACNC: 3.55 MIU/L (ref 0.3–5)
VLDLC SERPL CALC-MCNC: ABNORMAL MG/DL (ref 1–30)

## 2021-07-06 PROCEDURE — G8417 CALC BMI ABV UP PARAM F/U: HCPCS | Performed by: FAMILY MEDICINE

## 2021-07-06 PROCEDURE — 80061 LIPID PANEL: CPT

## 2021-07-06 PROCEDURE — G8427 DOCREV CUR MEDS BY ELIG CLIN: HCPCS | Performed by: FAMILY MEDICINE

## 2021-07-06 PROCEDURE — 4040F PNEUMOC VAC/ADMIN/RCVD: CPT | Performed by: FAMILY MEDICINE

## 2021-07-06 PROCEDURE — 4004F PT TOBACCO SCREEN RCVD TLK: CPT | Performed by: FAMILY MEDICINE

## 2021-07-06 PROCEDURE — G0439 PPPS, SUBSEQ VISIT: HCPCS | Performed by: FAMILY MEDICINE

## 2021-07-06 PROCEDURE — 36415 COLL VENOUS BLD VENIPUNCTURE: CPT

## 2021-07-06 PROCEDURE — 3017F COLORECTAL CA SCREEN DOC REV: CPT | Performed by: FAMILY MEDICINE

## 2021-07-06 PROCEDURE — 1123F ACP DISCUSS/DSCN MKR DOCD: CPT | Performed by: FAMILY MEDICINE

## 2021-07-06 PROCEDURE — 84443 ASSAY THYROID STIM HORMONE: CPT

## 2021-07-06 PROCEDURE — 72110 X-RAY EXAM L-2 SPINE 4/>VWS: CPT

## 2021-07-06 PROCEDURE — 99214 OFFICE O/P EST MOD 30 MIN: CPT | Performed by: FAMILY MEDICINE

## 2021-07-06 PROCEDURE — 83036 HEMOGLOBIN GLYCOSYLATED A1C: CPT | Performed by: FAMILY MEDICINE

## 2021-07-06 SDOH — ECONOMIC STABILITY: FOOD INSECURITY: WITHIN THE PAST 12 MONTHS, YOU WORRIED THAT YOUR FOOD WOULD RUN OUT BEFORE YOU GOT MONEY TO BUY MORE.: NEVER TRUE

## 2021-07-06 SDOH — ECONOMIC STABILITY: FOOD INSECURITY: WITHIN THE PAST 12 MONTHS, THE FOOD YOU BOUGHT JUST DIDN'T LAST AND YOU DIDN'T HAVE MONEY TO GET MORE.: NEVER TRUE

## 2021-07-06 ASSESSMENT — LIFESTYLE VARIABLES
HOW OFTEN DURING THE LAST YEAR HAVE YOU FOUND THAT YOU WERE NOT ABLE TO STOP DRINKING ONCE YOU HAD STARTED: 0
HOW OFTEN DURING THE LAST YEAR HAVE YOU NEEDED AN ALCOHOLIC DRINK FIRST THING IN THE MORNING TO GET YOURSELF GOING AFTER A NIGHT OF HEAVY DRINKING: 0
AUDIT TOTAL SCORE: 1
HOW OFTEN DURING THE LAST YEAR HAVE YOU FAILED TO DO WHAT WAS NORMALLY EXPECTED FROM YOU BECAUSE OF DRINKING: 0
HOW OFTEN DURING THE LAST YEAR HAVE YOU HAD A FEELING OF GUILT OR REMORSE AFTER DRINKING: 0
HOW OFTEN DURING THE LAST YEAR HAVE YOU BEEN UNABLE TO REMEMBER WHAT HAPPENED THE NIGHT BEFORE BECAUSE YOU HAD BEEN DRINKING: 0
HOW MANY STANDARD DRINKS CONTAINING ALCOHOL DO YOU HAVE ON A TYPICAL DAY: 0
HAS A RELATIVE, FRIEND, DOCTOR, OR ANOTHER HEALTH PROFESSIONAL EXPRESSED CONCERN ABOUT YOUR DRINKING OR SUGGESTED YOU CUT DOWN: 0
HOW OFTEN DO YOU HAVE A DRINK CONTAINING ALCOHOL: 1
HAVE YOU OR SOMEONE ELSE BEEN INJURED AS A RESULT OF YOUR DRINKING: 0
HOW OFTEN DO YOU HAVE SIX OR MORE DRINKS ON ONE OCCASION: 0
AUDIT-C TOTAL SCORE: 1

## 2021-07-06 ASSESSMENT — ENCOUNTER SYMPTOMS
BOWEL INCONTINENCE: 0
NAUSEA: 0
COUGH: 0
VOMITING: 0
EYE DISCHARGE: 0
EYE REDNESS: 0
BACK PAIN: 1
DIARRHEA: 0
CHOKING: 0
SORE THROAT: 0
TROUBLE SWALLOWING: 0
BLOOD IN STOOL: 0
ABDOMINAL PAIN: 0
SHORTNESS OF BREATH: 0
CONSTIPATION: 0

## 2021-07-06 ASSESSMENT — PATIENT HEALTH QUESTIONNAIRE - PHQ9
2. FEELING DOWN, DEPRESSED OR HOPELESS: 0
SUM OF ALL RESPONSES TO PHQ QUESTIONS 1-9: 0
SUM OF ALL RESPONSES TO PHQ9 QUESTIONS 1 & 2: 0
SUM OF ALL RESPONSES TO PHQ QUESTIONS 1-9: 0
SUM OF ALL RESPONSES TO PHQ QUESTIONS 1-9: 0
1. LITTLE INTEREST OR PLEASURE IN DOING THINGS: 0

## 2021-07-06 ASSESSMENT — SOCIAL DETERMINANTS OF HEALTH (SDOH): HOW HARD IS IT FOR YOU TO PAY FOR THE VERY BASICS LIKE FOOD, HOUSING, MEDICAL CARE, AND HEATING?: NOT HARD AT ALL

## 2021-07-06 NOTE — PROGRESS NOTES
needed for Pain Apply topically as needed. Yes Historical Provider, MD   Multiple Vitamins-Minerals (MULTI COMPLETE PO) Take by mouth Yes Historical Provider, MD   polyethylene glycol (GLYCOLAX) powder Take 17 g by mouth as needed  Yes Historical Provider, MD       Past Medical History:   Diagnosis Date    Arthritis     Cancer ([address]     multiple myeloma    Fibromyalgia     GERD (gastroesophageal reflux disease)     History of blood transfusion     At birth    [facility] Hyperlipidemia     Hypertension     Hypothyroidism     IBS (irritable bowel syndrome)     Osteoarthritis     Thyroid disease     Hypothyroid        Past Surgical History:   Procedure Laterality Date    ABDOMEN SURGERY  2004    Bowel Resection following \"fall on the ice\"; Delvin Ho MD    APPENDECTOMY      COLONOSCOPY      COLONOSCOPY  09-    Dr. Britt Man      cataract bilateral    GASTRIC FUNDOPLICATION  4417    HEMORRHOID SURGERY      HERNIA REPAIR      Hiatal Hernia repair, Esophageal repair     HERNIA REPAIR Bilateral     Inguinal     [place] LASER VAPORIZATION SURGERY PROSTATE, COMPLETE N/A 1/25/2018    CYSTOSCOPY TRANSURETHRAL RESECTION PROSTATE LASER performed by Zohra Delaney MD at [facility] ARTHROSCOPY Right 10/14/2020    RIGHT SHOULDER ARTHROSCPY PROBABLE RCR. Shoulder Scope Subacromial Decompression. Partial Distal Claviclectomy. Debreidement. Mini RCR Open.   Bicep Tenodecis performed by Fanny Garcia DO at [address] ENDOSCOPY      UPPER GASTROINTESTINAL ENDOSCOPY  09-    Dr. Rakesh Cadet  12/14/2016    Jackson Palacios MD       Family History   Problem Relation Age of Onset    Cancer Mother         bone    Heart Disease Father         MI       [facility] (Including outside providers/suppliers regularly involved in providing care):   Patient Care Team:  Naseem Frias MD as PCP - General (Family Medicine)  Miya Machado MD as PCP - [facility] Empaneled Provider  Sreedhar Munoz MD as Surgeon (General Surgery)  Dominique Navas MD (Rheumatology)    Wt Readings from Last 3 Encounters:   07/06/21 199 lb (90.3 kg)   05/13/21 204 lb (92.5 kg)   01/05/21 198 lb (89.8 kg)     Vitals:    07/06/21 0800   BP: 110/80   Pulse: 90   SpO2: 98%   Weight: 199 lb (90.3 kg)   Height: 5' 8.5\" (1.74 m)     Body mass index is 29.82 kg/m². Based upon direct observation of the patient, evaluation of cognition reveals recent and remote memory intact. Patient's complete Health Risk Assessment and screening values have been reviewed and are found in Flowsheets. The following problems were reviewed today and where indicated follow up appointments were made and/or referrals ordered. Positive Risk Factor Screenings with Interventions:         Substance History:  Social History     Tobacco History     Smoking Status  Current Some Day Smoker Last attempt to quit  2/28/2014 Smoking Frequency  1 pack/day for 40 years (40 pk yrs) Smoking Tobacco Type  Cigarettes    Smokeless Tobacco Use  Never Used          Alcohol History     Alcohol Use Status  Yes Comment  seldom          Drug Use     Drug Use Status  No          Sexual Activity     Sexually Active  Not Asked               Alcohol Screening: Audit-C Score: 1  Total Score: 1    A score of 8 or more is associated with harmful or hazardous drinking. A score of 13 or more in women, and 15 or more in men, is likely to indicate alcohol dependence. Substance Abuse Interventions:  · Tobacco abuse:  patient is not ready to work toward tobacco cessation at this time    [address] and ACP:  General  In general, how would you say your health is?: Good  In the past 7 days, have you experienced any of the following?  New or Increased Pain, New or Increased Fatigue, Loneliness, Social Isolation, Stress or Anger?: None of These  Do you get the social and emotional support that you need?: Yes  Do you have a Living Will?: Yes  Advance Directives     Power of [address] Will ACP-Advance Directive ACP-Power of     Not on File Not on File Not on File Not on File      General Health Risk Interventions:  · no concerns        Personalized Preventive Plan   Current Health Maintenance Status  Immunization History   Administered Date(s) Administered    COVID-19, Pfizer, PF, 30mcg/0.3mL 02/03/2021, 03/03/2021    Influenza 10/09/2013    Influenza Vaccine, unspecified formulation 10/09/2013    Influenza Virus Vaccine 10/02/2015, 09/21/2020    Influenza, Quadv, IM, (6 mo and older Fluzone, Flulaval, Fluarix and 3 yrs and older Afluria) 11/23/2016    Influenza, Quadv, IM, PF (6 mo and older Fluzone, Flulaval, Fluarix, and 3 yrs and older Afluria) 11/13/2017, 10/18/2018, 11/05/2019    Pneumococcal Conjugate 13-valent ([id number]) 11/13/2017    Pneumococcal Polysaccharide ([id number]) 10/02/2015    Zoster Live (Zostavax) 01/30/2012        Health Maintenance   Topic Date Due    Diabetic retinal exam  Never done    DTaP/Tdap/Td vaccine (1 - Tdap) Never done    Low dose CT lung screening  Never done    Shingles Vaccine (2 of 3) 03/26/2012    Diabetic microalbuminuria test  11/23/2017    Diabetic foot exam  05/18/2018    Annual Wellness Visit (AWV)  Never done    TSH testing  06/29/2021    Lipid screen  06/29/2021    Flu vaccine (1) 09/01/2021    A1C test (Diabetic or Prediabetic)  01/04/2022    Potassium monitoring  01/04/2022    Creatinine monitoring  01/04/2022    Colon cancer screen colonoscopy  12/14/2026    Pneumococcal 65+ years Vaccine  Completed    COVID-19 Vaccine  Completed    AAA screen  Completed    Hepatitis C screen  Addressed    Hepatitis A vaccine  Aged Out    Hib vaccine  Aged Out    Meningococcal (ACWY) vaccine  Aged Out     Recommendations for PhysicianPortal Due: see orders and patient instructions/AVS.  .   Recommended screening schedule for the next

## 2021-07-06 NOTE — PROGRESS NOTES
HPI Notes    Name: Geoff Tamayo  : 1945        Chief Complaint:     Chief Complaint   Patient presents with    Annual Exam     Pt presents today for AWV    Hypertension    Hyperlipidemia    Gastroesophageal Reflux    Arthritis    Benign Prostatic Hypertrophy    Hypothyroidism     20  TSH - 4.30       History of Present Illness:     Geoff Tamayo is a 70 y.o.  male who presents with Annual Exam (Pt presents today for AWV), Hypertension, Hyperlipidemia, Gastroesophageal Reflux, Arthritis, Benign Prostatic Hypertrophy, and Hypothyroidism (20  TSH - 4.30)      Hypertension  This is a chronic problem. The current episode started more than 1 year ago. The problem is unchanged. The problem is controlled. Pertinent negatives include no chest pain, headaches, palpitations, peripheral edema or shortness of breath. There are no associated agents to hypertension. Risk factors for coronary artery disease include dyslipidemia and male gender. The current treatment provides significant improvement. Hyperlipidemia  This is a chronic problem. The current episode started more than 1 year ago. The problem is controlled. Recent lipid tests were reviewed and are normal. Exacerbating diseases include hypothyroidism. Pertinent negatives include no chest pain or shortness of breath. Current antihyperlipidemic treatment includes statins. The current treatment provides significant improvement of lipids. Risk factors for coronary artery disease include dyslipidemia, hypertension and male sex. Gastroesophageal Reflux  He reports no abdominal pain, no chest pain, no choking, no coughing, no dysphagia, no nausea or no sore throat. This is a chronic problem. The current episode started more than 1 year ago. The problem has been unchanged. Pertinent negatives include no fatigue, melena or weight loss. He has tried a PPI for the symptoms. The treatment provided significant relief.    Benign Prostatic Hypertrophy  This is a chronic problem. The current episode started more than 1 year ago. The problem is unchanged. Pertinent negatives include no chills, dysuria, hematuria, nausea or vomiting. Past treatments include tamsulosin. The treatment provided significant relief. Back Pain  This is a chronic problem. The current episode started more than 1 year ago. The problem has been gradually worsening since onset. The pain is present in the lumbar spine. The quality of the pain is described as aching and cramping. The pain does not radiate. Exacerbated by: worse when going from sitting to standing. Getting to point where starting to interfere with activities like mowing lawn. Pertinent negatives include no abdominal pain, bladder incontinence, bowel incontinence, chest pain, dysuria, fever, headaches, numbness, paresis, pelvic pain, perianal numbness or weight loss. Arthritis - chronic and has been seeing the arthritis Dr in [place]. Pt takes neurontin     Hypothyroidism- Chronic/stable, Patient denies changes in weight,bowels,palpitations. Energy is good Last TSH >1yr ago. Needs labs soon.      Hyperglycemia - Pt monitors with DM diet and last hgba1c 6.3  Past Medical History:     Past Medical History:   Diagnosis Date    Arthritis     Cancer ([facility] [address]     multiple myeloma    Fibromyalgia     GERD (gastroesophageal reflux disease)     History of blood transfusion     At birth     Hyperlipidemia     Hypertension     Hypothyroidism     IBS (irritable bowel syndrome)     Osteoarthritis     Thyroid disease     Hypothyroid       Reviewed all health maintenance requirements and ordered appropriate tests  Health Maintenance Due   Topic Date Due    Diabetic retinal exam  Never done    DTaP/Tdap/Td vaccine (1 - Tdap) Never done    Low dose CT lung screening  Never done    Shingles Vaccine (2 of 3) 03/26/2012    Diabetic microalbuminuria test  11/23/2017    Diabetic foot exam  05/18/2018    Annual Wellness Visit (AWV)  Never done    Lipid screen  06/29/2021       Past Surgical History:     Past Surgical History:   Procedure Laterality Date    ABDOMEN SURGERY  2004    Bowel Resection following \"fall on the ice\"; Delvin Ho MD    APPENDECTOMY      COLONOSCOPY      COLONOSCOPY  09-    Dr. Britt Man      cataract bilateral    GASTRIC FUNDOPLICATION  2538    HEMORRHOID SURGERY      HERNIA REPAIR      Hiatal Hernia repair, Esophageal repair     HERNIA REPAIR Bilateral     Inguinal     [place] LASER VAPORIZATION SURGERY PROSTATE, COMPLETE N/A 1/25/2018    CYSTOSCOPY TRANSURETHRAL RESECTION PROSTATE LASER performed by Zohra Delaney MD at [facility] ARTHROSCOPY Right 10/14/2020    RIGHT SHOULDER ARTHROSCPY PROBABLE RCR. Shoulder Scope Subacromial Decompression. Partial Distal Claviclectomy. Debreidement. Mini RCR Open. Bicep Tenodecis performed by Fanny Garcia DO at [address] ENDOSCOPY      UPPER GASTROINTESTINAL ENDOSCOPY  09-    Dr. Peacock Beat ENDOSCOPY  12/14/2016    Jackson Palacios MD        Medications:       Prior to Admission medications    Medication Sig Start Date End Date Taking? Authorizing Provider   tamsulosin (FLOMAX) 0.4 MG capsule TAKE 1 CAPSULE EVERY DAY 5/18/21  Yes Phan Sawyer PA-C   amLODIPine (NORVASC) 5 MG tablet TAKE 1 TABLET EVERY DAY 3/31/21  Yes Naseem Frias MD   levothyroxine (SYNTHROID) 75 MCG tablet TAKE 1 TABLET EVERY DAY 3/31/21  Yes Naseem Frias MD   pantoprazole (PROTONIX) 40 MG tablet TAKE 1 TABLET TWICE DAILY 3/31/21  Yes Naseem Frias MD   simvastatin (ZOCOR) 20 MG tablet TAKE 1 TABLET EVERY NIGHT 3/31/21  Yes Naseem Frias MD   hydrocortisone 2.5 % cream Apply topically 2 times daily Apply topically 2 times daily.    Yes Historical Provider, MD   aspirin 81 MG tablet Take 81 mg by mouth daily   Yes Historical Provider, MD   gabapentin Psychiatric/Behavioral: Negative for confusion and sleep disturbance. Physical Exam:     Physical Exam  Vitals reviewed. Constitutional:       General: He is not in acute distress. Appearance: Normal appearance. He is well-developed. He is not ill-appearing. HENT:      Head: Normocephalic and atraumatic. Eyes:      General:         Right eye: No discharge. Left eye: No discharge. Conjunctiva/sclera: Conjunctivae normal.      Pupils: Pupils are equal, round, and reactive to light. Neck:      Thyroid: No thyromegaly. Vascular: No carotid bruit. Cardiovascular:      Rate and Rhythm: Normal rate and regular rhythm. Heart sounds: No murmur heard. Pulmonary:      Effort: Pulmonary effort is normal. No respiratory distress. Breath sounds: Normal breath sounds. Abdominal:      General: Bowel sounds are normal. There is no distension. Palpations: Abdomen is soft. Tenderness: There is no abdominal tenderness. Musculoskeletal:         General: Tenderness present. Cervical back: Neck supple. Lumbar back: Tenderness present. No bony tenderness. Decreased range of motion. Negative right straight leg raise test and negative left straight leg raise test.      Right lower leg: No edema. Left lower leg: No edema. Comments: +5/5 LE strength   Lymphadenopathy:      Cervical: No cervical adenopathy. Skin:     Findings: No erythema or rash. Neurological:      Mental Status: He is alert and oriented to person, place, and time.    Psychiatric:         Mood and Affect: Mood normal.         Behavior: Behavior normal.         Vitals:  /80   Pulse 90   Ht 5' 8.5\" (1.74 m)   Wt 199 lb (90.3 kg)   SpO2 98%   BMI 29.82 kg/m²       Data:     Lab Results   Component Value Date     01/04/2021    K 4.0 01/04/2021     01/04/2021    CO2 27 01/04/2021    BUN 18 01/04/2021    CREATININE 0.67 01/04/2021    GLUCOSE 126 01/04/2021    GLUCOSE 117 04/03/2012    PROT 7.4 01/04/2021    LABALBU 4.6 01/04/2021    LABALBU 4.5 04/03/2012    BILITOT 0.39 01/04/2021    ALKPHOS 91 01/04/2021    AST 24 01/04/2021    ALT 33 01/04/2021     Lab Results   Component Value Date    WBC 5.0 09/23/2020    RBC 5.39 09/23/2020    HGB 16.1 09/23/2020    HCT 48.0 09/23/2020    MCV 89.1 09/23/2020    MCH 29.9 09/23/2020    MCHC 33.5 09/23/2020    RDW 13.8 09/23/2020     09/23/2020    MPV NOT REPORTED 09/23/2020     Lab Results   Component Value Date    TSH 3.55 07/06/2021     Lab Results   Component Value Date    CHOL 133 06/29/2020    HDL 30 06/29/2020    PSA 2.92 01/06/2017    LABA1C 6.3 07/06/2021          Assessment/Plan:        1. Essential hypertension, benign  Stable on Norvasc   - Lipid Panel; Future    2. Pure hypercholesterolemia  Stable on zocor  And labs today   - Lipid Panel; Future    3. Gastroesophageal reflux disease without esophagitis  Stable on Protonix 40mg BID     4. Benign prostatic hyperplasia without lower urinary tract symptoms  Stable on flomax    5. Chronic bilateral low back pain without sciatica  Will do xray today and then referral to pain mgn  - XR LUMBAR SPINE (MIN 4 VIEWS); Future  - Bill Silva MD, Pain Management, [facility]    6. Arthritis  Stable per Alejo Miles     7. Acquired hypothyroidism  Stable on synthroid   - TSH without Reflex; Future    8. Hyperglycemia  Stable hgba1c 6.3 and no changes   - POCT glycosylated hemoglobin (Hb A1C)    9. Routine general medical examination at a health care facility  Completed         Lisandra Montana received counseling on the following healthy behaviors: nutrition and exercise  Reviewed prior labs and health maintenance  Continue current medications, diet and exercise. Discussed use, benefit, and side effects of prescribed medications. Barriers to medication compliance addressed.   Patient given educational materials - see patient instructions  Was a self-tracking handout given in paper form or via [employer]? Yes    Requested Prescriptions      No prescriptions requested or ordered in this encounter       All patient questions answered. Patient voiced understanding. Quality Measures    Body mass index is 29.82 kg/m². Elevated. Weight control planned discussed Healthy diet and regular exercise. BP: 110/80. Blood pressure is normal. Treatment plan consists of No treatment change needed. Fall Risk 7/6/2021 1/5/2021 8/21/2019 6/26/2018 5/18/2017 5/2/2016 7/30/2014   2 or more falls in past year? no no no no no no no   Fall with injury in past year? no no no no no no no     The patient does not have a history of falls. I did not - not indicated , complete a risk assessment for falls. A plan of care for falls No Treatment plan indicated    Lab Results   Component Value Date    LDLCHOLESTEROL 54 06/29/2020    (goal LDL reduction with dx if diabetes is 50% LDL reduction)    PHQ Scores 7/6/2021 1/5/2021 7/1/2020 8/21/2019 6/26/2018 5/18/2017 7/30/2014   PHQ2 Score 0 0 0 0 0 0 0   PHQ9 Score 0 0 0 0 0 0 0     Interpretation of Total Score Depression Severity: 1-4 = Minimal depression, 5-9 = Mild depression, 10-14 = Moderate depression, 15-19 = Moderately severe depression, 20-27 = Severe depression        Return in 6 months (on 1/6/2022) for Medicare Annual Wellness Visit in 1 year, HTN, Hyperlipidemia, Hypothyroid, gerd.       Electronically signed by Pilar Tristan MD on 7/6/2021 at 10:53 AM

## 2021-07-06 NOTE — PATIENT INSTRUCTIONS
Personalized Preventive Plan for Iraida Bolaños - 7/6/2021  Medicare offers a range of preventive health benefits. Some of the tests and screenings are paid in full while other may be subject to a deductible, co-insurance, and/or copay. Some of these benefits include a comprehensive review of your medical history including lifestyle, illnesses that may run in your family, and various assessments and screenings as appropriate. After reviewing your medical record and screening and assessments performed today your provider may have ordered immunizations, labs, imaging, and/or referrals for you. A list of these orders (if applicable) as well as your Preventive Care list are included within your After Visit Summary for your review. Other Preventive Recommendations:    · A preventive eye exam performed by an eye specialist is recommended every 1-2 years to screen for glaucoma; cataracts, macular degeneration, and other eye disorders. · A preventive dental visit is recommended every 6 months. · Try to get at least 150 minutes of exercise per week or 10,000 steps per day on a pedometer . · Order or download the FREE \"Exercise & Physical Activity: Your Everyday Guide\" from [employer]. Call 1-650.149.3585 or search [employer] online. · You need 9609-3506 mg of calcium and 3180-4714 IU of vitamin D per day. It is possible to meet your calcium requirement with diet alone, but a vitamin D supplement is usually necessary to meet this goal.  · When exposed to the sun, use a sunscreen that protects against both UVA and UVB radiation with an SPF of 30 or greater. Reapply every 2 to 3 hours or after sweating, drying off with a towel, or swimming. · Always wear a seat belt when traveling in a car. Always wear a helmet when riding a bicycle or motorcycle.

## 2021-07-30 ENCOUNTER — OFFICE VISIT (OUTPATIENT)
Dept: PAIN MANAGEMENT | Age: 76
End: 2021-07-30
Payer: MEDICARE

## 2021-07-30 VITALS
SYSTOLIC BLOOD PRESSURE: 124 MMHG | WEIGHT: 199 LBS | OXYGEN SATURATION: 97 % | DIASTOLIC BLOOD PRESSURE: 82 MMHG | BODY MASS INDEX: 29.82 KG/M2

## 2021-07-30 DIAGNOSIS — M48.061 SPINAL STENOSIS OF LUMBAR REGION, UNSPECIFIED WHETHER NEUROGENIC CLAUDICATION PRESENT: Primary | ICD-10-CM

## 2021-07-30 DIAGNOSIS — M79.10 MYALGIA: ICD-10-CM

## 2021-07-30 PROCEDURE — 99204 OFFICE O/P NEW MOD 45 MIN: CPT | Performed by: PHYSICAL MEDICINE & REHABILITATION

## 2021-07-30 PROCEDURE — G8427 DOCREV CUR MEDS BY ELIG CLIN: HCPCS | Performed by: PHYSICAL MEDICINE & REHABILITATION

## 2021-07-30 PROCEDURE — G8417 CALC BMI ABV UP PARAM F/U: HCPCS | Performed by: PHYSICAL MEDICINE & REHABILITATION

## 2021-07-30 PROCEDURE — 1123F ACP DISCUSS/DSCN MKR DOCD: CPT | Performed by: PHYSICAL MEDICINE & REHABILITATION

## 2021-07-30 PROCEDURE — 3017F COLORECTAL CA SCREEN DOC REV: CPT | Performed by: PHYSICAL MEDICINE & REHABILITATION

## 2021-07-30 PROCEDURE — 4004F PT TOBACCO SCREEN RCVD TLK: CPT | Performed by: PHYSICAL MEDICINE & REHABILITATION

## 2021-07-30 PROCEDURE — 4040F PNEUMOC VAC/ADMIN/RCVD: CPT | Performed by: PHYSICAL MEDICINE & REHABILITATION

## 2021-07-30 NOTE — PROGRESS NOTES
Pain Management Consultation    [address]  [place] [address]  Dept: [phone]        Sue Anton is a 78 y.o. male, who came today due to  back pain    Cause of the symptom(s): degenerative (arthritis)    Onset: 5years    Quality of Symptoms: aching    Aggravating factors: Sitting    Relieving factors: exercise    Red Flags: pain at night and pain with lying down    Treatment done: physical therapy and muscle relaxant    Current pain level: 5 on the scale of 0-10 ( 10 being worst)     Current treatment with gabapentin and norco.       Allergies   Allergen Reactions    Cortisone Other (See Comments)     Irritates \"GERD\"     Adhesive Tape Rash    Wound Dressing Adhesive      Redness, irritation       Social History     Socioeconomic History    Marital status:      Spouse name: Not on file    Number of children: Not on file    Years of education: Not on file    Highest education level: Not on file   Occupational History    Not on file   Tobacco Use    Smoking status: Current Some Day Smoker     Packs/day: 1.00     Years: 40.00     Pack years: 40.00     Types: Cigarettes     Last attempt to quit: 2014     Years since quittin.4    Smokeless tobacco: Never Used   Vaping Use    Vaping Use: Never used   Substance and Sexual Activity    Alcohol use: Yes     Comment: seldom    Drug use: No    Sexual activity: Not on file   Other Topics Concern    Not on file   Social History Narrative    Not on file     Social Determinants of Health     Financial Resource Strain: Low Risk     Difficulty of Paying Living Expenses: Not hard at all   Food Insecurity: No Food Insecurity    Worried About Running Out of Food in the Last Year: Never true    David of Food in the Last Year: Never true   Transportation Needs:     Lack of Transportation (Medical):      Lack of Transportation (Non-Medical):    Physical Activity:     Days of Exercise per Week:     Minutes of Exercise per Session:    Stress:     Feeling of Stress :    Social Connections:     Frequency of Communication with Friends and Family:     Frequency of Social Gatherings with Friends and Family:     Attends [employer] Services:     Active Member of Clubs or Organizations:     Attends Club or Organization Meetings:     Marital Status:    Intimate Partner Violence:     Fear of Current or Ex-Partner:     Emotionally Abused:     Physically Abused:     Sexually Abused:        Past Medical History:   Diagnosis Date    Arthritis     Cancer ([address]     multiple myeloma    Fibromyalgia     GERD (gastroesophageal reflux disease)     History of blood transfusion     At birth    Jamesezequiel Jorge Hyperlipidemia     Hypertension     Hypothyroidism     IBS (irritable bowel syndrome)     Osteoarthritis     Thyroid disease     Hypothyroid        Past Surgical History:   Procedure Laterality Date    ABDOMEN SURGERY  2004    Bowel Resection following \"fall on the ice\"; Anthony Crowder MD    APPENDECTOMY      COLONOSCOPY      COLONOSCOPY  09-    Dr. Emory Robbins      cataract bilateral    GASTRIC FUNDOPLICATION  3482    HEMORRHOID SURGERY      HERNIA REPAIR      Hiatal Hernia repair, Esophageal repair     HERNIA REPAIR Bilateral     Inguinal     [place] LASER VAPORIZATION SURGERY PROSTATE, COMPLETE N/A 1/25/2018    CYSTOSCOPY TRANSURETHRAL RESECTION PROSTATE LASER performed by Marva Quiñonez MD at [address] [facility] ARTHROSCOPY Right 10/14/2020    RIGHT SHOULDER ARTHROSCPY PROBABLE RCR. Shoulder Scope Subacromial Decompression. Partial Distal Claviclectomy. Debreidement. Mini RCR Open.   Bicep Tenodecis performed by Royanne Buerger, DO at [address] ENDOSCOPY      UPPER GASTROINTESTINAL ENDOSCOPY  09-    Dr. Roman Mccoy ENDOSCOPY  12/14/2016    Lorena Baptiste MD       Prior to

## 2021-08-09 ENCOUNTER — HOSPITAL ENCOUNTER (OUTPATIENT)
Dept: MRI IMAGING | Age: 76
Discharge: HOME | End: 2021-08-11
Payer: MEDICARE

## 2021-08-09 ENCOUNTER — HOSPITAL ENCOUNTER (OUTPATIENT)
Age: 76
Discharge: HOME | End: 2021-08-09
Payer: MEDICARE

## 2021-08-09 DIAGNOSIS — M79.10 MYALGIA: ICD-10-CM

## 2021-08-09 DIAGNOSIS — M48.061 SPINAL STENOSIS OF LUMBAR REGION, UNSPECIFIED WHETHER NEUROGENIC CLAUDICATION PRESENT: ICD-10-CM

## 2021-08-09 LAB
C-REACTIVE PROTEIN: <3 MG/L (ref 0–5)
CALCIUM IONIZED: 1.24 MMOL/L (ref 1.13–1.33)
PTH INTACT: 31.93 PG/ML (ref 15–65)
RHEUMATOID FACTOR: <10 IU/ML
SEDIMENTATION RATE, ERYTHROCYTE: 31 MM (ref 0–20)

## 2021-08-09 PROCEDURE — 83970 ASSAY OF PARATHORMONE: CPT

## 2021-08-09 PROCEDURE — 36415 COLL VENOUS BLD VENIPUNCTURE: CPT

## 2021-08-09 PROCEDURE — 86140 C-REACTIVE PROTEIN: CPT

## 2021-08-09 PROCEDURE — 82330 ASSAY OF CALCIUM: CPT

## 2021-08-09 PROCEDURE — 86431 RHEUMATOID FACTOR QUANT: CPT

## 2021-08-09 PROCEDURE — 85652 RBC SED RATE AUTOMATED: CPT

## 2021-08-09 PROCEDURE — 72148 MRI LUMBAR SPINE W/O DYE: CPT

## 2021-08-13 ENCOUNTER — OFFICE VISIT (OUTPATIENT)
Dept: PAIN MANAGEMENT | Age: 76
End: 2021-08-13
Payer: MEDICARE

## 2021-08-13 VITALS
OXYGEN SATURATION: 97 % | DIASTOLIC BLOOD PRESSURE: 84 MMHG | SYSTOLIC BLOOD PRESSURE: 130 MMHG | HEIGHT: 69 IN | BODY MASS INDEX: 29.18 KG/M2 | WEIGHT: 197 LBS

## 2021-08-13 DIAGNOSIS — M47.816 LUMBAR SPONDYLOSIS: Primary | ICD-10-CM

## 2021-08-13 PROCEDURE — 1123F ACP DISCUSS/DSCN MKR DOCD: CPT | Performed by: PHYSICAL MEDICINE & REHABILITATION

## 2021-08-13 PROCEDURE — 4040F PNEUMOC VAC/ADMIN/RCVD: CPT | Performed by: PHYSICAL MEDICINE & REHABILITATION

## 2021-08-13 PROCEDURE — G8417 CALC BMI ABV UP PARAM F/U: HCPCS | Performed by: PHYSICAL MEDICINE & REHABILITATION

## 2021-08-13 PROCEDURE — G8427 DOCREV CUR MEDS BY ELIG CLIN: HCPCS | Performed by: PHYSICAL MEDICINE & REHABILITATION

## 2021-08-13 PROCEDURE — 3017F COLORECTAL CA SCREEN DOC REV: CPT | Performed by: PHYSICAL MEDICINE & REHABILITATION

## 2021-08-13 PROCEDURE — 4004F PT TOBACCO SCREEN RCVD TLK: CPT | Performed by: PHYSICAL MEDICINE & REHABILITATION

## 2021-08-13 PROCEDURE — 99213 OFFICE O/P EST LOW 20 MIN: CPT | Performed by: PHYSICAL MEDICINE & REHABILITATION

## 2021-08-13 NOTE — PROGRESS NOTES
FOLLOW UP APPOINTMENT:             2021       John Parekh is a 78 y.o. male, who came for a  follow up to discuss treatment options    Cause of the symptom(s): degenerative (arthritis)    Onset: 5years      Prior reatment done: physical therapy and muscle relaxant    Current pain level: 4 on the scale of 0-10 ( 10 being worst)     Quality of Symptoms: aching    Aggravating factors: Sitting, especially with long drive     Relieving factors: rest, lying down and use of medication        Allergies   Allergen Reactions    Cortisone Other (See Comments)     Irritates \"GERD\"     Adhesive Tape Rash    Wound Dressing Adhesive      Redness, irritation       Social History     Socioeconomic History    Marital status:      Spouse name: Not on file    Number of children: Not on file    Years of education: Not on file    Highest education level: Not on file   Occupational History    Not on file   Tobacco Use    Smoking status: Current Some Day Smoker     Packs/day: 1.00     Years: 40.00     Pack years: 40.00     Types: Cigarettes     Last attempt to quit: 2014     Years since quittin.4    Smokeless tobacco: Never Used   Vaping Use    Vaping Use: Never used   Substance and Sexual Activity    Alcohol use: Yes     Comment: seldom    Drug use: No    Sexual activity: Not on file   Other Topics Concern    Not on file   Social History Narrative    Not on file     Social Determinants of Health     Financial Resource Strain: Low Risk     Difficulty of Paying Living Expenses: Not hard at all   Food Insecurity: No Food Insecurity    Worried About Running Out of Food in the Last Year: Never true    David of Food in the Last Year: Never true   Transportation Needs:     Lack of Transportation (Medical):      Lack of Transportation (Non-Medical):    Physical Activity:     Days of Exercise per Week:     Minutes of Exercise per Session:    Stress:     Feeling of Stress :    Social breath sounds. Musculoskeletal:         General: Tenderness present. Cervical back: Neck supple. Comments: Lumbar facet loading, tender, lumbar paraspinals   Neurological:      Mental Status: He is alert and oriented to person, place, and time. Motor: No weakness. Psychiatric:         Behavior: Behavior normal.         Assessment and Plan:      Diagnosis Orders   1. Lumbar spondylosis  MA INJ DX/THER AGNT PARAVERT FACET JOINT, LUMBAR/SAC, 1ST LEVEL    MA INJ DX/THER AGNT PARAVERT FACET JOINT, LUMBAR/SAC, 2ND LEVEL        MRI of the LS spine reviewed. No evidence of severe stenosis. No history of diabetes. No blood thinner. Schedule for bilateral L2-3-4-5 medial branch block bilaterally. Schedule injection in 2 weeks     Schedule ff up post injection virtually 2 weeks post op       All questions were answered and imaging studies reviewed with the patient. I have reviewed the chief complaint and HPI including the [facility] and Vital documentation by my staff and I agree with their documentation and have added where applicable. Time spent with patient was 20  minutes. More than 50% was spent counseling/coordinating the patient's care.      Remigio Castillo MD   Spine Medicine/PM&R

## 2021-08-19 RX ORDER — LEVOTHYROXINE SODIUM 0.07 MG/1
TABLET ORAL
Qty: 90 TABLET | Refills: 1 | Status: SHIPPED | OUTPATIENT
Start: 2021-08-19 | End: 2022-01-11

## 2021-08-19 RX ORDER — AMLODIPINE BESYLATE 5 MG/1
TABLET ORAL
Qty: 90 TABLET | Refills: 1 | Status: SHIPPED | OUTPATIENT
Start: 2021-08-19 | End: 2022-01-11

## 2021-08-19 RX ORDER — PANTOPRAZOLE SODIUM 40 MG/1
TABLET, DELAYED RELEASE ORAL
Qty: 180 TABLET | Refills: 1 | Status: SHIPPED | OUTPATIENT
Start: 2021-08-19 | End: 2022-01-11

## 2021-08-19 RX ORDER — SIMVASTATIN 20 MG
TABLET ORAL
Qty: 90 TABLET | Refills: 1 | Status: SHIPPED | OUTPATIENT
Start: 2021-08-19 | End: 2022-01-11

## 2021-08-19 NOTE — TELEPHONE ENCOUNTER
Last OV: 7/6/2021 [employer] [facility]   Last RX:    Next scheduled apt: Visit date not found          Sure scripts request      Rx pending

## 2021-09-17 ENCOUNTER — APPOINTMENT (OUTPATIENT)
Dept: GENERAL RADIOLOGY | Age: 76
End: 2021-09-17
Attending: PHYSICAL MEDICINE & REHABILITATION
Payer: MEDICARE

## 2021-09-17 ENCOUNTER — HOSPITAL ENCOUNTER (OUTPATIENT)
Dept: PREADMISSION TESTING | Age: 76
Setting detail: SPECIMEN
Discharge: HOME | End: 2021-09-17
Payer: MEDICARE

## 2021-09-17 ENCOUNTER — HOSPITAL ENCOUNTER (OUTPATIENT)
Age: 76
Setting detail: OUTPATIENT SURGERY
Discharge: HOME | End: 2021-09-17
Attending: PHYSICAL MEDICINE & REHABILITATION | Admitting: PHYSICAL MEDICINE & REHABILITATION
Payer: MEDICARE

## 2021-09-17 VITALS
HEIGHT: 69 IN | SYSTOLIC BLOOD PRESSURE: 91 MMHG | TEMPERATURE: 98 F | DIASTOLIC BLOOD PRESSURE: 45 MMHG | WEIGHT: 195 LBS | BODY MASS INDEX: 28.88 KG/M2 | OXYGEN SATURATION: 100 % | RESPIRATION RATE: 20 BRPM | HEART RATE: 68 BPM

## 2021-09-17 LAB
SARS-COV-2, RAPID: NOT DETECTED
SPECIMEN DESCRIPTION: NORMAL

## 2021-09-17 PROCEDURE — 6360000002 HC RX W HCPCS: Performed by: PHYSICAL MEDICINE & REHABILITATION

## 2021-09-17 PROCEDURE — 2580000003 HC RX 258: Performed by: PHYSICAL MEDICINE & REHABILITATION

## 2021-09-17 PROCEDURE — 64495 INJ PARAVERT F JNT L/S 3 LEV: CPT | Performed by: PHYSICAL MEDICINE & REHABILITATION

## 2021-09-17 PROCEDURE — 99152 MOD SED SAME PHYS/QHP 5/>YRS: CPT | Performed by: PHYSICAL MEDICINE & REHABILITATION

## 2021-09-17 PROCEDURE — 7100000011 HC PHASE II RECOVERY - ADDTL 15 MIN: Performed by: PHYSICAL MEDICINE & REHABILITATION

## 2021-09-17 PROCEDURE — 2500000003 HC RX 250 WO HCPCS: Performed by: PHYSICAL MEDICINE & REHABILITATION

## 2021-09-17 PROCEDURE — 64493 INJ PARAVERT F JNT L/S 1 LEV: CPT | Performed by: PHYSICAL MEDICINE & REHABILITATION

## 2021-09-17 PROCEDURE — C9803 HOPD COVID-19 SPEC COLLECT: HCPCS

## 2021-09-17 PROCEDURE — 2709999900 HC NON-CHARGEABLE SUPPLY: Performed by: PHYSICAL MEDICINE & REHABILITATION

## 2021-09-17 PROCEDURE — 64494 INJ PARAVERT F JNT L/S 2 LEV: CPT | Performed by: PHYSICAL MEDICINE & REHABILITATION

## 2021-09-17 PROCEDURE — 7100000010 HC PHASE II RECOVERY - FIRST 15 MIN: Performed by: PHYSICAL MEDICINE & REHABILITATION

## 2021-09-17 PROCEDURE — 3600000052 HC PAIN LEVEL 2 BASE: Performed by: PHYSICAL MEDICINE & REHABILITATION

## 2021-09-17 PROCEDURE — 87635 SARS-COV-2 COVID-19 AMP PRB: CPT

## 2021-09-17 PROCEDURE — 3600000053 HC PAIN LEVEL 2 ADDL 15 MIN: Performed by: PHYSICAL MEDICINE & REHABILITATION

## 2021-09-17 PROCEDURE — 76000 FLUOROSCOPY <1 HR PHYS/QHP: CPT

## 2021-09-17 RX ORDER — MIDAZOLAM HYDROCHLORIDE 1 MG/ML
INJECTION INTRAMUSCULAR; INTRAVENOUS PRN
Status: DISCONTINUED | OUTPATIENT
Start: 2021-09-17 | End: 2021-09-17 | Stop reason: ALTCHOICE

## 2021-09-17 RX ORDER — SODIUM CHLORIDE, SODIUM LACTATE, POTASSIUM CHLORIDE, CALCIUM CHLORIDE 600; 310; 30; 20 MG/100ML; MG/100ML; MG/100ML; MG/100ML
INJECTION, SOLUTION INTRAVENOUS CONTINUOUS
Status: DISCONTINUED | OUTPATIENT
Start: 2021-09-17 | End: 2021-09-17 | Stop reason: HOSPADM

## 2021-09-17 RX ORDER — LIDOCAINE HYDROCHLORIDE 10 MG/ML
INJECTION, SOLUTION INFILTRATION; PERINEURAL PRN
Status: DISCONTINUED | OUTPATIENT
Start: 2021-09-17 | End: 2021-09-17 | Stop reason: ALTCHOICE

## 2021-09-17 RX ORDER — FENTANYL CITRATE 50 UG/ML
INJECTION, SOLUTION INTRAMUSCULAR; INTRAVENOUS PRN
Status: DISCONTINUED | OUTPATIENT
Start: 2021-09-17 | End: 2021-09-17 | Stop reason: ALTCHOICE

## 2021-09-17 RX ORDER — TRIAMCINOLONE ACETONIDE 40 MG/ML
INJECTION, SUSPENSION INTRA-ARTICULAR; INTRAMUSCULAR PRN
Status: DISCONTINUED | OUTPATIENT
Start: 2021-09-17 | End: 2021-09-17 | Stop reason: ALTCHOICE

## 2021-09-17 RX ADMIN — SODIUM CHLORIDE, POTASSIUM CHLORIDE, SODIUM LACTATE AND CALCIUM CHLORIDE: 600; 310; 30; 20 INJECTION, SOLUTION INTRAVENOUS at 08:56

## 2021-09-17 ASSESSMENT — PAIN SCALES - GENERAL
PAINLEVEL_OUTOF10: 0
PAINLEVEL_OUTOF10: 5

## 2021-09-17 ASSESSMENT — PAIN DESCRIPTION - DESCRIPTORS: DESCRIPTORS: ACHING

## 2021-09-17 ASSESSMENT — PAIN - FUNCTIONAL ASSESSMENT: PAIN_FUNCTIONAL_ASSESSMENT: 0-10

## 2021-09-17 NOTE — OP NOTE
Operative Note      Patient: Brunilda Case  YOB: 1945  MRN: [id number]    Date of Procedure: 9/17/2021    Pre-Op Diagnosis: LUMBAR SPONDYLOSIS    Post-Op Diagnosis: Same       Procedure(s):  BILATERAL L2,L3,L4 MEDIAL BRANCH L5 DORSAL RAMI INJECTION    Surgeon(s):  Jayshree Blakely MD    Assistant:   * No surgical staff found *    Anesthesia: IV Sedation    Estimated Blood Loss (mL): Minimal    Complications: None    Anesthesia: Moderate sedation using 2 mg IV versed & 100 mcg IV fentanyl. CONSENT:     The risks, benefits, alternatives, plan, complications, and personnel were discussed prior to the procedure. The patient understood and agreed to proceed. The patient was positioned prone. Sign-in and time-out was performed. Identifying the   site, in this case, both sides L2 L3 L4 medial branch and L5 dorsal ramus/rami to address the L3-4 L4-5 and L5-S1 joints     Sterile technique was done in the usual manner using chlorhexidine. This was followed by infiltration of a 17 ml of 1% preservative-free lidocaine. A 3.5 inch  22-gauge spinal needle(s)  were positioned under fluoroscopic guidance. A total of  8  needles were positioned. Upon confirmation that the needle was properly positioned, a solution of 80 mg of triamcinolone and  10 ml of 1% preservative-free lidocaine was injected without difficulty. The patient tolerated the procedure well and went to the recovery room with stable vital signs. Moderate Sedation Time: > 15 minutes with a nurse administering the medication(s) under my supervision. The patient was independently monitored by a Registered Nurse assigned to the Procedure Room ( automated blood pressure, continuous EKG, Capnography and continuous pulse oximetry)    Start time:  1043 am  End Time:   1100 am      PLAN:     Home instructions were provided. The patient will follow up in 4 to 6 weeks or earlier if needed.       Electronically signed by Morro Hernandez Robbin Monahan MD on 9/17/2021 at 10:55 AM

## 2021-09-17 NOTE — H&P
Surendra Lo MD      Jennefer Meckel McDiffitt is a 78 y.o. male, who came in for a procedure,  Bilateral L2  L3, L4 medial branch and L5 dorsal rami injection. Jax Wolf No change since last visit      Treatment done: physical therapy, anti-inflammatory medication and muscle relaxant    Allergies   Allergen Reactions    Cortisone Other (See Comments)     Irritates \"GERD\"     Adhesive Tape Rash    Wound Dressing Adhesive      Redness, irritation       Social History     Socioeconomic History    Marital status:      Spouse name: Not on file    Number of children: Not on file    Years of education: Not on file    Highest education level: Not on file   Occupational History    Not on file   Tobacco Use    Smoking status: Current Some Day Smoker     Packs/day: 1.00     Years: 40.00     Pack years: 40.00     Types: Cigarettes     Last attempt to quit: 2014     Years since quittin.5    Smokeless tobacco: Never Used   Vaping Use    Vaping Use: Never used   Substance and Sexual Activity    Alcohol use: Yes     Comment: seldom    Drug use: No    Sexual activity: Not on file   Other Topics Concern    Not on file   Social History Narrative    Not on file     Social Determinants of Health     Financial Resource Strain: Low Risk     Difficulty of Paying Living Expenses: Not hard at all   Food Insecurity: No Food Insecurity    Worried About Running Out of Food in the Last Year: Never true    David of Food in the Last Year: Never true   Transportation Needs:     Lack of Transportation (Medical):      Lack of Transportation (Non-Medical):    Physical Activity:     Days of Exercise per Week:     Minutes of Exercise per Session:    Stress:     Feeling of Stress :    Social Connections:     Frequency of Communication with Friends and Family:     Frequency of Social Gatherings with Friends and Family:     Attends Yazidism Services:     Active Member of Clubs or Organizations:     Attends Club or Organization Meetings:     Marital Status:    Intimate Partner Violence:     Fear of Current or Ex-Partner:     Emotionally Abused:     Physically Abused:     Sexually Abused:        Past Medical History:   Diagnosis Date    Arthritis     Cancer ([address]     multiple myeloma    Fibromyalgia     GERD (gastroesophageal reflux disease)     History of blood transfusion     At birth    [facility] Hyperlipidemia     Hypertension     Hypothyroidism     IBS (irritable bowel syndrome)     Osteoarthritis     Thyroid disease     Hypothyroid        Past Surgical History:   Procedure Laterality Date    ABDOMEN SURGERY  2004    Bowel Resection following \"fall on the ice\"; Mj Parker MD    APPENDECTOMY      COLONOSCOPY      COLONOSCOPY  09-    Dr. Armond Naranjo      cataract bilateral    GASTRIC FUNDOPLICATION  2050    HEMORRHOID SURGERY      HERNIA REPAIR      Hiatal Hernia repair, Esophageal repair     HERNIA REPAIR Bilateral     Inguinal     DE LASER VAPORIZATION SURGERY PROSTATE, COMPLETE N/A 1/25/2018    CYSTOSCOPY TRANSURETHRAL RESECTION PROSTATE LASER performed by Blas Varghese MD at [address] [facility] ARTHROSCOPY Right 10/14/2020    RIGHT SHOULDER ARTHROSCPY PROBABLE RCR. Shoulder Scope Subacromial Decompression. Partial Distal Claviclectomy. Debreidement. Mini RCR Open. Bicep Tenodecis performed by Kaila Harp DO at [address] ENDOSCOPY      UPPER GASTROINTESTINAL ENDOSCOPY  09-    Dr. Elvira Zhang ENDOSCOPY  12/14/2016    Jeremy Lucio MD       Prior to Visit Medications    Medication Sig Taking?  Authorizing Provider   amLODIPine (NORVASC) 5 MG tablet TAKE 1 TABLET EVERY DAY  Beba Elizabeth MD   pantoprazole (PROTONIX) 40 MG tablet TAKE 1 TABLET TWICE DAILY  Beba Elizabeth MD   simvastatin (ZOCOR) 20 MG tablet TAKE 1 TABLET EVERY NIGHT  Beba Elizabeth MD   levothyroxine (SYNTHROID) 75 MCG tablet TAKE 1 TABLET EVERY DAY  Howard Harrell MD   tamsulosin (FLOMAX) 0.4 MG capsule TAKE 1 CAPSULE EVERY DAY  Phan Sawyer PA-C   hydrocortisone 2.5 % cream Apply topically 2 times daily Apply topically 2 times daily. Historical Provider, MD   aspirin 81 MG tablet Take 81 mg by mouth daily  Historical Provider, MD   gabapentin (NEURONTIN) 300 MG capsule Take 600 mg by mouth nightly. Historical Provider, MD   gabapentin (NEURONTIN) 600 MG tablet Take 600 mg by mouth 4 times daily. Historical Provider, MD   HYDROcodone-acetaminophen (NORCO) 5-325 MG per tablet Take 1 tablet by mouth every 6 hours as needed for Pain. Historical Provider, MD   lidocaine (LMX) 4 % cream Apply topically as needed for Pain Apply topically as needed. Historical Provider, MD   Multiple Vitamins-Minerals (MULTI COMPLETE PO) Take by mouth  Historical Provider, MD   polyethylene glycol (GLYCOLAX) powder Take 17 g by mouth as needed   Historical Provider, MD       Family History   Problem Relation Age of Onset    Cancer Mother         bone    Heart Disease Father         MI         Review of Systems : All systems reviewed, all unremarkable other than HPI. No change since last visit. Physical Exam  HENT:      Head: Atraumatic. Cardiovascular:      Heart sounds: Normal heart sounds. Pulmonary:      Effort: Pulmonary effort is normal. No respiratory distress. Breath sounds: Normal breath sounds. Neurological:      Mental Status: He is alert and oriented to person, place, and time.     :    Cervical Spine Exam: Full ROM, without limitation     Access: Adequate mouth opening       Assessment:   Lumbar spondylosis    PLAN:     As advertised. Planned duration of treatment: 4 weeks. Further assessment and followup in  4 weeks for follow up post injection.        Electronically signed by Brigida Ordaz MD on 9/17/2021 at 8:43 AM

## 2021-10-15 ENCOUNTER — OFFICE VISIT (OUTPATIENT)
Dept: PAIN MANAGEMENT | Age: 76
End: 2021-10-15
Payer: MEDICARE

## 2021-10-15 VITALS
BODY MASS INDEX: 28.58 KG/M2 | WEIGHT: 193 LBS | SYSTOLIC BLOOD PRESSURE: 118 MMHG | DIASTOLIC BLOOD PRESSURE: 76 MMHG | HEIGHT: 69 IN

## 2021-10-15 DIAGNOSIS — M47.816 LUMBAR SPONDYLOSIS: Primary | ICD-10-CM

## 2021-10-15 PROCEDURE — G8427 DOCREV CUR MEDS BY ELIG CLIN: HCPCS | Performed by: PHYSICAL MEDICINE & REHABILITATION

## 2021-10-15 PROCEDURE — 4040F PNEUMOC VAC/ADMIN/RCVD: CPT | Performed by: PHYSICAL MEDICINE & REHABILITATION

## 2021-10-15 PROCEDURE — G8484 FLU IMMUNIZE NO ADMIN: HCPCS | Performed by: PHYSICAL MEDICINE & REHABILITATION

## 2021-10-15 PROCEDURE — 4004F PT TOBACCO SCREEN RCVD TLK: CPT | Performed by: PHYSICAL MEDICINE & REHABILITATION

## 2021-10-15 PROCEDURE — G8417 CALC BMI ABV UP PARAM F/U: HCPCS | Performed by: PHYSICAL MEDICINE & REHABILITATION

## 2021-10-15 PROCEDURE — 1123F ACP DISCUSS/DSCN MKR DOCD: CPT | Performed by: PHYSICAL MEDICINE & REHABILITATION

## 2021-10-15 PROCEDURE — 99213 OFFICE O/P EST LOW 20 MIN: CPT | Performed by: PHYSICAL MEDICINE & REHABILITATION

## 2021-10-15 PROCEDURE — 3017F COLORECTAL CA SCREEN DOC REV: CPT | Performed by: PHYSICAL MEDICINE & REHABILITATION

## 2021-10-15 NOTE — PROGRESS NOTES
FOLLOW UP APPOINTMENT:         Robert Shukla MD    10/15/2021       Ankur Hinton is a 78 y.o. male, who came for a   post injection follow up    Cause of the symptom(s): degenerative (arthritis)    Onset: chronic     Prior reatment done: injection    Current pain level: 4 on the scale of 0-10 ( 10 being worst)     Quality of Symptoms: aching and throbbing    Aggravating factors: activity    Relieving factors: rest and lying down    Denies side effects from the injection(s). Allergies   Allergen Reactions    Cortisone Other (See Comments)     Irritates \"GERD\"     Adhesive Tape Rash    Wound Dressing Adhesive      Redness, irritation       Social History     Socioeconomic History    Marital status:      Spouse name: Not on file    Number of children: Not on file    Years of education: Not on file    Highest education level: Not on file   Occupational History    Not on file   Tobacco Use    Smoking status: Current Some Day Smoker     Packs/day: 0.50     Years: 40.00     Pack years: 20.00     Types: Cigarettes     Last attempt to quit: 2014     Years since quittin.6    Smokeless tobacco: Never Used   Vaping Use    Vaping Use: Never used   Substance and Sexual Activity    Alcohol use: Yes     Comment: seldom    Drug use: No    Sexual activity: Not on file   Other Topics Concern    Not on file   Social History Narrative    Not on file     Social Determinants of Health     Financial Resource Strain: Low Risk     Difficulty of Paying Living Expenses: Not hard at all   Food Insecurity: No Food Insecurity    Worried About Running Out of Food in the Last Year: Never true    David of Food in the Last Year: Never true   Transportation Needs:     Lack of Transportation (Medical):      Lack of Transportation (Non-Medical):    Physical Activity:     Days of Exercise per Week:     Minutes of Exercise per Session:    Stress:     Feeling of Stress :    Social Connections:  Frequency of Communication with Friends and Family:     Frequency of Social Gatherings with Friends and Family:     Attends Episcopalian Services:     Active Member of Clubs or Organizations:     Attends Club or Organization Meetings:     Marital Status:    Intimate Partner Violence:     Fear of Current or Ex-Partner:     Emotionally Abused:     Physically Abused:     Sexually Abused:        Past Medical History:   Diagnosis Date    Arthritis     Cancer ([facility] [address]     multiple myeloma    Fibromyalgia     GERD (gastroesophageal reflux disease)     History of blood transfusion     At birth    [employer] Hyperlipidemia     Hypertension     Hypothyroidism     IBS (irritable bowel syndrome)     Osteoarthritis     Thyroid disease     Hypothyroid        Past Surgical History:   Procedure Laterality Date    ABDOMEN SURGERY  2004    Bowel Resection following \"fall on the ice\"; Ermias Perea MD    APPENDECTOMY      COLONOSCOPY      COLONOSCOPY  09-    Dr. Reji Landeros      cataract bilateral    GASTRIC FUNDOPLICATION  1006    HEMORRHOID SURGERY      HERNIA REPAIR      Hiatal Hernia repair, Esophageal repair     HERNIA REPAIR Bilateral     Inguinal     PAIN MANAGEMENT PROCEDURE Bilateral 9/17/2021    BILATERAL L2,L3,L4 MEDIAL BRANCH L5 DORSAL RAMI INJECTION performed by Shaun Trivedi MD at [address] [facility] N/A 1/25/2018    CYSTOSCOPY TRANSURETHRAL RESECTION PROSTATE LASER performed by Joon Jalloh MD at [address] [facility] ARTHROSCOPY Right 10/14/2020    RIGHT SHOULDER ARTHROSCPY PROBABLE RCR. Shoulder Scope Subacromial Decompression. Partial Distal Claviclectomy. Debreidement. Mini RCR Open.   Bicep Tenodecis performed by [place] DO at [address] [facility] ENDOSCOPY      UPPER GASTROINTESTINAL ENDOSCOPY  09-    Dr. Geovanny Torres  12/14/2016 Chidi Garg MD       Family History   Problem Relation Age of Onset    Cancer Mother         bone    Heart Disease Father         MI        Prior to Visit Medications    Medication Sig Taking? Authorizing Provider   Boswellia-Glucosamine-Vit D (OSTEO BI-FLEX ONE PER DAY PO) Take 1 tablet by mouth daily Yes Historical Provider, MD   amLODIPine (NORVASC) 5 MG tablet TAKE 1 TABLET EVERY DAY Yes Beni Farrar MD   pantoprazole (PROTONIX) 40 MG tablet TAKE 1 TABLET TWICE DAILY Yes Beni Farrar MD   simvastatin (ZOCOR) 20 MG tablet TAKE 1 TABLET EVERY NIGHT Yes Beni Farrar MD   levothyroxine (SYNTHROID) 75 MCG tablet TAKE 1 TABLET EVERY DAY Yes Beni Farrar MD   tamsulosin (FLOMAX) 0.4 MG capsule TAKE 1 CAPSULE EVERY DAY Yes Phan Sawyer PA-C   hydrocortisone 2.5 % cream Apply topically 2 times daily Apply topically 2 times daily. Yes Historical Provider, MD   aspirin 81 MG tablet Take 81 mg by mouth daily Yes Historical Provider, MD   gabapentin (NEURONTIN) 300 MG capsule Take 600 mg by mouth nightly. Yes Historical Provider, MD   gabapentin (NEURONTIN) 600 MG tablet Take 300 mg by mouth 4 times daily. Yes Historical Provider, MD   HYDROcodone-acetaminophen (NORCO) 5-325 MG per tablet Take 1 tablet by mouth every 6 hours as needed for Pain. Yes Historical Provider, MD   lidocaine (LMX) 4 % cream Apply topically as needed for Pain Apply topically as needed. Yes Historical Provider, MD   Multiple Vitamins-Minerals (MULTI COMPLETE PO) Take by mouth Yes Historical Provider, MD   polyethylene glycol (GLYCOLAX) powder Take 17 g by mouth as needed  Yes Historical Provider, MD         Review of Systems  : All systems reviewed, all unremarkable other than HPI/subjective. No change since last visit. Denies  fever, chills, infection or non healing wound. /76   Ht 5' 8.5\" (1.74 m)   Wt 193 lb (87.5 kg)   BMI 28.92 kg/m²       Physical Exam  HENT:      Head: Atraumatic.    Cardiovascular: Rate and Rhythm: Normal rate. Pulmonary:      Effort: Pulmonary effort is normal. No respiratory distress. Breath sounds: Normal breath sounds. Musculoskeletal:         General: Tenderness present. Cervical back: Neck supple. Comments: Positive lumbar facet loading     Neurological:      Mental Status: He is alert and oriented to person, place, and time. Psychiatric:         Behavior: Behavior normal.           Assessment and Plan:      Diagnosis Orders   1. Lumbar spondylosis  IN INJ DX/THER AGNT PARAVERT FACET JOINT, LUMBAR/SAC, 1ST LEVEL    IN INJ DX/THER AGNT PARAVERT FACET JOINT, LUMBAR/SAC, 2ND LEVEL       He responded well from the prior injection. Schedule for the second injection  - bilateral L2-3-4-5 medial branch block. Consider RFA if  Relief is only short term. Schedule injection in 2 weeks     Schedule ff up post injection virtually 2 weeks post op       All questions were answered and imaging studies reviewed with the patient. I have reviewed the chief complaint and HPI including the [facility] and Vital documentation by my staff and I agree with their documentation and have added where applicable. Time spent with patient was  25  minutes. More than 50% was spent counseling/coordinating the patient's care.          Bernie Colon MD   Spine Medicine/PM&R

## 2021-11-01 NOTE — PROGRESS NOTES
[facility]   Preadmission Testing    Name: Ellen Tanner  : 1945  Patient Phone: [phone] (home)     Procedure: BILATERAL L2-3-4-5 MEDIAL BRANCH BLOCK      Date of Procedure: 21  Surgeon: Shaun Trivedi MD    Ht:  5' 8.5\" (174 cm)  Wt: 193 lb (87.5 kg)  Wt method: Allergies: Allergies   Allergen Reactions    Adhesive Tape Rash    Wound Dressing Adhesive      Redness, irritation           Latex Allergy Screening Tool  Have you ever had a reaction to or been told by a physician that you have an allergy to latex or natural rubber?: No    There were no vitals filed for this visit. No LMP for male patient. Do you take blood thinners? [x] Yes    [] No         Instructed to stop blood thinners prior to procedure? [x] Yes    [] No      [] N/A   Do you have sleep apnea? [] Yes    [x] No     Do you have acid reflux ? [x] Yes    [] No     Have you had a respiratory infection or sore throat in last 4 weeks before surgery? [] Yes    [x] No     Do you have poorly controlled asthma or COPD? [] Yes    [x] No         Have you had an EKG in last 12 months? [] Yes    [x] No          Do you smoke? [x] Yes    [] No      Please refrain from smoking on the day of surgery. Patient instructed on: [x] NPO Status   [x] Meds to Take  [x] Ride Home  [x]No Jewelry/Contact Lenses/Nail Polish     DOS Patient Needs [] HCG   [] Blood Sugar  [] PT/INR         COVID Vaccinated? [x] Yes    [] No                     Patient instructed on the pre-operative, intra-operative, and post-operative process? Yes  Medication instructions reviewed with patient?   Yes

## 2021-11-04 ENCOUNTER — APPOINTMENT (OUTPATIENT)
Dept: GENERAL RADIOLOGY | Age: 76
End: 2021-11-04
Attending: PHYSICAL MEDICINE & REHABILITATION
Payer: MEDICARE

## 2021-11-04 ENCOUNTER — HOSPITAL ENCOUNTER (OUTPATIENT)
Dept: PREADMISSION TESTING | Age: 76
Setting detail: SPECIMEN
Discharge: HOME | End: 2021-11-04
Payer: MEDICARE

## 2021-11-04 ENCOUNTER — HOSPITAL ENCOUNTER (OUTPATIENT)
Age: 76
Setting detail: OUTPATIENT SURGERY
Discharge: HOME | End: 2021-11-04
Attending: PHYSICAL MEDICINE & REHABILITATION | Admitting: PHYSICAL MEDICINE & REHABILITATION
Payer: MEDICARE

## 2021-11-04 VITALS
WEIGHT: 195.3 LBS | DIASTOLIC BLOOD PRESSURE: 78 MMHG | HEART RATE: 74 BPM | RESPIRATION RATE: 15 BRPM | HEIGHT: 68 IN | BODY MASS INDEX: 29.6 KG/M2 | TEMPERATURE: 100.6 F | OXYGEN SATURATION: 95 % | SYSTOLIC BLOOD PRESSURE: 131 MMHG

## 2021-11-04 LAB
SARS-COV-2, RAPID: NOT DETECTED
SPECIMEN DESCRIPTION: NORMAL

## 2021-11-04 PROCEDURE — 64494 INJ PARAVERT F JNT L/S 2 LEV: CPT | Performed by: PHYSICAL MEDICINE & REHABILITATION

## 2021-11-04 PROCEDURE — 3600000055 HC PAIN LEVEL 3 ADDL 15 MIN: Performed by: PHYSICAL MEDICINE & REHABILITATION

## 2021-11-04 PROCEDURE — 2709999900 HC NON-CHARGEABLE SUPPLY: Performed by: PHYSICAL MEDICINE & REHABILITATION

## 2021-11-04 PROCEDURE — 2580000003 HC RX 258: Performed by: PHYSICAL MEDICINE & REHABILITATION

## 2021-11-04 PROCEDURE — 76000 FLUOROSCOPY <1 HR PHYS/QHP: CPT

## 2021-11-04 PROCEDURE — 64493 INJ PARAVERT F JNT L/S 1 LEV: CPT | Performed by: PHYSICAL MEDICINE & REHABILITATION

## 2021-11-04 PROCEDURE — 87635 SARS-COV-2 COVID-19 AMP PRB: CPT

## 2021-11-04 PROCEDURE — 99152 MOD SED SAME PHYS/QHP 5/>YRS: CPT | Performed by: PHYSICAL MEDICINE & REHABILITATION

## 2021-11-04 PROCEDURE — 7100000011 HC PHASE II RECOVERY - ADDTL 15 MIN: Performed by: PHYSICAL MEDICINE & REHABILITATION

## 2021-11-04 PROCEDURE — 2500000003 HC RX 250 WO HCPCS: Performed by: PHYSICAL MEDICINE & REHABILITATION

## 2021-11-04 PROCEDURE — 64495 INJ PARAVERT F JNT L/S 3 LEV: CPT | Performed by: PHYSICAL MEDICINE & REHABILITATION

## 2021-11-04 PROCEDURE — C9803 HOPD COVID-19 SPEC COLLECT: HCPCS

## 2021-11-04 PROCEDURE — 7100000010 HC PHASE II RECOVERY - FIRST 15 MIN: Performed by: PHYSICAL MEDICINE & REHABILITATION

## 2021-11-04 PROCEDURE — 99153 MOD SED SAME PHYS/QHP EA: CPT | Performed by: PHYSICAL MEDICINE & REHABILITATION

## 2021-11-04 PROCEDURE — 6360000002 HC RX W HCPCS: Performed by: PHYSICAL MEDICINE & REHABILITATION

## 2021-11-04 PROCEDURE — 3600000054 HC PAIN LEVEL 3 BASE: Performed by: PHYSICAL MEDICINE & REHABILITATION

## 2021-11-04 RX ORDER — TRIAMCINOLONE ACETONIDE 40 MG/ML
INJECTION, SUSPENSION INTRA-ARTICULAR; INTRAMUSCULAR PRN
Status: DISCONTINUED | OUTPATIENT
Start: 2021-11-04 | End: 2021-11-04 | Stop reason: ALTCHOICE

## 2021-11-04 RX ORDER — SODIUM CHLORIDE, SODIUM LACTATE, POTASSIUM CHLORIDE, CALCIUM CHLORIDE 600; 310; 30; 20 MG/100ML; MG/100ML; MG/100ML; MG/100ML
INJECTION, SOLUTION INTRAVENOUS CONTINUOUS
Status: DISCONTINUED | OUTPATIENT
Start: 2021-11-04 | End: 2021-11-04 | Stop reason: HOSPADM

## 2021-11-04 RX ORDER — SODIUM CHLORIDE 9 MG/ML
25 INJECTION, SOLUTION INTRAVENOUS PRN
Status: DISCONTINUED | OUTPATIENT
Start: 2021-11-04 | End: 2021-11-04 | Stop reason: HOSPADM

## 2021-11-04 RX ORDER — SODIUM CHLORIDE 0.9 % (FLUSH) 0.9 %
5-40 SYRINGE (ML) INJECTION PRN
Status: DISCONTINUED | OUTPATIENT
Start: 2021-11-04 | End: 2021-11-04 | Stop reason: HOSPADM

## 2021-11-04 RX ORDER — MIDAZOLAM HYDROCHLORIDE 1 MG/ML
INJECTION INTRAMUSCULAR; INTRAVENOUS PRN
Status: DISCONTINUED | OUTPATIENT
Start: 2021-11-04 | End: 2021-11-04 | Stop reason: ALTCHOICE

## 2021-11-04 RX ORDER — SODIUM CHLORIDE 0.9 % (FLUSH) 0.9 %
5-40 SYRINGE (ML) INJECTION EVERY 12 HOURS SCHEDULED
Status: DISCONTINUED | OUTPATIENT
Start: 2021-11-04 | End: 2021-11-04 | Stop reason: HOSPADM

## 2021-11-04 RX ORDER — FENTANYL CITRATE 50 UG/ML
INJECTION, SOLUTION INTRAMUSCULAR; INTRAVENOUS PRN
Status: DISCONTINUED | OUTPATIENT
Start: 2021-11-04 | End: 2021-11-04 | Stop reason: ALTCHOICE

## 2021-11-04 RX ORDER — LIDOCAINE HYDROCHLORIDE 10 MG/ML
INJECTION, SOLUTION INFILTRATION; PERINEURAL PRN
Status: DISCONTINUED | OUTPATIENT
Start: 2021-11-04 | End: 2021-11-04 | Stop reason: ALTCHOICE

## 2021-11-04 RX ADMIN — SODIUM CHLORIDE, POTASSIUM CHLORIDE, SODIUM LACTATE AND CALCIUM CHLORIDE: 600; 310; 30; 20 INJECTION, SOLUTION INTRAVENOUS at 08:35

## 2021-11-04 ASSESSMENT — PAIN SCALES - GENERAL: PAINLEVEL_OUTOF10: 0

## 2021-11-04 NOTE — PROGRESS NOTES

## 2021-11-04 NOTE — OP NOTE
Operative Note      Patient: Randell Alvarado  YOB: 1945  MRN: [id number]    Date of Procedure: 11/4/2021    Pre-Op Diagnosis: M47.816    Post-Op Diagnosis: Same       Procedure(s):  BILATERAL L2-3-4-5 MEDIAL BRANCH BLOCK    Surgeon(s):  Gary Calero MD    Assistant:   * No surgical staff found *    Anesthesia: IV Sedation    Estimated Blood Loss (mL): Minimal    Complications: None    Anesthesia: Moderate sedation using 2 mg IV versed & 50 mcg IV fentanyl. CONSENT:     The risks, benefits, alternatives, plan, complications, and personnel were discussed prior to the procedure. The patient understood and agreed to proceed. The patient was positioned prone. Sign-in and time-out was performed. Identifying the   site, in this case, both sides L2 L3 L4 medial branch and L5 dorsal ramus/rami to address the L3-4 L4-5 and L5-S1 joints     Sterile technique was done in the usual manner using chlorhexidine. This was followed by infiltration of a 20 ml of 1% preservative-free lidocaine. A 3.5 inch  22-gauge spinal needle(s)  were positioned under fluoroscopic guidance. A total of  8  needles were positioned. Upon confirmation that the needle was properly positioned, a solution of 80 mg of triamcinolone and  6 ml of 1% preservative-free lidocaine was injected without difficulty. The patient tolerated the procedure well and went to the recovery room with stable vital signs. Moderate Sedation Time:  TotaL of 20 minutes with a nurse administering the medication(s) under my supervision. The patient was independently monitored by a Registered Nurse assigned to the Procedure Room ( automated blood pressure, continuous EKG, Capnography and continuous pulse oximetry)    Start time: 1010 am  End Time:  1030 am        PLAN:     Home instructions were provided. The patient will follow up in 4 to 6 weeks or earlier if needed.       Electronically signed by Gary Calero MD on 11/4/2021 at 10:26 AM

## 2021-11-04 NOTE — H&P
Brandon Isidro MD      Agata Pepper is a 78 y.o. male, who came in for a procedure,  Bilateral L2,L3-4 medial branch and L5 dorsal rami injection. No change since last visit. Treatment done: physical therapy, anti-inflammatory medication and muscle relaxant    Allergies   Allergen Reactions    Adhesive Tape Rash    Wound Dressing Adhesive      Redness, irritation       Social History     Socioeconomic History    Marital status:      Spouse name: Not on file    Number of children: Not on file    Years of education: Not on file    Highest education level: Not on file   Occupational History    Not on file   Tobacco Use    Smoking status: Current Some Day Smoker     Packs/day: 0.50     Years: 40.00     Pack years: 20.00     Types: Cigarettes     Last attempt to quit: 2014     Years since quittin.6    Smokeless tobacco: Never Used   Vaping Use    Vaping Use: Never used   Substance and Sexual Activity    Alcohol use: Yes     Comment: seldom    Drug use: No    Sexual activity: Not on file   Other Topics Concern    Not on file   Social History Narrative    Not on file     Social Determinants of Health     Financial Resource Strain: Low Risk     Difficulty of Paying Living Expenses: Not hard at all   Food Insecurity: No Food Insecurity    Worried About Running Out of Food in the Last Year: Never true    David of Food in the Last Year: Never true   Transportation Needs:     Lack of Transportation (Medical):      Lack of Transportation (Non-Medical):    Physical Activity:     Days of Exercise per Week:     Minutes of Exercise per Session:    Stress:     Feeling of Stress :    Social Connections:     Frequency of Communication with Friends and Family:     Frequency of Social Gatherings with Friends and Family:     Attends Caodaism Services:     Active Member of Clubs or Organizations:     Attends Club or Organization Meetings:     Marital Status: Intimate Partner Violence:     Fear of Current or Ex-Partner:     Emotionally Abused:     Physically Abused:     Sexually Abused:        Past Medical History:   Diagnosis Date    Arthritis     Cancer ([address]     multiple myeloma    Fibromyalgia     GERD (gastroesophageal reflux disease)     History of blood transfusion     At birth    Abreu Hyperlipidemia     Hypertension     Hypothyroidism     IBS (irritable bowel syndrome)     Osteoarthritis     Thyroid disease     Hypothyroid        Past Surgical History:   Procedure Laterality Date    ABDOMEN SURGERY  2004    Bowel Resection following \"fall on the ice\"; Walter Saba MD    APPENDECTOMY      COLONOSCOPY      COLONOSCOPY  09-    Dr. Krishna Sa      cataract bilateral    GASTRIC FUNDOPLICATION  1093    HEMORRHOID SURGERY      HERNIA REPAIR      Hiatal Hernia repair, Esophageal repair     HERNIA REPAIR Bilateral     Inguinal     PAIN MANAGEMENT PROCEDURE Bilateral 9/17/2021    BILATERAL L2,L3,L4 MEDIAL BRANCH L5 DORSAL RAMI INJECTION performed by Dana Shaw MD at [address] [facility] N/A 1/25/2018    CYSTOSCOPY TRANSURETHRAL RESECTION PROSTATE LASER performed by Lashon Ramey MD at [address] [facility] ARTHROSCOPY Right 10/14/2020    RIGHT SHOULDER ARTHROSCPY PROBABLE RCR. Shoulder Scope Subacromial Decompression. Partial Distal Claviclectomy. Debreidement. Mini RCR Open. Bicep Tenodecis performed by Irving Liu DO at [address] [facility] ENDOSCOPY      UPPER GASTROINTESTINAL ENDOSCOPY  09-    Dr. Castro [facility] ENDOSCOPY  12/14/2016    Daryl Lamb MD       Prior to Visit Medications    Medication Sig Taking?  Authorizing Provider   Boswellia-Glucosamine-Vit D (OSTEO BI-FLEX ONE PER DAY PO) Take 1 tablet by mouth daily Yes Historical Provider, MD   amLODIPine (NORVASC) 5 MG tablet TAKE 1 TABLET EVERY DAY Yes Christie Baker MD   pantoprazole (PROTONIX) 40 MG tablet TAKE 1 TABLET TWICE DAILY Yes Christie Baker MD   simvastatin (ZOCOR) 20 MG tablet TAKE 1 TABLET EVERY NIGHT Yes Christie Baker MD   levothyroxine (SYNTHROID) 75 MCG tablet TAKE 1 TABLET EVERY DAY Yes Christie Baker MD   tamsulosin (FLOMAX) 0.4 MG capsule TAKE 1 CAPSULE EVERY DAY Yes Phan Sawyer PA-C   gabapentin (NEURONTIN) 300 MG capsule Take 600 mg by mouth nightly. Yes Historical Provider, MD   gabapentin (NEURONTIN) 600 MG tablet Take 300 mg by mouth 4 times daily. Yes Historical Provider, MD   HYDROcodone-acetaminophen (NORCO) 5-325 MG per tablet Take 1 tablet by mouth every 6 hours as needed for Pain. Yes Historical Provider, MD   Multiple Vitamins-Minerals (MULTI COMPLETE PO) Take by mouth Yes Historical Provider, MD   hydrocortisone 2.5 % cream Apply topically 2 times daily Apply topically 2 times daily. Historical Provider, MD   aspirin 81 MG tablet Take 81 mg by mouth daily  Historical Provider, MD   lidocaine (LMX) 4 % cream Apply topically as needed for Pain Apply topically as needed. Historical Provider, MD   polyethylene glycol (GLYCOLAX) powder Take 17 g by mouth as needed   Historical Provider, MD       Family History   Problem Relation Age of Onset    Cancer Mother         bone    Heart Disease Father         MI         Review of Systems : All systems reviewed, all unremarkable other than HPI. No change since last visit. Physical Exam  Constitutional:       General: He is not in acute distress. Appearance: Normal appearance. HENT:      Head: Atraumatic. Cardiovascular:      Rate and Rhythm: Normal rate. Pulses: Normal pulses. Pulmonary:      Effort: Pulmonary effort is normal. No respiratory distress. Breath sounds: Normal breath sounds. Musculoskeletal:         General: Tenderness present. Comments: Lumbar facet loading   Skin:     General: Skin is dry.    Neurological:      Mental Status: He is alert and oriented to person, place, and time. Psychiatric:         Behavior: Behavior normal.     :    Cervical Spine Exam: Full ROM, without limitation     Access: Adequate mouth opening       Assessment:   Lumbar spondylosis    PLAN:     As advertised. Planned duration of treatment: 4 weeks. Further assessment and followup in  4 weeks for follow up post injection.        Electronically signed by Christina Israel MD on 11/4/2021 at 9:46 AM

## 2021-11-26 ENCOUNTER — VIRTUAL VISIT (OUTPATIENT)
Dept: PAIN MANAGEMENT | Age: 76
End: 2021-11-26
Payer: MEDICARE

## 2021-11-26 DIAGNOSIS — M47.816 LUMBAR SPONDYLOSIS: Primary | ICD-10-CM

## 2021-11-26 PROCEDURE — 99442 PR PHYS/QHP TELEPHONE EVALUATION 11-20 MIN: CPT | Performed by: PHYSICAL MEDICINE & REHABILITATION

## 2021-11-26 NOTE — PROGRESS NOTES
FOLLOW UP APPOINTMENT: Kylie Berg MD    2021       Josr Gilbert is a 78 y.o. male, who came for a   post injection follow up    Cause of the symptom(s): degenerative (arthritis)    Onset: chronic     He reports 90% better. Aggravating factors: activity    Relieving factors: rest and use of medication        Allergies   Allergen Reactions    Adhesive Tape Rash    Wound Dressing Adhesive      Redness, irritation       Social History     Socioeconomic History    Marital status:      Spouse name: Not on file    Number of children: Not on file    Years of education: Not on file    Highest education level: Not on file   Occupational History    Not on file   Tobacco Use    Smoking status: Current Some Day Smoker     Packs/day: 0.50     Years: 40.00     Pack years: 20.00     Types: Cigarettes     Last attempt to quit: 2014     Years since quittin.7    Smokeless tobacco: Never Used   Vaping Use    Vaping Use: Never used   Substance and Sexual Activity    Alcohol use: Yes     Comment: seldom    Drug use: No    Sexual activity: Not on file   Other Topics Concern    Not on file   Social History Narrative    Not on file     Social Determinants of Health     Financial Resource Strain: Low Risk     Difficulty of Paying Living Expenses: Not hard at all   Food Insecurity: No Food Insecurity    Worried About Running Out of Food in the Last Year: Never true    David of Food in the Last Year: Never true   Transportation Needs:     Lack of Transportation (Medical): Not on file    Lack of Transportation (Non-Medical):  Not on file   Physical Activity:     Days of Exercise per Week: Not on file    Minutes of Exercise per Session: Not on file   Stress:     Feeling of Stress : Not on file   Social Connections:     Frequency of Communication with Friends and Family: Not on file    Frequency of Social Gatherings with Friends and Family: Not on file   [employer] Attends Sikh Services: Not on file    Active Member of Clubs or Organizations: Not on file    Attends Club or Organization Meetings: Not on file    Marital Status: Not on file   Intimate Partner Violence:     Fear of Current or Ex-Partner: Not on file    Emotionally Abused: Not on file    Physically Abused: Not on file    Sexually Abused: Not on file   Housing Stability:     Unable to Pay for Housing in the Last Year: Not on file    Number of [place] in the Last Year: Not on file    Unstable Housing in the Last Year: Not on file       Past Medical History:   Diagnosis Date    Arthritis     [address] [address]     multiple myeloma    Fibromyalgia     GERD (gastroesophageal reflux disease)     History of blood transfusion     At birth    Roger De La Cruz Hyperlipidemia     Hypertension     Hypothyroidism     IBS (irritable bowel syndrome)     Osteoarthritis     Thyroid disease     Hypothyroid        Past Surgical History:   Procedure Laterality Date    ABDOMEN SURGERY  2004    Bowel Resection following \"fall on the ice\"; Carola Stewart MD    APPENDECTOMY      COLONOSCOPY      COLONOSCOPY  09-    Dr. Zee Dennis      cataract bilateral    GASTRIC FUNDOPLICATION  6425    HEMORRHOID SURGERY      HERNIA REPAIR      Hiatal Hernia repair, Esophageal repair     HERNIA REPAIR Bilateral     Inguinal     PAIN MANAGEMENT PROCEDURE Bilateral 9/17/2021    BILATERAL L2,L3,L4 MEDIAL BRANCH L5 DORSAL RAMI INJECTION performed by Trever Bryant MD at [place] [address] N/A 11/4/2021    BILATERAL L2-3-4-5 MEDIAL BRANCH BLOCK performed by Trever Bryant MD at [address] [place] [address], [facility] N/A 1/25/2018    CYSTOSCOPY TRANSURETHRAL RESECTION PROSTATE LASER performed by Manav Davis MD at [address] [facility] ARTHROSCOPY Right 10/14/2020    RIGHT SHOULDER ARTHROSCPY PROBABLE RCR. Shoulder Scope Subacromial Decompression.   Partial Distal Claviclectomy. Debreidement. Mini RCR Open. Bicep Tenodecis performed by Pilar Sauceda DO at [address] ENDOSCOPY      UPPER GASTROINTESTINAL ENDOSCOPY  09-    Dr. Parker Baptiste  12/14/2016    Cali Islas MD       Family History   Problem Relation Age of Onset    Cancer Mother         bone    Heart Disease Father         MI        Prior to Visit Medications    Medication Sig Taking? Authorizing Provider   Boswellia-Glucosamine-Vit D (OSTEO BI-FLEX ONE PER DAY PO) Take 1 tablet by mouth daily  Historical Provider, MD   amLODIPine (NORVASC) 5 MG tablet TAKE 1 TABLET EVERY DAY  Lara Bernard MD   pantoprazole (PROTONIX) 40 MG tablet TAKE 1 TABLET TWICE DAILY  Lara Bernard MD   simvastatin (ZOCOR) 20 MG tablet TAKE 1 TABLET EVERY NIGHT  Lara Bernard MD   levothyroxine (SYNTHROID) 75 MCG tablet TAKE 1 TABLET EVERY DAY  Lara Bernard MD   tamsulosin (FLOMAX) 0.4 MG capsule TAKE 1 CAPSULE EVERY DAY  Phan Sawyer PA-C   hydrocortisone 2.5 % cream Apply topically 2 times daily Apply topically 2 times daily. Historical Provider, MD   aspirin 81 MG tablet Take 81 mg by mouth daily  Historical Provider, MD   gabapentin (NEURONTIN) 300 MG capsule Take 600 mg by mouth nightly. Historical Provider, MD   gabapentin (NEURONTIN) 600 MG tablet Take 300 mg by mouth 4 times daily. Historical Provider, MD   HYDROcodone-acetaminophen (NORCO) 5-325 MG per tablet Take 1 tablet by mouth every 6 hours as needed for Pain. Historical Provider, MD   lidocaine (LMX) 4 % cream Apply topically as needed for Pain Apply topically as needed. Historical Provider, MD   Multiple Vitamins-Minerals (MULTI COMPLETE PO) Take by mouth  Historical Provider, MD   polyethylene glycol (GLYCOLAX) powder Take 17 g by mouth as needed   Historical Provider, MD         Review of Systems  : All systems reviewed, all unremarkable other than HPI.  No fever, chills, shortness breath, gastric ulcer or acid reflux, anticoagulation, diabetes, MI/CAD, stroke, cancer. .       Physical Exam  Constitutional:       General: He is not in acute distress. Pulmonary:      Effort: Pulmonary effort is normal.   Musculoskeletal:      Cervical back: Neck supple. Neurological:      Mental Status: He is alert and oriented to person, place, and time. Assessment and Plan:      Diagnosis Orders   1. Lumbar spondylosis       Schedule for bilateral L2-3-4-5 medial branch block, preferrably before Dec 15. He responded well at least 90% relief on 2 occasion post lumbar medial branch block    Patient was advised. Brenda Fowler is a 78 y.o. male evaluated via telephone on 11/26/2021. Consent:  He and/or health care decision maker is aware that that he may receive a bill for this telephone service, depending on his insurance coverage, and has provided verbal consent to proceed: Yes        I affirm this is a Patient Initiated Episode with a Patient who has not had a related appointment within my department in the past 7 days or scheduled within the next 24 hours. Patient identification was verified at the start of the visit: Yes    Total Time: minutes: 5-10 minutes    The visit was conducted pursuant to the emergency declaration under the [facility], [address] [place] [address] authority and the [facility] Act. Patient identification was verified, and a caregiver was present when appropriate. The patient was located in a state where the provider was credentialed to provide care.     Note: not billable if this call serves to triage the patient into an appointment for the relevant concern      Susanna Ayoub MD

## 2021-12-01 NOTE — PROGRESS NOTES
[facility]    Name: Gabino Duenas  : 1945  Patient Phone: [phone] (home)     Procedure: LUMBAR RFA L 2-3-4-5 BILATERAL MEDIAL BRANCH RFA - Bilateral      Date of Procedure: 12/3/21  Surgeon: Boby Collins MD    Ht:  5' 8\" (172.7 cm)  Wt: 195 lb (88.5 kg)  Wt method: Allergies: Allergies   Allergen Reactions    Adhesive Tape Rash    Wound Dressing Adhesive      Redness, irritation           Latex Allergy Screening Tool  Have you ever had a reaction to or been told by a physician that you have an allergy to latex or natural rubber?: No    There were no vitals filed for this visit. No LMP for male patient. Do you take blood thinners? [x] Yes    [] No         Instructed to stop blood thinners prior to procedure? [x] Yes    [] No      [] N/A   Do you have sleep apnea? [] Yes    [x] No     Do you have acid reflux ? [x] Yes    [] No     Have you had a respiratory infection or sore throat in last 4 weeks before surgery? [] Yes    [x] No     Do you have poorly controlled asthma or COPD? [] Yes    [x] No         Have you had an EKG in last 12 months? [] Yes    [x] No          Do you smoke? [] Yes    [x] No      Please refrain from smoking on the day of surgery. Patient instructed on: [x] NPO Status   [x] Meds to Take  [x] Ride Home  [x]No Jewelry/Contact Lenses/Nail Polish     DOS Patient Needs [] HCG   [] Blood Sugar  [] PT/INR         COVID Vaccinated? [x] Yes    [] No                     Patient instructed on the pre-operative, intra-operative, and post-operative process? Yes  Medication instructions reviewed with patient?   Yes

## 2021-12-03 ENCOUNTER — HOSPITAL ENCOUNTER (OUTPATIENT)
Dept: PREADMISSION TESTING | Age: 76
Setting detail: SPECIMEN
Discharge: HOME | End: 2021-12-03
Payer: MEDICARE

## 2021-12-03 ENCOUNTER — HOSPITAL ENCOUNTER (OUTPATIENT)
Age: 76
Setting detail: OUTPATIENT SURGERY
Discharge: HOME | End: 2021-12-03
Attending: PHYSICAL MEDICINE & REHABILITATION | Admitting: PHYSICAL MEDICINE & REHABILITATION
Payer: MEDICARE

## 2021-12-03 ENCOUNTER — APPOINTMENT (OUTPATIENT)
Dept: GENERAL RADIOLOGY | Age: 76
End: 2021-12-03
Attending: PHYSICAL MEDICINE & REHABILITATION
Payer: MEDICARE

## 2021-12-03 VITALS
OXYGEN SATURATION: 94 % | DIASTOLIC BLOOD PRESSURE: 88 MMHG | RESPIRATION RATE: 14 BRPM | BODY MASS INDEX: 29.55 KG/M2 | WEIGHT: 195 LBS | TEMPERATURE: 98.2 F | HEART RATE: 77 BPM | SYSTOLIC BLOOD PRESSURE: 114 MMHG | HEIGHT: 68 IN

## 2021-12-03 LAB
SARS-COV-2, RAPID: NOT DETECTED
SPECIMEN DESCRIPTION: NORMAL

## 2021-12-03 PROCEDURE — C9803 HOPD COVID-19 SPEC COLLECT: HCPCS

## 2021-12-03 PROCEDURE — 7100000011 HC PHASE II RECOVERY - ADDTL 15 MIN: Performed by: PHYSICAL MEDICINE & REHABILITATION

## 2021-12-03 PROCEDURE — 6360000002 HC RX W HCPCS: Performed by: PHYSICAL MEDICINE & REHABILITATION

## 2021-12-03 PROCEDURE — 7100000010 HC PHASE II RECOVERY - FIRST 15 MIN: Performed by: PHYSICAL MEDICINE & REHABILITATION

## 2021-12-03 PROCEDURE — 99152 MOD SED SAME PHYS/QHP 5/>YRS: CPT | Performed by: PHYSICAL MEDICINE & REHABILITATION

## 2021-12-03 PROCEDURE — 64635 DESTROY LUMB/SAC FACET JNT: CPT | Performed by: PHYSICAL MEDICINE & REHABILITATION

## 2021-12-03 PROCEDURE — 64636 DESTROY L/S FACET JNT ADDL: CPT | Performed by: PHYSICAL MEDICINE & REHABILITATION

## 2021-12-03 PROCEDURE — 3600000056 HC PAIN LEVEL 4 BASE: Performed by: PHYSICAL MEDICINE & REHABILITATION

## 2021-12-03 PROCEDURE — 2580000003 HC RX 258: Performed by: PHYSICAL MEDICINE & REHABILITATION

## 2021-12-03 PROCEDURE — 3600000057 HC PAIN LEVEL 4 ADDL 15 MIN: Performed by: PHYSICAL MEDICINE & REHABILITATION

## 2021-12-03 PROCEDURE — 2709999900 HC NON-CHARGEABLE SUPPLY: Performed by: PHYSICAL MEDICINE & REHABILITATION

## 2021-12-03 PROCEDURE — 87635 SARS-COV-2 COVID-19 AMP PRB: CPT

## 2021-12-03 PROCEDURE — 76000 FLUOROSCOPY <1 HR PHYS/QHP: CPT

## 2021-12-03 PROCEDURE — 99153 MOD SED SAME PHYS/QHP EA: CPT | Performed by: PHYSICAL MEDICINE & REHABILITATION

## 2021-12-03 PROCEDURE — 2500000003 HC RX 250 WO HCPCS: Performed by: PHYSICAL MEDICINE & REHABILITATION

## 2021-12-03 RX ORDER — TRIAMCINOLONE ACETONIDE 40 MG/ML
INJECTION, SUSPENSION INTRA-ARTICULAR; INTRAMUSCULAR PRN
Status: DISCONTINUED | OUTPATIENT
Start: 2021-12-03 | End: 2021-12-03 | Stop reason: ALTCHOICE

## 2021-12-03 RX ORDER — LIDOCAINE HYDROCHLORIDE 20 MG/ML
INJECTION, SOLUTION INFILTRATION; PERINEURAL PRN
Status: DISCONTINUED | OUTPATIENT
Start: 2021-12-03 | End: 2021-12-03 | Stop reason: ALTCHOICE

## 2021-12-03 RX ORDER — MIDAZOLAM HYDROCHLORIDE 1 MG/ML
INJECTION INTRAMUSCULAR; INTRAVENOUS PRN
Status: DISCONTINUED | OUTPATIENT
Start: 2021-12-03 | End: 2021-12-03 | Stop reason: ALTCHOICE

## 2021-12-03 RX ORDER — SODIUM CHLORIDE, SODIUM LACTATE, POTASSIUM CHLORIDE, CALCIUM CHLORIDE 600; 310; 30; 20 MG/100ML; MG/100ML; MG/100ML; MG/100ML
INJECTION, SOLUTION INTRAVENOUS CONTINUOUS
Status: DISCONTINUED | OUTPATIENT
Start: 2021-12-03 | End: 2021-12-03 | Stop reason: HOSPADM

## 2021-12-03 RX ORDER — LIDOCAINE HYDROCHLORIDE 10 MG/ML
INJECTION, SOLUTION EPIDURAL; INFILTRATION; INTRACAUDAL; PERINEURAL PRN
Status: DISCONTINUED | OUTPATIENT
Start: 2021-12-03 | End: 2021-12-03 | Stop reason: ALTCHOICE

## 2021-12-03 RX ORDER — FENTANYL CITRATE 50 UG/ML
INJECTION, SOLUTION INTRAMUSCULAR; INTRAVENOUS PRN
Status: DISCONTINUED | OUTPATIENT
Start: 2021-12-03 | End: 2021-12-03 | Stop reason: ALTCHOICE

## 2021-12-03 RX ADMIN — SODIUM CHLORIDE, POTASSIUM CHLORIDE, SODIUM LACTATE AND CALCIUM CHLORIDE: 600; 310; 30; 20 INJECTION, SOLUTION INTRAVENOUS at 08:36

## 2021-12-03 ASSESSMENT — PAIN - FUNCTIONAL ASSESSMENT: PAIN_FUNCTIONAL_ASSESSMENT: 0-10

## 2021-12-03 NOTE — OP NOTE
Operative Note      Patient: Bianca Granado  YOB: 1945  MRN: [id number]    Date of Procedure: 12/3/2021    Pre-Op Diagnosis: LUMBAR SPONDYLOSIS    Post-Op Diagnosis: Same       Procedure(s):  LUMBAR RFA L 2-3-4-5 BILATERAL MEDIAL BRANCH RFA    Surgeon(s):  Luis Youngblood MD    Assistant:   Surgical Assistant: Yuki Pino    Anesthesia: IV Sedation    Estimated Blood Loss (mL): Minimal    Complications: None    CONSENT:     The risks, benefits, alternatives, plan, complications, and personnel were discussed prior to the procedure. The patient understood and agreed to proceed. PROCEDURE:     Anesthesia: Moderate sedation using 2 mg IV versed & 100 mcg IV fentanyl. The patient was positioned prone. Sign-in and time-out was performed. Identifying the site, in this case, both sides     L2 L3 L4 medial branch and L5 dorsal ramus/rami to address the L3-4 L4-5 and L5-S1 joints     Sterile technique was done in the usual manner using chlorhexidine. Thiis was followed by infiltration of a 16 of 1% preservative-free lidocaine. A 100 mm 20 gauge radiofrequency needles were positioned under fluoroscopic   guidance. A total of 8  radiofrequency needles were positioned. 4 needles were positioned on each side. Left side was performed first followed by the right side. Upon confirmation that the needle was properly positioned, a series of a stimulation was performed as follows: In both motor and sensory stimulation, there was no evidence of radicular pain pattern. Prior to lesioning, 8 mL of 2% lidocaine was injected to the sites identified. 4 ml on each site. 90° for 90 seconds continuous thermal ablation was performed. This was followed by injecting solution of 40 mg of triamcinolone and  7 ml of 1% preservative-free lidocaine was injected without difficulty. The patient tolerated the procedure well and went to the recovery room with stable vital signs.     Moderate Sedation Time: > 15 minutes with a nurse administering the medication(s) under my supervision. The patient was independently monitored by a Registered Nurse assigned to the Procedure Room ( automated blood pressure, continuous EKG, Capnography and continuous pulse oximetry)    Start time: 6793  End Time:  1020      PLAN:   Home instructions were provided. The patient will follow up in 4 to 6 weeks or earlier if needed.        Electronically signed by Chapo Valente MD on 12/3/2021 at 9:35 AM

## 2021-12-03 NOTE — H&P
Clubs or Organizations: Not on file    Attends Club or Organization Meetings: Not on file    Marital Status: Not on file   Intimate Partner Violence:     Fear of Current or Ex-Partner: Not on file    Emotionally Abused: Not on file    Physically Abused: Not on file    Sexually Abused: Not on file   Housing Stability:     Unable to Pay for Housing in the Last Year: Not on file    Number of [place] in the Last Year: Not on file    Unstable Housing in the Last Year: Not on file       Past Medical History:   Diagnosis Date    Arthritis     Cancer ([address]     multiple myeloma    Fibromyalgia     GERD (gastroesophageal reflux disease)     History of blood transfusion     At birth    [address] [facility] [address] Hyperlipidemia     Hypertension     Hypothyroidism     IBS (irritable bowel syndrome)     Osteoarthritis     Thyroid disease     Hypothyroid        Past Surgical History:   Procedure Laterality Date    ABDOMEN SURGERY  2004    Bowel Resection following \"fall on the ice\"; Apple Puentes MD    APPENDECTOMY      COLONOSCOPY      COLONOSCOPY  09-    Dr. Geo Rod      cataract bilateral    GASTRIC FUNDOPLICATION  5161    HEMORRHOID SURGERY      HERNIA REPAIR      Hiatal Hernia repair, Esophageal repair     HERNIA REPAIR Bilateral     Inguinal     PAIN MANAGEMENT PROCEDURE Bilateral 9/17/2021    BILATERAL L2,L3,L4 MEDIAL BRANCH L5 DORSAL RAMI INJECTION performed by Gary Calero MD at [address] [facility] N/A 11/4/2021    BILATERAL L2-3-4-5 MEDIAL BRANCH BLOCK performed by Gary Calero MD at [address] [facility] N/A 1/25/2018    CYSTOSCOPY TRANSURETHRAL RESECTION PROSTATE LASER performed by Ignacio Collins MD at [address] [facility] ARTHROSCOPY Right 10/14/2020    RIGHT SHOULDER ARTHROSCPY PROBABLE RCR. Shoulder Scope Subacromial Decompression. Partial Distal Claviclectomy. Debreidement. Mini RCR Open.   Bicep Tenodecis performed by Charmain Pallas, DO at [address] GASTROINTESTINAL ENDOSCOPY      UPPER GASTROINTESTINAL ENDOSCOPY  09-    Dr. Heidi Lao ENDOSCOPY  12/14/2016    Augusto Fernandez MD       Prior to Visit Medications    Medication Sig Taking? Authorizing Provider   Boswellia-Glucosamine-Vit D (OSTEO BI-FLEX ONE PER DAY PO) Take 1 tablet by mouth daily Yes Historical Provider, MD   amLODIPine (NORVASC) 5 MG tablet TAKE 1 TABLET EVERY DAY Yes Jocelyn Kerr MD   pantoprazole (PROTONIX) 40 MG tablet TAKE 1 TABLET TWICE DAILY Yes Jocelyn Kerr MD   simvastatin (ZOCOR) 20 MG tablet TAKE 1 TABLET EVERY NIGHT Yes Jocelyn Kerr MD   levothyroxine (SYNTHROID) 75 MCG tablet TAKE 1 TABLET EVERY DAY Yes Jocelyn Kerr MD   tamsulosin (FLOMAX) 0.4 MG capsule TAKE 1 CAPSULE EVERY DAY Yes Phan Sawyer PA-C   hydrocortisone 2.5 % cream Apply topically 2 times daily Apply topically 2 times daily. Yes Historical Provider, MD   aspirin 81 MG tablet Take 81 mg by mouth daily Yes Historical Provider, MD   gabapentin (NEURONTIN) 300 MG capsule Take 600 mg by mouth nightly. Yes Historical Provider, MD   gabapentin (NEURONTIN) 600 MG tablet Take 300 mg by mouth 4 times daily. Yes Historical Provider, MD   HYDROcodone-acetaminophen (NORCO) 5-325 MG per tablet Take 1 tablet by mouth every 6 hours as needed for Pain. Yes Historical Provider, MD   lidocaine (LMX) 4 % cream Apply topically as needed for Pain Apply topically as needed. Yes Historical Provider, MD   Multiple Vitamins-Minerals (MULTI COMPLETE PO) Take by mouth Yes Historical Provider, MD   polyethylene glycol (GLYCOLAX) powder Take 17 g by mouth as needed  Yes Historical Provider, MD       Family History   Problem Relation Age of Onset    Cancer Mother         bone    Heart Disease Father         MI         Review of Systems : All systems reviewed, all unremarkable other than HPI. No change since last visit.

## 2021-12-06 ENCOUNTER — HOSPITAL ENCOUNTER (OUTPATIENT)
Age: 76
Discharge: HOME | End: 2021-12-08
Payer: MEDICARE

## 2021-12-06 ENCOUNTER — HOSPITAL ENCOUNTER (OUTPATIENT)
Age: 76
Discharge: HOME | End: 2021-12-06
Payer: MEDICARE

## 2021-12-06 ENCOUNTER — HOSPITAL ENCOUNTER (OUTPATIENT)
Dept: GENERAL RADIOLOGY | Age: 76
Discharge: HOME | End: 2021-12-08
Payer: MEDICARE

## 2021-12-06 DIAGNOSIS — Z01.818 PRE-OP EXAM: ICD-10-CM

## 2021-12-06 LAB
ANION GAP SERPL CALCULATED.3IONS-SCNC: 12 MMOL/L (ref 9–17)
BUN BLDV-MCNC: 18 MG/DL (ref 8–23)
BUN/CREAT BLD: 25 (ref 9–20)
CALCIUM SERPL-MCNC: 9.5 MG/DL (ref 8.6–10.4)
CHLORIDE BLD-SCNC: 103 MMOL/L (ref 98–107)
CO2: 25 MMOL/L (ref 20–31)
CREAT SERPL-MCNC: 0.73 MG/DL (ref 0.7–1.2)
GFR AFRICAN AMERICAN: >60 ML/MIN
GFR NON-AFRICAN AMERICAN: >60 ML/MIN
GFR SERPL CREATININE-BSD FRML MDRD: ABNORMAL ML/MIN/{1.73_M2}
GFR SERPL CREATININE-BSD FRML MDRD: ABNORMAL ML/MIN/{1.73_M2}
GLUCOSE BLD-MCNC: 144 MG/DL (ref 70–99)
HCT VFR BLD CALC: 49.2 % (ref 41–53)
HEMOGLOBIN: 16.5 G/DL (ref 13.5–17.5)
MCH RBC QN AUTO: 29.4 PG (ref 26–34)
MCHC RBC AUTO-ENTMCNC: 33.4 G/DL (ref 31–37)
MCV RBC AUTO: 88 FL (ref 80–100)
NRBC AUTOMATED: ABNORMAL PER 100 WBC
PDW BLD-RTO: 15.3 % (ref 12.1–15.2)
PLATELET # BLD: 279 K/UL (ref 140–450)
PMV BLD AUTO: ABNORMAL FL (ref 6–12)
POTASSIUM SERPL-SCNC: 3.8 MMOL/L (ref 3.7–5.3)
RBC # BLD: 5.59 M/UL (ref 4.5–5.9)
SODIUM BLD-SCNC: 140 MMOL/L (ref 135–144)
WBC # BLD: 10.1 K/UL (ref 3.5–11)

## 2021-12-06 PROCEDURE — 71046 X-RAY EXAM CHEST 2 VIEWS: CPT

## 2021-12-06 PROCEDURE — 80048 BASIC METABOLIC PNL TOTAL CA: CPT

## 2021-12-06 PROCEDURE — 36415 COLL VENOUS BLD VENIPUNCTURE: CPT

## 2021-12-06 PROCEDURE — 85027 COMPLETE CBC AUTOMATED: CPT

## 2021-12-06 PROCEDURE — 93005 ELECTROCARDIOGRAM TRACING: CPT | Performed by: ORTHOPAEDIC SURGERY

## 2021-12-07 LAB
EKG ATRIAL RATE: 88 BPM
EKG P AXIS: 37 DEGREES
EKG P-R INTERVAL: 158 MS
EKG Q-T INTERVAL: 354 MS
EKG QRS DURATION: 72 MS
EKG QTC CALCULATION (BAZETT): 428 MS
EKG R AXIS: 120 DEGREES
EKG T AXIS: 24 DEGREES
EKG VENTRICULAR RATE: 88 BPM

## 2021-12-07 PROCEDURE — 93010 ELECTROCARDIOGRAM REPORT: CPT | Performed by: INTERNAL MEDICINE

## 2021-12-22 ENCOUNTER — OFFICE VISIT (OUTPATIENT)
Dept: FAMILY MEDICINE CLINIC | Age: 76
End: 2021-12-22
Payer: MEDICARE

## 2021-12-22 VITALS
HEART RATE: 90 BPM | DIASTOLIC BLOOD PRESSURE: 74 MMHG | WEIGHT: 195 LBS | OXYGEN SATURATION: 97 % | HEIGHT: 68 IN | SYSTOLIC BLOOD PRESSURE: 132 MMHG | BODY MASS INDEX: 29.55 KG/M2

## 2021-12-22 DIAGNOSIS — I10 ESSENTIAL HYPERTENSION, BENIGN: ICD-10-CM

## 2021-12-22 DIAGNOSIS — K21.9 GASTROESOPHAGEAL REFLUX DISEASE WITHOUT ESOPHAGITIS: ICD-10-CM

## 2021-12-22 DIAGNOSIS — N40.0 BENIGN PROSTATIC HYPERPLASIA WITHOUT LOWER URINARY TRACT SYMPTOMS: ICD-10-CM

## 2021-12-22 DIAGNOSIS — E03.9 ACQUIRED HYPOTHYROIDISM: ICD-10-CM

## 2021-12-22 DIAGNOSIS — E11.9 CONTROLLED TYPE 2 DIABETES MELLITUS WITHOUT COMPLICATION, WITHOUT LONG-TERM CURRENT USE OF INSULIN (HCC): Primary | ICD-10-CM

## 2021-12-22 DIAGNOSIS — E78.00 PURE HYPERCHOLESTEROLEMIA: ICD-10-CM

## 2021-12-22 PROBLEM — M75.102 TEAR OF LEFT ROTATOR CUFF: Status: ACTIVE | Noted: 2021-12-22

## 2021-12-22 LAB — HBA1C MFR BLD: 7.3 %

## 2021-12-22 PROCEDURE — 1123F ACP DISCUSS/DSCN MKR DOCD: CPT | Performed by: FAMILY MEDICINE

## 2021-12-22 PROCEDURE — G8484 FLU IMMUNIZE NO ADMIN: HCPCS | Performed by: FAMILY MEDICINE

## 2021-12-22 PROCEDURE — 83036 HEMOGLOBIN GLYCOSYLATED A1C: CPT | Performed by: FAMILY MEDICINE

## 2021-12-22 PROCEDURE — 3017F COLORECTAL CA SCREEN DOC REV: CPT | Performed by: FAMILY MEDICINE

## 2021-12-22 PROCEDURE — 4004F PT TOBACCO SCREEN RCVD TLK: CPT | Performed by: FAMILY MEDICINE

## 2021-12-22 PROCEDURE — G8417 CALC BMI ABV UP PARAM F/U: HCPCS | Performed by: FAMILY MEDICINE

## 2021-12-22 PROCEDURE — 4040F PNEUMOC VAC/ADMIN/RCVD: CPT | Performed by: FAMILY MEDICINE

## 2021-12-22 PROCEDURE — 99214 OFFICE O/P EST MOD 30 MIN: CPT | Performed by: FAMILY MEDICINE

## 2021-12-22 PROCEDURE — G8427 DOCREV CUR MEDS BY ELIG CLIN: HCPCS | Performed by: FAMILY MEDICINE

## 2021-12-22 PROCEDURE — 2022F DILAT RTA XM EVC RTNOPTHY: CPT | Performed by: FAMILY MEDICINE

## 2021-12-22 PROCEDURE — 3051F HG A1C>EQUAL 7.0%<8.0%: CPT | Performed by: FAMILY MEDICINE

## 2021-12-22 ASSESSMENT — ENCOUNTER SYMPTOMS
EYE REDNESS: 0
VOMITING: 0
COUGH: 0
TROUBLE SWALLOWING: 0
DIARRHEA: 0
NAUSEA: 0
ABDOMINAL PAIN: 0
BLOOD IN STOOL: 0
CHOKING: 0
EYE DISCHARGE: 0
HEARTBURN: 0
CONSTIPATION: 0
SHORTNESS OF BREATH: 0

## 2021-12-22 NOTE — PATIENT INSTRUCTIONS
Survey: You may be receiving a survey from [employer] regarding your visit today. You may get this in the mail, through your MyChart or in your email. Please complete the survey to enable us to provide the highest quality of care to you and your family. Please also, mention our names. If you cannot score us as very good (5 Stars) on any question, please feel free to call the office to discuss how we could have made your experience exceptional.      Thank You!         MD Rojelio López LPN

## 2021-12-22 NOTE — PROGRESS NOTES
include tamsulosin. The treatment provided significant relief. Diabetes  He presents for his follow-up diabetic visit. He has type 2 diabetes mellitus. Pertinent negatives for hypoglycemia include no dizziness or headaches. Pertinent negatives for diabetes include no chest pain and no fatigue. There are no hypoglycemic complications. Risk factors for coronary artery disease include diabetes mellitus, dyslipidemia, hypertension and male sex. Current diabetic treatment includes diet. He is compliant with treatment most of the time. His weight is stable. He is following a diabetic diet. When asked about meal planning, he reported none. Exercise: pt has been sitting in chair more due to shoulder surgery and back pain. pt usually more acitve. His home blood glucose trend is increasing steadily. An ACE inhibitor/angiotensin II receptor blocker is not being taken. Eye exam is current. Hypothyroidism- Chronic/stable, Patient denies changes in weight,bowels,palpitations. Energy is good Last TSH in July 2021 and was normal      Past Medical History:     Past Medical History:   Diagnosis Date    Arthritis     Cancer ([facility] [address]     multiple myeloma    Fibromyalgia     GERD (gastroesophageal reflux disease)     History of blood transfusion     At birth     Hyperlipidemia     Hypertension     Hypothyroidism     IBS (irritable bowel syndrome)     Osteoarthritis     Thyroid disease     Hypothyroid       Reviewed all health maintenance requirements and ordered appropriate tests  Health Maintenance Due   Topic Date Due    DTaP/Tdap/Td vaccine (1 - Tdap) Never done    Low dose CT lung screening  Never done    Shingles Vaccine (2 of 3) 03/26/2012    Diabetic microalbuminuria test  11/23/2017    Diabetic retinal exam  09/15/2021       Past Surgical History:     Past Surgical History:   Procedure Laterality Date    ABDOMEN SURGERY  2004    Bowel Resection following \"fall on the ice\";  [facility] COLONOSCOPY      COLONOSCOPY  09-    Dr. Saint Spates      cataract bilateral    GASTRIC FUNDOPLICATION  0203    HEMORRHOID SURGERY      HERNIA REPAIR      Hiatal Hernia repair, Esophageal repair     HERNIA REPAIR Bilateral     Inguinal     PAIN MANAGEMENT PROCEDURE Bilateral 9/17/2021    BILATERAL L2,L3,L4 MEDIAL BRANCH L5 DORSAL RAMI INJECTION performed by Ramirez Cyr MD at [facility] [address] N/A 11/4/2021    BILATERAL L2-3-4-5 MEDIAL BRANCH BLOCK performed by Ramirez Cyr MD at [facility] [address] Bilateral 12/3/2021    LUMBAR RFA L 2-3-4-5 BILATERAL MEDIAL BRANCH RFA performed by Ramirez Cyr MD at [address] [facility] N/A 1/25/2018    CYSTOSCOPY TRANSURETHRAL RESECTION PROSTATE LASER performed by Grant Claudio MD at [address] [facility] ARTHROSCOPY Right 10/14/2020    RIGHT SHOULDER ARTHROSCPY PROBABLE RCR. Shoulder Scope Subacromial Decompression. Partial Distal Claviclectomy. Debreidement. Mini RCR Open. Bicep Tenodecis performed by Home Francois DO at [address]  [facility] ENDOSCOPY      UPPER GASTROINTESTINAL ENDOSCOPY  09-    Dr. Modesta Pineda ENDOSCOPY  12/14/2016    Sidney Dallas MD        Medications:       Prior to Admission medications    Medication Sig Start Date End Date Taking?  Authorizing Provider   amLODIPine (NORVASC) 5 MG tablet TAKE 1 TABLET EVERY DAY 8/19/21  Yes Sandy Dooley MD   pantoprazole (PROTONIX) 40 MG tablet TAKE 1 TABLET TWICE DAILY 8/19/21  Yes Sandy Dooley MD   simvastatin (ZOCOR) 20 MG tablet TAKE 1 TABLET EVERY NIGHT 8/19/21  Yes Sandy Dooley MD   levothyroxine (SYNTHROID) 75 MCG tablet TAKE 1 TABLET EVERY DAY 8/19/21  Yes Sandy Dooley MD   tamsulosin (FLOMAX) 0.4 MG capsule TAKE 1 CAPSULE EVERY DAY 5/18/21  Yes Farhad Sawyer PA-C   aspirin 81 MG tablet Take 81 mg by mouth daily   Yes Historical Provider, MD   gabapentin (NEURONTIN) 300 MG capsule Take 600 mg by mouth nightly. Yes Historical Provider, MD   gabapentin (NEURONTIN) 600 MG tablet Take 300 mg by mouth 4 times daily. Yes Historical Provider, MD   HYDROcodone-acetaminophen (NORCO) 5-325 MG per tablet Take 1 tablet by mouth every 6 hours as needed for Pain. Yes Historical Provider, MD   Multiple Vitamins-Minerals (MULTI COMPLETE PO) Take by mouth   Yes Historical Provider, MD   polyethylene glycol (GLYCOLAX) powder Take 17 g by mouth as needed    Yes Historical Provider, MD   hydrocortisone 2.5 % cream Apply topically 2 times daily Apply topically 2 times daily. Historical Provider, MD   lidocaine (LMX) 4 % cream Apply topically as needed for Pain Apply topically as needed. Historical Provider, MD        Allergies:       Adhesive tape and Wound dressing adhesive    Social History:     Tobacco:    reports that he has been smoking cigarettes. He has a 20.00 pack-year smoking history. He has never used smokeless tobacco.  Alcohol:      reports current alcohol use. Drug Use:  reports no history of drug use. Family History:     Family History   Problem Relation Age of Onset    Cancer Mother         bone    Heart Disease Father         MI       Review of Systems:       Review of Systems   Constitutional: Negative for chills, fatigue and fever. HENT: Negative for trouble swallowing. Eyes: Negative for discharge, redness and visual disturbance. Respiratory: Negative for cough, choking and shortness of breath. Cardiovascular: Negative for chest pain, palpitations and leg swelling. Gastrointestinal: Negative for abdominal pain, blood in stool, constipation, diarrhea, heartburn, nausea and vomiting. Genitourinary: Negative for dysuria and hematuria. Musculoskeletal: Negative for joint swelling and neck stiffness.         Seeing pain mgn for back and just had Lt shoulder surgery per ortho.   Skin: Negative for rash. Neurological: Negative for dizziness, light-headedness and headaches. Psychiatric/Behavioral: Negative for sleep disturbance. Physical Exam:     Physical Exam  Vitals reviewed. Constitutional:       Appearance: He is well-developed. HENT:      Head: Normocephalic and atraumatic. Eyes:      General:         Right eye: No discharge. Left eye: No discharge. Conjunctiva/sclera: Conjunctivae normal.   Neck:      Thyroid: No thyromegaly. Vascular: No carotid bruit. Cardiovascular:      Rate and Rhythm: Normal rate and regular rhythm. Heart sounds: No murmur heard. Pulmonary:      Effort: Pulmonary effort is normal. No respiratory distress. Breath sounds: Normal breath sounds. Abdominal:      General: Bowel sounds are normal.      Palpations: Abdomen is soft. Tenderness: There is no abdominal tenderness. Musculoskeletal:      Cervical back: Neck supple. Right lower leg: No edema. Left lower leg: No edema. Lymphadenopathy:      Cervical: No cervical adenopathy. Skin:     Findings: No rash. Comments: microfilament sensation intact and no lesions or sores on feet bilateral. +2 DP pulse bilateral.     Neurological:      Mental Status: He is alert and oriented to person, place, and time.    Psychiatric:         Mood and Affect: Mood normal.         Behavior: Behavior normal.         Vitals:  /74   Pulse 90   Ht 5' 8\" (1.727 m)   Wt 195 lb (88.5 kg)   SpO2 97%   BMI 29.65 kg/m²       Data:     Lab Results   Component Value Date     12/06/2021    K 3.8 12/06/2021     12/06/2021    CO2 25 12/06/2021    BUN 18 12/06/2021    CREATININE 0.73 12/06/2021    GLUCOSE 144 12/06/2021    GLUCOSE 117 04/03/2012    PROT 7.4 01/04/2021    LABALBU 4.6 01/04/2021    LABALBU 4.5 04/03/2012    BILITOT 0.39 01/04/2021    ALKPHOS 91 01/04/2021    AST 24 01/04/2021    ALT 33 01/04/2021     Lab Results   Component Value Date    WBC 10.1 12/06/2021    RBC 5.59 12/06/2021    HGB 16.5 12/06/2021    HCT 49.2 12/06/2021    MCV 88.0 12/06/2021    MCH 29.4 12/06/2021    MCHC 33.4 12/06/2021    RDW 15.3 12/06/2021     12/06/2021    MPV NOT REPORTED 12/06/2021     Lab Results   Component Value Date    TSH 3.55 07/06/2021     Lab Results   Component Value Date    CHOL 135 07/06/2021    HDL 32 07/06/2021    PSA 2.92 01/06/2017    LABA1C 7.3 12/22/2021          Assessment/Plan:        1. Essential hypertension, benign  Stable no Norvasc and labs in July     2. Pure hypercholesterolemia  Stable on zocor and labs in July     3. Gastroesophageal reflux disease without esophagitis  Stable on protonix     4. Benign prostatic hyperplasia without lower urinary tract symptoms  Stable on flomax    5. Controlled type 2 diabetes mellitus without complication, without long-term current use of insulin (HCC)  F/U 4mos, in office  HgbA1C. Do daily foot checks and yearly eye exams  Pt to increase water and exercise and reviewed DM diet while in [place] Jan-April. Mer in April and if can't get Hgba1c <6.8 then needs to start metformin. Pt leaving for [place] so not starting medication now. - POCT glycosylated hemoglobin (Hb A1C)  -  DIABETES FOOT EXAM    6. Acquired hypothyroidism  Stable on synthroid and labs in July        Julio received counseling on the following healthy behaviors: nutrition and exercise  Reviewed prior labs and health maintenance  Continue current medications, diet and exercise. Discussed use, benefit, and side effects of prescribed medications. Barriers to medication compliance addressed. Patient given educational materials - see patient instructions  Was a self-tracking handout given in paper form or via [employer]? Yes    Requested Prescriptions      No prescriptions requested or ordered in this encounter       All patient questions answered. Patient voiced understanding. Quality Measures    Body mass index is 29.65 kg/m². Elevated. Weight control planned discussed Healthy diet and regular exercise. BP: 132/74. Blood pressure is normal. Treatment plan consists of No treatment change needed. Fall Risk 7/6/2021 1/5/2021 8/21/2019 6/26/2018 5/18/2017 5/2/2016 7/30/2014   2 or more falls in past year? no no no no no no no   Fall with injury in past year? no no no no no no no     The patient does not have a history of falls. I did not - not indicated , complete a risk assessment for falls. A plan of care for falls No Treatment plan indicated    Lab Results   Component Value Date    LDLCHOLESTEROL 76 07/06/2021    (goal LDL reduction with dx if diabetes is 50% LDL reduction)    PHQ Scores 7/6/2021 1/5/2021 7/1/2020 8/21/2019 6/26/2018 5/18/2017 7/30/2014   PHQ2 Score 0 0 0 0 0 0 0   PHQ9 Score 0 0 0 0 0 0 0     Interpretation of Total Score Depression Severity: 1-4 = Minimal depression, 5-9 = Mild depression, 10-14 = Moderate depression, 15-19 = Moderately severe depression, 20-27 = Severe depression        Return in about 4 months (around 4/22/2022) for DM.       Electronically signed by Denise Gupta MD on 12/22/2021 at 8:47 AM

## 2022-01-07 ENCOUNTER — OFFICE VISIT (OUTPATIENT)
Dept: PAIN MANAGEMENT | Age: 77
End: 2022-01-07
Payer: MEDICARE

## 2022-01-07 VITALS — BODY MASS INDEX: 29.65 KG/M2 | WEIGHT: 195 LBS

## 2022-01-07 DIAGNOSIS — M47.816 LUMBAR SPONDYLOSIS: Primary | ICD-10-CM

## 2022-01-07 PROCEDURE — 3017F COLORECTAL CA SCREEN DOC REV: CPT | Performed by: PHYSICAL MEDICINE & REHABILITATION

## 2022-01-07 PROCEDURE — G8428 CUR MEDS NOT DOCUMENT: HCPCS | Performed by: PHYSICAL MEDICINE & REHABILITATION

## 2022-01-07 PROCEDURE — 4004F PT TOBACCO SCREEN RCVD TLK: CPT | Performed by: PHYSICAL MEDICINE & REHABILITATION

## 2022-01-07 PROCEDURE — G8417 CALC BMI ABV UP PARAM F/U: HCPCS | Performed by: PHYSICAL MEDICINE & REHABILITATION

## 2022-01-07 PROCEDURE — 99213 OFFICE O/P EST LOW 20 MIN: CPT | Performed by: PHYSICAL MEDICINE & REHABILITATION

## 2022-01-07 PROCEDURE — 4040F PNEUMOC VAC/ADMIN/RCVD: CPT | Performed by: PHYSICAL MEDICINE & REHABILITATION

## 2022-01-07 PROCEDURE — 1123F ACP DISCUSS/DSCN MKR DOCD: CPT | Performed by: PHYSICAL MEDICINE & REHABILITATION

## 2022-01-07 PROCEDURE — G8484 FLU IMMUNIZE NO ADMIN: HCPCS | Performed by: PHYSICAL MEDICINE & REHABILITATION

## 2022-01-07 NOTE — PROGRESS NOTES
FOLLOW UP APPOINTMENT:         Elian Ta MD    2022       Trang Rajput is a 78 y.o. male, who came for a  follow up to discuss treatment options    Cause of the symptom(s): degenerative (arthritis)    Onset: chronic    Prior reatment done: physical therapy, injection and anti-inflammatory medication    Current pain level: 2 on the scale of 0-10 ( 10 being worst)       Allergies   Allergen Reactions    Adhesive Tape Rash    Wound Dressing Adhesive      Redness, irritation       Social History     Socioeconomic History    Marital status:      Spouse name: Not on file    Number of children: Not on file    Years of education: Not on file    Highest education level: Not on file   Occupational History    Not on file   Tobacco Use    Smoking status: Current Some Day Smoker     Packs/day: 0.50     Years: 40.00     Pack years: 20.00     Types: Cigarettes     Last attempt to quit: 2014     Years since quittin.8    Smokeless tobacco: Never Used   Vaping Use    Vaping Use: Never used   Substance and Sexual Activity    Alcohol use: Yes     Comment: seldom    Drug use: No    Sexual activity: Not on file   Other Topics Concern    Not on file   Social History Narrative    Not on file     Social Determinants of Health     Financial Resource Strain: Low Risk     Difficulty of Paying Living Expenses: Not hard at all   Food Insecurity: No Food Insecurity    Worried About Running Out of Food in the Last Year: Never true    David of Food in the Last Year: Never true   Transportation Needs:     Lack of Transportation (Medical): Not on file    Lack of Transportation (Non-Medical):  Not on file   Physical Activity:     Days of Exercise per Week: Not on file    Minutes of Exercise per Session: Not on file   Stress:     Feeling of Stress : Not on file   Social Connections:     Frequency of Communication with Friends and Family: Not on file    Frequency of Social Gatherings with Friends and Family: Not on file    Attends Mosque Services: Not on file    Active Member of Clubs or Organizations: Not on file    Attends Club or Organization Meetings: Not on file    Marital Status: Not on file   Intimate Partner Violence:     Fear of Current or Ex-Partner: Not on file    Emotionally Abused: Not on file    Physically Abused: Not on file    Sexually Abused: Not on file   Housing Stability:     Unable to Pay for Housing in the Last Year: Not on file    Number of [place] in the Last Year: Not on file    Unstable Housing in the Last Year: Not on file       Past Medical History:   Diagnosis Date    Arthritis     Cancer ([facility] [address]     multiple myeloma    Fibromyalgia     GERD (gastroesophageal reflux disease)     History of blood transfusion     At birth    Katharina Draft Hyperlipidemia     Hypertension     Hypothyroidism     IBS (irritable bowel syndrome)     Osteoarthritis     Thyroid disease     Hypothyroid        Past Surgical History:   Procedure Laterality Date    ABDOMEN SURGERY  2004    Bowel Resection following \"fall on the ice\";  Maria L Lilly MD    APPENDECTOMY      COLONOSCOPY      COLONOSCOPY  09-    Dr. Leander Solis      cataract bilateral    GASTRIC FUNDOPLICATION  [address]    HEMORRHOID SURGERY      HERNIA REPAIR      Hiatal Hernia repair, Esophageal repair     HERNIA REPAIR Bilateral     Inguinal     PAIN MANAGEMENT PROCEDURE Bilateral 9/17/2021    BILATERAL L2,L3,L4 MEDIAL BRANCH L5 DORSAL RAMI INJECTION performed by Bibiana Bustillo MD at [address] [place] [address] N/A 11/4/2021    BILATERAL L2-3-4-5 MEDIAL BRANCH BLOCK performed by Bibiana Bustillo MD at [address] [place] [address] Bilateral 12/3/2021    LUMBAR RFA L 2-3-4-5 BILATERAL MEDIAL BRANCH RFA performed by Bibiana Bustillo MD at [address] [place] [address] N/A 1/25/2018    CYSTOSCOPY TRANSURETHRAL RESECTION PROSTATE LASER performed by Cindy Khan MD at [address] [facility] ARTHROSCOPY Right 10/14/2020    RIGHT SHOULDER ARTHROSCPY PROBABLE RCR. Shoulder Scope Subacromial Decompression. Partial Distal Claviclectomy. Debreidement. Mini RCR Open. Bicep Tenodecis performed by Balta Lau DO at [address] ENDOSCOPY      UPPER GASTROINTESTINAL ENDOSCOPY  09-    Dr. Stacie Pickering  12/14/2016    Hema Romero MD       Family History   Problem Relation Age of Onset    Cancer Mother         bone    Heart Disease Father         MI        Prior to Visit Medications    Medication Sig Taking? Authorizing Provider   amLODIPine (NORVASC) 5 MG tablet TAKE 1 TABLET EVERY DAY Yes Yobani Chu MD   pantoprazole (PROTONIX) 40 MG tablet TAKE 1 TABLET TWICE DAILY Yes Yobani Chu MD   simvastatin (ZOCOR) 20 MG tablet TAKE 1 TABLET EVERY NIGHT Yes Yobani Chu MD   levothyroxine (SYNTHROID) 75 MCG tablet TAKE 1 TABLET EVERY DAY Yes Yobani Chu MD   tamsulosin (FLOMAX) 0.4 MG capsule TAKE 1 CAPSULE EVERY DAY Yes Phan Sawyer PA-C   hydrocortisone 2.5 % cream Apply topically 2 times daily Apply topically 2 times daily. Yes Historical Provider, MD   aspirin 81 MG tablet Take 81 mg by mouth daily Yes Historical Provider, MD   gabapentin (NEURONTIN) 300 MG capsule Take 600 mg by mouth nightly. Yes Historical Provider, MD   gabapentin (NEURONTIN) 600 MG tablet Take 300 mg by mouth 4 times daily. Yes Historical Provider, MD   HYDROcodone-acetaminophen (NORCO) 5-325 MG per tablet Take 1 tablet by mouth every 6 hours as needed for Pain. Yes Historical Provider, MD   lidocaine (LMX) 4 % cream Apply topically as needed for Pain Apply topically as needed.  Yes Historical Provider, MD   Multiple Vitamins-Minerals (MULTI COMPLETE PO) Take by mouth Yes Historical Provider, MD   polyethylene glycol (GLYCOLAX) powder Take 17 g by mouth as needed  Yes Historical Provider, MD         Review of Systems  : All systems reviewed, all unremarkable other than HPI/subjective. No change since last visit. Denies  fever, chills, infection or non healing wound. Wt 195 lb (88.5 kg)   BMI 29.65 kg/m²       Physical Exam  HENT:      Head: Atraumatic. Pulmonary:      Effort: Pulmonary effort is normal. No respiratory distress. Breath sounds: Normal breath sounds. Musculoskeletal:      Cervical back: Neck supple. Neurological:      General: No focal deficit present. Mental Status: He is alert and oriented to person, place, and time. Psychiatric:         Behavior: Behavior normal.         Assessment and Plan:      Diagnosis Orders   1. Lumbar spondylosis         Doing well with the injection. Follow up in 6 months. All questions were answered and imaging studies reviewed with the patient. I have reviewed the chief complaint and HPI including the [facility] and Vital documentation by my staff and I agree with their documentation and have added where applicable. Time spent with patient was 15  minutes. More than 50% was spent counseling/coordinating the patient's care.        Joseph Stark MD   Spine Medicine/PM&R

## 2022-01-11 RX ORDER — AMLODIPINE BESYLATE 5 MG/1
TABLET ORAL
Qty: 90 TABLET | Refills: 1 | Status: SHIPPED | OUTPATIENT
Start: 2022-01-11 | End: 2022-06-02

## 2022-01-11 RX ORDER — PANTOPRAZOLE SODIUM 40 MG/1
TABLET, DELAYED RELEASE ORAL
Qty: 180 TABLET | Refills: 1 | Status: SHIPPED | OUTPATIENT
Start: 2022-01-11 | End: 2022-06-02

## 2022-01-11 RX ORDER — LEVOTHYROXINE SODIUM 0.07 MG/1
TABLET ORAL
Qty: 90 TABLET | Refills: 1 | Status: SHIPPED | OUTPATIENT
Start: 2022-01-11 | End: 2022-06-02

## 2022-01-11 RX ORDER — SIMVASTATIN 20 MG
TABLET ORAL
Qty: 90 TABLET | Refills: 1 | Status: SHIPPED | OUTPATIENT
Start: 2022-01-11 | End: 2022-06-02

## 2022-01-27 NOTE — PRE-CERTIFICATION NOTE
Medicare Cap     [] Physical Therapy  [] Speech Therapy  [] Occupational therapy    *PT and Speech caps combine      $1940 Cap limit < kx modifier needed < $3536 requires pre-cert     Patient Name: John Parekh  YOB: 1945     Date of [address] Name $$$ charge Daily Charge YTD   Total $   11/3/20 Daniel, Ex 113.90 113.90 113.90   11/4/20 Ex x 3 90.24 90.24 --   11/6/20 Ex x 3 90.24 90.24 --   11/9/20 Ex x 3 90.24 90.24 --   11/11/20 Ex x 2 60.16 60.16 444.78   11/23/20 Ex x 2 30.08 x 2 60.16 504.94   11/24/20 Ex x 2 30.08 x 2 60.16 565.10   11/27/20 Ex x 2 30.08 x 2 60.16 625.26   11/30/20 Ex x 2, man 30.08 x 2, 27.71 87.87 713.13   12/2/20 Ex x 2, man x 1 30.08 x 2, 27.71 87.87 801.00   12/4/20 Ex x 2, man x 1 30.08 x 2, 27.71 87.87 888.87   12/7/20 Ex x 2, man x 1 30.08 x 2, 27.71 87.87 976.74
Medicare Cap     [] Physical Therapy  [] Speech Therapy  [] Occupational therapy    *PT and Speech caps combine      $1940 Cap limit < kx modifier needed < $8131 requires pre-cert     Patient Name: Duran Jiménez  YOB: 1945     Date of [address] Name $$$ charge Daily Charge YTD   Total $   11/3/20 Daniel, Ex 113.90 113.90 113.90   11/4/20 Ex x 3 90.24 90.24 --   11/6/20 Ex x 3 90.24 90.24 --   11/9/20 Ex x 3 90.24 90.24 --   11/11/20 Ex x 2 60.16 60.16 444.78   11/23/20 Ex x 2 30.08 x 2 60.16 504.94   11/24/20 Ex x 2 30.08 x 2 60.16 565.10   11/27/20 Ex x 2 30.08 x 2 60.16 625.26   11/30/20 Ex x 2, man 30.08 x 2, 27.71 87.87 713.13   12/2/20 Ex x 2, man x 1 30.08 x 2, 27.71 87.87 801.00   12/4/20 Ex x 2, man x 1 30.08 x 2, 27.71 87.87 888.87   12/7/20 Ex x 2, man x 1
show

## 2022-02-23 DIAGNOSIS — N13.8 BPH WITH OBSTRUCTION/LOWER URINARY TRACT SYMPTOMS: ICD-10-CM

## 2022-02-23 DIAGNOSIS — N40.1 BPH WITH OBSTRUCTION/LOWER URINARY TRACT SYMPTOMS: ICD-10-CM

## 2022-02-24 RX ORDER — TAMSULOSIN HYDROCHLORIDE 0.4 MG/1
CAPSULE ORAL
Qty: 90 CAPSULE | Refills: 3 | Status: SHIPPED | OUTPATIENT
Start: 2022-02-24

## 2022-02-24 NOTE — TELEPHONE ENCOUNTER
Patient chart was reviewed. Patient tolerates Flomax well. We will refill his medication.   Patient has an upcoming appointment in 3 months

## 2022-04-25 ENCOUNTER — OFFICE VISIT (OUTPATIENT)
Dept: FAMILY MEDICINE CLINIC | Age: 77
End: 2022-04-25
Payer: MEDICARE

## 2022-04-25 VITALS
WEIGHT: 198 LBS | DIASTOLIC BLOOD PRESSURE: 80 MMHG | SYSTOLIC BLOOD PRESSURE: 138 MMHG | OXYGEN SATURATION: 96 % | HEART RATE: 88 BPM | BODY MASS INDEX: 30.11 KG/M2

## 2022-04-25 DIAGNOSIS — E11.9 CONTROLLED TYPE 2 DIABETES MELLITUS WITHOUT COMPLICATION, WITHOUT LONG-TERM CURRENT USE OF INSULIN (HCC): Primary | ICD-10-CM

## 2022-04-25 DIAGNOSIS — K40.90 LEFT GROIN HERNIA: ICD-10-CM

## 2022-04-25 LAB — HBA1C MFR BLD: 7 %

## 2022-04-25 PROCEDURE — 4004F PT TOBACCO SCREEN RCVD TLK: CPT | Performed by: FAMILY MEDICINE

## 2022-04-25 PROCEDURE — 4040F PNEUMOC VAC/ADMIN/RCVD: CPT | Performed by: FAMILY MEDICINE

## 2022-04-25 PROCEDURE — 3051F HG A1C>EQUAL 7.0%<8.0%: CPT | Performed by: FAMILY MEDICINE

## 2022-04-25 PROCEDURE — G8427 DOCREV CUR MEDS BY ELIG CLIN: HCPCS | Performed by: FAMILY MEDICINE

## 2022-04-25 PROCEDURE — 99213 OFFICE O/P EST LOW 20 MIN: CPT | Performed by: FAMILY MEDICINE

## 2022-04-25 PROCEDURE — 83036 HEMOGLOBIN GLYCOSYLATED A1C: CPT | Performed by: FAMILY MEDICINE

## 2022-04-25 PROCEDURE — 3017F COLORECTAL CA SCREEN DOC REV: CPT | Performed by: FAMILY MEDICINE

## 2022-04-25 PROCEDURE — G8417 CALC BMI ABV UP PARAM F/U: HCPCS | Performed by: FAMILY MEDICINE

## 2022-04-25 PROCEDURE — 1123F ACP DISCUSS/DSCN MKR DOCD: CPT | Performed by: FAMILY MEDICINE

## 2022-04-25 PROCEDURE — 2022F DILAT RTA XM EVC RTNOPTHY: CPT | Performed by: FAMILY MEDICINE

## 2022-04-25 ASSESSMENT — ENCOUNTER SYMPTOMS
COUGH: 0
DIARRHEA: 0
NAUSEA: 0
VISUAL CHANGE: 0
TROUBLE SWALLOWING: 0
EYE DISCHARGE: 0
EYE REDNESS: 0
ABDOMINAL PAIN: 0
SHORTNESS OF BREATH: 0
VOMITING: 0
BLOOD IN STOOL: 0
CONSTIPATION: 0

## 2022-04-25 ASSESSMENT — PATIENT HEALTH QUESTIONNAIRE - PHQ9
1. LITTLE INTEREST OR PLEASURE IN DOING THINGS: 0
SUM OF ALL RESPONSES TO PHQ QUESTIONS 1-9: 0
2. FEELING DOWN, DEPRESSED OR HOPELESS: 0
SUM OF ALL RESPONSES TO PHQ9 QUESTIONS 1 & 2: 0
SUM OF ALL RESPONSES TO PHQ QUESTIONS 1-9: 0

## 2022-04-25 NOTE — PROGRESS NOTES
HPI Notes    Name: Claudia Stark  : 1945        Chief Complaint:     Chief Complaint   Patient presents with    Diabetes     Pt presents today for follow up. 21 If HgA1C < 6.8 then patients needs to start Metformin. History of Present Illness:     Claudia Stark is a 78 y.o.  male who presents with Diabetes (Pt presents today for follow up. 21 If HgA1C < 6.8 then patients needs to start Metformin.)      Diabetes  He presents for his follow-up diabetic visit. He has type 2 diabetes mellitus. His disease course has been stable. There are no hypoglycemic associated symptoms. Pertinent negatives for hypoglycemia include no dizziness, headaches or tremors. Pertinent negatives for diabetes include no chest pain, no fatigue, no foot paresthesias, no foot ulcerations, no visual change and no weight loss. There are no hypoglycemic complications. Current diabetic treatment includes diet. He is compliant with treatment most of the time. His weight is stable. He is following a generally healthy diet. He participates in exercise weekly. His home blood glucose trend is decreasing steadily (hgba1c from 7.3 to 7.0). Eye exam is current. Hernia - Lt lower groin bulge -- noticed more this winter. No pain. No heavy lifting. But was off miralax for awhile so admits may have been straining more with BMs.  Pt back on miralax and not groin pain    Past Medical History:     Past Medical History:   Diagnosis Date    Arthritis     Cancer ([facility] [address]     multiple myeloma    Fibromyalgia     GERD (gastroesophageal reflux disease)     History of blood transfusion     At birth     Hyperlipidemia     Hypertension     Hypothyroidism     IBS (irritable bowel syndrome)     Osteoarthritis     Thyroid disease     Hypothyroid       Reviewed all health maintenance requirements and ordered appropriate tests  Health Maintenance Due   Topic Date Due    DTaP/Tdap/Td vaccine (1 - Tdap) Never done    Low dose CT lung screening  Never done    Shingles Vaccine (2 of 3) 03/26/2012    Diabetic microalbuminuria test  11/23/2017    Diabetic retinal exam  09/15/2021       Past Surgical History:     Past Surgical History:   Procedure Laterality Date    ABDOMEN SURGERY  2004    Bowel Resection following \"fall on the ice\"; Silviano Frank MD    APPENDECTOMY      COLONOSCOPY      COLONOSCOPY  09-    Dr. Lazar[facility]      cataract bilateral    GASTRIC FUNDOPLICATION  3720    HEMORRHOID SURGERY      HERNIA REPAIR      Hiatal Hernia repair, Esophageal repair     HERNIA REPAIR Bilateral     Inguinal     PAIN MANAGEMENT PROCEDURE Bilateral 9/17/2021    BILATERAL L2,L3,L4 MEDIAL BRANCH L5 DORSAL RAMI INJECTION performed by Surendra Lo MD at [facility] [address] N/A 11/4/2021    BILATERAL L2-3-4-5 MEDIAL BRANCH BLOCK performed by Surendra Lo MD at [facility] [address] Bilateral 12/3/2021    LUMBAR RFA L 2-3-4-5 BILATERAL MEDIAL BRANCH RFA performed by Surendra Lo MD at [address] [place] [address] B, COMPLETE N/A 1/25/2018    CYSTOSCOPY TRANSURETHRAL RESECTION PROSTATE LASER performed by Debbi Noland MD at [address] [place] [address] ARTHROSCOPY Right 10/14/2020    RIGHT SHOULDER ARTHROSCPY PROBABLE RCR. Shoulder Scope Subacromial Decompression. Partial Distal Claviclectomy. Debreidement. Mini RCR Open. Bicep Tenodecis performed by [facility]  at [place] [address] ENDOSCOPY      UPPER GASTROINTESTINAL ENDOSCOPY  09-    Dr. Nena Vega ENDOSCOPY  12/14/2016    David Alvarez MD        Medications:       Prior to Admission medications    Medication Sig Start Date End Date Taking?  Authorizing Provider   tamsulosin (FLOMAX) 0.4 MG capsule TAKE 1 CAPSULE EVERY DAY 2/24/22  Yes Phan Sawyer PA-C   simvastatin (ZOCOR) 20 MG tablet TAKE 1 TABLET EVERY NIGHT 1/11/22  Yes Jd Diehl MD   levothyroxine (SYNTHROID) 75 MCG tablet TAKE 1 TABLET EVERY DAY 1/11/22  Yes Jd Diehl MD   pantoprazole (PROTONIX) 40 MG tablet TAKE 1 TABLET TWICE DAILY 1/11/22  Yes Jd Diehl MD   amLODIPine (NORVASC) 5 MG tablet TAKE 1 TABLET EVERY DAY 1/11/22  Yes Jd Diehl MD   hydrocortisone 2.5 % cream Apply topically 2 times daily Apply topically 2 times daily. Yes Historical Provider, MD   aspirin 81 MG tablet Take 81 mg by mouth daily   Yes Historical Provider, MD   gabapentin (NEURONTIN) 300 MG capsule Take 600 mg by mouth nightly. Yes Historical Provider, MD   gabapentin (NEURONTIN) 600 MG tablet Take 300 mg by mouth 4 times daily. Yes Historical Provider, MD   HYDROcodone-acetaminophen (NORCO) 5-325 MG per tablet Take 1 tablet by mouth every 6 hours as needed for Pain. Yes Historical Provider, MD   lidocaine (LMX) 4 % cream Apply topically as needed for Pain Apply topically as needed. Yes Historical Provider, MD   Multiple Vitamins-Minerals (MULTI COMPLETE PO) Take by mouth   Yes Historical Provider, MD   polyethylene glycol (GLYCOLAX) powder Take 17 g by mouth as needed    Yes Historical Provider, MD        Allergies:       Adhesive tape and Wound dressing adhesive    Social History:     Tobacco:    reports that he has been smoking cigarettes. He has a 20.00 pack-year smoking history. He has never used smokeless tobacco.  Alcohol:      reports current alcohol use. Drug Use:  reports no history of drug use. Family History:     Family History   Problem Relation Age of Onset    Cancer Mother         bone    Heart Disease Father         MI       Review of Systems:       Review of Systems   Constitutional: Negative for chills, fatigue, fever and weight loss. HENT: Negative for trouble swallowing. Eyes: Negative for discharge, redness and visual disturbance. Respiratory: Negative for cough and shortness of breath.     Cardiovascular: Negative for chest pain and palpitations. Gastrointestinal: Negative for abdominal pain, blood in stool, constipation, diarrhea, nausea and vomiting. Hernia   Genitourinary: Negative for dysuria and hematuria. Musculoskeletal: Negative for joint swelling and neck stiffness. Skin: Negative for rash. Neurological: Negative for dizziness, tremors, light-headedness and headaches. Psychiatric/Behavioral: Negative for sleep disturbance. Physical Exam:     Physical Exam  Vitals reviewed. Constitutional:       General: He is not in acute distress. Appearance: He is well-developed. He is not ill-appearing. HENT:      Head: Normocephalic and atraumatic. Eyes:      General:         Right eye: No discharge. Left eye: No discharge. Conjunctiva/sclera: Conjunctivae normal.   Neck:      Thyroid: No thyromegaly. Vascular: No carotid bruit. Cardiovascular:      Rate and Rhythm: Normal rate and regular rhythm. Heart sounds: No murmur heard. Pulmonary:      Effort: Pulmonary effort is normal.      Breath sounds: Normal breath sounds. Abdominal:      General: Bowel sounds are normal. There is no distension. Palpations: Abdomen is soft. Tenderness: There is no abdominal tenderness. Hernia: A hernia is present. Hernia is present in the left inguinal area. Musculoskeletal:      Cervical back: Neck supple. Right lower leg: No edema. Left lower leg: No edema. Lymphadenopathy:      Cervical: No cervical adenopathy. Skin:     Findings: No rash. Neurological:      Mental Status: He is alert and oriented to person, place, and time.    Psychiatric:         Mood and Affect: Mood normal.         Behavior: Behavior normal.         Vitals:  /80   Pulse 88   Wt 198 lb (89.8 kg)   SpO2 96%   BMI 30.11 kg/m²       Data:     Lab Results   Component Value Date     12/06/2021    K 3.8 12/06/2021     12/06/2021    CO2 25 12/06/2021    BUN 18 12/06/2021    CREATININE 0.73 12/06/2021    GLUCOSE 144 12/06/2021    GLUCOSE 117 04/03/2012    PROT 7.4 01/04/2021    LABALBU 4.6 01/04/2021    LABALBU 4.5 04/03/2012    BILITOT 0.39 01/04/2021    ALKPHOS 91 01/04/2021    AST 24 01/04/2021    ALT 33 01/04/2021     Lab Results   Component Value Date    WBC 10.1 12/06/2021    RBC 5.59 12/06/2021    HGB 16.5 12/06/2021    HCT 49.2 12/06/2021    MCV 88.0 12/06/2021    MCH 29.4 12/06/2021    MCHC 33.4 12/06/2021    RDW 15.3 12/06/2021     12/06/2021    MPV NOT REPORTED 12/06/2021     Lab Results   Component Value Date    TSH 3.55 07/06/2021     Lab Results   Component Value Date    CHOL 135 07/06/2021    HDL 32 07/06/2021    PSA 2.92 01/06/2017    LABA1C 7.3 12/22/2021          Assessment/Plan:        1. Controlled type 2 diabetes mellitus without complication, without long-term current use of insulin (HCC)  F/U 6mos, prior CMP, Lipids, HgbA1C. Do daily foot checks and yearly eye exams  Goal hgb a1c <7 on diet and exercise more  Otherwise start metformin.   - POCT glycosylated hemoglobin (Hb A1C)    2. Left groin hernia  Pt will monitor and if hard or sharp pain then go to ER otherwise monitor and avoid hevay lifting or straining        Danis Mercado received counseling on the following healthy behaviors: nutrition and exercise  Reviewed prior labs and health maintenance  Continue current medications, diet and exercise. Discussed use, benefit, and side effects of prescribed medications. Barriers to medication compliance addressed. Patient given educational materials - see patient instructions  Was a self-tracking handout given in paper form or via [employer]? Yes    Requested Prescriptions      No prescriptions requested or ordered in this encounter       All patient questions answered. Patient voiced understanding. Quality Measures    Body mass index is 30.11 kg/m². Elevated. Weight control planned discussed Healthy diet and regular exercise. BP: 138/80.  Blood pressure is normal. Treatment plan consists of No treatment change needed. Fall Risk 7/6/2021 1/5/2021 8/21/2019 6/26/2018 5/18/2017 5/2/2016 7/30/2014   2 or more falls in past year? no no no no no no no   Fall with injury in past year? no no no no no no no     The patient does not have a history of falls. I did not - not indicated , complete a risk assessment for falls. A plan of care for falls No Treatment plan indicated    Lab Results   Component Value Date    LDLCHOLESTEROL 76 07/06/2021    (goal LDL reduction with dx if diabetes is 50% LDL reduction)    PHQ Scores 4/25/2022 7/6/2021 1/5/2021 7/1/2020 8/21/2019 6/26/2018 5/18/2017   PHQ2 Score 0 0 0 0 0 0 0   PHQ9 Score 0 0 0 0 0 0 0     Interpretation of Total Score Depression Severity: 1-4 = Minimal depression, 5-9 = Mild depression, 10-14 = Moderate depression, 15-19 = Moderately severe depression, 20-27 = Severe depression        Return in about 6 months (around 10/25/2022) for [employer] and ck up .       Electronically signed by Bairon Hendrix MD on 4/25/2022 at 8:44 AM

## 2022-05-23 ENCOUNTER — TELEPHONE (OUTPATIENT)
Dept: UROLOGY | Age: 77
End: 2022-05-23

## 2022-05-24 ENCOUNTER — HOSPITAL ENCOUNTER (OUTPATIENT)
Age: 77
Discharge: HOME | End: 2022-05-24
Payer: MEDICARE

## 2022-05-24 LAB
ABSOLUTE EOS #: 0.1 K/UL (ref 0–0.4)
ABSOLUTE LYMPH #: 1.4 K/UL (ref 1–4.8)
ABSOLUTE MONO #: 0.6 K/UL (ref 0–1)
ALT SERPL-CCNC: 27 U/L (ref 5–41)
AST SERPL-CCNC: 20 U/L
BASOPHILS # BLD: 1 % (ref 0–2)
BASOPHILS ABSOLUTE: 0 K/UL (ref 0–0.2)
CREAT SERPL-MCNC: 0.73 MG/DL (ref 0.7–1.2)
DIFFERENTIAL TYPE: YES
EOSINOPHILS RELATIVE PERCENT: 2 % (ref 0–5)
GFR AFRICAN AMERICAN: >60 ML/MIN
GFR NON-AFRICAN AMERICAN: >60 ML/MIN
GFR SERPL CREATININE-BSD FRML MDRD: NORMAL ML/MIN/{1.73_M2}
HCT VFR BLD CALC: 46.8 % (ref 41–53)
HEMOGLOBIN: 15.6 G/DL (ref 13.5–17.5)
LYMPHOCYTES # BLD: 26 % (ref 13–44)
MCH RBC QN AUTO: 29.3 PG (ref 26–34)
MCHC RBC AUTO-ENTMCNC: 33.3 G/DL (ref 31–37)
MCV RBC AUTO: 88 FL (ref 80–100)
MONOCYTES # BLD: 12 % (ref 5–9)
PDW BLD-RTO: 14.4 % (ref 12.1–15.2)
PLATELET # BLD: 282 K/UL (ref 140–450)
RBC # BLD: 5.31 M/UL (ref 4.5–5.9)
SEG NEUTROPHILS: 59 % (ref 39–75)
SEGMENTED NEUTROPHILS ABSOLUTE COUNT: 3.2 K/UL (ref 2.1–6.5)
WBC # BLD: 5.3 K/UL (ref 3.5–11)

## 2022-05-24 PROCEDURE — 36415 COLL VENOUS BLD VENIPUNCTURE: CPT

## 2022-05-24 PROCEDURE — 85025 COMPLETE CBC W/AUTO DIFF WBC: CPT

## 2022-05-24 PROCEDURE — 82565 ASSAY OF CREATININE: CPT

## 2022-05-24 PROCEDURE — 84460 ALANINE AMINO (ALT) (SGPT): CPT

## 2022-05-24 PROCEDURE — 84450 TRANSFERASE (AST) (SGOT): CPT

## 2022-05-26 ENCOUNTER — OFFICE VISIT (OUTPATIENT)
Dept: UROLOGY | Age: 77
End: 2022-05-26
Payer: MEDICARE

## 2022-05-26 VITALS — BODY MASS INDEX: 29.35 KG/M2 | SYSTOLIC BLOOD PRESSURE: 118 MMHG | DIASTOLIC BLOOD PRESSURE: 80 MMHG | WEIGHT: 193 LBS

## 2022-05-26 DIAGNOSIS — N13.8 BPH WITH OBSTRUCTION/LOWER URINARY TRACT SYMPTOMS: Primary | ICD-10-CM

## 2022-05-26 DIAGNOSIS — N40.1 BPH WITH OBSTRUCTION/LOWER URINARY TRACT SYMPTOMS: Primary | ICD-10-CM

## 2022-05-26 PROCEDURE — G8417 CALC BMI ABV UP PARAM F/U: HCPCS | Performed by: UROLOGY

## 2022-05-26 PROCEDURE — 51798 US URINE CAPACITY MEASURE: CPT | Performed by: UROLOGY

## 2022-05-26 PROCEDURE — 3017F COLORECTAL CA SCREEN DOC REV: CPT | Performed by: UROLOGY

## 2022-05-26 PROCEDURE — 4004F PT TOBACCO SCREEN RCVD TLK: CPT | Performed by: UROLOGY

## 2022-05-26 PROCEDURE — 99213 OFFICE O/P EST LOW 20 MIN: CPT | Performed by: UROLOGY

## 2022-05-26 PROCEDURE — 1123F ACP DISCUSS/DSCN MKR DOCD: CPT | Performed by: UROLOGY

## 2022-05-26 PROCEDURE — G8427 DOCREV CUR MEDS BY ELIG CLIN: HCPCS | Performed by: UROLOGY

## 2022-05-26 RX ORDER — METHOTREXATE 2.5 MG/1
TABLET ORAL
COMMUNITY
Start: 2022-05-10

## 2022-05-26 RX ORDER — FOLIC ACID 1 MG/1
TABLET ORAL
COMMUNITY
Start: 2022-05-10

## 2022-05-26 RX ORDER — HYDROXYCHLOROQUINE SULFATE 200 MG/1
TABLET, FILM COATED ORAL
COMMUNITY
Start: 2022-05-11

## 2022-05-26 ASSESSMENT — ENCOUNTER SYMPTOMS
EYE PAIN: 0
EYE REDNESS: 0
WHEEZING: 0
VOMITING: 0
COUGH: 0
COLOR CHANGE: 0
BACK PAIN: 0
SHORTNESS OF BREATH: 0
NAUSEA: 0
ABDOMINAL PAIN: 0

## 2022-05-26 NOTE — PROGRESS NOTES
HPI:          Patient is a 78 y.o. male in no acute distress. He is alert and oriented to person, place, and time. History  2014 Evaluated for hydrocele. Never opted for surgery and was lost to follow-up.      1/2016 sudden onset LUTS x 1 wk. IPSS 35 QOL 6 (compared to baseline 8/1). POC UA negative. PCP increased his Flomax to 0.8mg daily without much improvement. Does have perineal pain and pressure in the rectum. Has some pain with urination. Pt has never had prostatitis in past. . Recent PSA 1/6/16 - 2.92              Treated with 4 wks abx & 1 wk NSAIDs for suspected prostatitis.    1/3/2018 follow-up for urinary retention. Roman Arthur was evaluated in the ER on 12/30/17 because he was unable to urinate. Di Dupree did have a Mejía catheter placed with over 1200 mL returned. Stewart Patient to presenting to the ER he was evaluated in the urgent care for decreased urination. Di Dupree was started on empiric Cipro.  Urine culture from 12/28/17 was negative for infection.  Patient does not recall any perineal or rectal pain, but he did have suprapubic pain and the pain radiated to his right flank.  While in the ER they did complete a CT scan shows bilateral hydronephrosis and hydroureter. Kranthi Lava is no evidence of  calcifications.  The bladder is distended and there is a markedly enlarged prostate gland. Kranthi Lava is a moderate amount of stool as well.  Patient admits to only having a bowel movement every 3 days. Di Dupree is taking Flomax daily for history of BPH.  He denies any gross hematuria and he is tolerating the Mejía catheter without complaints.  His wife is concerned because they're planning on leaving for [place] on 1/18/18.     1/25/2018 PVP greenlight     4/2019 Resumed flomax due to post-void dribbling     Currently  Patient is here today for 1 year follow-up. Patient does remain on Flomax. Patient has been doing well. He reports no recent gross hematuria or dysuria. His lower urinary tract symptoms are stable. He does state that it sometimes takes him some time to start his urine stream.  He does state that when he does initiate his stream his stream is adequate. No pain today. Past Medical History:   Diagnosis Date    Arthritis     Cancer ([address]     multiple myeloma    Fibromyalgia     GERD (gastroesophageal reflux disease)     History of blood transfusion     At birth     Hyperlipidemia     Hypertension     Hypothyroidism     IBS (irritable bowel syndrome)     Osteoarthritis     Thyroid disease     Hypothyroid      Past Surgical History:   Procedure Laterality Date    ABDOMEN SURGERY  2004    Bowel Resection following \"fall on the ice\"; Stu Dumont MD    APPENDECTOMY      COLONOSCOPY      COLONOSCOPY  09-    Dr. Wilder Torrez      cataract bilateral    GASTRIC FUNDOPLICATION  8889    HEMORRHOID SURGERY      HERNIA REPAIR      Hiatal Hernia repair, Esophageal repair     HERNIA REPAIR Bilateral     Inguinal     PAIN MANAGEMENT PROCEDURE Bilateral 9/17/2021    BILATERAL L2,L3,L4 MEDIAL BRANCH L5 DORSAL RAMI INJECTION performed by Yandy Mitchell MD at [address] N/A 11/4/2021    BILATERAL L2-3-4-5 MEDIAL BRANCH BLOCK performed by Yandy Mitchell MD at [address] Bilateral 12/3/2021    LUMBAR RFA L 2-3-4-5 BILATERAL MEDIAL BRANCH RFA performed by Yandy Mitchell MD at [address] [facility] N/A 1/25/2018    CYSTOSCOPY TRANSURETHRAL RESECTION PROSTATE LASER performed by Elena Guajardo MD at [address] [facility] ARTHROSCOPY Right 10/14/2020    RIGHT SHOULDER ARTHROSCPY PROBABLE RCR. Shoulder Scope Subacromial Decompression. Partial Distal Claviclectomy. Debreidement. Mini RCR Open.   Bicep Tenodecis performed by Whitney Osullivan DO at [address] ENDOSCOPY      UPPER GASTROINTESTINAL ENDOSCOPY  09-    Dr. Margarito Garcia UPPER GASTROINTESTINAL ENDOSCOPY  12/14/2016    Kamaljit Alfred MD     Outpatient Encounter Medications as of 5/26/2022   Medication Sig Dispense Refill    folic acid (FOLVITE) 1 MG tablet TAKE 1-3 TABLETS BY ORAL ROUTE EVERY DAY      hydroxychloroquine (PLAQUENIL) 200 mg tablet TAKE 1 TABLET BY MOUTH TWICE A DAY      methotrexate (RHEUMATREX) 2.5 MG chemo tablet TAKE 6 TABLET BY ORAL ROUTE EVERY WEEK ON EMPTY STOMACH      tamsulosin (FLOMAX) 0.4 MG capsule TAKE 1 CAPSULE EVERY DAY 90 capsule 3    simvastatin (ZOCOR) 20 MG tablet TAKE 1 TABLET EVERY NIGHT 90 tablet 1    levothyroxine (SYNTHROID) 75 MCG tablet TAKE 1 TABLET EVERY DAY 90 tablet 1    pantoprazole (PROTONIX) 40 MG tablet TAKE 1 TABLET TWICE DAILY 180 tablet 1    amLODIPine (NORVASC) 5 MG tablet TAKE 1 TABLET EVERY DAY 90 tablet 1    hydrocortisone 2.5 % cream Apply topically 2 times daily Apply topically 2 times daily.  aspirin 81 MG tablet Take 81 mg by mouth daily      gabapentin (NEURONTIN) 300 MG capsule Take 600 mg by mouth nightly.  gabapentin (NEURONTIN) 600 MG tablet Take 300 mg by mouth 4 times daily.  HYDROcodone-acetaminophen (NORCO) 5-325 MG per tablet Take 1 tablet by mouth every 6 hours as needed for Pain.  lidocaine (LMX) 4 % cream Apply topically as needed for Pain Apply topically as needed.  Multiple Vitamins-Minerals (MULTI COMPLETE PO) Take by mouth      polyethylene glycol (GLYCOLAX) powder Take 17 g by mouth as needed        No facility-administered encounter medications on file as of 5/26/2022.       Current Outpatient Medications on File Prior to Visit   Medication Sig Dispense Refill    folic acid (FOLVITE) 1 MG tablet TAKE 1-3 TABLETS BY ORAL ROUTE EVERY DAY      hydroxychloroquine (PLAQUENIL) 200 mg tablet TAKE 1 TABLET BY MOUTH TWICE A DAY      methotrexate (RHEUMATREX) 2.5 MG chemo tablet TAKE 6 TABLET BY ORAL ROUTE EVERY WEEK ON EMPTY STOMACH      tamsulosin (FLOMAX) 0.4 MG capsule TAKE 1 CAPSULE EVERY DAY 90 capsule 3    simvastatin (ZOCOR) 20 MG tablet TAKE 1 TABLET EVERY NIGHT 90 tablet 1    levothyroxine (SYNTHROID) 75 MCG tablet TAKE 1 TABLET EVERY DAY 90 tablet 1    pantoprazole (PROTONIX) 40 MG tablet TAKE 1 TABLET TWICE DAILY 180 tablet 1    amLODIPine (NORVASC) 5 MG tablet TAKE 1 TABLET EVERY DAY 90 tablet 1    hydrocortisone 2.5 % cream Apply topically 2 times daily Apply topically 2 times daily.  aspirin 81 MG tablet Take 81 mg by mouth daily      gabapentin (NEURONTIN) 300 MG capsule Take 600 mg by mouth nightly.  gabapentin (NEURONTIN) 600 MG tablet Take 300 mg by mouth 4 times daily.  HYDROcodone-acetaminophen (NORCO) 5-325 MG per tablet Take 1 tablet by mouth every 6 hours as needed for Pain.  lidocaine (LMX) 4 % cream Apply topically as needed for Pain Apply topically as needed.  Multiple Vitamins-Minerals (MULTI COMPLETE PO) Take by mouth      polyethylene glycol (GLYCOLAX) powder Take 17 g by mouth as needed        No current facility-administered medications on file prior to visit. Adhesive tape and Wound dressing adhesive  Family History   Problem Relation Age of Onset    Cancer Mother         bone    Heart Disease Father         MI     Social History     Tobacco Use   Smoking Status Current Some Day Smoker    Packs/day: 0.50    Years: 40.00    Pack years: 20.00    Types: Cigarettes    Last attempt to quit: 2014    Years since quittin.2   Smokeless Tobacco Never Used       Social History     Substance and Sexual Activity   Alcohol Use Yes    Comment: seldom       Review of Systems   Constitutional: Negative for appetite change, chills and fever. Eyes: Negative for pain, redness and visual disturbance. Respiratory: Negative for cough, shortness of breath and wheezing. Cardiovascular: Negative for chest pain and leg swelling. Gastrointestinal: Negative for abdominal pain, nausea and vomiting.    Genitourinary: Negative for difficulty urinating, dysuria, flank pain, frequency, hematuria, penile discharge, scrotal swelling and testicular pain. Musculoskeletal: Negative for back pain, joint swelling and myalgias. Skin: Negative for color change, rash and wound. Neurological: Negative for dizziness, tremors and numbness. Hematological: Negative for adenopathy. Does not bruise/bleed easily. /80 (Site: Left Upper Arm, Position: Sitting, Cuff Size: Medium Adult)   Wt 193 lb (87.5 kg)   BMI 29.35 kg/m²       PHYSICAL EXAM:  Constitutional: Patient in no acute distress; Neuro: alert and oriented to person place and time. Psych: Mood and affect normal.  Skin: Normal  Lungs: Respiratory effort normal  Cardiovascular:  Normal peripheral pulses  Abdomen: Soft, non-tender, non-distended with no CVA, flank pain  Bladder non-tender and not distended. Lymphatics: no palpable lymphadenopathy  Penis normal  Urethral meatus normal  Scrotal exam normal  Testicles normal bilaterally  Epididymis normal bilaterally  No evidence of inguinal hernia        Lab Results   Component Value Date    BUN 18 12/06/2021     Lab Results   Component Value Date    CREATININE 0.73 05/24/2022     Lab Results   Component Value Date    PSA 2.92 01/06/2017    PSA 3.23 11/23/2016    PSA 2.36 08/05/2015       ASSESSMENT:  This is a 78 y.o. male with the following diagnoses:   Diagnosis Orders   1. BPH with obstruction/lower urinary tract symptoms         PLAN:  Patient will continue Flomax. I did offer cystoscopy today to rule out any scar tissue. He would like to hold off. He will follow-up with us in 1 year.

## 2022-06-02 RX ORDER — AMLODIPINE BESYLATE 5 MG/1
TABLET ORAL
Qty: 90 TABLET | Refills: 1 | Status: SHIPPED | OUTPATIENT
Start: 2022-06-02

## 2022-06-02 RX ORDER — LEVOTHYROXINE SODIUM 0.07 MG/1
TABLET ORAL
Qty: 90 TABLET | Refills: 1 | Status: SHIPPED | OUTPATIENT
Start: 2022-06-02

## 2022-06-02 RX ORDER — SIMVASTATIN 20 MG
TABLET ORAL
Qty: 90 TABLET | Refills: 1 | Status: SHIPPED | OUTPATIENT
Start: 2022-06-02

## 2022-06-02 RX ORDER — PANTOPRAZOLE SODIUM 40 MG/1
TABLET, DELAYED RELEASE ORAL
Qty: 180 TABLET | Refills: 1 | Status: SHIPPED | OUTPATIENT
Start: 2022-06-02

## 2022-06-02 NOTE — TELEPHONE ENCOUNTER
Last OV: 4/25/2022 dm  Last RX:    Next scheduled apt: Visit date not found    Sure scripts request      RX pending

## 2022-06-07 ENCOUNTER — HOSPITAL ENCOUNTER (OUTPATIENT)
Age: 77
Discharge: HOME | End: 2022-06-07
Payer: MEDICARE

## 2022-06-07 LAB
ABSOLUTE EOS #: 0.1 K/UL (ref 0–0.4)
ABSOLUTE LYMPH #: 1.3 K/UL (ref 1–4.8)
ABSOLUTE MONO #: 0.8 K/UL (ref 0–1)
ALT SERPL-CCNC: 20 U/L (ref 5–41)
AST SERPL-CCNC: 17 U/L
BASOPHILS # BLD: 0 % (ref 0–2)
BASOPHILS ABSOLUTE: 0 K/UL (ref 0–0.2)
CREAT SERPL-MCNC: 0.76 MG/DL (ref 0.7–1.2)
DIFFERENTIAL TYPE: YES
EOSINOPHILS RELATIVE PERCENT: 2 % (ref 0–5)
GFR AFRICAN AMERICAN: >60 ML/MIN
GFR NON-AFRICAN AMERICAN: >60 ML/MIN
GFR SERPL CREATININE-BSD FRML MDRD: NORMAL ML/MIN/{1.73_M2}
HCT VFR BLD CALC: 46.5 % (ref 41–53)
HEMOGLOBIN: 15.5 G/DL (ref 13.5–17.5)
LYMPHOCYTES # BLD: 19 % (ref 13–44)
MCH RBC QN AUTO: 29.4 PG (ref 26–34)
MCHC RBC AUTO-ENTMCNC: 33.2 G/DL (ref 31–37)
MCV RBC AUTO: 88.5 FL (ref 80–100)
MONOCYTES # BLD: 11 % (ref 5–9)
PDW BLD-RTO: 14.4 % (ref 12.1–15.2)
PLATELET # BLD: 282 K/UL (ref 140–450)
RBC # BLD: 5.26 M/UL (ref 4.5–5.9)
SEG NEUTROPHILS: 68 % (ref 39–75)
SEGMENTED NEUTROPHILS ABSOLUTE COUNT: 4.6 K/UL (ref 2.1–6.5)
WBC # BLD: 6.8 K/UL (ref 3.5–11)

## 2022-06-07 PROCEDURE — 82565 ASSAY OF CREATININE: CPT

## 2022-06-07 PROCEDURE — 84450 TRANSFERASE (AST) (SGOT): CPT

## 2022-06-07 PROCEDURE — 84460 ALANINE AMINO (ALT) (SGPT): CPT

## 2022-06-07 PROCEDURE — 85025 COMPLETE CBC W/AUTO DIFF WBC: CPT

## 2022-06-07 PROCEDURE — 36415 COLL VENOUS BLD VENIPUNCTURE: CPT

## 2022-07-05 ENCOUNTER — HOSPITAL ENCOUNTER (OUTPATIENT)
Age: 77
Discharge: HOME | End: 2022-07-05
Payer: MEDICARE

## 2022-07-05 LAB
ABSOLUTE EOS #: 0.1 K/UL (ref 0–0.4)
ABSOLUTE LYMPH #: 1.4 K/UL (ref 1–4.8)
ABSOLUTE MONO #: 0.7 K/UL (ref 0–1)
ALT SERPL-CCNC: 26 U/L (ref 5–41)
AST SERPL-CCNC: 23 U/L
BASOPHILS # BLD: 0 % (ref 0–2)
BASOPHILS ABSOLUTE: 0 K/UL (ref 0–0.2)
CREAT SERPL-MCNC: 0.75 MG/DL (ref 0.7–1.2)
DIFFERENTIAL TYPE: YES
EOSINOPHILS RELATIVE PERCENT: 2 % (ref 0–5)
GFR AFRICAN AMERICAN: >60 ML/MIN
GFR NON-AFRICAN AMERICAN: >60 ML/MIN
GFR SERPL CREATININE-BSD FRML MDRD: NORMAL ML/MIN/{1.73_M2}
HCT VFR BLD CALC: 43.4 % (ref 41–53)
HEMOGLOBIN: 14.6 G/DL (ref 13.5–17.5)
LYMPHOCYTES # BLD: 24 % (ref 13–44)
MCH RBC QN AUTO: 29.6 PG (ref 26–34)
MCHC RBC AUTO-ENTMCNC: 33.5 G/DL (ref 31–37)
MCV RBC AUTO: 88.4 FL (ref 80–100)
MONOCYTES # BLD: 12 % (ref 5–9)
PDW BLD-RTO: 14.4 % (ref 12.1–15.2)
PLATELET # BLD: 249 K/UL (ref 140–450)
RBC # BLD: 4.91 M/UL (ref 4.5–5.9)
SEG NEUTROPHILS: 62 % (ref 39–75)
SEGMENTED NEUTROPHILS ABSOLUTE COUNT: 3.5 K/UL (ref 2.1–6.5)
WBC # BLD: 5.7 K/UL (ref 3.5–11)

## 2022-07-05 PROCEDURE — 85025 COMPLETE CBC W/AUTO DIFF WBC: CPT

## 2022-07-05 PROCEDURE — 84460 ALANINE AMINO (ALT) (SGPT): CPT

## 2022-07-05 PROCEDURE — 82565 ASSAY OF CREATININE: CPT

## 2022-07-05 PROCEDURE — 36415 COLL VENOUS BLD VENIPUNCTURE: CPT

## 2022-07-05 PROCEDURE — 84450 TRANSFERASE (AST) (SGOT): CPT

## 2022-08-09 ENCOUNTER — HOSPITAL ENCOUNTER (OUTPATIENT)
Age: 77
Discharge: HOME | End: 2022-08-09
Payer: MEDICARE

## 2022-08-09 LAB
ABSOLUTE EOS #: 0.1 K/UL (ref 0–0.4)
ABSOLUTE LYMPH #: 1.3 K/UL (ref 1–4.8)
ABSOLUTE MONO #: 0.7 K/UL (ref 0–1)
ALT SERPL-CCNC: 24 U/L (ref 5–41)
AST SERPL-CCNC: 19 U/L
BASOPHILS # BLD: 1 % (ref 0–2)
BASOPHILS ABSOLUTE: 0 K/UL (ref 0–0.2)
CREAT SERPL-MCNC: 0.67 MG/DL (ref 0.7–1.2)
DIFFERENTIAL TYPE: YES
EOSINOPHILS RELATIVE PERCENT: 2 % (ref 0–5)
GFR AFRICAN AMERICAN: >60 ML/MIN
GFR NON-AFRICAN AMERICAN: >60 ML/MIN
GFR SERPL CREATININE-BSD FRML MDRD: ABNORMAL ML/MIN/{1.73_M2}
HCT VFR BLD CALC: 44.2 % (ref 41–53)
HEMOGLOBIN: 14.8 G/DL (ref 13.5–17.5)
LYMPHOCYTES # BLD: 19 % (ref 13–44)
MCH RBC QN AUTO: 30.4 PG (ref 26–34)
MCHC RBC AUTO-ENTMCNC: 33.5 G/DL (ref 31–37)
MCV RBC AUTO: 90.9 FL (ref 80–100)
MONOCYTES # BLD: 10 % (ref 5–9)
PDW BLD-RTO: 15.2 % (ref 12.1–15.2)
PLATELET # BLD: 257 K/UL (ref 140–450)
RBC # BLD: 4.86 M/UL (ref 4.5–5.9)
SEG NEUTROPHILS: 68 % (ref 39–75)
SEGMENTED NEUTROPHILS ABSOLUTE COUNT: 4.8 K/UL (ref 2.1–6.5)
WBC # BLD: 7 K/UL (ref 3.5–11)

## 2022-08-09 PROCEDURE — 82565 ASSAY OF CREATININE: CPT

## 2022-08-09 PROCEDURE — 36415 COLL VENOUS BLD VENIPUNCTURE: CPT

## 2022-08-09 PROCEDURE — 84450 TRANSFERASE (AST) (SGOT): CPT

## 2022-08-09 PROCEDURE — 85025 COMPLETE CBC W/AUTO DIFF WBC: CPT

## 2022-08-09 PROCEDURE — 84460 ALANINE AMINO (ALT) (SGPT): CPT

## 2022-08-24 ENCOUNTER — HOSPITAL ENCOUNTER (OUTPATIENT)
Age: 77
Discharge: HOME | End: 2022-08-24
Payer: MEDICARE

## 2022-08-24 LAB
ABSOLUTE EOS #: 0.13 K/UL (ref 0–0.4)
ABSOLUTE LYMPH #: 1.28 K/UL (ref 1–4.8)
ABSOLUTE MONO #: 0.58 K/UL (ref 0–1)
ALT SERPL-CCNC: 23 U/L (ref 5–41)
AST SERPL-CCNC: 19 U/L
BASOPHILS # BLD: NORMAL % (ref 0–2)
BASOPHILS ABSOLUTE: NORMAL K/UL (ref 0–0.2)
CREAT SERPL-MCNC: 0.72 MG/DL (ref 0.7–1.2)
EOSINOPHILS RELATIVE PERCENT: 2 % (ref 0–5)
GFR AFRICAN AMERICAN: >60 ML/MIN
GFR NON-AFRICAN AMERICAN: >60 ML/MIN
GFR SERPL CREATININE-BSD FRML MDRD: NORMAL ML/MIN/{1.73_M2}
HCT VFR BLD CALC: 45.1 % (ref 41–53)
HEMOGLOBIN: 15.6 G/DL (ref 13.5–17.5)
LYMPHOCYTES # BLD: 20 % (ref 13–44)
MCH RBC QN AUTO: 31.2 PG (ref 26–34)
MCHC RBC AUTO-ENTMCNC: 34.6 G/DL (ref 31–37)
MCV RBC AUTO: 90.2 FL (ref 80–100)
MONOCYTES # BLD: 9 % (ref 5–9)
MORPHOLOGY: NORMAL
PDW BLD-RTO: 14.1 % (ref 12.1–15.2)
PLATELET # BLD: 223 K/UL (ref 140–450)
RBC # BLD: 5 M/UL (ref 4.5–5.9)
SEG NEUTROPHILS: 69 % (ref 39–75)
SEGMENTED NEUTROPHILS ABSOLUTE COUNT: 4.41 K/UL (ref 2.1–6.5)
WBC # BLD: 6.4 K/UL (ref 3.5–11)

## 2022-08-24 PROCEDURE — 82565 ASSAY OF CREATININE: CPT

## 2022-08-24 PROCEDURE — 36415 COLL VENOUS BLD VENIPUNCTURE: CPT

## 2022-08-24 PROCEDURE — 84460 ALANINE AMINO (ALT) (SGPT): CPT

## 2022-08-24 PROCEDURE — 84450 TRANSFERASE (AST) (SGOT): CPT

## 2022-08-24 PROCEDURE — 85025 COMPLETE CBC W/AUTO DIFF WBC: CPT

## 2022-11-03 ENCOUNTER — OFFICE VISIT (OUTPATIENT)
Dept: PAIN MANAGEMENT | Age: 77
End: 2022-11-03
Payer: MEDICARE

## 2022-11-03 VITALS — WEIGHT: 194 LBS | OXYGEN SATURATION: 98 % | HEIGHT: 68 IN | BODY MASS INDEX: 29.4 KG/M2 | RESPIRATION RATE: 19 BRPM

## 2022-11-03 DIAGNOSIS — M51.36 LUMBAR DEGENERATIVE DISC DISEASE: ICD-10-CM

## 2022-11-03 DIAGNOSIS — M47.817 LUMBOSACRAL SPONDYLOSIS WITHOUT MYELOPATHY: Primary | ICD-10-CM

## 2022-11-03 DIAGNOSIS — M79.18 MYOFASCIAL PAIN SYNDROME: ICD-10-CM

## 2022-11-03 PROCEDURE — G8427 DOCREV CUR MEDS BY ELIG CLIN: HCPCS | Performed by: STUDENT IN AN ORGANIZED HEALTH CARE EDUCATION/TRAINING PROGRAM

## 2022-11-03 PROCEDURE — 1123F ACP DISCUSS/DSCN MKR DOCD: CPT | Performed by: STUDENT IN AN ORGANIZED HEALTH CARE EDUCATION/TRAINING PROGRAM

## 2022-11-03 PROCEDURE — G8484 FLU IMMUNIZE NO ADMIN: HCPCS | Performed by: STUDENT IN AN ORGANIZED HEALTH CARE EDUCATION/TRAINING PROGRAM

## 2022-11-03 PROCEDURE — 4004F PT TOBACCO SCREEN RCVD TLK: CPT | Performed by: STUDENT IN AN ORGANIZED HEALTH CARE EDUCATION/TRAINING PROGRAM

## 2022-11-03 PROCEDURE — 3017F COLORECTAL CA SCREEN DOC REV: CPT | Performed by: STUDENT IN AN ORGANIZED HEALTH CARE EDUCATION/TRAINING PROGRAM

## 2022-11-03 PROCEDURE — G8417 CALC BMI ABV UP PARAM F/U: HCPCS | Performed by: STUDENT IN AN ORGANIZED HEALTH CARE EDUCATION/TRAINING PROGRAM

## 2022-11-03 PROCEDURE — 99214 OFFICE O/P EST MOD 30 MIN: CPT | Performed by: STUDENT IN AN ORGANIZED HEALTH CARE EDUCATION/TRAINING PROGRAM

## 2022-11-03 RX ORDER — LEUCOVORIN CALCIUM 5 MG/1
5 TABLET ORAL WEEKLY
COMMUNITY
Start: 2022-10-04

## 2022-11-03 NOTE — PROGRESS NOTES
Chronic Pain Clinic Note     Encounter Date: 11/3/2022     SUBJECTIVE:  Chief Complaint   Patient presents with    Back Pain     Lumbar back pain. Prior [place] patient. H/o Fibromyalgia        History of Present Illness:   Cece Weaver is a 78 y.o. male who presents with Lumbar spondylosis. H/o Fibromyalgia and RA    Medication Refill: n/a    Current Complaints of Pain:   Location: Lumbar   Radiation: BLE  Severity: moderate  Pain Numerical Score -    Average: 5     Highest: 8  Lowest: 3-4  Character/Quality: Complains of pain that is aching  Timing: Keeps awake at night, but also keeps him up at night   Associated symptoms: weakness- BLE  Numbness: no   Weakness: BLE  Exacerbating factors: activity, walking  Alleviating factors: n/a  Length of time pain has been present: Started on years, chronic issue  Inciting event/injury: no  Bowel/Bladder incontinence: no  Falls: no  Physical Therapy: no     History of Interventions:   Surgery: no  Injections: Yes    Imaging:    Lumbar MRI 8/9/2021    FINDINGS: Alignment normal. No spondylolysis. No spondylolisthesis. Vertebral    body heights are normal. There are a few incidental vertebral body hemangioma. A vertebral body hemangioma in the L3 vertebral body measures 2.2 cm. A    vertebral body hemangioma in the L1 vertebral body measures 1.9 cm in diameter. There are no suspicious osseous lesions. No compression fractures. L5-S1, moderate degenerative disc disease. There is a shallow central    protrusion and annular tear. No spinal stenosis. Disc space narrowing results    in mild bilateral foraminal narrowing. L4-L5, mild degenerative disc disease. Diffuse disc bulge. There is an annular    tear posteriorly in the disc annulus. No spinal stenosis. No foraminal    narrowing.        L3-L4, there is a shallow broad-based central protrusion mildly effacing the    ventral aspect of the thecal sac without significant spinal stenosis or nerve    root compression. No neural foraminal narrowing. L2-L3, mild degenerative disc disease with mild disc desiccation. No spinal    stenosis. No foraminal narrowing. L1-L2, no spinal stenosis. No foraminal narrowing. No abnormal signal in the conus medullaris. There are no paraspinal masses. Past Medical History:   Diagnosis Date    Arthritis     Cancer ([facility] [address]     multiple myeloma    Fibromyalgia     GERD (gastroesophageal reflux disease)     History of blood transfusion     At birth     Hyperlipidemia     Hypertension     Hypothyroidism     IBS (irritable bowel syndrome)     Osteoarthritis     Thyroid disease     Hypothyroid        Past Surgical History:   Procedure Laterality Date    ABDOMEN SURGERY  2004    Bowel Resection following \"fall on the ice\"; Katerina Vasques MD    APPENDECTOMY      COLONOSCOPY      COLONOSCOPY  09-    Dr. Polina Mcgovern      cataract bilateral    GASTRIC FUNDOPLICATION  0643    HEMORRHOID SURGERY      HERNIA REPAIR      Hiatal Hernia repair, Esophageal repair     HERNIA REPAIR Bilateral     Inguinal     PAIN MANAGEMENT PROCEDURE Bilateral 9/17/2021    BILATERAL L2,L3,L4 MEDIAL BRANCH L5 DORSAL RAMI INJECTION performed by Gabriel Lipscomb MD at [address] N/A 11/4/2021    BILATERAL L2-3-4-5 MEDIAL BRANCH BLOCK performed by Gabriel Lipscomb MD at [address] Bilateral 12/3/2021    LUMBAR RFA L 2-3-4-5 BILATERAL MEDIAL BRANCH RFA performed by Gabriel Lipscomb MD at [address] [facility], COMPLETE N/A 1/25/2018    CYSTOSCOPY TRANSURETHRAL RESECTION PROSTATE LASER performed by Clinton Hair MD at [address] [facility] ARTHROSCOPY Right 10/14/2020    RIGHT SHOULDER ARTHROSCPY PROBABLE RCR. Shoulder Scope Subacromial Decompression. Partial Distal Claviclectomy. Debreidement. Mini RCR Open.   Bicep Tenodecis performed by Patrick Mota DO at [place] UPPER GASTROINTESTINAL ENDOSCOPY      UPPER GASTROINTESTINAL ENDOSCOPY  2014    Dr. Jennifer Ramires  2016    Brenda Choudhary MD       Family History   Problem Relation Age of Onset    Cancer Mother         bone    Heart Disease Father         MI       Social History     Socioeconomic History    Marital status:      Spouse name: Not on file    Number of children: Not on file    Years of education: Not on file    Highest education level: Not on file   Occupational History    Not on file   Tobacco Use    Smoking status: Some Days     Packs/day: 0.50     Years: 40.00     Pack years: 20.00     Types: Cigarettes     Last attempt to quit: 2014     Years since quittin.6    Smokeless tobacco: Never   Vaping Use    Vaping Use: Never used   Substance and Sexual Activity    Alcohol use: Yes     Comment: seldom    Drug use: No    Sexual activity: Not on file   Other Topics Concern    Not on file   Social History Narrative    Not on file     Social Determinants of Health     Financial Resource Strain: Not on file   Food Insecurity: Not on file   Transportation Needs: Not on file   Physical Activity: Not on file   Stress: Not on file   Social Connections: Not on file   Intimate Partner Violence: Not on file   Housing Stability: Not on file       Medications & Allergies:   Current Outpatient Medications   Medication Instructions    amLODIPine (NORVASC) 5 MG tablet TAKE 1 TABLET EVERY DAY    aspirin 81 mg, Oral, DAILY    folic acid (FOLVITE) 1 MG tablet TAKE 1-3 TABLETS BY ORAL ROUTE EVERY DAY    gabapentin (NEURONTIN) 600 mg, Oral, NIGHTLY    gabapentin (NEURONTIN) 300 mg, Oral, 4 TIMES DAILY    HYDROcodone-acetaminophen (NORCO) 5-325 MG per tablet 1 tablet, Oral, EVERY 6 HOURS PRN    hydrocortisone 2.5 % cream Topical, 2 TIMES DAILY, Apply topically 2 times daily.      hydroxychloroquine (PLAQUENIL) 200 mg tablet TAKE 1 TABLET BY MOUTH TWICE A DAY    leucovorin calcium (WELLCOVORIN) 5 MG tablet No dose, route, or frequency recorded. levothyroxine (SYNTHROID) 75 MCG tablet TAKE 1 TABLET EVERY DAY    lidocaine (LMX) 4 % cream Topical, PRN, Apply topically as needed. methotrexate (RHEUMATREX) 2.5 MG chemo tablet TAKE 6 TABLET BY ORAL ROUTE EVERY WEEK ON EMPTY STOMACH    Multiple Vitamins-Minerals (MULTI COMPLETE PO) Oral    pantoprazole (PROTONIX) 40 MG tablet TAKE 1 TABLET TWICE DAILY    polyethylene glycol (GLYCOLAX) 17 g, Oral, PRN    simvastatin (ZOCOR) 20 MG tablet TAKE 1 TABLET EVERY NIGHT    tamsulosin (FLOMAX) 0.4 MG capsule TAKE 1 CAPSULE EVERY DAY       Allergies   Allergen Reactions    Adhesive Tape Rash    Wound Dressing Adhesive      Redness, irritation       Review of Systems:   Constitutional: negative for weight changes or fevers  Cardiovascular: negative for chest pain, palpitations, irregular heart beat  Respiratory: negative for dyspnea, cough, wheezing  Gastrointestinal: negative for constipation, diarrhea, nausea  Genitourinary: negative for bowel/bladder incontinence   Musculoskeletal: positive for low back pain  Neurological: negative for radicular leg pain, leg weakness or numbness/tingling  Behavioral/Psych: negative for anxiety/depression   Hematological: negative for abnormal bleeding, anticoagulation use or antiplatelet use  All other systems reviewed and are negative    OBJECTIVE:    Vitals:    11/03/22 1022   Resp: 19   SpO2: 98%       PHYSICAL EXAM    GENERAL: No acute distress, pleasant, well-appearing  HEENT: Normocephalic, atraumatic, Pupils equal and round  CARDIOVASCULAR: Well perfused, No peripheral cyanosis  PULMONARY: Good chest wall excursion, breathing unlabored  PSYCH: Appropriate affect and insight, non-pressured speech  SKIN: No rashes or lesions  MUSCULOSKELETAL:  Inspection: The back and extremities are symmetric and aligned. Muscle bulk is normal in appearance.   Palpation: There is tenderness to palpation along the lumbar paraspinal musculature bilaterally  Lumbar range of motion is full  NEUROMUSCULAR:  Patient ambulates unassisted  Gait is nonantalgic  Sensation to light touch is intact throughout lower extremities  Strength is full in lower extremities  No ankle clonus    Special Tests:  Lumbar facet loading is positive bilaterally  Seated straight leg raise is negative bilaterally      DIAGNOSIS:    ICD-10-CM    1. Lumbosacral spondylosis without myelopathy  M47.817 FACET JOINT L/S SINGLE     FACET JOINT L/S 2ND LEVEL      2. Lumbar degenerative disc disease  M51.36       3. Myofascial pain syndrome  M79.18            ASSESSMENT:    Hugo Wylie is a 78 y. o.male who presents with chronic low back pain. To review, patient has had low back pain for the last 5 years without injuries or trauma. He denies any previous low back surgeries. The patient's history and physical examination are consistent with lumbosacral spondylosis as the patient has axial low back pain that is mechanical in nature. There is tenderness to palpation along the lumbar paraspinal musculature with positive lumbar facet loading bilaterally. The lumbar MRI on 8/9/2021 reveals multilevel degenerative changes with facet hypertrophy throughout the lumbar spine specifically at L4-5 and L5-S1 facet joints. He underwent a previous bilateral lumbar L2, L3, L4, L5 medial branch radiofrequency ablation on 12/3/2021 with 80% improvement in pain and function for 10 months. Therefore, I will plan for a repeat lumbar radiofrequency ablation. Neurologically, it appears the patient has full strength and normal sensation. There is no evidence of radiculopathy or myelopathy on examination. There are no red flags in the patient's history. The patient has failed conservative measures including greater than 3 medications for pain relief, a self-directed therapy program, as well as activity modification all within the last 6 weeks over 3 months.  The patient's pain has been causing worsening quality of life and function. PLAN:  Medications: For nonopioid therapy, the following medications were prescribed:    -Continue medication prescribed by primary care physician    Opioid therapy:  -Managed by another provider    Interventions:  -Plan for repeat bilateral lumbar L3, L4, L5 medial branch radiofrequency ablation  -Moderate sedation will be utilized as patient has a history of severe anxiety which will cause the patient to move during the procedure which will not allow for a safe and effective procedure to be performed. We discussed the risk of moderate sedation including neurological events, cardiopulmonary events and even death. The patient still consented to proceed with moderate sedation to allow for a safe and effective procedure. Imaging:  -Reviewed lumbar MRI with patient in room    Behavioral Therapies:  -Continue daily stretching and home exercise program    Referrals:  -None    Follow-up Plan:  -After procedure    Patient was offered intervention where appropriate. Multi-modal Pain Therapy: The patient was explicitly considered for multimodal and interdisciplinary therapy. Non-opioid and non-pharmacological opportunities to enhance analgesia and quality of life have been and will continue to be pursued.     Hang Pittman, DO  Interventional Pain Management/PM&R   Norman [address]    Orders Placed This Encounter    FACET JOINT L/S SINGLE     Standing Status:   Future     Standing Expiration Date:   11/3/2023     Scheduling Instructions:      Bilateral lumbar L3, L4, L5 RFA      Moderate Sedation    FACET JOINT L/S 2ND LEVEL     Standing Status:   Future     Standing Expiration Date:   11/3/2023    leucovorin calcium (WELLCOVORIN) 5 MG tablet

## 2022-11-03 NOTE — PATIENT INSTRUCTIONS
SURVEY:    You may be receiving a survey from [employer] regarding your visit today. Please complete the survey to enable us to provide the highest quality of care to you and your family. If you cannot score us a very good on any question, please call the office to discuss how we could have made your experience a very good one. Thank you.   [id number], MD Deborra Grandchild, MD Charla Cowden, MD Delona Pelton, DO  Rosalina Gan, LACY Prakash, [address] [facility]

## 2022-11-29 ENCOUNTER — OFFICE VISIT (OUTPATIENT)
Dept: FAMILY MEDICINE CLINIC | Age: 77
End: 2022-11-29
Payer: MEDICARE

## 2022-11-29 VITALS
BODY MASS INDEX: 29.86 KG/M2 | HEIGHT: 68 IN | HEART RATE: 88 BPM | OXYGEN SATURATION: 96 % | WEIGHT: 197 LBS | SYSTOLIC BLOOD PRESSURE: 138 MMHG | DIASTOLIC BLOOD PRESSURE: 82 MMHG

## 2022-11-29 DIAGNOSIS — E78.00 PURE HYPERCHOLESTEROLEMIA: ICD-10-CM

## 2022-11-29 DIAGNOSIS — E11.9 CONTROLLED TYPE 2 DIABETES MELLITUS WITHOUT COMPLICATION, WITHOUT LONG-TERM CURRENT USE OF INSULIN (HCC): ICD-10-CM

## 2022-11-29 DIAGNOSIS — K21.9 GASTROESOPHAGEAL REFLUX DISEASE WITHOUT ESOPHAGITIS: ICD-10-CM

## 2022-11-29 DIAGNOSIS — Z00.00 MEDICARE ANNUAL WELLNESS VISIT, SUBSEQUENT: Primary | ICD-10-CM

## 2022-11-29 DIAGNOSIS — I10 ESSENTIAL HYPERTENSION, BENIGN: ICD-10-CM

## 2022-11-29 DIAGNOSIS — E03.9 ACQUIRED HYPOTHYROIDISM: ICD-10-CM

## 2022-11-29 LAB — HBA1C MFR BLD: 6.1 %

## 2022-11-29 PROCEDURE — 3044F HG A1C LEVEL LT 7.0%: CPT | Performed by: FAMILY MEDICINE

## 2022-11-29 PROCEDURE — G8417 CALC BMI ABV UP PARAM F/U: HCPCS | Performed by: FAMILY MEDICINE

## 2022-11-29 PROCEDURE — 1123F ACP DISCUSS/DSCN MKR DOCD: CPT | Performed by: FAMILY MEDICINE

## 2022-11-29 PROCEDURE — 83036 HEMOGLOBIN GLYCOSYLATED A1C: CPT | Performed by: FAMILY MEDICINE

## 2022-11-29 PROCEDURE — 2022F DILAT RTA XM EVC RTNOPTHY: CPT | Performed by: FAMILY MEDICINE

## 2022-11-29 PROCEDURE — G0439 PPPS, SUBSEQ VISIT: HCPCS | Performed by: FAMILY MEDICINE

## 2022-11-29 PROCEDURE — 3017F COLORECTAL CA SCREEN DOC REV: CPT | Performed by: FAMILY MEDICINE

## 2022-11-29 PROCEDURE — 3078F DIAST BP <80 MM HG: CPT | Performed by: FAMILY MEDICINE

## 2022-11-29 PROCEDURE — 3074F SYST BP LT 130 MM HG: CPT | Performed by: FAMILY MEDICINE

## 2022-11-29 PROCEDURE — 99214 OFFICE O/P EST MOD 30 MIN: CPT | Performed by: FAMILY MEDICINE

## 2022-11-29 PROCEDURE — G8482 FLU IMMUNIZE ORDER/ADMIN: HCPCS | Performed by: FAMILY MEDICINE

## 2022-11-29 PROCEDURE — 4004F PT TOBACCO SCREEN RCVD TLK: CPT | Performed by: FAMILY MEDICINE

## 2022-11-29 PROCEDURE — G8427 DOCREV CUR MEDS BY ELIG CLIN: HCPCS | Performed by: FAMILY MEDICINE

## 2022-11-29 SDOH — ECONOMIC STABILITY: FOOD INSECURITY: WITHIN THE PAST 12 MONTHS, YOU WORRIED THAT YOUR FOOD WOULD RUN OUT BEFORE YOU GOT MONEY TO BUY MORE.: NEVER TRUE

## 2022-11-29 SDOH — ECONOMIC STABILITY: FOOD INSECURITY: WITHIN THE PAST 12 MONTHS, THE FOOD YOU BOUGHT JUST DIDN'T LAST AND YOU DIDN'T HAVE MONEY TO GET MORE.: NEVER TRUE

## 2022-11-29 ASSESSMENT — ENCOUNTER SYMPTOMS
DIARRHEA: 0
FACIAL SWELLING: 0
TROUBLE SWALLOWING: 0
COUGH: 0
CHOKING: 0
CONSTIPATION: 0
SHORTNESS OF BREATH: 0
VOMITING: 0
BLOOD IN STOOL: 0
EYE REDNESS: 0
BLURRED VISION: 0
ABDOMINAL PAIN: 0
NAUSEA: 0
HEARTBURN: 0
EYE DISCHARGE: 0

## 2022-11-29 ASSESSMENT — PATIENT HEALTH QUESTIONNAIRE - PHQ9
SUM OF ALL RESPONSES TO PHQ QUESTIONS 1-9: 0
SUM OF ALL RESPONSES TO PHQ QUESTIONS 1-9: 0
2. FEELING DOWN, DEPRESSED OR HOPELESS: 0
SUM OF ALL RESPONSES TO PHQ QUESTIONS 1-9: 0
1. LITTLE INTEREST OR PLEASURE IN DOING THINGS: 0
SUM OF ALL RESPONSES TO PHQ QUESTIONS 1-9: 0
SUM OF ALL RESPONSES TO PHQ9 QUESTIONS 1 & 2: 0

## 2022-11-29 ASSESSMENT — SOCIAL DETERMINANTS OF HEALTH (SDOH): HOW HARD IS IT FOR YOU TO PAY FOR THE VERY BASICS LIKE FOOD, HOUSING, MEDICAL CARE, AND HEATING?: NOT HARD AT ALL

## 2022-11-29 ASSESSMENT — LIFESTYLE VARIABLES: HOW OFTEN DO YOU HAVE A DRINK CONTAINING ALCOHOL: MONTHLY OR LESS

## 2022-11-29 NOTE — PROGRESS NOTES
HPI Notes    Name: Sekou Lowe  : 1945        Chief Complaint:     Chief Complaint   Patient presents with    Medicare AWV    Diabetes    Gastroesophageal Reflux    Hypertension    Hypothyroidism     21  TSH - 3.55       History of Present Illness:     Sekou Lowe is a 78 y.o.  male who presents with Medicare AWV, Diabetes, Gastroesophageal Reflux, Hypertension, and Hypothyroidism (21  TSH - 3.55)      Diabetes  He presents for his follow-up diabetic visit. He has type 2 diabetes mellitus. His disease course has been stable. There are no hypoglycemic associated symptoms. Pertinent negatives for hypoglycemia include no dizziness or headaches. Pertinent negatives for diabetes include no blurred vision, no chest pain, no fatigue and no weight loss. There are no hypoglycemic complications. Symptoms are stable. There are no diabetic complications. Risk factors for coronary artery disease include dyslipidemia, hypertension and male sex. Current diabetic treatment includes diet. He is following a generally healthy diet. An ACE inhibitor/angiotensin II receptor blocker is being taken. Eye exam is current. Gastroesophageal Reflux  He reports no abdominal pain, no chest pain, no choking, no coughing, no dysphagia, no heartburn or no nausea. This is a chronic problem. The current episode started more than 1 year ago. The problem has been unchanged. Pertinent negatives include no fatigue, melena or weight loss. He has tried a PPI for the symptoms. The treatment provided significant relief. Hypertension  This is a chronic problem. The current episode started more than 1 year ago. The problem is unchanged. The problem is controlled. Pertinent negatives include no blurred vision, chest pain, headaches, malaise/fatigue, palpitations, peripheral edema or shortness of breath.  Risk factors for coronary artery disease include male gender, smoking/tobacco exposure, dyslipidemia, diabetes mellitus and hyperhomocysteinemia. Hyperlipidemia  This is a chronic problem. The current episode started more than 1 year ago. The problem is controlled. Recent lipid tests were reviewed and are normal. Exacerbating diseases include diabetes and hypothyroidism. Pertinent negatives include no chest pain or shortness of breath. Current antihyperlipidemic treatment includes statins. The current treatment provides significant improvement of lipids. Risk factors for coronary artery disease include dyslipidemia, hypertension and diabetes mellitus. Hypothyroidism- Chronic/stable, Patient denies changes in weight,bowels,palpitations. Energy is good Last TSH 3.55 7/6/22    Pt sees rheumatoid  and Kaitlin Odonnell Dr for his arthritis and back pain    Past Medical History:     Past Medical History:   Diagnosis Date    Arthritis     Cancer ([facility] [address]     multiple myeloma    Fibromyalgia     GERD (gastroesophageal reflux disease)     History of blood transfusion     At birth     Hyperlipidemia     Hypertension     Hypothyroidism     IBS (irritable bowel syndrome)     Osteoarthritis     Thyroid disease     Hypothyroid       Reviewed all health maintenance requirements and ordered appropriate tests  Health Maintenance Due   Topic Date Due    DTaP/Tdap/Td vaccine (1 - Tdap) Never done    Low dose CT lung screening  Never done    Shingles vaccine (2 of 3) 03/26/2012    COVID-19 Vaccine (5 - Booster for Tanner Peter series) 06/13/2022    Lipids  07/06/2022    Diabetic foot exam  12/22/2022       Past Surgical History:     Past Surgical History:   Procedure Laterality Date    ABDOMEN SURGERY  2004    Bowel Resection following \"fall on the ice\";  Junito Juares MD    APPENDECTOMY      COLONOSCOPY      COLONOSCOPY  09-    Dr. Kathy Patel      cataract bilateral    GASTRIC FUNDOPLICATION  0955    HEMORRHOID SURGERY      HERNIA REPAIR      Hiatal Hernia repair, Esophageal repair     HERNIA REPAIR Bilateral     Inguinal     PAIN MANAGEMENT PROCEDURE Bilateral 9/17/2021    BILATERAL L2,L3,L4 MEDIAL BRANCH L5 DORSAL RAMI INJECTION performed by Juan Alves MD at [address] [place] [address] N/A 11/4/2021    BILATERAL L2-3-4-5 MEDIAL BRANCH BLOCK performed by Juan Alves MD at [address] [place] [address] Bilateral 12/3/2021    LUMBAR RFA L 2-3-4-5 BILATERAL MEDIAL BRANCH RFA performed by Juan Alves MD at [address] [facility], COMPLETE N/A 1/25/2018    CYSTOSCOPY TRANSURETHRAL RESECTION PROSTATE LASER performed by Billie Najjar, MD at [address] [facility] ARTHROSCOPY Right 10/14/2020    RIGHT SHOULDER ARTHROSCPY PROBABLE RCR. Shoulder Scope Subacromial Decompression. Partial Distal Claviclectomy. Debreidement. Mini RCR Open. Bicep Tenodecis performed by Stephanie Anthony DO at [address] [facility] ENDOSCOPY  09-    Dr. Mellisa Friend ENDOSCOPY  12/14/2016    Meek Gotti MD        Medications:       Prior to Admission medications    Medication Sig Start Date End Date Taking?  Authorizing Provider   leucovorin calcium (WELLCOVORIN) 5 MG tablet  10/4/22  Yes Historical Provider, MD   amLODIPine (NORVASC) 5 MG tablet TAKE 1 TABLET EVERY DAY 6/2/22  Yes Derrek Mcdermott MD   pantoprazole (PROTONIX) 40 MG tablet TAKE 1 TABLET TWICE DAILY 6/2/22  Yes Derrek Mcdermott MD   levothyroxine (SYNTHROID) 75 MCG tablet TAKE 1 TABLET EVERY DAY 6/2/22  Yes Derrek Mcdermott MD   simvastatin (ZOCOR) 20 MG tablet TAKE 1 TABLET EVERY NIGHT 6/2/22  Yes Derrek Mcdermott MD   folic acid (FOLVITE) 1 MG tablet TAKE 1-3 TABLETS BY ORAL ROUTE EVERY DAY 5/10/22  Yes Historical Provider, MD   hydroxychloroquine (PLAQUENIL) 200 mg tablet TAKE 1 TABLET BY MOUTH TWICE A DAY 5/11/22  Yes Historical Provider, MD   methotrexate (RHEUMATREX) 2.5 MG chemo tablet TAKE 6 TABLET BY ORAL ROUTE EVERY WEEK ON EMPTY STOMACH 5/10/22  Yes Historical Provider, MD   tamsulosin (FLOMAX) 0.4 MG capsule TAKE 1 CAPSULE EVERY DAY 2/24/22  Yes Verito Sawyer PA-C   hydrocortisone 2.5 % cream Apply topically 2 times daily Apply topically 2 times daily. Yes Historical Provider, MD   aspirin 81 MG tablet Take 81 mg by mouth daily   Yes Historical Provider, MD   gabapentin (NEURONTIN) 300 MG capsule Take 600 mg by mouth nightly. Yes Historical Provider, MD   gabapentin (NEURONTIN) 600 MG tablet Take 300 mg by mouth 4 times daily. Yes Historical Provider, MD   HYDROcodone-acetaminophen (NORCO) 5-325 MG per tablet Take 1 tablet by mouth every 6 hours as needed for Pain. Yes Historical Provider, MD   lidocaine (LMX) 4 % cream Apply topically as needed for Pain Apply topically as needed. Yes Historical Provider, MD   Multiple Vitamins-Minerals (MULTI COMPLETE PO) Take by mouth   Yes Historical Provider, MD   polyethylene glycol (GLYCOLAX) powder Take 17 g by mouth as needed    Yes Historical Provider, MD        Allergies:       Adhesive tape and Wound dressing adhesive    Social History:     Tobacco:    reports that he has been smoking cigarettes. He has a 20.00 pack-year smoking history. He has never used smokeless tobacco.  Alcohol:      reports current alcohol use. Drug Use:  reports no history of drug use. Family History:     Family History   Problem Relation Age of Onset    Cancer Mother         bone    Heart Disease Father         MI       Review of Systems:       Review of Systems   Constitutional:  Negative for chills, fatigue, fever, malaise/fatigue and weight loss. HENT:  Negative for facial swelling and trouble swallowing. Eyes:  Negative for blurred vision, discharge, redness and visual disturbance. Respiratory:  Negative for cough, choking and shortness of breath. Cardiovascular:  Negative for chest pain and palpitations.    Gastrointestinal:  Negative for abdominal pain, blood in stool, constipation, diarrhea, dysphagia, heartburn, melena, nausea and vomiting. Genitourinary:  Negative for dysuria and hematuria. Musculoskeletal:  Negative for joint swelling and neck stiffness. Skin:  Negative for rash. Neurological:  Negative for dizziness, light-headedness and headaches. Psychiatric/Behavioral:  Negative for sleep disturbance. Physical Exam:     Physical Exam  Vitals reviewed. Constitutional:       General: He is not in acute distress. Appearance: Normal appearance. He is well-developed. He is not ill-appearing. HENT:      Head: Normocephalic and atraumatic. Eyes:      Conjunctiva/sclera: Conjunctivae normal.   Neck:      Thyroid: No thyromegaly. Vascular: No carotid bruit. Cardiovascular:      Rate and Rhythm: Normal rate and regular rhythm. Heart sounds: Normal heart sounds. No murmur heard. Pulmonary:      Effort: Pulmonary effort is normal. No respiratory distress. Breath sounds: Normal breath sounds. Abdominal:      General: There is no distension. Palpations: Abdomen is soft. Tenderness: There is no abdominal tenderness. Musculoskeletal:      Cervical back: Neck supple. Right lower leg: No edema. Left lower leg: No edema. Lymphadenopathy:      Cervical: No cervical adenopathy. Skin:     Findings: No rash. Neurological:      Mental Status: He is alert and oriented to person, place, and time.    Psychiatric:         Mood and Affect: Mood normal.       Vitals:  /82   Pulse 88   Ht 5' 8\" (1.727 m)   Wt 197 lb (89.4 kg)   SpO2 96%   BMI 29.95 kg/m²       Data:     Lab Results   Component Value Date/Time     12/06/2021 08:51 AM    K 3.8 12/06/2021 08:51 AM     12/06/2021 08:51 AM    CO2 25 12/06/2021 08:51 AM    BUN 18 12/06/2021 08:51 AM    CREATININE 0.72 08/24/2022 08:49 AM    GLUCOSE 144 12/06/2021 08:51 AM    GLUCOSE 117 04/03/2012 07:09 AM    PROT 7.4 01/04/2021 07:05 AM    LABALBU 4.6 01/04/2021 07:05 AM LABALBU 4.5 04/03/2012 07:09 AM    BILITOT 0.39 01/04/2021 07:05 AM    ALKPHOS 91 01/04/2021 07:05 AM    AST 19 08/24/2022 08:49 AM    ALT 23 08/24/2022 08:49 AM     Lab Results   Component Value Date/Time    WBC 6.4 08/24/2022 08:49 AM    RBC 5.00 08/24/2022 08:49 AM    HGB 15.6 08/24/2022 08:49 AM    HCT 45.1 08/24/2022 08:49 AM    MCV 90.2 08/24/2022 08:49 AM    MCH 31.2 08/24/2022 08:49 AM    MCHC 34.6 08/24/2022 08:49 AM    RDW 14.1 08/24/2022 08:49 AM     08/24/2022 08:49 AM    MPV NOT REPORTED 12/06/2021 08:51 AM     Lab Results   Component Value Date/Time    TSH 3.55 07/06/2021 08:59 AM     Lab Results   Component Value Date/Time    CHOL 135 07/06/2021 08:59 AM    HDL 32 07/06/2021 08:59 AM    PSA 2.92 01/06/2017 02:51 PM    LABA1C 6.1 11/29/2022 08:19 AM          Assessment/Plan:        1. Controlled type 2 diabetes mellitus without complication, without long-term current use of insulin (HCC)  F/U 6mos, prior Lipids, and in office HgbA1C. Do daily foot checks and yearly eye exams  Doing well on DM diet with hgba1c 6.1  - POCT glycosylated hemoglobin (Hb A1C)    2. Essential hypertension, benign  Stable no norvasc     3. Pure hypercholesterolemia  Stable on zocor and labs in 6mos   - Lipid Panel; Future    4. Gastroesophageal reflux disease without esophagitis  Stable on protonix and diet control     5. Acquired hypothyroidism  Stable on synthroid     6. Medicare annual wellness visit, subsequent  completed        Jessica Blow received counseling on the following healthy behaviors: nutrition and exercise  Reviewed prior labs and health maintenance  Continue current medications, diet and exercise. Discussed use, benefit, and side effects of prescribed medications. Barriers to medication compliance addressed. Patient given educational materials - see patient instructions  Was a self-tracking handout given in paper form or via [employer]?  Yes    Requested Prescriptions      No prescriptions requested or ordered in this encounter       All patient questions answered. Patient voiced understanding. Quality Measures    Body mass index is 29.95 kg/m². Elevated. Weight control planned discussed Healthy diet and regular exercise. BP: 138/82. Blood pressure is Normal. Treatment plan consists of No treatment change needed. Fall Risk 11/29/2022 7/6/2021 1/5/2021 8/21/2019 6/26/2018 5/18/2017 5/2/2016   2 or more falls in past year? no no no no no no no   Fall with injury in past year? no no no no no no no     The patient does not have a history of falls. I did not - not indicated , complete a risk assessment for falls. A plan of care for falls No Treatment plan indicated    Lab Results   Component Value Date    LDLCHOLESTEROL 76 07/06/2021    (goal LDL reduction with dx if diabetes is 50% LDL reduction)    PHQ Scores 11/29/2022 4/25/2022 7/6/2021 1/5/2021 7/1/2020 8/21/2019 6/26/2018   PHQ2 Score 0 0 0 0 0 0 0   PHQ9 Score 0 0 0 0 0 0 0     Interpretation of Total Score Depression Severity: 1-4 = Minimal depression, 5-9 = Mild depression, 10-14 = Moderate depression, 15-19 = Moderately severe depression, 20-27 = Severe depression      Return in 6 months (on 5/29/2023) for Medicare Annual Wellness Visit in 1 year, DM, HTN, Hyperlipidemia, Hypothyroid.       Electronically signed by Marley Chua MD on 11/29/2022 at 9:01 AM

## 2022-11-29 NOTE — PROGRESS NOTES
Medicare Annual Wellness Visit    Olayinka Chambers is here for Medicare AWV, Diabetes, Gastroesophageal Reflux, Hypertension, and Hypothyroidism (7/6/21  TSH - 3.55)    Assessment & Plan   Medicare annual wellness visit, subsequent  Controlled type 2 diabetes mellitus without complication, without long-term current use of insulin ([facility] [address]  -     POCT glycosylated hemoglobin (Hb A1C)  Essential hypertension, benign  Pure hypercholesterolemia  -     Lipid Panel; Future  Gastroesophageal reflux disease without esophagitis  Acquired hypothyroidism    Recommendations for Preventive Services Due: see orders and patient instructions/AVS.  Recommended screening schedule for the next 5-10 years is provided to the patient in written form: see Patient Instructions/AVS.     Return in 6 months (on 5/29/2023) for Medicare Annual Wellness Visit in 1 year, DM, HTN, Hyperlipidemia, Hypothyroid. Subjective   The following acute and/or chronic problems were also addressed today: see 6mos note     Patient's complete Health Risk Assessment and screening values have been reviewed and are found in Flowsheets. The following problems were reviewed today and where indicated follow up appointments were made and/or referrals ordered. Positive Risk Factor Screenings with Interventions:       Tobacco Use:  Tobacco Use: High Risk    Smoking Tobacco Use: Some Days    Smokeless Tobacco Use: Never    Passive Exposure: Not on file     E-cigarette/Vaping       Questions Responses    E-cigarette/Vaping Use Never User    Start Date     Passive Exposure     Quit Date     Counseling Given     Comments           Substance Use - Tobacco Interventions:  patient is not ready to work toward tobacco cessation at this time         Opioid Risk: (Low risk score <55) Opioid risk score: 17    Patient is low risk for opioid use disorder or overdose.   Last PDMP Natalie Fontanez as Reviewed:  Review User Review Instant Review Result   Ardis Bloch 11/3/2022 12:32 PM Reviewed PDMP [1]      General Health and ACP:  General  In general, how would you say your health is?: Fair  In the past 7 days, have you experienced any of the following: New or Increased Pain, New or Increased Fatigue, Loneliness, Social Isolation, Stress or Anger?: No  Do you get the social and emotional support that you need?: Yes  Do you have a Living Will?: Yes    Advance Directives       Power of Rossy [place] Will ACP-Advance Directive ACP-Power of     Not on File Not on File Not on File Not on File        General Health Risk Interventions:  Doing ok but not ready to quit smoking    Health Habits/Nutrition:  Physical Activity: Inactive    Days of Exercise per Week: 0 days    Minutes of Exercise per Session: 0 min     Have you lost any weight without trying in the past 3 months?: No  Body mass index: (!) 29.95  Have you seen the dentist within the past year?: Appointment is scheduled  Health Habits/Nutrition Interventions:  Inadequate physical activity:  pt will start walking more once has his ablation  for back pain in Dec              Objective   Vitals:    11/29/22 0806   BP: 138/82   Pulse: 88   SpO2: 96%   Weight: 197 lb (89.4 kg)   Height: 5' 8\" (1.727 m)      Body mass index is 29.95 kg/m². Allergies   Allergen Reactions    Adhesive Tape Rash    Wound Dressing Adhesive      Redness, irritation     Prior to Visit Medications    Medication Sig Taking?  Authorizing Provider   leucovorin calcium (WELLCOVORIN) 5 MG tablet  Yes Historical Provider, MD   amLODIPine (NORVASC) 5 MG tablet TAKE 1 TABLET EVERY DAY Yes Jenn Tellez MD   pantoprazole (PROTONIX) 40 MG tablet TAKE 1 TABLET TWICE DAILY Yes Jenn Tellez MD   levothyroxine (SYNTHROID) 75 MCG tablet TAKE 1 TABLET EVERY DAY Yes Jenn Tellez MD   simvastatin (ZOCOR) 20 MG tablet TAKE 1 TABLET EVERY NIGHT Yes Jenn Tellez MD   folic acid (FOLVITE) 1 MG tablet TAKE 1-3 TABLETS BY ORAL ROUTE EVERY DAY Yes Historical Provider, MD   hydroxychloroquine (PLAQUENIL) 200 mg tablet TAKE 1 TABLET BY MOUTH TWICE A DAY Yes Historical Provider, MD   methotrexate (RHEUMATREX) 2.5 MG chemo tablet TAKE 6 TABLET BY ORAL ROUTE EVERY WEEK ON EMPTY STOMACH Yes Historical Provider, MD   tamsulosin (FLOMAX) 0.4 MG capsule TAKE 1 CAPSULE EVERY DAY Yes Phan Sawyer PA-C   hydrocortisone 2.5 % cream Apply topically 2 times daily Apply topically 2 times daily. Yes Historical Provider, MD   aspirin 81 MG tablet Take 81 mg by mouth daily Yes Historical Provider, MD   gabapentin (NEURONTIN) 300 MG capsule Take 600 mg by mouth nightly. Yes Historical Provider, MD   gabapentin (NEURONTIN) 600 MG tablet Take 300 mg by mouth 4 times daily. Yes Historical Provider, MD   HYDROcodone-acetaminophen (NORCO) 5-325 MG per tablet Take 1 tablet by mouth every 6 hours as needed for Pain. Yes Historical Provider, MD   lidocaine (LMX) 4 % cream Apply topically as needed for Pain Apply topically as needed.  Yes Historical Provider, MD   Multiple Vitamins-Minerals (MULTI COMPLETE PO) Take by mouth Yes Historical Provider, MD   polyethylene glycol (GLYCOLAX) powder Take 17 g by mouth as needed  Yes Historical Provider, MD       CareTeam (Including outside providers/suppliers regularly involved in providing care):   Patient Care Team:  Juju Lei MD as PCP - General (Family Medicine)  Juju Lei MD as PCP - [facility] Empaneled Provider  Gayla Hua MD as Surgeon (General Surgery)  Kamlesh Mojica MD (Rheumatology)     Reviewed and updated this visit:  Tobacco  Allergies  Meds  Problems  Med Hx  Surg Hx  Soc Hx  Fam Hx

## 2022-11-29 NOTE — PROGRESS NOTES
[facility]   Preadmission Testing    Name: Luz Marina Walls  : 1945  Patient Phone: [phone] (home)     Procedure: BILATERAL LUMBAR L3, L4M L5 MEDIAL BRANCH RADIOFREQUENCY ABLATION  Date of Procedure: 2022  Surgeon: Cortney Echevarria DO    Ht:  5' 9\" (175.3 cm)  Wt: 195 lb (88.5 kg)  Wt method: Stated    Allergies: Allergies   Allergen Reactions    Adhesive Tape Rash    Wound Dressing Adhesive      Redness, irritation                There were no vitals filed for this visit. No LMP for male patient. Do you take blood thinners? [x] Yes    [] No    ASA 81mg     Instructed to stop blood thinners prior to procedure? [] Yes    [x] No      [] N/A    []Eliquis - 3 days  []Xarelto - 3 days  []Pradaxa - 5 days  []Coumadin - 5 days   []Plavix - 7 days  []ASA 325mg - 7 days  []Brillinta - 5 days  []Pletal - 2 days   Have you had a respiratory infection or sore throat in last 4 weeks before surgery?     [] Yes    [x] No     Patient instructed on: [x] NPO Status - if receiving sedation  [x] Meds to Take  [x] Ride Home - sedation/ epidurals  [x]No Jewelry/Contact Lenses/Nail Polish     DOS Patient Needs [] HCG   [] Blood Sugar  [] PT/INR

## 2022-11-29 NOTE — PATIENT INSTRUCTIONS
Personalized Preventive Plan for Néstor Pepper - 11/29/2022  Medicare offers a range of preventive health benefits. Some of the tests and screenings are paid in full while other may be subject to a deductible, co-insurance, and/or copay. Some of these benefits include a comprehensive review of your medical history including lifestyle, illnesses that may run in your family, and various assessments and screenings as appropriate. After reviewing your medical record and screening and assessments performed today your provider may have ordered immunizations, labs, imaging, and/or referrals for you. A list of these orders (if applicable) as well as your Preventive Care list are included within your After Visit Summary for your review. Other Preventive Recommendations:    A preventive eye exam performed by an eye specialist is recommended every 1-2 years to screen for glaucoma; cataracts, macular degeneration, and other eye disorders. A preventive dental visit is recommended every 6 months. Try to get at least 150 minutes of exercise per week or 10,000 steps per day on a pedometer . Order or download the FREE \"Exercise & Physical Activity: Your Everyday Guide\" from [employer]. Call 8-493.313.7287 or search [employer] online. You need 0252-6363 mg of calcium and 9515-7673 IU of vitamin D per day. It is possible to meet your calcium requirement with diet alone, but a vitamin D supplement is usually necessary to meet this goal.  When exposed to the sun, use a sunscreen that protects against both UVA and UVB radiation with an SPF of 30 or greater. Reapply every 2 to 3 hours or after sweating, drying off with a towel, or swimming. Always wear a seat belt when traveling in a car. Always wear a helmet when riding a bicycle or motorcycle.

## 2022-12-08 ENCOUNTER — HOSPITAL ENCOUNTER (OUTPATIENT)
Age: 77
Setting detail: OUTPATIENT SURGERY
Discharge: HOME | End: 2022-12-08
Attending: STUDENT IN AN ORGANIZED HEALTH CARE EDUCATION/TRAINING PROGRAM | Admitting: STUDENT IN AN ORGANIZED HEALTH CARE EDUCATION/TRAINING PROGRAM
Payer: MEDICARE

## 2022-12-08 ENCOUNTER — APPOINTMENT (OUTPATIENT)
Dept: GENERAL RADIOLOGY | Age: 77
End: 2022-12-08
Attending: STUDENT IN AN ORGANIZED HEALTH CARE EDUCATION/TRAINING PROGRAM
Payer: MEDICARE

## 2022-12-08 VITALS
RESPIRATION RATE: 16 BRPM | SYSTOLIC BLOOD PRESSURE: 156 MMHG | WEIGHT: 197.8 LBS | HEIGHT: 69 IN | BODY MASS INDEX: 29.3 KG/M2 | OXYGEN SATURATION: 96 % | TEMPERATURE: 98 F | DIASTOLIC BLOOD PRESSURE: 90 MMHG | HEART RATE: 76 BPM

## 2022-12-08 PROCEDURE — 99153 MOD SED SAME PHYS/QHP EA: CPT | Performed by: STUDENT IN AN ORGANIZED HEALTH CARE EDUCATION/TRAINING PROGRAM

## 2022-12-08 PROCEDURE — 76000 FLUOROSCOPY <1 HR PHYS/QHP: CPT

## 2022-12-08 PROCEDURE — 2709999900 HC NON-CHARGEABLE SUPPLY: Performed by: STUDENT IN AN ORGANIZED HEALTH CARE EDUCATION/TRAINING PROGRAM

## 2022-12-08 PROCEDURE — 64635 DESTROY LUMB/SAC FACET JNT: CPT | Performed by: STUDENT IN AN ORGANIZED HEALTH CARE EDUCATION/TRAINING PROGRAM

## 2022-12-08 PROCEDURE — 99152 MOD SED SAME PHYS/QHP 5/>YRS: CPT | Performed by: STUDENT IN AN ORGANIZED HEALTH CARE EDUCATION/TRAINING PROGRAM

## 2022-12-08 PROCEDURE — 6360000002 HC RX W HCPCS: Performed by: STUDENT IN AN ORGANIZED HEALTH CARE EDUCATION/TRAINING PROGRAM

## 2022-12-08 PROCEDURE — 2580000003 HC RX 258: Performed by: STUDENT IN AN ORGANIZED HEALTH CARE EDUCATION/TRAINING PROGRAM

## 2022-12-08 PROCEDURE — 7100000010 HC PHASE II RECOVERY - FIRST 15 MIN: Performed by: STUDENT IN AN ORGANIZED HEALTH CARE EDUCATION/TRAINING PROGRAM

## 2022-12-08 PROCEDURE — 7100000011 HC PHASE II RECOVERY - ADDTL 15 MIN: Performed by: STUDENT IN AN ORGANIZED HEALTH CARE EDUCATION/TRAINING PROGRAM

## 2022-12-08 PROCEDURE — 3600000059 HC PAIN LEVEL 5 ADDL 15 MIN: Performed by: STUDENT IN AN ORGANIZED HEALTH CARE EDUCATION/TRAINING PROGRAM

## 2022-12-08 PROCEDURE — 3600000058 HC PAIN LEVEL 5 BASE: Performed by: STUDENT IN AN ORGANIZED HEALTH CARE EDUCATION/TRAINING PROGRAM

## 2022-12-08 PROCEDURE — 2500000003 HC RX 250 WO HCPCS: Performed by: STUDENT IN AN ORGANIZED HEALTH CARE EDUCATION/TRAINING PROGRAM

## 2022-12-08 PROCEDURE — 64636 DESTROY L/S FACET JNT ADDL: CPT | Performed by: STUDENT IN AN ORGANIZED HEALTH CARE EDUCATION/TRAINING PROGRAM

## 2022-12-08 RX ORDER — LIDOCAINE HYDROCHLORIDE 10 MG/ML
INJECTION, SOLUTION INFILTRATION; PERINEURAL PRN
Status: DISCONTINUED | OUTPATIENT
Start: 2022-12-08 | End: 2022-12-08 | Stop reason: ALTCHOICE

## 2022-12-08 RX ORDER — TRIAMCINOLONE ACETONIDE 40 MG/ML
INJECTION, SUSPENSION INTRA-ARTICULAR; INTRAMUSCULAR PRN
Status: DISCONTINUED | OUTPATIENT
Start: 2022-12-08 | End: 2022-12-08 | Stop reason: ALTCHOICE

## 2022-12-08 RX ORDER — SODIUM CHLORIDE, SODIUM LACTATE, POTASSIUM CHLORIDE, CALCIUM CHLORIDE 600; 310; 30; 20 MG/100ML; MG/100ML; MG/100ML; MG/100ML
INJECTION, SOLUTION INTRAVENOUS CONTINUOUS
Status: DISCONTINUED | OUTPATIENT
Start: 2022-12-08 | End: 2022-12-08 | Stop reason: HOSPADM

## 2022-12-08 RX ORDER — MIDAZOLAM HYDROCHLORIDE 1 MG/ML
INJECTION INTRAMUSCULAR; INTRAVENOUS PRN
Status: DISCONTINUED | OUTPATIENT
Start: 2022-12-08 | End: 2022-12-08 | Stop reason: ALTCHOICE

## 2022-12-08 RX ORDER — LIDOCAINE HYDROCHLORIDE 20 MG/ML
INJECTION, SOLUTION EPIDURAL; INFILTRATION; INTRACAUDAL; PERINEURAL PRN
Status: DISCONTINUED | OUTPATIENT
Start: 2022-12-08 | End: 2022-12-08 | Stop reason: ALTCHOICE

## 2022-12-08 RX ADMIN — SODIUM CHLORIDE, POTASSIUM CHLORIDE, SODIUM LACTATE AND CALCIUM CHLORIDE: 600; 310; 30; 20 INJECTION, SOLUTION INTRAVENOUS at 12:34

## 2022-12-08 NOTE — DISCHARGE INSTRUCTIONS
You have received an injection of local anesthetic and corticosteroids. The local anesthetic effect typically last 4-6 hours. It may take 3-7 days for the corticosteroids to reach optimal effect. Please pay close attention to the following information/instructions: You may not drive for 12 hours after your injection. It is common to experience mild soreness at the injection site(s) for 24-48 hours. Ice is the best remedy. You may apply ice for 20 minutes at a time several times a day as needed. Avoid heat to the injection area for 72 hours. No hot packs, saunas, or steam rooms during this time. A regular shower is OK. You may immediately restart your regular medication regimen, including pain medications, anti-inflammatory, and blood thinners. Please remove the sterile dressing/band-aid tonight or tomorrow morning. Do not leave it on after you have taken a shower or gotten it wet. Corticosteroid side effects can occur after this injection, but they usually resolve after several days. These side effects can include flushing, hot flashes, mild palpitations, insomnia, water retention, feeling anxious/restless, or headaches. While it is extremely rare to get an infection after a spinal procedure, please call the office if you develop any signs of infection. These signs include fevers/chills, severely increased pain, redness at the injections site, or any drainage from the injection site. Remember that the corticosteroid benefit (long-term pain relief) from an injection can take as long as 10 days to occur. You may have a period of slightly increased pain after your injection before the cortisone takes effect. You may resume all of your normal daily activities 24 hours after your injection. It is OK to restart your exercise or physical therapy program as soon as you feel comfortable doing so. Please complete the pain diary given to you after your injection.  The information you provide on this diary is used to guide your treatment plan. You should schedule a follow-up visit in 2-3 weeks to review your response to this injection. Please bring your pain diary with you to this appointment. Education Materials Received: yes  Belongings Returned: yes    Resume all current outpatient medication(s). The discharge instructions have been reviewed with the patient and/or Guardian. Patient and/or Guardian signed and retained a printed copy.

## 2022-12-08 NOTE — OP NOTE
PROCEDURE PERFORMED: Bilateral Lumbar Medial Branch Radiofrequency Ablation using Fluoroscopy    PREOPERATIVE DIAGNOSIS: Lumbar spondylosis    INDICATIONS: Chronic low back pain    The patient's history and physical exam were reviewed. The risk, benefits, and alternatives of the procedure were discussed and all questions were answered to the patient's satisfaction. The patient agreed to proceed and written informed consent was obtained. POSTOPERATIVE DIAGNOSIS: Lumbar spondylosis    PHYSICIAN:  Dr. Matthew Klein DO    ANESTHESIA:  LOCAL    ASSISTANT:  NONE    PATHOLOGY:  NONE    ESTIMATED BLOOD LOSS:  N/A    IMPLANTS:  NONE    PROCEDURE DESCRIPTION: Bilateral lumbar medial branch radiofrequency ablation using fluoroscopy    The patient was placed on the operative bed in prone position. The area was prepped with  Chlorhexidine. The area was then draped in a sterile fashion. Targeted levels: Bilateral lumbar L3, L4, L5 medial branch radiofrequency ablation    An AP  fluoroscopy image was used to identify and kasey Chambers's point at the L3, L4 levels on the targeted side. Additionally, the junction of the SAP and sacral ala was also marked on the same side. The skin and subcutaneous tissues were then anesthetized with 1% lidocaine. At each lumbar medial branch, a 20-gauge, 100 mm curved with 10 mm active tip probe was advanced under AP fluoroscopic projections until bone was contacted along the medial aspect of the transverse process. Aspiration of each needle was negative for blood, CSF and paresthesia prior to injection. Motor stimulation at 2 Hz and 1.2 V was negative. Then, after negative aspiration, 1 mL lidocaine 2% was injected at each level prior to lesioning which was performed at 80°C for 90 seconds. Once the lesion was complete, injectate solution containing Kenalog 40 mg and 2 mL lidocaine 1% was injected at each site with a total of 1 mL. The probes were then removed.   The needle sites were dressed appropriately. The same procedure was performed on the opposite side. The patient did not experience any hemodynamic or neurologic sequelae. The patient was transferred to the postoperative care unit in stable condition. Written discharge instructions were given to the patient. COMPLICATIONS:  There were no apparent complications. The patient tolerated the procedure well. SEDATION NOTE:     ASA CLASSIFICATION  2  MP   CLASSIFICATION  3     Moderate intravenous conscious sedation was supervised by Dr. Darcy Ganser  The patient was independently monitored by a Registered Nurse assigned to the Procedure Room  Monitoring included automated blood pressure, continuous EKG, Capnography and continuous pulse oximetry. The detailed Conscious Record is permanently stored in the [place] [id number].       The following is the conscious sedation record;  Start Time:  1357  End times:  1417  Duration:  20 minutes  MEDS GIVEN 2 mg Versed

## 2022-12-08 NOTE — H&P
Update History & Physical    The patient's History and Physical of November 3, 2022 was reviewed with the patient and I examined the patient. There was no change. The surgical site was confirmed by the patient and me. Plan: The risks, benefits, expected outcome, and alternative to the recommended procedure have been discussed with the patient. Patient understands and wants to proceed with the procedure. Electronically signed by Hernandez Escobar DO on 12/8/2022 at 1:26 PM    Chronic Pain Clinic Note     Encounter Date: 12/8/2022     SUBJECTIVE:  No chief complaint on file. History of Present Illness:   Janice Jackson is a 78 y.o. male who presents with Lumbar spondylosis. H/o Fibromyalgia and RA    Medication Refill: n/a    Current Complaints of Pain:   Location: Lumbar   Radiation: BLE  Severity: moderate  Pain Numerical Score -    Average: 5     Highest: 8  Lowest: 3-4  Character/Quality: Complains of pain that is aching  Timing: Keeps awake at night, but also keeps him up at night   Associated symptoms: weakness- BLE  Numbness: no   Weakness: BLE  Exacerbating factors: activity, walking  Alleviating factors: n/a  Length of time pain has been present: Started on years, chronic issue  Inciting event/injury: no  Bowel/Bladder incontinence: no  Falls: no  Physical Therapy: no     History of Interventions:   Surgery: no  Injections: Yes    Imaging:    Lumbar MRI 8/9/2021    FINDINGS: Alignment normal. No spondylolysis. No spondylolisthesis. Vertebral    body heights are normal. There are a few incidental vertebral body hemangioma. A vertebral body hemangioma in the L3 vertebral body measures 2.2 cm. A    vertebral body hemangioma in the L1 vertebral body measures 1.9 cm in diameter. There are no suspicious osseous lesions. No compression fractures. L5-S1, moderate degenerative disc disease. There is a shallow central    protrusion and annular tear. No spinal stenosis.  Disc space narrowing results    in mild bilateral foraminal narrowing. L4-L5, mild degenerative disc disease. Diffuse disc bulge. There is an annular    tear posteriorly in the disc annulus. No spinal stenosis. No foraminal    narrowing. L3-L4, there is a shallow broad-based central protrusion mildly effacing the    ventral aspect of the thecal sac without significant spinal stenosis or nerve    root compression. No neural foraminal narrowing. L2-L3, mild degenerative disc disease with mild disc desiccation. No spinal    stenosis. No foraminal narrowing. L1-L2, no spinal stenosis. No foraminal narrowing. No abnormal signal in the conus medullaris. There are no paraspinal masses. Past Medical History:   Diagnosis Date    Arthritis     osteoarthritis, rheumatoid    Cancer (HCC)     multiple myeloma    Fibromyalgia     GERD (gastroesophageal reflux disease)     History of blood transfusion     At birth     Hyperlipidemia     Hypertension     Hypothyroidism     IBS (irritable bowel syndrome)     Osteoarthritis     Thyroid disease     Hypothyroid        Past Surgical History:   Procedure Laterality Date    ABDOMEN SURGERY  2004    Bowel Resection following \"fall on the ice\";  Alan Varma MD    APPENDECTOMY      COLONOSCOPY      COLONOSCOPY  09-    Dr. Mendoza Nance      cataract bilateral    GASTRIC FUNDOPLICATION  4488    HEMORRHOID SURGERY      HERNIA REPAIR      Hiatal Hernia repair, Esophageal repair     HERNIA REPAIR Bilateral     Inguinal     PAIN MANAGEMENT PROCEDURE Bilateral 9/17/2021    BILATERAL L2,L3,L4 MEDIAL BRANCH L5 DORSAL RAMI INJECTION performed by Levar Norman MD at [address] [facility] N/A 11/4/2021    BILATERAL L2-3-4-5 MEDIAL BRANCH BLOCK performed by Levar Norman MD at [address] [place] [address] Bilateral 12/3/2021    LUMBAR RFA L 2-3-4-5 BILATERAL MEDIAL BRANCH RFA performed by Levar Norman MD at [address] [place] [address] VAPORIZATION SURGERY PROSTATE, COMPLETE N/A 2018    CYSTOSCOPY TRANSURETHRAL RESECTION PROSTATE LASER performed by Gates Canavan, MD at [address] [facility] ARTHROSCOPY Right 10/14/2020    RIGHT SHOULDER ARTHROSCPY PROBABLE RCR. Shoulder Scope Subacromial Decompression. Partial Distal Claviclectomy. Debreidement. Mini RCR Open.   Bicep Tenodecis performed by Lorenza Mary DO at [address] [facility] ENDOSCOPY  2014    Dr. Alina Walker  2016    Chastity Morillo MD       Family History   Problem Relation Age of Onset    Cancer Mother         bone    Heart Disease Father         MI       Social History     Socioeconomic History    Marital status:      Spouse name: Not on file    Number of children: Not on file    Years of education: Not on file    Highest education level: Not on file   Occupational History    Not on file   Tobacco Use    Smoking status: Some Days     Packs/day: 0.50     Years: 40.00     Pack years: 20.00     Types: Cigarettes     Last attempt to quit: 2014     Years since quittin.7    Smokeless tobacco: Never   Vaping Use    Vaping Use: Never used   Substance and Sexual Activity    Alcohol use: Yes     Comment: seldom    Drug use: No    Sexual activity: Not on file   Other Topics Concern    Not on file   Social History Narrative    Not on file     Social Determinants of Health     Financial Resource Strain: Low Risk     Difficulty of Paying Living Expenses: Not hard at all   Food Insecurity: No Food Insecurity    Worried About Running Out of Food in the Last Year: Never true    Ran Out of Food in the Last Year: Never true   Transportation Needs: Not on file   Physical Activity: Inactive    Days of Exercise per Week: 0 days    Minutes of Exercise per Session: 0 min   Stress: Not on file   Social Connections: Not on file   Intimate Partner Violence: Not on file Housing Stability: Not on file       Medications & Allergies:   Current Outpatient Medications   Medication Instructions    amLODIPine (NORVASC) 5 MG tablet TAKE 1 TABLET EVERY DAY    aspirin 81 mg, Oral, DAILY    gabapentin (NEURONTIN) 600 mg, Oral, NIGHTLY    gabapentin (NEURONTIN) 300 mg, Oral, 4 TIMES DAILY    HYDROcodone-acetaminophen (NORCO) 5-325 MG per tablet 1 tablet, Oral, EVERY 6 HOURS PRN    hydrocortisone 2.5 % cream Topical, 2 TIMES DAILY, Apply topically 2 times daily. hydroxychloroquine (PLAQUENIL) 200 mg tablet TAKE 1 TABLET BY MOUTH TWICE A DAY    leucovorin calcium (WELLCOVORIN) 5 mg, Oral, WEEKLY    levothyroxine (SYNTHROID) 75 MCG tablet TAKE 1 TABLET EVERY DAY    lidocaine (LMX) 4 % cream Topical, PRN, Apply topically as needed.      methotrexate (RHEUMATREX) 2.5 MG chemo tablet 10 tablets once a week    Multiple Vitamins-Minerals (MULTI COMPLETE PO) Oral    pantoprazole (PROTONIX) 40 MG tablet TAKE 1 TABLET TWICE DAILY    polyethylene glycol (GLYCOLAX) 17 g, Oral, PRN    simvastatin (ZOCOR) 20 MG tablet TAKE 1 TABLET EVERY NIGHT    tamsulosin (FLOMAX) 0.4 MG capsule TAKE 1 CAPSULE EVERY DAY       Allergies   Allergen Reactions    Adhesive Tape Rash    Wound Dressing Adhesive      Redness, irritation       Review of Systems:   Constitutional: negative for weight changes or fevers  Cardiovascular: negative for chest pain, palpitations, irregular heart beat  Respiratory: negative for dyspnea, cough, wheezing  Gastrointestinal: negative for constipation, diarrhea, nausea  Genitourinary: negative for bowel/bladder incontinence   Musculoskeletal: positive for low back pain  Neurological: negative for radicular leg pain, leg weakness or numbness/tingling  Behavioral/Psych: negative for anxiety/depression   Hematological: negative for abnormal bleeding, anticoagulation use or antiplatelet use  All other systems reviewed and are negative    OBJECTIVE:    Vitals:    12/08/22 1215   BP: (!) 153/89 Pulse: 86   Resp: 16   Temp: 98 °F (36.7 °C)   SpO2: 92%       PHYSICAL EXAM    GENERAL: No acute distress, pleasant, well-appearing  HEENT: Normocephalic, atraumatic, Pupils equal and round  CARDIOVASCULAR: Well perfused, No peripheral cyanosis  PULMONARY: Good chest wall excursion, breathing unlabored  PSYCH: Appropriate affect and insight, non-pressured speech  SKIN: No rashes or lesions  MUSCULOSKELETAL:  Inspection: The back and extremities are symmetric and aligned. Muscle bulk is normal in appearance. Palpation: There is tenderness to palpation along the lumbar paraspinal musculature bilaterally  Lumbar range of motion is full  NEUROMUSCULAR:  Patient ambulates unassisted  Gait is nonantalgic  Sensation to light touch is intact throughout lower extremities  Strength is full in lower extremities  No ankle clonus    Special Tests:  Lumbar facet loading is positive bilaterally  Seated straight leg raise is negative bilaterally      DIAGNOSIS:  No diagnosis found. ASSESSMENT:    Luz Marina Walls is a 78 y. o.male who presents with chronic low back pain. To review, patient has had low back pain for the last 5 years without injuries or trauma. He denies any previous low back surgeries. The patient's history and physical examination are consistent with lumbosacral spondylosis as the patient has axial low back pain that is mechanical in nature. There is tenderness to palpation along the lumbar paraspinal musculature with positive lumbar facet loading bilaterally. The lumbar MRI on 8/9/2021 reveals multilevel degenerative changes with facet hypertrophy throughout the lumbar spine specifically at L4-5 and L5-S1 facet joints. He underwent a previous bilateral lumbar L2, L3, L4, L5 medial branch radiofrequency ablation on 12/3/2021 with 80% improvement in pain and function for 10 months. Therefore, I will plan for a repeat lumbar radiofrequency ablation.     Neurologically, it appears the patient has full strength and normal sensation. There is no evidence of radiculopathy or myelopathy on examination. There are no red flags in the patient's history. The patient has failed conservative measures including greater than 3 medications for pain relief, a self-directed therapy program, as well as activity modification all within the last 6 weeks over 3 months. The patient's pain has been causing worsening quality of life and function. PLAN:  Medications: For nonopioid therapy, the following medications were prescribed:    -Continue medication prescribed by primary care physician    Opioid therapy:  -Managed by another provider    Interventions:  -Plan for repeat bilateral lumbar L3, L4, L5 medial branch radiofrequency ablation  -Moderate sedation will be utilized as patient has a history of severe anxiety which will cause the patient to move during the procedure which will not allow for a safe and effective procedure to be performed. We discussed the risk of moderate sedation including neurological events, cardiopulmonary events and even death. The patient still consented to proceed with moderate sedation to allow for a safe and effective procedure. Imaging:  -Reviewed lumbar MRI with patient in room    Behavioral Therapies:  -Continue daily stretching and home exercise program    Referrals:  -None    Follow-up Plan:  -After procedure    Patient was offered intervention where appropriate. Multi-modal Pain Therapy: The patient was explicitly considered for multimodal and interdisciplinary therapy. Non-opioid and non-pharmacological opportunities to enhance analgesia and quality of life have been and will continue to be pursued.     Luis Armando Gunderson, DO  Interventional Pain Management/PM&R   [facility]    Orders Placed This Encounter    FL LESS THAN 1 HOUR     Standing Status:   Standing     Number of Occurrences:   1     Order Specific Question:   Reason for exam:     Answer:   bilateral lumbar l3,l4 medial branch rfa    lactated ringers infusion

## 2022-12-14 DIAGNOSIS — N40.1 BPH WITH OBSTRUCTION/LOWER URINARY TRACT SYMPTOMS: ICD-10-CM

## 2022-12-14 DIAGNOSIS — N13.8 BPH WITH OBSTRUCTION/LOWER URINARY TRACT SYMPTOMS: ICD-10-CM

## 2022-12-14 RX ORDER — TAMSULOSIN HYDROCHLORIDE 0.4 MG/1
CAPSULE ORAL
Qty: 90 CAPSULE | Refills: 1 | Status: SHIPPED | OUTPATIENT
Start: 2022-12-14

## 2022-12-29 ENCOUNTER — OFFICE VISIT (OUTPATIENT)
Dept: PAIN MANAGEMENT | Age: 77
End: 2022-12-29
Payer: MEDICARE

## 2022-12-29 VITALS
WEIGHT: 190 LBS | BODY MASS INDEX: 28.06 KG/M2 | RESPIRATION RATE: 19 BRPM | HEART RATE: 80 BPM | OXYGEN SATURATION: 98 %

## 2022-12-29 DIAGNOSIS — M79.18 MYOFASCIAL PAIN SYNDROME: ICD-10-CM

## 2022-12-29 DIAGNOSIS — M51.36 LUMBAR DEGENERATIVE DISC DISEASE: ICD-10-CM

## 2022-12-29 DIAGNOSIS — M47.817 LUMBOSACRAL SPONDYLOSIS WITHOUT MYELOPATHY: Primary | ICD-10-CM

## 2022-12-29 PROCEDURE — G8482 FLU IMMUNIZE ORDER/ADMIN: HCPCS | Performed by: STUDENT IN AN ORGANIZED HEALTH CARE EDUCATION/TRAINING PROGRAM

## 2022-12-29 PROCEDURE — G8417 CALC BMI ABV UP PARAM F/U: HCPCS | Performed by: STUDENT IN AN ORGANIZED HEALTH CARE EDUCATION/TRAINING PROGRAM

## 2022-12-29 PROCEDURE — 99213 OFFICE O/P EST LOW 20 MIN: CPT | Performed by: STUDENT IN AN ORGANIZED HEALTH CARE EDUCATION/TRAINING PROGRAM

## 2022-12-29 PROCEDURE — 4004F PT TOBACCO SCREEN RCVD TLK: CPT | Performed by: STUDENT IN AN ORGANIZED HEALTH CARE EDUCATION/TRAINING PROGRAM

## 2022-12-29 PROCEDURE — 1123F ACP DISCUSS/DSCN MKR DOCD: CPT | Performed by: STUDENT IN AN ORGANIZED HEALTH CARE EDUCATION/TRAINING PROGRAM

## 2022-12-29 PROCEDURE — G8427 DOCREV CUR MEDS BY ELIG CLIN: HCPCS | Performed by: STUDENT IN AN ORGANIZED HEALTH CARE EDUCATION/TRAINING PROGRAM

## 2022-12-29 PROCEDURE — 3017F COLORECTAL CA SCREEN DOC REV: CPT | Performed by: STUDENT IN AN ORGANIZED HEALTH CARE EDUCATION/TRAINING PROGRAM

## 2022-12-29 NOTE — PROGRESS NOTES
Chronic Pain Clinic Note     Encounter Date: 12/29/2022     SUBJECTIVE:  Chief Complaint   Patient presents with    Injections     Follow up after injections        History of Present Illness:   Donna Borjas is a 78 y.o. male who presents for follow up after bilateral RFA. Patient states that he got 100% relief, he has no pain. Medication Refill: n/a    Current Complaints of Pain:   Location: Lumbar   Radiation: None  Severity: none  Pain Numerical Score -    Average: 0     Highest: 0  Lowest: 0  Character/Quality:   Timing: Keeps awake at night, but also keeps him up at night   Associated symptoms: weakness- BLE  Numbness: no   Weakness: no  Exacerbating factors: activity, walking  Alleviating factors: n/a  Length of time pain has been present: Started on years, chronic issue  Inciting event/injury: no  Bowel/Bladder incontinence: no  Falls: no  Physical Therapy: no     History of Interventions:   Surgery: no  Injections: Yes    Imaging:    Lumbar MRI 8/9/2021    FINDINGS: Alignment normal. No spondylolysis. No spondylolisthesis. Vertebral    body heights are normal. There are a few incidental vertebral body hemangioma. A vertebral body hemangioma in the L3 vertebral body measures 2.2 cm. A    vertebral body hemangioma in the L1 vertebral body measures 1.9 cm in diameter. There are no suspicious osseous lesions. No compression fractures. L5-S1, moderate degenerative disc disease. There is a shallow central    protrusion and annular tear. No spinal stenosis. Disc space narrowing results    in mild bilateral foraminal narrowing. L4-L5, mild degenerative disc disease. Diffuse disc bulge. There is an annular    tear posteriorly in the disc annulus. No spinal stenosis. No foraminal    narrowing. L3-L4, there is a shallow broad-based central protrusion mildly effacing the    ventral aspect of the thecal sac without significant spinal stenosis or nerve    root compression.  No neural foraminal narrowing. L2-L3, mild degenerative disc disease with mild disc desiccation. No spinal    stenosis. No foraminal narrowing. L1-L2, no spinal stenosis. No foraminal narrowing. No abnormal signal in the conus medullaris. There are no paraspinal masses. Past Medical History:   Diagnosis Date    Arthritis     osteoarthritis, rheumatoid    Cancer (HCC)     multiple myeloma    Fibromyalgia     GERD (gastroesophageal reflux disease)     History of blood transfusion     At birth     Hyperlipidemia     Hypertension     Hypothyroidism     IBS (irritable bowel syndrome)     Osteoarthritis     Thyroid disease     Hypothyroid        Past Surgical History:   Procedure Laterality Date    ABDOMEN SURGERY  2004    Bowel Resection following \"fall on the ice\"; Ben Gresham MD    APPENDECTOMY      COLONOSCOPY      COLONOSCOPY  09-    Dr. Meng Pathak      cataract bilateral    GASTRIC FUNDOPLICATION  1643    HEMORRHOID SURGERY      HERNIA REPAIR      Hiatal Hernia repair, Esophageal repair     HERNIA REPAIR Bilateral     Inguinal     PAIN MANAGEMENT PROCEDURE Bilateral 9/17/2021    BILATERAL L2,L3,L4 MEDIAL BRANCH L5 DORSAL RAMI INJECTION performed by Ra Vera MD at [address] [place] [address] N/A 11/4/2021    BILATERAL L2-3-4-5 MEDIAL BRANCH BLOCK performed by Ra Vera MD at [address] [place] [address] Bilateral 12/3/2021    LUMBAR RFA L 2-3-4-5 BILATERAL MEDIAL BRANCH RFA performed by Ra Vera MD at [address] [place] [address] Bilateral 12/8/2022    BILATERAL LUMBAR L3, L4M L5 MEDIAL BRANCH RADIOFREQUENCY ABLATION performed by Sp Styles DO at [address] [facility] N/A 1/25/2018    CYSTOSCOPY TRANSURETHRAL RESECTION PROSTATE LASER performed by Sanya Waller MD at [address] [facility] ARTHROSCOPY Right 10/14/2020    RIGHT SHOULDER ARTHROSCPY PROBABLE RCR.   Shoulder Scope Subacromial Decompression. Partial Distal Claviclectomy. Debreidement. Mini RCR Open.   Bicep Tenodecis performed by Lorenza Mary DO at [address] [facility] ENDOSCOPY  2014    Dr. Alina Walker  2016    Chastity Morillo MD       Family History   Problem Relation Age of Onset    Cancer Mother         bone    Heart Disease Father         MI       Social History     Socioeconomic History    Marital status:      Spouse name: Not on file    Number of children: Not on file    Years of education: Not on file    Highest education level: Not on file   Occupational History    Not on file   Tobacco Use    Smoking status: Some Days     Packs/day: 0.50     Years: 40.00     Pack years: 20.00     Types: Cigarettes     Last attempt to quit: 2014     Years since quittin.8    Smokeless tobacco: Never   Vaping Use    Vaping Use: Never used   Substance and Sexual Activity    Alcohol use: Yes     Comment: seldom    Drug use: No    Sexual activity: Not on file   Other Topics Concern    Not on file   Social History Narrative    Not on file     Social Determinants of Health     Financial Resource Strain: Low Risk     Difficulty of Paying Living Expenses: Not hard at all   Food Insecurity: No Food Insecurity    Worried About Running Out of Food in the Last Year: Never true    Ran Out of Food in the Last Year: Never true   Transportation Needs: Not on file   Physical Activity: Inactive    Days of Exercise per Week: 0 days    Minutes of Exercise per Session: 0 min   Stress: Not on file   Social Connections: Not on file   Intimate Partner Violence: Not on file   Housing Stability: Not on file       Medications & Allergies:   Current Outpatient Medications   Medication Instructions    amLODIPine (NORVASC) 5 MG tablet TAKE 1 TABLET EVERY DAY    aspirin 81 mg, Oral, DAILY    gabapentin (NEURONTIN) 600 mg, Oral, NIGHTLY    gabapentin (NEURONTIN) 300 mg, Oral, 4 TIMES DAILY    HYDROcodone-acetaminophen (NORCO) 5-325 MG per tablet 1 tablet, Oral, EVERY 6 HOURS PRN    hydrocortisone 2.5 % cream Topical, 2 TIMES DAILY, Apply topically 2 times daily. hydroxychloroquine (PLAQUENIL) 200 mg tablet TAKE 1 TABLET BY MOUTH TWICE A DAY    leucovorin calcium (WELLCOVORIN) 5 mg, Oral, WEEKLY    levothyroxine (SYNTHROID) 75 MCG tablet TAKE 1 TABLET EVERY DAY    lidocaine (LMX) 4 % cream Topical, PRN, Apply topically as needed.      methotrexate (RHEUMATREX) 2.5 MG chemo tablet 10 tablets once a week    Multiple Vitamins-Minerals (MULTI COMPLETE PO) Oral    pantoprazole (PROTONIX) 40 MG tablet TAKE 1 TABLET TWICE DAILY    polyethylene glycol (GLYCOLAX) 17 g, Oral, PRN    simvastatin (ZOCOR) 20 MG tablet TAKE 1 TABLET EVERY NIGHT    tamsulosin (FLOMAX) 0.4 MG capsule TAKE 1 CAPSULE EVERY DAY       Allergies   Allergen Reactions    Adhesive Tape Rash     Other reaction(s): Unknown    Wound Dressing Adhesive      Redness, irritation       Review of Systems:   Constitutional: negative for weight changes or fevers  Cardiovascular: negative for chest pain, palpitations, irregular heart beat  Respiratory: negative for dyspnea, cough, wheezing  Gastrointestinal: negative for constipation, diarrhea, nausea  Genitourinary: negative for bowel/bladder incontinence   Musculoskeletal: positive for low back pain  Neurological: negative for radicular leg pain, leg weakness or numbness/tingling  Behavioral/Psych: negative for anxiety/depression   Hematological: negative for abnormal bleeding, anticoagulation use or antiplatelet use  All other systems reviewed and are negative    OBJECTIVE:    Vitals:    12/29/22 0904   Pulse: 80   Resp: 19   SpO2: 98%         PHYSICAL EXAM    GENERAL: No acute distress, pleasant, well-appearing  HEENT: Normocephalic, atraumatic, Pupils equal and round  CARDIOVASCULAR: Well perfused, No peripheral cyanosis  PULMONARY: Good chest wall excursion, breathing unlabored  PSYCH: Appropriate affect and insight, non-pressured speech  SKIN: No rashes or lesions  MUSCULOSKELETAL:  Inspection: The back and extremities are symmetric and aligned. Muscle bulk is normal in appearance. Palpation: There is tenderness to palpation along the lumbar paraspinal musculature bilaterally  Lumbar range of motion is full  NEUROMUSCULAR:  Patient ambulates unassisted  Gait is nonantalgic  Sensation to light touch is intact throughout lower extremities  Strength is full in lower extremities  No ankle clonus    Special Tests:  Lumbar facet loading is positive bilaterally  Seated straight leg raise is negative bilaterally      DIAGNOSIS:    ICD-10-CM    1. Lumbosacral spondylosis without myelopathy  M47.817       2. Lumbar degenerative disc disease  M51.36       3. Myofascial pain syndrome  M79.18              ASSESSMENT:    Nate Strange is a 78 y. o.male who presents with chronic low back pain. To review, patient has had low back pain for the last 5 years without injuries or trauma. He denies any previous low back surgeries. The patient's history and physical examination are consistent with lumbosacral spondylosis as the patient has axial low back pain that is mechanical in nature. There is tenderness to palpation along the lumbar paraspinal musculature with positive lumbar facet loading bilaterally. The lumbar MRI on 8/9/2021 reveals multilevel degenerative changes with facet hypertrophy throughout the lumbar spine specifically at L4-5 and L5-S1 facet joints. He underwent a previous bilateral lumbar L2, L3, L4, L5 medial branch radiofrequency ablation on 12/3/2021 with 80% improvement in pain and function for 10 months. He underwent bilateral lumbar L3, L4, L5 medial branch radiofrequency ablation on 12/8/2022 with 100% improvement in pain and function. Neurologically, it appears the patient has full strength and normal sensation.  There is no evidence of radiculopathy or myelopathy on examination. There are no red flags in the patient's history. PLAN:  Medications: For nonopioid therapy, the following medications were prescribed:    -Continue medication prescribed by primary care physician    Opioid therapy:  -Managed by another provider    Interventions:  -Status post bilateral lumbar L3, L4, L5 medial branch radiofrequency ablation on 12/8/2022 with 100% improvement in pain and function  -Of note patient received moderate sedation    Imaging:  -No new imaging    Behavioral Therapies:  -Continue daily stretching and home exercise program    Referrals:  -None    Follow-up Plan:  -6 months    Patient was offered intervention where appropriate. Multi-modal Pain Therapy: The patient was explicitly considered for multimodal and interdisciplinary therapy. Non-opioid and non-pharmacological opportunities to enhance analgesia and quality of life have been and will continue to be pursued. Eli Phan DO  Interventional Pain Management/PM&R   [facility]    No orders of the defined types were placed in this encounter.

## 2023-01-25 RX ORDER — SIMVASTATIN 20 MG
TABLET ORAL
Qty: 90 TABLET | Refills: 1 | Status: SHIPPED | OUTPATIENT
Start: 2023-01-25

## 2023-01-25 RX ORDER — LEVOTHYROXINE SODIUM 0.07 MG/1
TABLET ORAL
Qty: 90 TABLET | Refills: 1 | Status: SHIPPED | OUTPATIENT
Start: 2023-01-25

## 2023-01-25 RX ORDER — PANTOPRAZOLE SODIUM 40 MG/1
TABLET, DELAYED RELEASE ORAL
Qty: 180 TABLET | Refills: 1 | Status: SHIPPED | OUTPATIENT
Start: 2023-01-25

## 2023-01-25 RX ORDER — AMLODIPINE BESYLATE 5 MG/1
TABLET ORAL
Qty: 90 TABLET | Refills: 1 | Status: SHIPPED | OUTPATIENT
Start: 2023-01-25

## 2023-01-25 NOTE — TELEPHONE ENCOUNTER
Last OV: 11/29/2022  AWV   Last RX:    Next scheduled apt: 5/30/2023  chronic           Surescript requesting a refill

## 2023-03-16 NOTE — PROGRESS NOTES
Apply topically 2 times daily. Yes Historical Provider, MD   levothyroxine (SYNTHROID) 75 MCG tablet Take 75 mcg by mouth Daily   Yes Historical Provider, MD   pantoprazole (PROTONIX) 40 MG tablet Take 40 mg by mouth daily   Yes Historical Provider, MD   simvastatin (ZOCOR) 20 MG tablet Take 20 mg by mouth nightly   Yes Historical Provider, MD   tamsulosin (FLOMAX) 0.4 MG capsule Take 0.4 mg by mouth daily   Yes Historical Provider, MD   aspirin 81 MG tablet Take 81 mg by mouth daily   Yes Historical Provider, MD   gabapentin (NEURONTIN) 300 MG capsule Take 300 mg by mouth 3 times daily. Yes Historical Provider, MD   gabapentin (NEURONTIN) 600 MG tablet Take 600 mg by mouth 3 times daily. Yes Historical Provider, MD   HYDROcodone-acetaminophen (NORCO) 5-325 MG per tablet Take 1 tablet by mouth every 6 hours as needed for Pain. Yes Historical Provider, MD   pyrethrins-piperonyl butoxide 0.5 % bottle Apply topically once Apply topically once. Yes Historical Provider, MD   lidocaine (LMX) 4 % cream Apply topically as needed for Pain Apply topically as needed. Yes Historical Provider, MD   Multiple Vitamins-Minerals (MULTI COMPLETE PO) Take by mouth   Yes Historical Provider, MD   polyethylene glycol (GLYCOLAX) powder Take 17 g by mouth daily   Yes Historical Provider, MD   methylPREDNISolone (MEDROL DOSEPACK) 4 MG tablet Take by mouth. 8/21/19 8/27/19 Yes Urmila Jin MD        Allergies:       Cortisone    Social History:     Tobacco:    reports that he has been smoking. He has a 40.00 pack-year smoking history. He has never used smokeless tobacco.  Alcohol:      reports that he drinks alcohol. Drug Use:  reports that he does not use drugs. Family History:     Family History   Problem Relation Age of Onset    Cancer Mother         bone    Heart Disease Father         MI       Review of Systems:       Review of Systems   Constitutional: Negative for chills, fatigue, fever and weight loss.    HENT: health maintenance  Continue current medications, diet and exercise. Discussed use, benefit, and side effects of prescribed medications. Barriers to medication compliance addressed. Patient given educational materials - see patient instructions  Was a self-tracking handout given in paper form or via [employer]? Yes    Requested Prescriptions     Signed Prescriptions Disp Refills    methylPREDNISolone (MEDROL DOSEPACK) 4 MG tablet 1 kit 0     Sig: Take by mouth. All patient questions answered. Patient voiced understanding. Quality Measures    Body mass index is 28.28 kg/m². Elevated. Weight control planned discussed Healthy diet and regular exercise. BP: 134/78. Blood pressure is normal. Treatment plan consists of No treatment change needed. Fall Risk 8/21/2019 6/26/2018 5/18/2017 5/2/2016 7/30/2014   2 or more falls in past year? no no no no no   Fall with injury in past year? no no no no no     The patient does not have a history of falls. I did not - not indicated , complete a risk assessment for falls. A plan of care for falls No Treatment plan indicated    Lab Results   Component Value Date    LDLCHOLESTEROL 59 08/20/2019    (goal LDL reduction with dx if diabetes is 50% LDL reduction)    PHQ Scores 8/21/2019 6/26/2018 5/18/2017 7/30/2014   PHQ2 Score 0 0 0 0   PHQ9 Score 0 0 0 0     Interpretation of Total Score Depression Severity: 1-4 = Minimal depression, 5-9 = Mild depression, 10-14 = Moderate depression, 15-19 = Moderately severe depression, 20-27 = Severe depression        Return for hyperglycemia, HTN, Hypothyroid, gerd.       Electronically signed by Joshua Lopez MD on 8/23/2019 at 1:53 PM 94

## 2023-05-15 ENCOUNTER — HOSPITAL ENCOUNTER (OUTPATIENT)
Age: 78
Discharge: HOME | End: 2023-05-15
Payer: MEDICARE

## 2023-05-15 DIAGNOSIS — E78.00 PURE HYPERCHOLESTEROLEMIA: ICD-10-CM

## 2023-05-15 LAB
CHOLEST SERPL-MCNC: 106 MG/DL
CHOLESTEROL/HDL RATIO: 3.4
HDLC SERPL-MCNC: 31 MG/DL
LDLC SERPL CALC-MCNC: 45 MG/DL (ref 0–130)
PATIENT FASTING?: YES
TRIGL SERPL-MCNC: 148 MG/DL

## 2023-05-15 PROCEDURE — 36415 COLL VENOUS BLD VENIPUNCTURE: CPT

## 2023-05-15 PROCEDURE — 80061 LIPID PANEL: CPT

## 2023-05-18 ENCOUNTER — OFFICE VISIT (OUTPATIENT)
Dept: UROLOGY | Age: 78
End: 2023-05-18
Payer: MEDICARE

## 2023-05-18 VITALS
WEIGHT: 194 LBS | HEART RATE: 30 BPM | HEIGHT: 69 IN | BODY MASS INDEX: 28.73 KG/M2 | SYSTOLIC BLOOD PRESSURE: 144 MMHG | DIASTOLIC BLOOD PRESSURE: 73 MMHG

## 2023-05-18 DIAGNOSIS — N13.8 BPH WITH OBSTRUCTION/LOWER URINARY TRACT SYMPTOMS: Primary | ICD-10-CM

## 2023-05-18 DIAGNOSIS — N40.1 BPH WITH OBSTRUCTION/LOWER URINARY TRACT SYMPTOMS: Primary | ICD-10-CM

## 2023-05-18 DIAGNOSIS — K59.09 OTHER CONSTIPATION: ICD-10-CM

## 2023-05-18 PROCEDURE — 4004F PT TOBACCO SCREEN RCVD TLK: CPT | Performed by: PHYSICIAN ASSISTANT

## 2023-05-18 PROCEDURE — 3077F SYST BP >= 140 MM HG: CPT | Performed by: PHYSICIAN ASSISTANT

## 2023-05-18 PROCEDURE — G8417 CALC BMI ABV UP PARAM F/U: HCPCS | Performed by: PHYSICIAN ASSISTANT

## 2023-05-18 PROCEDURE — 1123F ACP DISCUSS/DSCN MKR DOCD: CPT | Performed by: PHYSICIAN ASSISTANT

## 2023-05-18 PROCEDURE — 99213 OFFICE O/P EST LOW 20 MIN: CPT | Performed by: PHYSICIAN ASSISTANT

## 2023-05-18 PROCEDURE — 3078F DIAST BP <80 MM HG: CPT | Performed by: PHYSICIAN ASSISTANT

## 2023-05-18 PROCEDURE — 3017F COLORECTAL CA SCREEN DOC REV: CPT | Performed by: PHYSICIAN ASSISTANT

## 2023-05-18 PROCEDURE — G8428 CUR MEDS NOT DOCUMENT: HCPCS | Performed by: PHYSICIAN ASSISTANT

## 2023-05-18 PROCEDURE — 51798 US URINE CAPACITY MEASURE: CPT | Performed by: PHYSICIAN ASSISTANT

## 2023-05-18 RX ORDER — CLOPIDOGREL BISULFATE 75 MG/1
75 TABLET ORAL DAILY
COMMUNITY

## 2023-05-18 RX ORDER — METOPROLOL TARTRATE 50 MG/1
50 TABLET, FILM COATED ORAL DAILY
COMMUNITY

## 2023-05-18 ASSESSMENT — ENCOUNTER SYMPTOMS
COLOR CHANGE: 0
WHEEZING: 0
VOMITING: 0
EYE REDNESS: 0
COUGH: 0
NAUSEA: 0
CONSTIPATION: 0
BACK PAIN: 0
ABDOMINAL PAIN: 0
SHORTNESS OF BREATH: 0

## 2023-05-18 NOTE — PATIENT INSTRUCTIONS
SURVEY:    You may be receiving a survey from [employer] regarding your visit today. Please complete the survey to enable us to provide the highest quality of care to you and your family. If you cannot score us a very good on any question, please call the office to discuss how we could of made your experience a very good one. Thank you.

## 2023-05-18 NOTE — PROGRESS NOTES
Bladderscan performed in office today:  Pt voided - 20 mL, PVR - 30 mL
PLAN:  Offered to increase Flomax versus performing cystoscopy to further evaluate his urinary symptoms, patient is content and declines.     Continue Flomax daily    Recommended MiraLAX daily    Follow-up in 1 year PVR

## 2023-05-27 SDOH — ECONOMIC STABILITY: HOUSING INSECURITY
IN THE LAST 12 MONTHS, WAS THERE A TIME WHEN YOU DID NOT HAVE A STEADY PLACE TO SLEEP OR SLEPT IN A SHELTER (INCLUDING NOW)?: NO

## 2023-05-27 SDOH — ECONOMIC STABILITY: FOOD INSECURITY: WITHIN THE PAST 12 MONTHS, YOU WORRIED THAT YOUR FOOD WOULD RUN OUT BEFORE YOU GOT MONEY TO BUY MORE.: NEVER TRUE

## 2023-05-27 SDOH — ECONOMIC STABILITY: INCOME INSECURITY: HOW HARD IS IT FOR YOU TO PAY FOR THE VERY BASICS LIKE FOOD, HOUSING, MEDICAL CARE, AND HEATING?: NOT HARD AT ALL

## 2023-05-27 SDOH — ECONOMIC STABILITY: FOOD INSECURITY: WITHIN THE PAST 12 MONTHS, THE FOOD YOU BOUGHT JUST DIDN'T LAST AND YOU DIDN'T HAVE MONEY TO GET MORE.: NEVER TRUE

## 2023-05-27 SDOH — ECONOMIC STABILITY: TRANSPORTATION INSECURITY
IN THE PAST 12 MONTHS, HAS LACK OF TRANSPORTATION KEPT YOU FROM MEETINGS, WORK, OR FROM GETTING THINGS NEEDED FOR DAILY LIVING?: NO

## 2023-05-30 ENCOUNTER — OFFICE VISIT (OUTPATIENT)
Dept: FAMILY MEDICINE CLINIC | Age: 78
End: 2023-05-30
Payer: MEDICARE

## 2023-05-30 VITALS
SYSTOLIC BLOOD PRESSURE: 130 MMHG | BODY MASS INDEX: 28.72 KG/M2 | HEART RATE: 67 BPM | OXYGEN SATURATION: 94 % | WEIGHT: 193.9 LBS | DIASTOLIC BLOOD PRESSURE: 68 MMHG | HEIGHT: 69 IN

## 2023-05-30 DIAGNOSIS — E03.9 ACQUIRED HYPOTHYROIDISM: ICD-10-CM

## 2023-05-30 DIAGNOSIS — E11.9 CONTROLLED TYPE 2 DIABETES MELLITUS WITHOUT COMPLICATION, WITHOUT LONG-TERM CURRENT USE OF INSULIN (HCC): Primary | ICD-10-CM

## 2023-05-30 DIAGNOSIS — I10 ESSENTIAL HYPERTENSION, BENIGN: ICD-10-CM

## 2023-05-30 DIAGNOSIS — K21.9 GASTROESOPHAGEAL REFLUX DISEASE WITHOUT ESOPHAGITIS: ICD-10-CM

## 2023-05-30 DIAGNOSIS — G47.39 OTHER SLEEP APNEA: ICD-10-CM

## 2023-05-30 DIAGNOSIS — E78.00 PURE HYPERCHOLESTEROLEMIA: ICD-10-CM

## 2023-05-30 PROBLEM — I21.3 STEMI (ST ELEVATION MYOCARDIAL INFARCTION) (HCC): Status: ACTIVE | Noted: 2023-04-10

## 2023-05-30 PROCEDURE — 3078F DIAST BP <80 MM HG: CPT | Performed by: FAMILY MEDICINE

## 2023-05-30 PROCEDURE — G8427 DOCREV CUR MEDS BY ELIG CLIN: HCPCS | Performed by: FAMILY MEDICINE

## 2023-05-30 PROCEDURE — 4004F PT TOBACCO SCREEN RCVD TLK: CPT | Performed by: FAMILY MEDICINE

## 2023-05-30 PROCEDURE — 1123F ACP DISCUSS/DSCN MKR DOCD: CPT | Performed by: FAMILY MEDICINE

## 2023-05-30 PROCEDURE — 3075F SYST BP GE 130 - 139MM HG: CPT | Performed by: FAMILY MEDICINE

## 2023-05-30 PROCEDURE — G8417 CALC BMI ABV UP PARAM F/U: HCPCS | Performed by: FAMILY MEDICINE

## 2023-05-30 PROCEDURE — 2022F DILAT RTA XM EVC RTNOPTHY: CPT | Performed by: FAMILY MEDICINE

## 2023-05-30 PROCEDURE — 3017F COLORECTAL CA SCREEN DOC REV: CPT | Performed by: FAMILY MEDICINE

## 2023-05-30 PROCEDURE — 99214 OFFICE O/P EST MOD 30 MIN: CPT | Performed by: FAMILY MEDICINE

## 2023-05-30 PROCEDURE — 3046F HEMOGLOBIN A1C LEVEL >9.0%: CPT | Performed by: FAMILY MEDICINE

## 2023-05-30 ASSESSMENT — PATIENT HEALTH QUESTIONNAIRE - PHQ9
SUM OF ALL RESPONSES TO PHQ QUESTIONS 1-9: 0
1. LITTLE INTEREST OR PLEASURE IN DOING THINGS: 0
SUM OF ALL RESPONSES TO PHQ9 QUESTIONS 1 & 2: 0
SUM OF ALL RESPONSES TO PHQ QUESTIONS 1-9: 0
2. FEELING DOWN, DEPRESSED OR HOPELESS: 0

## 2023-05-30 ASSESSMENT — ENCOUNTER SYMPTOMS
BLOOD IN STOOL: 0
COUGH: 0
EYE DISCHARGE: 0
SHORTNESS OF BREATH: 0
TROUBLE SWALLOWING: 0
ABDOMINAL PAIN: 0
VOMITING: 0
DIARRHEA: 0
NAUSEA: 0
EYE REDNESS: 0

## 2023-05-30 NOTE — PROGRESS NOTES
HPI Notes    Name: Harshad Manzo  : 1945        Chief Complaint:     Chief Complaint   Patient presents with    Diabetes     6 month f/u    Hypertension    Hyperlipidemia    Hypothyroidism       History of Present Illness:     Harshad Manzo is a 78 y.o.  male who presents with Diabetes (6 month f/u), Hypertension, Hyperlipidemia, and Hypothyroidism      Diabetes  He presents for his follow-up diabetic visit. He has type 2 diabetes mellitus. Pertinent negatives for hypoglycemia include no dizziness or headaches. Pertinent negatives for diabetes include no chest pain, no fatigue and no weight loss. There are no hypoglycemic complications. Symptoms are stable. Risk factors for coronary artery disease include diabetes mellitus, dyslipidemia and hypertension. Current diabetic treatment includes diet (hgba1c 5.5). His weight is stable. He is following a generally healthy diet. His home blood glucose trend is decreasing steadily. An ACE inhibitor/angiotensin II receptor blocker is being taken. Hypertension  This is a chronic problem. The current episode started more than 1 year ago. The problem is unchanged. The problem is controlled. Pertinent negatives include no chest pain, headaches, palpitations, peripheral edema or shortness of breath. There are no associated agents to hypertension. Risk factors for coronary artery disease include dyslipidemia, diabetes mellitus and male gender. The current treatment provides significant improvement. Hyperlipidemia  This is a chronic problem. The current episode started more than 1 year ago. The problem is controlled. Recent lipid tests were reviewed and are normal. Exacerbating diseases include diabetes. He has no history of hypothyroidism. Pertinent negatives include no chest pain or shortness of breath. Current antihyperlipidemic treatment includes statins. The current treatment provides significant improvement of lipids.  Risk factors for coronary artery

## 2023-05-30 NOTE — PATIENT INSTRUCTIONS
[employer] SURVEY:    You may be receiving a survey from [employer] regarding your visit today. You may get this in the mail, through your MyChart or in your email. Please complete the survey to enable us to provide the highest quality of care to you and your family. If you cannot score us as very good ( 5 Stars) on any question, please feel free to call the office to discuss how we could have made your experience exceptional.     Thank you.     Clinical Care Team:   Dr. Lula Sterling MD                                               [facility], [address] [place] [address]                                     Triage: Samantha Pérez, [address] Team:  Heriberto Carrera Ben

## 2023-07-06 ENCOUNTER — OFFICE VISIT (OUTPATIENT)
Dept: PAIN MANAGEMENT | Age: 78
End: 2023-07-06
Payer: MEDICARE

## 2023-07-06 VITALS
OXYGEN SATURATION: 96 % | RESPIRATION RATE: 19 BRPM | HEIGHT: 69 IN | WEIGHT: 194 LBS | BODY MASS INDEX: 28.73 KG/M2 | HEART RATE: 63 BPM

## 2023-07-06 DIAGNOSIS — M51.36 LUMBAR DEGENERATIVE DISC DISEASE: ICD-10-CM

## 2023-07-06 DIAGNOSIS — M47.817 LUMBOSACRAL SPONDYLOSIS WITHOUT MYELOPATHY: Primary | ICD-10-CM

## 2023-07-06 DIAGNOSIS — M79.18 MYOFASCIAL PAIN SYNDROME: ICD-10-CM

## 2023-07-06 PROCEDURE — 99213 OFFICE O/P EST LOW 20 MIN: CPT | Performed by: STUDENT IN AN ORGANIZED HEALTH CARE EDUCATION/TRAINING PROGRAM

## 2023-07-06 PROCEDURE — G8427 DOCREV CUR MEDS BY ELIG CLIN: HCPCS | Performed by: STUDENT IN AN ORGANIZED HEALTH CARE EDUCATION/TRAINING PROGRAM

## 2023-07-06 PROCEDURE — 3017F COLORECTAL CA SCREEN DOC REV: CPT | Performed by: STUDENT IN AN ORGANIZED HEALTH CARE EDUCATION/TRAINING PROGRAM

## 2023-07-06 PROCEDURE — 1123F ACP DISCUSS/DSCN MKR DOCD: CPT | Performed by: STUDENT IN AN ORGANIZED HEALTH CARE EDUCATION/TRAINING PROGRAM

## 2023-07-06 PROCEDURE — 4004F PT TOBACCO SCREEN RCVD TLK: CPT | Performed by: STUDENT IN AN ORGANIZED HEALTH CARE EDUCATION/TRAINING PROGRAM

## 2023-07-06 PROCEDURE — G8417 CALC BMI ABV UP PARAM F/U: HCPCS | Performed by: STUDENT IN AN ORGANIZED HEALTH CARE EDUCATION/TRAINING PROGRAM

## 2023-07-06 NOTE — PROGRESS NOTES
lumbar L2, L3, L4, L5 medial branch radiofrequency ablation on 12/3/2021 with 80% improvement in pain and function for 10 months. He underwent bilateral lumbar L3, L4, L5 medial branch radiofrequency ablation on 12/8/2022 with 100% improvement in pain and function. At this point, his low back pain is slowly returning. Therefore, we will follow-up in 3 months and consider repeating his radiofrequency ablation. Neurologically, it appears the patient has full strength and normal sensation. There is no evidence of radiculopathy or myelopathy on examination. There are no red flags in the patient's history. PLAN:  Medications: For nonopioid therapy, the following medications were prescribed:    -Continue medication prescribed by primary care physician    Opioid therapy:  -Managed by another provider    Interventions:  -Status post bilateral lumbar L3, L4, L5 medial branch radiofrequency ablation on 12/8/2022 with 100% improvement in pain and function  -Of note patient received moderate sedation    Imaging:  -No new imaging    Behavioral Therapies:  -Continue daily stretching and home exercise program    Referrals:  -None    Follow-up Plan:  -3 months    Patient was offered intervention where appropriate. Multi-modal Pain Therapy: The patient was explicitly considered for multimodal and interdisciplinary therapy. Non-opioid and non-pharmacological opportunities to enhance analgesia and quality of life have been and will continue to be pursued. Vianey Moreno,   Interventional Pain Management/PM&R   [facility]    No orders of the defined types were placed in this encounter.

## 2023-07-31 ENCOUNTER — TELEPHONE (OUTPATIENT)
Dept: UROLOGY | Age: 78
End: 2023-07-31

## 2023-07-31 NOTE — TELEPHONE ENCOUNTER
Please call and check on his status. Find out if the hospital removed his catheter, if he has been urinating wellhave him follow-up in 2 weeks with PVR    If they have not removed his catheter and he is going to be discharged today have him follow-up tomorrow (9ish) morning for possible catheter removal.  Please let him know we need to evaluate him prior to removing it.

## 2023-07-31 NOTE — TELEPHONE ENCOUNTER
The patient's wife Red Cardona called in for Belen Kendrick and advised that he sent a message over the weekend as charted. He is still in the hospital today and not sure of discharge time. I advised that the messages would be sent to the provider and we would be in touch with her.

## 2023-07-31 NOTE — TELEPHONE ENCOUNTER
----- Message from Santiago Omero Pepper sent at 7/29/2023 11:57 AM EDT -----  Regarding: Ciara Ester  Contact: [phone]  This is Saturday, July 29, and I know you are not in until Monday at 8 AM. I am currently in an observation unit at [place], [place]. I went to the emergency room yesterday afternoon with a first-ever A-Fib that finally converted, but it was decided I should be observed. I began to bleed with clots in my urinal, and because the amount increased greatly in the middle of the night, soaking my bed and a towel, a catheter was put in place. The decision was made to send me home with the catheter for you to remove Monday. There is still a bit of blood in my bag with clots and it is thought that the source might not be the bladder but the urethra, and that I am profiting from the pressure of the catheter on the urethera to control the bleed. My wife will call your office to schedule an appointment.

## 2023-07-31 NOTE — TELEPHONE ENCOUNTER
Find out if the hospital will be removing his catheter, if he has been urinating well after removal have him follow-up in 2 weeks with PVR     If they have not removed his catheter and he is going to be discharged today have him follow-up tomorrow (9ish) morning for possible catheter removal.  Please let him know we need to evaluate him prior to removing it.

## 2023-07-31 NOTE — TELEPHONE ENCOUNTER
----- Message from Elva Pepper sent at 7/30/2023  8:41 PM EDT -----  Regarding: Removal of urinary catheter  Contact: [phone]  I did not get to come home from [facility] today, but will tomorrow, late afternoon if all goes well. My wife will be calling your office tomorrow morning to explain further what is happening. I had a second A-fib this morning, which explains why I am here another day. I still have a catheter in which continues to show  that I have blood in the urine including some clots. I am confused as to why they were requesting me to contact your office to remove a catheter that they had put in. Hopefully, that will be answered in my exit instructions. Just wanted to give you a heads up that there are some changes from my previous communication to you.

## 2023-07-31 NOTE — TELEPHONE ENCOUNTER
Jessica Dai advised that they were to schedule a follow up with a Urologist for catheter removal, the hospital was not going to remove it as of yesterday. She was advised of response and he is on the scheduled for tomorrow. If anything changes she will let us know.

## 2023-08-01 ENCOUNTER — OFFICE VISIT (OUTPATIENT)
Dept: UROLOGY | Age: 78
End: 2023-08-01
Payer: MEDICARE

## 2023-08-01 VITALS
BODY MASS INDEX: 29.62 KG/M2 | TEMPERATURE: 98.6 F | DIASTOLIC BLOOD PRESSURE: 82 MMHG | HEART RATE: 71 BPM | WEIGHT: 200 LBS | SYSTOLIC BLOOD PRESSURE: 144 MMHG | HEIGHT: 69 IN

## 2023-08-01 DIAGNOSIS — N13.8 BPH WITH OBSTRUCTION/LOWER URINARY TRACT SYMPTOMS: ICD-10-CM

## 2023-08-01 DIAGNOSIS — R31.0 GROSS HEMATURIA: Primary | ICD-10-CM

## 2023-08-01 DIAGNOSIS — N40.1 BPH WITH OBSTRUCTION/LOWER URINARY TRACT SYMPTOMS: ICD-10-CM

## 2023-08-01 PROCEDURE — 4004F PT TOBACCO SCREEN RCVD TLK: CPT | Performed by: PHYSICIAN ASSISTANT

## 2023-08-01 PROCEDURE — 3077F SYST BP >= 140 MM HG: CPT | Performed by: PHYSICIAN ASSISTANT

## 2023-08-01 PROCEDURE — G8417 CALC BMI ABV UP PARAM F/U: HCPCS | Performed by: PHYSICIAN ASSISTANT

## 2023-08-01 PROCEDURE — 3079F DIAST BP 80-89 MM HG: CPT | Performed by: PHYSICIAN ASSISTANT

## 2023-08-01 PROCEDURE — 1123F ACP DISCUSS/DSCN MKR DOCD: CPT | Performed by: PHYSICIAN ASSISTANT

## 2023-08-01 PROCEDURE — G8427 DOCREV CUR MEDS BY ELIG CLIN: HCPCS | Performed by: PHYSICIAN ASSISTANT

## 2023-08-01 PROCEDURE — 99213 OFFICE O/P EST LOW 20 MIN: CPT | Performed by: PHYSICIAN ASSISTANT

## 2023-08-01 RX ORDER — CEFDINIR 300 MG/1
300 CAPSULE ORAL 2 TIMES DAILY
COMMUNITY
Start: 2023-07-31

## 2023-08-01 RX ORDER — APIXABAN 5 MG/1
5 TABLET, FILM COATED ORAL 2 TIMES DAILY
COMMUNITY
Start: 2023-07-30

## 2023-08-01 RX ORDER — DILTIAZEM HYDROCHLORIDE 180 MG/1
180 CAPSULE, EXTENDED RELEASE ORAL DAILY
COMMUNITY
Start: 2023-07-31

## 2023-08-01 ASSESSMENT — ENCOUNTER SYMPTOMS
WHEEZING: 0
CONSTIPATION: 0
COUGH: 0
ABDOMINAL PAIN: 0
COLOR CHANGE: 0
SHORTNESS OF BREATH: 0
BACK PAIN: 0
VOMITING: 0
EYE REDNESS: 0
NAUSEA: 0

## 2023-08-01 NOTE — PATIENT INSTRUCTIONS
Drink plenty of fluids, aim for 80 oz daily for the next few days. It is normal to feel a little discomfort the next few times you void. Try to urinate every 2-3 hours while awake (even if you don't feel like you have to void). If you DO urinate, please record the amount. If you do NOT urinate within about 6 hours OR if you feel the strong uncomfortable urge to void but are not able - you'll need to come back to the office to get the catheter replaced. Either way, call our office around 3:00 pm and let us know the void amounts (if you are urinating) or let us know that you're on your way back to the office (if you are not urinating). SURVEY:    You may be receiving a survey from [employer] regarding your visit today. Please complete the survey to enable us to provide the highest quality of care to you and your family. If you cannot score us a very good on any question, please call the office to discuss how we could have made your experience a very good one. Thank you.

## 2023-08-01 NOTE — PROGRESS NOTES
week      hydrocortisone 2.5 % cream Apply topically 2 times daily Apply topically 2 times daily. gabapentin (NEURONTIN) 300 MG capsule Take 2 capsules by mouth nightly.      gabapentin (NEURONTIN) 600 MG tablet Take 0.5 tablets by mouth 4 times daily. HYDROcodone-acetaminophen (NORCO) 5-325 MG per tablet Take 1 tablet by mouth every 6 hours as needed for Pain.      lidocaine (LMX) 4 % cream Apply topically as needed for Pain Apply topically as needed. Multiple Vitamins-Minerals (MULTI COMPLETE PO) Take by mouth      polyethylene glycol (GLYCOLAX) powder Take 17 g by mouth as needed       No current facility-administered medications on file prior to visit. Adhesive tape and Wound dressing adhesive  Family History   Problem Relation Age of Onset    Cancer Mother         bone    Heart Disease Father         MI     Social History     Tobacco Use   Smoking Status Some Days    Packs/day: 0.50    Years: 40.00    Pack years: 20.00    Types: Cigarettes    Last attempt to quit: 2014    Years since quittin.4   Smokeless Tobacco Never       Social History     Substance and Sexual Activity   Alcohol Use Yes    Comment: seldom       Review of Systems   Constitutional:  Negative for appetite change, chills and fever. Eyes:  Negative for redness and visual disturbance. Respiratory:  Negative for cough, shortness of breath and wheezing. Cardiovascular:  Negative for chest pain and leg swelling. Gastrointestinal:  Negative for abdominal pain, constipation, nausea and vomiting. Genitourinary:  Positive for hematuria. Negative for decreased urine volume, difficulty urinating, dysuria, enuresis, flank pain, frequency, penile discharge, penile pain, scrotal swelling, testicular pain and urgency. Positive for weak stream   Musculoskeletal:  Negative for back pain, joint swelling and myalgias. Skin:  Negative for color change, rash and wound.    Neurological:  Negative for dizziness, tremors

## 2023-08-15 ENCOUNTER — HOSPITAL ENCOUNTER (OUTPATIENT)
Age: 78
End: 2023-08-15
Payer: MEDICARE

## 2023-08-15 ENCOUNTER — OFFICE VISIT (OUTPATIENT)
Dept: UROLOGY | Age: 78
End: 2023-08-15
Payer: MEDICARE

## 2023-08-15 ENCOUNTER — HOSPITAL ENCOUNTER (OUTPATIENT)
Dept: CT IMAGING | Age: 78
Discharge: HOME | End: 2023-08-17
Payer: MEDICARE

## 2023-08-15 VITALS
SYSTOLIC BLOOD PRESSURE: 130 MMHG | WEIGHT: 206 LBS | TEMPERATURE: 97.7 F | HEART RATE: 56 BPM | DIASTOLIC BLOOD PRESSURE: 69 MMHG | HEIGHT: 70 IN | BODY MASS INDEX: 29.49 KG/M2

## 2023-08-15 DIAGNOSIS — N13.8 BPH WITH OBSTRUCTION/LOWER URINARY TRACT SYMPTOMS: Primary | ICD-10-CM

## 2023-08-15 DIAGNOSIS — R31.0 GROSS HEMATURIA: ICD-10-CM

## 2023-08-15 DIAGNOSIS — N40.1 BPH WITH OBSTRUCTION/LOWER URINARY TRACT SYMPTOMS: Primary | ICD-10-CM

## 2023-08-15 PROCEDURE — 6360000004 HC RX CONTRAST MEDICATION: Performed by: PHYSICIAN ASSISTANT

## 2023-08-15 PROCEDURE — 51798 US URINE CAPACITY MEASURE: CPT | Performed by: PHYSICIAN ASSISTANT

## 2023-08-15 PROCEDURE — 74178 CT ABD&PLV WO CNTR FLWD CNTR: CPT | Performed by: PHYSICIAN ASSISTANT

## 2023-08-15 RX ADMIN — IOPAMIDOL 75 ML: 755 INJECTION, SOLUTION INTRAVENOUS at 09:38

## 2023-08-15 ASSESSMENT — ENCOUNTER SYMPTOMS
BACK PAIN: 0
COUGH: 0
COLOR CHANGE: 0
WHEEZING: 0
VOMITING: 0
EYE REDNESS: 0
SHORTNESS OF BREATH: 0
NAUSEA: 0
ABDOMINAL PAIN: 0
CONSTIPATION: 0

## 2023-08-15 NOTE — PROGRESS NOTES
HPI:      Patient is a 70 y.o. male in no acute distress. He is alert and oriented to person, place, and time. History:  2014 Evaluated for hydrocele. Never opted for surgery and was lost to follow-up. 1/2016 sudden onset LUTS x 1 wk. IPSS 35 QOL 6 (compared to baseline 8/1). POC UA negative. PCP increased his Flomax to 0.8mg daily without much improvement. Does have perineal pain and pressure in the rectum. Has some pain with urination. Pt has never had prostatitis in past. . Recent PSA 1/6/16 - 2.92              Treated with 4 wks abx & 1 wk NSAIDs for suspected prostatitis. 1/3/2018 follow-up for urinary retention. Patient was evaluated in the ER on 12/30/17 because he was unable to urinate. He did have a Mejía catheter placed with over 1200 mL returned. Prior to presenting to the ER he was evaluated in the urgent care for decreased urination. He was started on empiric Cipro. Urine culture from 12/28/17 was negative for infection. Patient does not recall any perineal or rectal pain, but he did have suprapubic pain and the pain radiated to his right flank. While in the ER they did complete a CT scan shows bilateral hydronephrosis and hydroureter. There is no evidence of  calcifications. The bladder is distended and there is a markedly enlarged prostate gland. There is a moderate amount of stool as well. Patient admits to only having a bowel movement every 3 days. He is taking Flomax daily for history of BPH. He denies any gross hematuria and he is tolerating the Mejía catheter without complaints. His wife is concerned because they're planning on leaving for [place] on 1/18/18.     1/25/2018 PVP [facility]     4/2019 Resumed flomax due to post-void dribbling    8/2023 hospitalization for atrial fibrillation. Patient also had fluid retention for which they diuresed him to remove approximately 10 pounds of fluid. Patient chronically takes Flomax through our office.   He had a PVP

## 2023-08-15 NOTE — PATIENT INSTRUCTIONS
SURVEY:    You may be receiving a survey from [employer] regarding your visit today. Please complete the survey to enable us to provide the highest quality of care to you and your family. If you cannot score us a very good on any question, please call the office to discuss how we could have made your experience a very good one. Thank you.

## 2023-08-30 ENCOUNTER — PROCEDURE VISIT (OUTPATIENT)
Dept: UROLOGY | Age: 78
End: 2023-08-30
Payer: MEDICARE

## 2023-08-30 VITALS
SYSTOLIC BLOOD PRESSURE: 140 MMHG | BODY MASS INDEX: 29.78 KG/M2 | DIASTOLIC BLOOD PRESSURE: 70 MMHG | WEIGHT: 208 LBS | HEIGHT: 70 IN | TEMPERATURE: 98.6 F

## 2023-08-30 DIAGNOSIS — R31.0 GROSS HEMATURIA: ICD-10-CM

## 2023-08-30 DIAGNOSIS — N13.8 BPH WITH OBSTRUCTION/LOWER URINARY TRACT SYMPTOMS: Primary | ICD-10-CM

## 2023-08-30 DIAGNOSIS — N40.1 BPH WITH OBSTRUCTION/LOWER URINARY TRACT SYMPTOMS: Primary | ICD-10-CM

## 2023-08-30 PROCEDURE — G8427 DOCREV CUR MEDS BY ELIG CLIN: HCPCS | Performed by: UROLOGY

## 2023-08-30 PROCEDURE — 1123F ACP DISCUSS/DSCN MKR DOCD: CPT | Performed by: UROLOGY

## 2023-08-30 PROCEDURE — G8417 CALC BMI ABV UP PARAM F/U: HCPCS | Performed by: UROLOGY

## 2023-08-30 PROCEDURE — 3078F DIAST BP <80 MM HG: CPT | Performed by: UROLOGY

## 2023-08-30 PROCEDURE — 4004F PT TOBACCO SCREEN RCVD TLK: CPT | Performed by: UROLOGY

## 2023-08-30 PROCEDURE — 52000 CYSTOURETHROSCOPY: CPT | Performed by: UROLOGY

## 2023-08-30 PROCEDURE — 99213 OFFICE O/P EST LOW 20 MIN: CPT | Performed by: UROLOGY

## 2023-08-30 PROCEDURE — 3077F SYST BP >= 140 MM HG: CPT | Performed by: UROLOGY

## 2023-08-30 ASSESSMENT — ENCOUNTER SYMPTOMS
NAUSEA: 0
COLOR CHANGE: 0
ABDOMINAL PAIN: 0
CONSTIPATION: 0
COUGH: 0
EYE REDNESS: 0
WHEEZING: 0
BACK PAIN: 0
VOMITING: 0
SHORTNESS OF BREATH: 0

## 2023-08-30 NOTE — PROGRESS NOTES
During cystoscopy the following was utilized on patient with no adverse affects:    45% SODIUM CHLORIDE 500 ML BAG  Lot number: [id number]  Expiration date: 07/24      LIDOCAINE HYDROCHLORIDE JELLY 2%   Lot number: [id number]  Expiration date: 01/25
1/25/2018    CYSTOSCOPY TRANSURETHRAL RESECTION PROSTATE LASER performed by Delaney Mg MD at [address] [place] [address] ARTHROSCOPY Right 10/14/2020    RIGHT SHOULDER ARTHROSCPY PROBABLE RCR. Shoulder Scope Subacromial Decompression. Partial Distal Claviclectomy. Debreidement. Mini RCR Open. Bicep Tenodecis performed by Shilpa Tran DO at [address] ENDOSCOPY  09-    Dr. Cindy Anna  12/14/2016    Ana Laura Sauer MD     Outpatient Encounter Medications as of 8/30/2023   Medication Sig Dispense Refill    ELIQUIS 5 MG TABS tablet 1 tablet 2 times daily      dilTIAZem (TIAZAC) 180 MG extended release capsule Take 1 capsule by mouth daily      metoprolol tartrate (LOPRESSOR) 50 MG tablet Take 1 tablet by mouth 2 times daily      clopidogrel (PLAVIX) 75 MG tablet Take 1 tablet by mouth daily      simvastatin (ZOCOR) 20 MG tablet TAKE 1 TABLET EVERY NIGHT 90 tablet 1    levothyroxine (SYNTHROID) 75 MCG tablet TAKE 1 TABLET EVERY DAY 90 tablet 1    pantoprazole (PROTONIX) 40 MG tablet TAKE 1 TABLET TWICE DAILY 180 tablet 1    amLODIPine (NORVASC) 5 MG tablet TAKE 1 TABLET EVERY DAY 90 tablet 1    tamsulosin (FLOMAX) 0.4 MG capsule TAKE 1 CAPSULE EVERY DAY (Patient taking differently: Take 1 capsule by mouth 2 times daily) 90 capsule 1    leucovorin calcium (WELLCOVORIN) 5 MG tablet Take 1 tablet by mouth once a week      hydroxychloroquine (PLAQUENIL) 200 mg tablet TAKE 1 TABLET BY MOUTH TWICE A DAY      methotrexate (RHEUMATREX) 2.5 MG chemo tablet 10 tablets once a week      hydrocortisone 2.5 % cream Apply topically 2 times daily Apply topically 2 times daily. gabapentin (NEURONTIN) 300 MG capsule Take 2 capsules by mouth nightly.      gabapentin (NEURONTIN) 600 MG tablet Take 0.5 tablets by mouth 4 times daily.       HYDROcodone-acetaminophen (NORCO) 5-325 MG per tablet Take 1 tablet by

## 2023-08-30 NOTE — PATIENT INSTRUCTIONS
SURVEY:    You may be receiving a survey from [employer] regarding your visit today. Please complete the survey to enable us to provide the highest quality of care to you and your family. If you cannot score us a very good on any question, please call the office to discuss how we could have made your experience a very good one. Thank you.

## 2023-10-26 ENCOUNTER — OFFICE VISIT (OUTPATIENT)
Dept: PAIN MANAGEMENT | Age: 78
End: 2023-10-26
Payer: MEDICARE

## 2023-10-26 VITALS
SYSTOLIC BLOOD PRESSURE: 140 MMHG | BODY MASS INDEX: 29.56 KG/M2 | DIASTOLIC BLOOD PRESSURE: 70 MMHG | WEIGHT: 206 LBS | HEART RATE: 68 BPM | RESPIRATION RATE: 19 BRPM | OXYGEN SATURATION: 98 %

## 2023-10-26 DIAGNOSIS — M51.36 LUMBAR DEGENERATIVE DISC DISEASE: ICD-10-CM

## 2023-10-26 DIAGNOSIS — M79.18 MYOFASCIAL PAIN SYNDROME: ICD-10-CM

## 2023-10-26 DIAGNOSIS — M47.817 LUMBOSACRAL SPONDYLOSIS WITHOUT MYELOPATHY: Primary | ICD-10-CM

## 2023-10-26 PROCEDURE — 1123F ACP DISCUSS/DSCN MKR DOCD: CPT | Performed by: STUDENT IN AN ORGANIZED HEALTH CARE EDUCATION/TRAINING PROGRAM

## 2023-10-26 PROCEDURE — 99214 OFFICE O/P EST MOD 30 MIN: CPT | Performed by: STUDENT IN AN ORGANIZED HEALTH CARE EDUCATION/TRAINING PROGRAM

## 2023-10-26 PROCEDURE — G8427 DOCREV CUR MEDS BY ELIG CLIN: HCPCS | Performed by: STUDENT IN AN ORGANIZED HEALTH CARE EDUCATION/TRAINING PROGRAM

## 2023-10-26 PROCEDURE — 3078F DIAST BP <80 MM HG: CPT | Performed by: STUDENT IN AN ORGANIZED HEALTH CARE EDUCATION/TRAINING PROGRAM

## 2023-10-26 PROCEDURE — 3077F SYST BP >= 140 MM HG: CPT | Performed by: STUDENT IN AN ORGANIZED HEALTH CARE EDUCATION/TRAINING PROGRAM

## 2023-10-26 PROCEDURE — G8417 CALC BMI ABV UP PARAM F/U: HCPCS | Performed by: STUDENT IN AN ORGANIZED HEALTH CARE EDUCATION/TRAINING PROGRAM

## 2023-10-26 PROCEDURE — 4004F PT TOBACCO SCREEN RCVD TLK: CPT | Performed by: STUDENT IN AN ORGANIZED HEALTH CARE EDUCATION/TRAINING PROGRAM

## 2023-10-26 PROCEDURE — G8484 FLU IMMUNIZE NO ADMIN: HCPCS | Performed by: STUDENT IN AN ORGANIZED HEALTH CARE EDUCATION/TRAINING PROGRAM

## 2023-10-26 NOTE — PATIENT INSTRUCTIONS
SURVEY:    You may be receiving a survey from [employer] regarding your visit today. Please complete the survey to enable us to provide the highest quality of care to you and your family. If you cannot score us a very good on any question, please call the office to discuss how we could have made your experience a very good one. Thank you.   Sonia Sorto [address]  MD Magy Cortez Junior, MD Betsy Quaker, MD Eleanore Heckle, DO Reyna Fraser, APRN-CN  Clare Lewis, APRN-CNP  [address], PM  SUN[place], 1 [facility] 270, [facility], [facility], [address] [place] [address], [address] [place] [address], [address] [place] [address]

## 2023-10-26 NOTE — PROGRESS NOTES
dyspnea, cough, wheezing  Gastrointestinal: negative for constipation, diarrhea, nausea  Genitourinary: negative for bowel/bladder incontinence   Musculoskeletal: positive for low back pain  Neurological: negative for radicular leg pain, leg weakness or numbness/tingling  Behavioral/Psych: negative for anxiety/depression   Hematological: negative for abnormal bleeding, anticoagulation use or antiplatelet use  All other systems reviewed and are negative    OBJECTIVE:    Vitals:    10/26/23 0907   BP: (!) 140/70   Pulse: 68   Resp: 19   SpO2: 98%       PHYSICAL EXAM    GENERAL: No acute distress, pleasant, well-appearing  HEENT: Normocephalic, atraumatic, Pupils equal and round  CARDIOVASCULAR: Well perfused, No peripheral cyanosis  PULMONARY: Good chest wall excursion, breathing unlabored  PSYCH: Appropriate affect and insight, non-pressured speech  SKIN: No rashes or lesions  MUSCULOSKELETAL:  Inspection: The back and extremities are symmetric and aligned. Muscle bulk is normal in appearance. Palpation: There is tenderness to palpation along the lumbar paraspinal musculature bilaterally  Lumbar range of motion is full  NEUROMUSCULAR:  Patient ambulates unassisted  Gait is nonantalgic  Sensation to light touch is intact throughout lower extremities  Strength is full in lower extremities  No ankle clonus    Special Tests:  Lumbar facet loading is positive bilaterally  Seated straight leg raise is negative bilaterally      DIAGNOSIS:    ICD-10-CM    1. Lumbosacral spondylosis without myelopathy  M47.817 FACET JOINT L/S SINGLE     RADIOFREQUENCY L/S ADDITIONAL      2. Lumbar degenerative disc disease  M51.36       3. Myofascial pain syndrome  M79.18         ASSESSMENT:    Cailin Del Rio is a 70 y.o.male who presents with chronic low back pain. To review, patient has had low back pain for the last 5 years without injuries or trauma. He denies any previous low back surgeries.     The patient's history and physical

## 2023-11-14 DIAGNOSIS — N40.1 BPH WITH OBSTRUCTION/LOWER URINARY TRACT SYMPTOMS: ICD-10-CM

## 2023-11-14 DIAGNOSIS — N13.8 BPH WITH OBSTRUCTION/LOWER URINARY TRACT SYMPTOMS: ICD-10-CM

## 2023-11-14 RX ORDER — TAMSULOSIN HYDROCHLORIDE 0.4 MG/1
0.4 CAPSULE ORAL 2 TIMES DAILY
Qty: 180 CAPSULE | Refills: 3 | Status: SHIPPED | OUTPATIENT
Start: 2023-11-14

## 2023-11-14 RX ORDER — PANTOPRAZOLE SODIUM 40 MG/1
TABLET, DELAYED RELEASE ORAL
Qty: 180 TABLET | Refills: 1 | Status: SHIPPED | OUTPATIENT
Start: 2023-11-14

## 2023-11-14 RX ORDER — SIMVASTATIN 20 MG
TABLET ORAL
Qty: 90 TABLET | Refills: 1 | Status: SHIPPED | OUTPATIENT
Start: 2023-11-14

## 2023-11-14 RX ORDER — LEVOTHYROXINE SODIUM 0.07 MG/1
TABLET ORAL
Qty: 90 TABLET | Refills: 1 | Status: SHIPPED | OUTPATIENT
Start: 2023-11-14

## 2023-11-14 NOTE — TELEPHONE ENCOUNTER
Last OV: 5/30/2023  chronic   Last RX:    Next scheduled apt: 12/4/2023 AWV      Surescript requesting a refill

## 2023-11-15 NOTE — PROGRESS NOTES
[facility]   Preadmission Testing    Name: Cris Barraza  : 1945  Patient Phone: [phone] (home)     Procedure: Bilateral Lumbar L3, L4, L5 Medial Branch Radiofrequency Ablation - Bilateral  Date of Procedure: 2023  Surgeon: Con Villavicencio DO    Ht:  177.8 cm (5' 10\")  Wt: 93 kg (205 lb)  Wt method: Stated    Allergies: Allergies   Allergen Reactions    Adhesive Tape Rash     Other reaction(s): Unknown    Wound Dressing Adhesive      Redness, irritation                There were no vitals filed for this visit. No LMP for male patient. Do you take blood thinners? [x] Yes    [] No         Instructed to stop blood thinners prior to procedure? [x] Yes    [] No      [] N/A    [x]Eliquis - 3 days  []Xarelto - 3 days  []Pradaxa - 5 days  []Coumadin - 5 days   [x]Plavix - 7 days  []ASA 325mg - 7 days  []Brillinta - 5 days  []Pletal - 2 days   Have you had a respiratory infection or sore throat in last 4 weeks before surgery?     [] Yes    [x] No     Patient instructed on: [x] NPO Status - if receiving sedation  [x] Meds to Take  [x] Ride Home - sedation/ epidurals  [x]No Jewelry/Contact Lenses/Nail Polish     DOS Patient Needs [] HCG   [] Blood Sugar  [] PT/INR

## 2023-11-27 ENCOUNTER — HOSPITAL ENCOUNTER (OUTPATIENT)
Age: 78
Discharge: HOME | End: 2023-11-27
Payer: MEDICARE

## 2023-11-27 DIAGNOSIS — E11.9 CONTROLLED TYPE 2 DIABETES MELLITUS WITHOUT COMPLICATION, WITHOUT LONG-TERM CURRENT USE OF INSULIN (HCC): ICD-10-CM

## 2023-11-27 DIAGNOSIS — E03.9 ACQUIRED HYPOTHYROIDISM: ICD-10-CM

## 2023-11-27 LAB
ALBUMIN SERPL-MCNC: 4.2 G/DL (ref 3.5–5.2)
ALP SERPL-CCNC: 82 U/L (ref 40–129)
ALT SERPL-CCNC: 30 U/L (ref 5–41)
ANION GAP SERPL CALCULATED.3IONS-SCNC: 9 MMOL/L (ref 9–17)
AST SERPL-CCNC: 24 U/L
BILIRUB SERPL-MCNC: 0.4 MG/DL (ref 0.3–1.2)
BUN SERPL-MCNC: 19 MG/DL (ref 8–23)
BUN/CREAT SERPL: 24 (ref 9–20)
CALCIUM SERPL-MCNC: 9.4 MG/DL (ref 8.6–10.4)
CHLORIDE SERPL-SCNC: 104 MMOL/L (ref 98–107)
CHOLEST SERPL-MCNC: 117 MG/DL (ref 0–199)
CHOLESTEROL/HDL RATIO: 4
CO2 SERPL-SCNC: 26 MMOL/L (ref 20–31)
CREAT SERPL-MCNC: 0.8 MG/DL (ref 0.7–1.2)
CREAT UR-MCNC: 168.1 MG/DL (ref 39–259)
EST. AVERAGE GLUCOSE BLD GHB EST-MCNC: 131 MG/DL
GFR SERPL CREATININE-BSD FRML MDRD: >60 ML/MIN/1.73M2
GLUCOSE SERPL-MCNC: 146 MG/DL (ref 70–99)
HBA1C MFR BLD: 6.2 % (ref 4–6)
HDLC SERPL-MCNC: 33 MG/DL
LDLC SERPL CALC-MCNC: 52 MG/DL (ref 0–100)
MICROALBUMIN UR-MCNC: 105 MG/L
MICROALBUMIN/CREAT UR-RTO: 62 MCG/MG CREAT
POTASSIUM SERPL-SCNC: 4 MMOL/L (ref 3.7–5.3)
PROT SERPL-MCNC: 7.3 G/DL (ref 6.4–8.3)
SODIUM SERPL-SCNC: 139 MMOL/L (ref 135–144)
TRIGL SERPL-MCNC: 159 MG/DL (ref 0–149)
TSH SERPL DL<=0.05 MIU/L-ACNC: 7.32 UIU/ML (ref 0.3–5)
VLDLC SERPL CALC-MCNC: 32 MG/DL

## 2023-11-27 PROCEDURE — 82570 ASSAY OF URINE CREATININE: CPT

## 2023-11-27 PROCEDURE — 36415 COLL VENOUS BLD VENIPUNCTURE: CPT

## 2023-11-27 PROCEDURE — 83036 HEMOGLOBIN GLYCOSYLATED A1C: CPT

## 2023-11-27 PROCEDURE — 80053 COMPREHEN METABOLIC PANEL: CPT

## 2023-11-27 PROCEDURE — 84443 ASSAY THYROID STIM HORMONE: CPT

## 2023-11-27 PROCEDURE — 82043 UR ALBUMIN QUANTITATIVE: CPT

## 2023-11-27 PROCEDURE — 80061 LIPID PANEL: CPT

## 2023-11-30 ENCOUNTER — APPOINTMENT (OUTPATIENT)
Dept: GENERAL RADIOLOGY | Age: 78
End: 2023-11-30
Attending: STUDENT IN AN ORGANIZED HEALTH CARE EDUCATION/TRAINING PROGRAM
Payer: MEDICARE

## 2023-11-30 ENCOUNTER — HOSPITAL ENCOUNTER (OUTPATIENT)
Age: 78
Setting detail: OUTPATIENT SURGERY
Discharge: HOME | End: 2023-11-30
Attending: STUDENT IN AN ORGANIZED HEALTH CARE EDUCATION/TRAINING PROGRAM | Admitting: STUDENT IN AN ORGANIZED HEALTH CARE EDUCATION/TRAINING PROGRAM
Payer: MEDICARE

## 2023-11-30 VITALS
DIASTOLIC BLOOD PRESSURE: 81 MMHG | TEMPERATURE: 97.1 F | BODY MASS INDEX: 29.95 KG/M2 | HEIGHT: 70 IN | OXYGEN SATURATION: 95 % | SYSTOLIC BLOOD PRESSURE: 156 MMHG | WEIGHT: 209.2 LBS | RESPIRATION RATE: 16 BRPM | HEART RATE: 58 BPM

## 2023-11-30 PROCEDURE — 7100000011 HC PHASE II RECOVERY - ADDTL 15 MIN: Performed by: STUDENT IN AN ORGANIZED HEALTH CARE EDUCATION/TRAINING PROGRAM

## 2023-11-30 PROCEDURE — 2500000003 HC RX 250 WO HCPCS: Performed by: STUDENT IN AN ORGANIZED HEALTH CARE EDUCATION/TRAINING PROGRAM

## 2023-11-30 PROCEDURE — 99153 MOD SED SAME PHYS/QHP EA: CPT | Performed by: STUDENT IN AN ORGANIZED HEALTH CARE EDUCATION/TRAINING PROGRAM

## 2023-11-30 PROCEDURE — 76000 FLUOROSCOPY <1 HR PHYS/QHP: CPT

## 2023-11-30 PROCEDURE — 2580000003 HC RX 258: Performed by: STUDENT IN AN ORGANIZED HEALTH CARE EDUCATION/TRAINING PROGRAM

## 2023-11-30 PROCEDURE — 64636 DESTROY L/S FACET JNT ADDL: CPT | Performed by: STUDENT IN AN ORGANIZED HEALTH CARE EDUCATION/TRAINING PROGRAM

## 2023-11-30 PROCEDURE — 2709999900 HC NON-CHARGEABLE SUPPLY: Performed by: STUDENT IN AN ORGANIZED HEALTH CARE EDUCATION/TRAINING PROGRAM

## 2023-11-30 PROCEDURE — 99152 MOD SED SAME PHYS/QHP 5/>YRS: CPT | Performed by: STUDENT IN AN ORGANIZED HEALTH CARE EDUCATION/TRAINING PROGRAM

## 2023-11-30 PROCEDURE — 6360000002 HC RX W HCPCS: Performed by: STUDENT IN AN ORGANIZED HEALTH CARE EDUCATION/TRAINING PROGRAM

## 2023-11-30 PROCEDURE — 64635 DESTROY LUMB/SAC FACET JNT: CPT | Performed by: STUDENT IN AN ORGANIZED HEALTH CARE EDUCATION/TRAINING PROGRAM

## 2023-11-30 PROCEDURE — 3600000058 HC PAIN LEVEL 5 BASE: Performed by: STUDENT IN AN ORGANIZED HEALTH CARE EDUCATION/TRAINING PROGRAM

## 2023-11-30 PROCEDURE — 3600000059 HC PAIN LEVEL 5 ADDL 15 MIN: Performed by: STUDENT IN AN ORGANIZED HEALTH CARE EDUCATION/TRAINING PROGRAM

## 2023-11-30 PROCEDURE — 7100000010 HC PHASE II RECOVERY - FIRST 15 MIN: Performed by: STUDENT IN AN ORGANIZED HEALTH CARE EDUCATION/TRAINING PROGRAM

## 2023-11-30 RX ORDER — MIDAZOLAM HYDROCHLORIDE 1 MG/ML
INJECTION INTRAMUSCULAR; INTRAVENOUS PRN
Status: DISCONTINUED | OUTPATIENT
Start: 2023-11-30 | End: 2023-11-30 | Stop reason: ALTCHOICE

## 2023-11-30 RX ORDER — SODIUM CHLORIDE, SODIUM LACTATE, POTASSIUM CHLORIDE, CALCIUM CHLORIDE 600; 310; 30; 20 MG/100ML; MG/100ML; MG/100ML; MG/100ML
INJECTION, SOLUTION INTRAVENOUS CONTINUOUS
Status: DISCONTINUED | OUTPATIENT
Start: 2023-11-30 | End: 2023-11-30 | Stop reason: HOSPADM

## 2023-11-30 RX ORDER — LIDOCAINE HYDROCHLORIDE 20 MG/ML
INJECTION, SOLUTION EPIDURAL; INFILTRATION; INTRACAUDAL; PERINEURAL PRN
Status: DISCONTINUED | OUTPATIENT
Start: 2023-11-30 | End: 2023-11-30 | Stop reason: ALTCHOICE

## 2023-11-30 RX ORDER — TRIAMCINOLONE ACETONIDE 40 MG/ML
INJECTION, SUSPENSION INTRA-ARTICULAR; INTRAMUSCULAR PRN
Status: DISCONTINUED | OUTPATIENT
Start: 2023-11-30 | End: 2023-11-30 | Stop reason: ALTCHOICE

## 2023-11-30 RX ORDER — LIDOCAINE HYDROCHLORIDE 10 MG/ML
INJECTION, SOLUTION EPIDURAL; INFILTRATION; INTRACAUDAL; PERINEURAL PRN
Status: DISCONTINUED | OUTPATIENT
Start: 2023-11-30 | End: 2023-11-30 | Stop reason: ALTCHOICE

## 2023-11-30 RX ADMIN — SODIUM CHLORIDE, POTASSIUM CHLORIDE, SODIUM LACTATE AND CALCIUM CHLORIDE: 600; 310; 30; 20 INJECTION, SOLUTION INTRAVENOUS at 13:18

## 2023-11-30 ASSESSMENT — PAIN - FUNCTIONAL ASSESSMENT: PAIN_FUNCTIONAL_ASSESSMENT: 0-10

## 2023-11-30 ASSESSMENT — PAIN DESCRIPTION - DESCRIPTORS: DESCRIPTORS: STABBING

## 2023-11-30 NOTE — H&P
RCR.  Shoulder Scope Subacromial Decompression. Partial Distal Claviclectomy. Debreidement. Mini RCR Open. Bicep Tenodecis performed by Nita Garcia DO at [address] [place] [address] ARTHROSCOPY Left     TONSILLECTOMY      UPPER GASTROINTESTINAL ENDOSCOPY      UPPER GASTROINTESTINAL ENDOSCOPY  2014    Dr. Amber Brandon  2016    Clay Land MD       Family History   Problem Relation Age of Onset    Cancer Mother         bone    Heart Disease Father         MI       Social History     Socioeconomic History    Marital status:      Spouse name: Not on file    Number of children: Not on file    Years of education: Not on file    Highest education level: Not on file   Occupational History    Not on file   Tobacco Use    Smoking status: Former     Packs/day: 0.50     Years: 40.00     Additional pack years: 0.00     Total pack years: 20.00     Types: Cigarettes     Quit date: 2023     Years since quittin.6    Smokeless tobacco: Never   Vaping Use    Vaping Use: Never used   Substance and Sexual Activity    Alcohol use: Yes     Comment: seldom    Drug use: No    Sexual activity: Not on file   Other Topics Concern    Not on file   Social History Narrative    Not on file     Social Determinants of Health     Financial Resource Strain: Low Risk  (2023)    Overall Financial Resource Strain (CARDIA)     Difficulty of Paying Living Expenses: Not hard at all   Food Insecurity: Not on file (2023)   Transportation Needs: Unknown (2023)    PRAPARE - Transportation     Lack of Transportation (Medical): Not on file     Lack of Transportation (Non-Medical):  No   Physical Activity: Inactive (2022)    Exercise Vital Sign     Days of Exercise per Week: 0 days     Minutes of Exercise per Session: 0 min   Stress: Not on file   Social Connections: Not on file   Intimate Partner Violence: Not on file   Housing Stability: Unknown (2023)    Housing Stability and Affect: Mood and affect normal.   Cardiovascular:      Rate: Normal rate. Patient's current physical status, medications, medical history, and HPI have been reviewed and updated as appropriate on this date: 11/30/23    Risk/Benefit(s): The risks, benefits, alternatives, and potential complications have been discussed with the patient/family and informed consent has been obtained for the procedure/sedation.     Diagnosis:   Lumbosacral spondylosis    Plan: Bilateral lumbar L3, L4, L5 medial branch radiofrequency ablation    Jay Davis DO

## 2023-11-30 NOTE — OP NOTE
PROCEDURE PERFORMED: Bilateral Lumbar Medial Branch Radiofrequency Ablation using Fluoroscopy    PREOPERATIVE DIAGNOSIS: Lumbar spondylosis    INDICATIONS: Chronic low back pain    The patient's history and physical exam were reviewed. The risk, benefits, and alternatives of the procedure were discussed and all questions were answered to the patient's satisfaction. The patient agreed to proceed and written informed consent was obtained. POSTOPERATIVE DIAGNOSIS: Lumbar spondylosis    PHYSICIAN:  Dr. Jose Manuel Monsivais DO    ANESTHESIA:  LOCAL    ASSISTANT:  NONE    PATHOLOGY:  NONE    ESTIMATED BLOOD LOSS:  N/A    IMPLANTS:  NONE    PROCEDURE DESCRIPTION: Bilateral lumbar medial branch radiofrequency ablation using fluoroscopy    The patient was placed on the operative bed in prone position. The area was prepped with  Chlorhexidine. The area was then draped in a sterile fashion. Targeted levels: Bilateral lumbar L3, L4, L5 medial branch radiofrequency ablation    An AP  fluoroscopy image was used to identify and kasey Chambers's point at the L3, L4 levels on the targeted side. Additionally, the junction of the SAP and sacral ala was also marked on the same side. The skin and subcutaneous tissues were then anesthetized with 1% lidocaine. At each lumbar medial branch, a 20-gauge, 100 mm curved with 10 mm active tip probe was advanced under AP fluoroscopic projections until bone was contacted along the medial aspect of the transverse process. Aspiration of each needle was negative for blood, CSF and paresthesia prior to injection. Motor stimulation at 2 Hz and 1.2 V was negative. Then, after negative aspiration, 1 mL lidocaine 2% was injected at each level prior to lesioning which was performed at [facility] for 90 seconds. Once the lesion was complete, injectate solution containing Kenalog 40 mg and 2 mL lidocaine 1% was injected at each site with a total of 1 mL. The probes were then removed.   The needle

## 2023-12-04 ENCOUNTER — OFFICE VISIT (OUTPATIENT)
Dept: FAMILY MEDICINE CLINIC | Age: 78
End: 2023-12-04
Payer: MEDICARE

## 2023-12-04 VITALS
SYSTOLIC BLOOD PRESSURE: 124 MMHG | HEART RATE: 56 BPM | HEIGHT: 70 IN | WEIGHT: 210 LBS | OXYGEN SATURATION: 98 % | DIASTOLIC BLOOD PRESSURE: 72 MMHG | BODY MASS INDEX: 30.06 KG/M2

## 2023-12-04 DIAGNOSIS — K21.9 GASTROESOPHAGEAL REFLUX DISEASE WITHOUT ESOPHAGITIS: ICD-10-CM

## 2023-12-04 DIAGNOSIS — E11.9 CONTROLLED TYPE 2 DIABETES MELLITUS WITHOUT COMPLICATION, WITHOUT LONG-TERM CURRENT USE OF INSULIN (HCC): ICD-10-CM

## 2023-12-04 DIAGNOSIS — Z00.00 MEDICARE ANNUAL WELLNESS VISIT, SUBSEQUENT: Primary | ICD-10-CM

## 2023-12-04 DIAGNOSIS — G47.39 OTHER SLEEP APNEA: ICD-10-CM

## 2023-12-04 DIAGNOSIS — E78.00 PURE HYPERCHOLESTEROLEMIA: ICD-10-CM

## 2023-12-04 DIAGNOSIS — I10 ESSENTIAL HYPERTENSION, BENIGN: ICD-10-CM

## 2023-12-04 DIAGNOSIS — Z87.891 PERSONAL HISTORY OF TOBACCO USE: ICD-10-CM

## 2023-12-04 DIAGNOSIS — E03.9 ACQUIRED HYPOTHYROIDISM: ICD-10-CM

## 2023-12-04 PROCEDURE — G8484 FLU IMMUNIZE NO ADMIN: HCPCS | Performed by: FAMILY MEDICINE

## 2023-12-04 PROCEDURE — 3044F HG A1C LEVEL LT 7.0%: CPT | Performed by: FAMILY MEDICINE

## 2023-12-04 PROCEDURE — 3074F SYST BP LT 130 MM HG: CPT | Performed by: FAMILY MEDICINE

## 2023-12-04 PROCEDURE — 99214 OFFICE O/P EST MOD 30 MIN: CPT | Performed by: FAMILY MEDICINE

## 2023-12-04 PROCEDURE — 1123F ACP DISCUSS/DSCN MKR DOCD: CPT | Performed by: FAMILY MEDICINE

## 2023-12-04 PROCEDURE — 3078F DIAST BP <80 MM HG: CPT | Performed by: FAMILY MEDICINE

## 2023-12-04 PROCEDURE — G8427 DOCREV CUR MEDS BY ELIG CLIN: HCPCS | Performed by: FAMILY MEDICINE

## 2023-12-04 PROCEDURE — G0296 VISIT TO DETERM LDCT ELIG: HCPCS | Performed by: FAMILY MEDICINE

## 2023-12-04 PROCEDURE — G0439 PPPS, SUBSEQ VISIT: HCPCS | Performed by: FAMILY MEDICINE

## 2023-12-04 PROCEDURE — 1036F TOBACCO NON-USER: CPT | Performed by: FAMILY MEDICINE

## 2023-12-04 PROCEDURE — G8417 CALC BMI ABV UP PARAM F/U: HCPCS | Performed by: FAMILY MEDICINE

## 2023-12-04 ASSESSMENT — ENCOUNTER SYMPTOMS
CHOKING: 0
SHORTNESS OF BREATH: 0
COUGH: 0
BLOOD IN STOOL: 0
VOMITING: 0
HEARTBURN: 0
NAUSEA: 0
EYE DISCHARGE: 0
ABDOMINAL PAIN: 0
EYE REDNESS: 0
DIARRHEA: 0
TROUBLE SWALLOWING: 0

## 2023-12-04 ASSESSMENT — LIFESTYLE VARIABLES
HOW OFTEN DO YOU HAVE A DRINK CONTAINING ALCOHOL: NEVER
HOW MANY STANDARD DRINKS CONTAINING ALCOHOL DO YOU HAVE ON A TYPICAL DAY: PATIENT DOES NOT DRINK

## 2023-12-04 ASSESSMENT — PATIENT HEALTH QUESTIONNAIRE - PHQ9
2. FEELING DOWN, DEPRESSED OR HOPELESS: 0
SUM OF ALL RESPONSES TO PHQ QUESTIONS 1-9: 0
1. LITTLE INTEREST OR PLEASURE IN DOING THINGS: 0
SUM OF ALL RESPONSES TO PHQ QUESTIONS 1-9: 0
SUM OF ALL RESPONSES TO PHQ9 QUESTIONS 1 & 2: 0

## 2023-12-04 NOTE — PATIENT INSTRUCTIONS
need more tests to make sure you don't have cancer. These tests can be harmful and cause a lot of worry. These tests may include more CT scans and invasive testing like a lung biopsy. In a biopsy, the doctor takes a sample of tissue from inside your lung so it can be looked at under a microscope. A biopsy is the only way to tell if you have lung cancer. If the biopsy finds cancer, you and your doctor will have to decide how or whether to treat it. Some lung cancers found on CT scans are harmless and would not have caused a problem if they had not been found through screening. But because doctors can't tell which ones will turn out to be harmless, most will be treated. This means that you may get treatment--including surgery, radiation, or chemotherapy--that you don't need. There is a risk of damage to cells or tissue from being exposed to radiation, including the small amounts used in CTs, X-rays, and other medical tests. Over time, exposure to radiation may cause cancer and other health problems. But in most cases, the risk of getting cancer from being exposed to small amounts of radiation is low. It's not a reason to avoid these tests for most people. What are the benefits of screening? Your scan may be normal (negative). For some people who are at higher risk, screening lowers the chance of dying of lung cancer. How much and how long you smoked helps to determine your risk level. Screening can find some cancers early, when treatment may be more likely to work. What happens after screening? The results of your CT scan will be sent to your doctor. Someone from your care team will explain the results of your scan and answer any questions you may have. If you need any follow-up, he or she will help you understand what to do next. After a lung cancer screening, you can go back to your usual activities right away. A lung cancer screening test can't tell if you have lung cancer.  If your results are positive,

## 2023-12-04 NOTE — PROGRESS NOTES
HPI Notes    Name: Adriana Edwards  : 1945        Chief Complaint:     Chief Complaint   Patient presents with    Medicare AWV     Pt presents today for AWV    Diabetes    Hypertension    Hyperlipidemia       History of Present Illness:     Adriana Edwards is a 70 y.o.  male who presents with Medicare AWV (Pt presents today for AWV), Diabetes, Hypertension, and Hyperlipidemia      Diabetes  He presents for his follow-up diabetic visit. He has type 2 diabetes mellitus. There are no hypoglycemic associated symptoms. Pertinent negatives for hypoglycemia include no dizziness, headaches or tremors. There are no diabetic associated symptoms. Pertinent negatives for diabetes include no chest pain, no fatigue and no weight loss. There are no hypoglycemic complications. Risk factors for coronary artery disease include diabetes mellitus, dyslipidemia, hypertension and male sex. Current diabetic treatment includes diet. He is compliant with treatment most of the time. His weight is stable. He is following a generally healthy diet. There is no change (hga1c 6.2) in his home blood glucose trend. Eye exam is current. Hypertension  This is a chronic problem. The current episode started more than 1 year ago. The problem is unchanged. The problem is controlled. Pertinent negatives include no chest pain, headaches, neck pain, palpitations or shortness of breath. There are no associated agents to hypertension. Risk factors for coronary artery disease include diabetes mellitus and male gender. The current treatment provides significant improvement. Hypertensive end-organ damage includes CAD/MI. Hyperlipidemia  This is a chronic problem. The current episode started more than 1 year ago. The problem is controlled. Recent lipid tests were reviewed and are normal. He has no history of diabetes. There are no known factors aggravating his hyperlipidemia. Pertinent negatives include no chest pain or shortness of breath.
tablet 10 tablets once a week Yes Lisa Villatoro MD   hydrocortisone 2.5 % cream Apply topically 2 times daily Apply topically 2 times daily. Yes Lisa Villatoro MD   gabapentin (NEURONTIN) 300 MG capsule Take 1 capsule by mouth nightly. Yes Lisa Villatoro MD   gabapentin (NEURONTIN) 600 MG tablet Take 1 tablet by mouth 4 times daily. Yes Lisa Villatoro MD   HYDROcodone-acetaminophen (NORCO) 5-325 MG per tablet Take 1 tablet by mouth every 6 hours as needed for Pain. Yes Lisa Villatoro MD   lidocaine (LMX) 4 % cream Apply topically as needed for Pain Apply topically as needed.  Yes Lisa Villatoro MD   Multiple Vitamins-Minerals (MULTI COMPLETE PO) Take by mouth Yes Lisa Villatoro MD   polyethylene glycol (GLYCOLAX) powder Take 17 g by mouth as needed Yes Lisa Villatoro MD       CareTeam (Including outside providers/suppliers regularly involved in providing care):   Patient Care Team:  Milan Lubin MD as PCP - General (Family Medicine)  Milan Lubin MD as PCP - Empaneled Provider  Rogelio Rueda MD as Surgeon (General Surgery)  Rui Mitchell MD (Rheumatology)     Reviewed and updated this visit:  Tobacco  Allergies  Meds  Problems  Med Hx  Surg Hx  Soc Hx  Fam Hx

## 2023-12-06 ENCOUNTER — TELEPHONE (OUTPATIENT)
Dept: ONCOLOGY | Age: 78
End: 2023-12-06

## 2023-12-13 ENCOUNTER — HOSPITAL ENCOUNTER (OUTPATIENT)
Dept: CT IMAGING | Age: 78
Discharge: HOME | End: 2023-12-15
Attending: FAMILY MEDICINE
Payer: MEDICARE

## 2023-12-13 DIAGNOSIS — Z87.891 PERSONAL HISTORY OF TOBACCO USE: ICD-10-CM

## 2023-12-13 PROCEDURE — 71271 CT THORAX LUNG CANCER SCR C-: CPT

## 2024-04-08 NOTE — TELEPHONE ENCOUNTER
Last OV 12/4/23 AWV    Next OV 6/4/24    Requesting refill on simvastatin, pantoprazole, and levothyroxine thru surescripts.  Rx's pending

## 2024-04-09 RX ORDER — LEVOTHYROXINE SODIUM 0.07 MG/1
TABLET ORAL
Qty: 90 TABLET | Refills: 1 | Status: SHIPPED | OUTPATIENT
Start: 2024-04-09

## 2024-04-09 RX ORDER — PANTOPRAZOLE SODIUM 40 MG/1
TABLET, DELAYED RELEASE ORAL
Qty: 180 TABLET | Refills: 1 | Status: SHIPPED | OUTPATIENT
Start: 2024-04-09

## 2024-04-09 RX ORDER — SIMVASTATIN 20 MG
TABLET ORAL
Qty: 90 TABLET | Refills: 1 | Status: SHIPPED | OUTPATIENT
Start: 2024-04-09

## 2024-05-08 ENCOUNTER — TELEPHONE (OUTPATIENT)
Dept: FAMILY MEDICINE CLINIC | Age: 79
End: 2024-05-08

## 2024-05-08 DIAGNOSIS — K58.8 OTHER IRRITABLE BOWEL SYNDROME: Primary | ICD-10-CM

## 2024-05-15 ENCOUNTER — TELEPHONE (OUTPATIENT)
Dept: GASTROENTEROLOGY | Age: 79
End: 2024-05-15

## 2024-05-15 ENCOUNTER — HOSPITAL ENCOUNTER (OUTPATIENT)
Age: 79
Discharge: HOME | End: 2024-05-15
Payer: MEDICARE

## 2024-05-15 ENCOUNTER — OFFICE VISIT (OUTPATIENT)
Dept: GASTROENTEROLOGY | Age: 79
End: 2024-05-15
Payer: MEDICARE

## 2024-05-15 ENCOUNTER — HOSPITAL ENCOUNTER (OUTPATIENT)
Dept: GENERAL RADIOLOGY | Age: 79
Discharge: HOME | End: 2024-05-17
Payer: MEDICARE

## 2024-05-15 ENCOUNTER — HOSPITAL ENCOUNTER (OUTPATIENT)
Age: 79
Discharge: HOME | End: 2024-05-17
Payer: MEDICARE

## 2024-05-15 VITALS
WEIGHT: 211 LBS | SYSTOLIC BLOOD PRESSURE: 128 MMHG | BODY MASS INDEX: 30.28 KG/M2 | OXYGEN SATURATION: 95 % | RESPIRATION RATE: 18 BRPM | HEART RATE: 58 BPM | DIASTOLIC BLOOD PRESSURE: 84 MMHG

## 2024-05-15 DIAGNOSIS — R15.2 DEFECATION URGENCY: ICD-10-CM

## 2024-05-15 DIAGNOSIS — R19.4 CHANGE IN BOWEL HABITS: ICD-10-CM

## 2024-05-15 DIAGNOSIS — Z87.19 HISTORY OF CONSTIPATION: ICD-10-CM

## 2024-05-15 DIAGNOSIS — R19.4 CHANGE IN BOWEL HABITS: Primary | ICD-10-CM

## 2024-05-15 DIAGNOSIS — Z01.818 PRE-OP TESTING: ICD-10-CM

## 2024-05-15 DIAGNOSIS — R15.9 INCONTINENCE OF FECES, UNSPECIFIED FECAL INCONTINENCE TYPE: ICD-10-CM

## 2024-05-15 PROCEDURE — 99203 OFFICE O/P NEW LOW 30 MIN: CPT | Performed by: NURSE PRACTITIONER

## 2024-05-15 PROCEDURE — 74018 RADEX ABDOMEN 1 VIEW: CPT

## 2024-05-15 PROCEDURE — 83516 IMMUNOASSAY NONANTIBODY: CPT

## 2024-05-15 PROCEDURE — 36415 COLL VENOUS BLD VENIPUNCTURE: CPT

## 2024-05-15 PROCEDURE — G8427 DOCREV CUR MEDS BY ELIG CLIN: HCPCS | Performed by: NURSE PRACTITIONER

## 2024-05-15 PROCEDURE — 3079F DIAST BP 80-89 MM HG: CPT | Performed by: NURSE PRACTITIONER

## 2024-05-15 PROCEDURE — 82784 ASSAY IGA/IGD/IGG/IGM EACH: CPT

## 2024-05-15 PROCEDURE — 1123F ACP DISCUSS/DSCN MKR DOCD: CPT | Performed by: NURSE PRACTITIONER

## 2024-05-15 PROCEDURE — G8417 CALC BMI ABV UP PARAM F/U: HCPCS | Performed by: NURSE PRACTITIONER

## 2024-05-15 PROCEDURE — 93005 ELECTROCARDIOGRAM TRACING: CPT

## 2024-05-15 PROCEDURE — 1036F TOBACCO NON-USER: CPT | Performed by: NURSE PRACTITIONER

## 2024-05-15 PROCEDURE — 3074F SYST BP LT 130 MM HG: CPT | Performed by: NURSE PRACTITIONER

## 2024-05-15 RX ORDER — DILTIAZEM HYDROCHLORIDE 180 MG/1
CAPSULE, COATED, EXTENDED RELEASE ORAL
COMMUNITY
Start: 2024-04-10

## 2024-05-15 ASSESSMENT — ENCOUNTER SYMPTOMS
RESPIRATORY NEGATIVE: 1
ALLERGIC/IMMUNOLOGIC NEGATIVE: 1

## 2024-05-15 NOTE — TELEPHONE ENCOUNTER
----- Message from KERWIN Barajas - CNP sent at 5/15/2024  3:36 PM EDT -----  Please let patient know that his KUB of his abdomen returned showing a moderate amount of stool throughout the colon, concerning for constipation, and as we discussed overflow constipation.  I would recommend he start a daily MiraLAX.  Thanks, Alee.

## 2024-05-15 NOTE — PATIENT INSTRUCTIONS
SURVEY:    You may be receiving a survey from [facility] regarding your visit today.    You may get this in the mail, through your MyChart, or in your email.     Please complete the survey to enable us to provide the highest quality of care to you and your family.    If you cannot score us a very good (5 Stars) on any question, please call the office to discuss how we could of made your experience exceptional.    Thank you!    MD Alee Barba, APRN-KEMAR Wilde, JEAN Asencio LPN Brenda Boehler, LPN Jena Adams, MA    Phone: [phone]  Fax: [phone]    Office Hours:   M-TH 8-5, F: 8-12    14-Jan-2021 16:54

## 2024-05-15 NOTE — PROGRESS NOTES
[address] [id number]    Chief Complaint   Patient presents with    Irritable Bowel Syndrome     Patient presents as new patient for IBS. Patient states he is having infusions done at [facility] in June 10th and just wants to check in due to his reoccurring IBS that has been increasing over the past month.patient states he is having a lot of gas and pressure that varies throughout his stomach. Pt denies abd pain. Patient states he is having chronic diarrhea and sometimes unable to control when he will have a BM. Pt states he has had 4 BM today.         HPI Julio ANA Shantelle is a 78 y.o. old male who has a past medical history of arthritis, multiple myeloma, hyperlipidemia, hypertension, MI (4/10/2023 s/p x5 stents and on Eliquis), atrial fib, hypothyroidism, bowel resection (2004 s/p fall) presents today as a new GI referral with concerns for change in bowel habits with diarrhea stools, defecation urgency, fecal incontinence with a history of IBS and constipation (2011).  Julio is accompanied by his wife who assists with HPI.  According to Julio, over the last 2 months he has began to experience increased diarrheal stools, up to 3-4 diarrheal stools per day.  He is concerned over the amount of defecation urgency and fecal incontinence that he has been experiencing.  He is mostly concerned that he is scheduled to start Remicade infusions 6/10/2024 at the [facility] and each infusion will take approximately 2 hours; and experiencing defecation urgency and fecal incontinence.  He is also concerned that he is planned for a trip out of state that will require a plane trip with his current symptoms.      Family history of upper GI cancer:  No.  Personal history of having esophagus \"rebuilt\" at [facility] 2000 for HH.  Review of EMR notes he had a gastric fundoplication (2000).  Family history of colon cancer: No  Blood in stool: No.  No melena  Unintentional weight loss:

## 2024-05-15 NOTE — TELEPHONE ENCOUNTER
Julio Pepper     1945        male    [address]  [facility] [address]                    Legal Guardian no  If yes, Name:       Skilled Facility no     If yes, Name:                                             Home Phone: [phone]         Cell Phone:    Telephone Information:   Mobile [phone]                                           Surgeon: Toni   Surgery Date: 11/20/24                      Time:     Procedure: colonoscopy  Duration:    Diagnosis: change in bowel habits R19.4   CPT Codes: 73659    Important Medical History:  In Epic    First Assistant no  Special Inst/Equip/Implants: Regular    Nickel allergy  no  Latex Allergy: no      Cardiac Device:  No  If yes, need most recent pacemaker interrogation from Cardiologist:  Type of pacemaker:    Anesthesia:    MAC                       Admission Type:  Same Day                        Admit Prior to Day of Surgery: No    Case Location:  Ambulatory            Preadmission Testing:  Phone Call             PAT Date and Time:     Need Preop Cardiac Clearance: yes  Need Pre-op/Medical Clearance: yes    Does Patient have Cardiologist/physician? Name of Physician: [facility]      Special Needs Communication:  [place] no    needed no

## 2024-05-16 ENCOUNTER — TELEPHONE (OUTPATIENT)
Dept: GASTROENTEROLOGY | Age: 79
End: 2024-05-16

## 2024-05-16 ENCOUNTER — HOSPITAL ENCOUNTER (OUTPATIENT)
Age: 79
Setting detail: SPECIMEN
Discharge: HOME | End: 2024-05-16
Payer: MEDICARE

## 2024-05-16 DIAGNOSIS — R19.4 CHANGE IN BOWEL HABITS: ICD-10-CM

## 2024-05-16 DIAGNOSIS — R15.2 DEFECATION URGENCY: ICD-10-CM

## 2024-05-16 DIAGNOSIS — R15.9 INCONTINENCE OF FECES, UNSPECIFIED FECAL INCONTINENCE TYPE: ICD-10-CM

## 2024-05-16 LAB
EKG ATRIAL RATE: 56 BPM
EKG P AXIS: 44 DEGREES
EKG P-R INTERVAL: 188 MS
EKG Q-T INTERVAL: 454 MS
EKG QRS DURATION: 86 MS
EKG QTC CALCULATION (BAZETT): 438 MS
EKG R AXIS: 73 DEGREES
EKG T AXIS: -12 DEGREES
EKG VENTRICULAR RATE: 56 BPM
GLIADIN IGA SER IA-ACNC: 6.3 U/ML
GLIADIN IGG SER IA-ACNC: <0.4 U/ML
IGA SERPL-MCNC: 241 MG/DL (ref 70–400)
TTG IGA SER IA-ACNC: 0.4 U/ML

## 2024-05-16 PROCEDURE — 87329 GIARDIA AG IA: CPT

## 2024-05-16 PROCEDURE — 83993 ASSAY FOR CALPROTECTIN FECAL: CPT

## 2024-05-16 PROCEDURE — 87506 IADNA-DNA/RNA PROBE TQ 6-11: CPT

## 2024-05-16 PROCEDURE — 87328 CRYPTOSPORIDIUM AG IA: CPT

## 2024-05-16 PROCEDURE — 82653 EL-1 FECAL QUANTITATIVE: CPT

## 2024-05-16 NOTE — TELEPHONE ENCOUNTER
Shilpa from Lab called and stated the C-diff order is no good as patients stool sample was firm and could not be processed.

## 2024-05-16 NOTE — TELEPHONE ENCOUNTER
Patient is scheduled to have a Colonoscopy procedure scheduled for 11/20/24.      PLEASE REVIEW RECENT EKG RESULTS FROM 5/15/24 AND INDICATE WHETHER PATIENT IS CLEARED FOR SURGERY.         ----- Message from KERWIN Barajas CNP sent at 5/16/2024  8:33 AM EDT -----  Please send  5/15/24 EKG Results to PCP for clearance.  Thanks, Alee

## 2024-05-16 NOTE — TELEPHONE ENCOUNTER
----- Message from KERWIN Barajas - CNP sent at 5/16/2024  8:33 AM EDT -----  Please send to PCP for clearance.  Thanks, Alee

## 2024-05-17 LAB
C PARVUM AG STL QL IA: NEGATIVE
CAMPYLOBACTER DNA SPEC NAA+PROBE: NORMAL
ETEC ELTA+ESTB GENES STL QL NAA+PROBE: NORMAL
G LAMBLIA AG STL QL IA: NEGATIVE
P SHIGELLOIDES DNA STL QL NAA+PROBE: NORMAL
SALMONELLA DNA SPEC QL NAA+PROBE: NORMAL
SHIGA TOXIN STX GENE SPEC NAA+PROBE: NORMAL
SHIGELLA DNA SPEC QL NAA+PROBE: NORMAL
SOURCE: NORMAL
SPECIMEN DESCRIPTION: NORMAL
SPECIMEN DESCRIPTION: NORMAL
V CHOL+PARA RFBL+TRKH+TNAA STL QL NAA+PR: NORMAL
Y ENTERO RECN STL QL NAA+PROBE: NORMAL

## 2024-05-19 LAB
CALPROTECTIN, FECAL: 824 UG/G
FECAL PANCREATIC ELASTASE-1: 560 UG/G

## 2024-05-20 ENCOUNTER — TELEPHONE (OUTPATIENT)
Dept: GASTROENTEROLOGY | Age: 79
End: 2024-05-20

## 2024-05-20 DIAGNOSIS — R19.4 CHANGE IN BOWEL HABITS: ICD-10-CM

## 2024-05-20 DIAGNOSIS — R19.5 ELEVATED FECAL CALPROTECTIN: Primary | ICD-10-CM

## 2024-05-20 NOTE — TELEPHONE ENCOUNTER
Patient reports he is scheduled with Dr. Harrison at [facility] on Wed. 5/22/24 for cardiac clearance. Clearance letter faxed to Dr. Harrison office 5/20/24.

## 2024-05-20 NOTE — TELEPHONE ENCOUNTER
Results message given to patient. Patient reports that he has 6 stents and it is suggested for him not to have any MRI's.     Patient was advised at this time colonoscopy will be prioritized after patient receives cardiac clearance. Patient reports his cardiologist has just retired and he will need to contact their office to set up an appointment with another cardiologist or find a new provider. Patient to call GI office back with scheduled clearance appointment date. Patient aware colonoscopy date will be moved up once clearance obtained. Patient would like GI office to know he will be traveling [place] on 5/30/24-6/2/24.        ----- Message from KERWIN Barajas - CNP sent at 5/20/2024  7:42 AM EDT -----  Please let Nithin know that his fecal Calprotectin returned elevated.  I would recommend an MRI enterography and the order has been placed. Please prioritize his colonoscopy to a sooner date.  Thanks,Alee.

## 2024-05-21 NOTE — TELEPHONE ENCOUNTER
Call placed to patient to advise of being scheduled for new procedure date of 6/26/24 d/t cancellation. Patient will need cardiac clearance prior and is scheduled with Dr. Guido Harrison ([facility]) on 5/22/24. Clearance request sent.     Awaiting return call with date acceptance from patient.

## 2024-05-22 NOTE — TELEPHONE ENCOUNTER
Per patient, Dr Harrison has approval for scope and MRI. Patient will provide paper from cardiology stating this.

## 2024-05-23 ENCOUNTER — TELEPHONE (OUTPATIENT)
Dept: GASTROENTEROLOGY | Age: 79
End: 2024-05-23

## 2024-05-23 NOTE — TELEPHONE ENCOUNTER
Patient cleared by cardiology for colonoscopy and MRI. Note from Dr Harrison cardiology is in chart through [facility].

## 2024-06-04 ENCOUNTER — OFFICE VISIT (OUTPATIENT)
Dept: FAMILY MEDICINE CLINIC | Age: 79
End: 2024-06-04
Payer: COMMERCIAL

## 2024-06-04 ENCOUNTER — HOSPITAL ENCOUNTER (OUTPATIENT)
Age: 79
Discharge: HOME | End: 2024-06-04
Payer: MEDICARE

## 2024-06-04 VITALS
SYSTOLIC BLOOD PRESSURE: 132 MMHG | HEART RATE: 73 BPM | OXYGEN SATURATION: 95 % | BODY MASS INDEX: 29.84 KG/M2 | DIASTOLIC BLOOD PRESSURE: 64 MMHG | WEIGHT: 208 LBS

## 2024-06-04 DIAGNOSIS — E78.00 PURE HYPERCHOLESTEROLEMIA: ICD-10-CM

## 2024-06-04 DIAGNOSIS — E03.9 ACQUIRED HYPOTHYROIDISM: ICD-10-CM

## 2024-06-04 DIAGNOSIS — K21.9 GASTROESOPHAGEAL REFLUX DISEASE WITHOUT ESOPHAGITIS: ICD-10-CM

## 2024-06-04 DIAGNOSIS — G47.39 OTHER SLEEP APNEA: ICD-10-CM

## 2024-06-04 DIAGNOSIS — I10 ESSENTIAL HYPERTENSION, BENIGN: Primary | ICD-10-CM

## 2024-06-04 DIAGNOSIS — E11.9 CONTROLLED TYPE 2 DIABETES MELLITUS WITHOUT COMPLICATION, WITHOUT LONG-TERM CURRENT USE OF INSULIN (HCC): ICD-10-CM

## 2024-06-04 LAB
ALT SERPL-CCNC: 25 U/L (ref 5–41)
AST SERPL-CCNC: 25 U/L
EST. AVERAGE GLUCOSE BLD GHB EST-MCNC: 128 MG/DL
HBA1C MFR BLD: 6.1 % (ref 4–6)
TSH SERPL DL<=0.05 MIU/L-ACNC: 10.31 UIU/ML (ref 0.3–5)

## 2024-06-04 PROCEDURE — 3078F DIAST BP <80 MM HG: CPT | Performed by: FAMILY MEDICINE

## 2024-06-04 PROCEDURE — 99214 OFFICE O/P EST MOD 30 MIN: CPT | Performed by: FAMILY MEDICINE

## 2024-06-04 PROCEDURE — 36415 COLL VENOUS BLD VENIPUNCTURE: CPT

## 2024-06-04 PROCEDURE — 3075F SYST BP GE 130 - 139MM HG: CPT | Performed by: FAMILY MEDICINE

## 2024-06-04 PROCEDURE — 1123F ACP DISCUSS/DSCN MKR DOCD: CPT | Performed by: FAMILY MEDICINE

## 2024-06-04 PROCEDURE — 83036 HEMOGLOBIN GLYCOSYLATED A1C: CPT

## 2024-06-04 PROCEDURE — 84450 TRANSFERASE (AST) (SGOT): CPT

## 2024-06-04 PROCEDURE — 84443 ASSAY THYROID STIM HORMONE: CPT

## 2024-06-04 PROCEDURE — 84460 ALANINE AMINO (ALT) (SGPT): CPT

## 2024-06-04 RX ORDER — LEVOTHYROXINE SODIUM 88 UG/1
88 TABLET ORAL DAILY
Qty: 30 TABLET | Refills: 2 | Status: SHIPPED | OUTPATIENT
Start: 2024-06-04

## 2024-06-04 SDOH — ECONOMIC STABILITY: FOOD INSECURITY: WITHIN THE PAST 12 MONTHS, THE FOOD YOU BOUGHT JUST DIDN'T LAST AND YOU DIDN'T HAVE MONEY TO GET MORE.: NEVER TRUE

## 2024-06-04 SDOH — ECONOMIC STABILITY: FOOD INSECURITY: WITHIN THE PAST 12 MONTHS, YOU WORRIED THAT YOUR FOOD WOULD RUN OUT BEFORE YOU GOT MONEY TO BUY MORE.: NEVER TRUE

## 2024-06-04 SDOH — ECONOMIC STABILITY: INCOME INSECURITY: HOW HARD IS IT FOR YOU TO PAY FOR THE VERY BASICS LIKE FOOD, HOUSING, MEDICAL CARE, AND HEATING?: NOT HARD AT ALL

## 2024-06-04 ASSESSMENT — PATIENT HEALTH QUESTIONNAIRE - PHQ9
SUM OF ALL RESPONSES TO PHQ QUESTIONS 1-9: 0
SUM OF ALL RESPONSES TO PHQ QUESTIONS 1-9: 0
1. LITTLE INTEREST OR PLEASURE IN DOING THINGS: NOT AT ALL
SUM OF ALL RESPONSES TO PHQ QUESTIONS 1-9: 0
2. FEELING DOWN, DEPRESSED OR HOPELESS: NOT AT ALL
SUM OF ALL RESPONSES TO PHQ9 QUESTIONS 1 & 2: 0
SUM OF ALL RESPONSES TO PHQ QUESTIONS 1-9: 0

## 2024-06-04 ASSESSMENT — ENCOUNTER SYMPTOMS
BLOOD IN STOOL: 0
CONSTIPATION: 0
EYE DISCHARGE: 0
BELCHING: 0
TROUBLE SWALLOWING: 0
COUGH: 0
ABDOMINAL PAIN: 0
VOMITING: 0
SHORTNESS OF BREATH: 0
DIARRHEA: 0
NAUSEA: 0
CHOKING: 0
EYE REDNESS: 0
HEARTBURN: 0

## 2024-06-04 NOTE — PATIENT INSTRUCTIONS
SURVEY:    You may be receiving a survey from [employer] regarding your visit today.    You may get this in the mail, through your MyChart or in your email.     Please complete the survey to enable us to provide the highest quality of care to you and your family.    If you cannot score us as very good ( 5 Stars) on any question, please feel free to call the office to discuss how we could have made your experience exceptional.     Thank you.    Clinical Care Team:         MD Nat Whitlock MA     Clerical Team:  Katiana Mercedes

## 2024-06-04 NOTE — PROGRESS NOTES
HPI Notes    Name: Julio Pepper  : 1945        Chief Complaint:     Chief Complaint   Patient presents with    Diabetes Mellitus     Last A1C 6.2-23    Hypertension     No concerns     Hypothyroidism     Discuss TSH       History of Present Illness:     Julio Pepper is a 78 y.o.  male who presents with Diabetes Mellitus (Last A1C 6.2-23), Hypertension (No concerns ), and Hypothyroidism (Discuss TSH)      Diabetes  He presents for his follow-up diabetic visit. He has type 2 diabetes mellitus. There are no hypoglycemic associated symptoms. Pertinent negatives for hypoglycemia include no dizziness, headaches or tremors. Associated symptoms include fatigue. Pertinent negatives for diabetes include no chest pain and no weight loss. There are no hypoglycemic complications. There are no diabetic complications. Risk factors for coronary artery disease include diabetes mellitus, dyslipidemia and hypertension.   Hypertension  This is a chronic problem. The current episode started more than 1 year ago. The problem is unchanged. The problem is controlled. Pertinent negatives include no chest pain, headaches, neck pain, palpitations or shortness of breath. There are no associated agents to hypertension. Risk factors for coronary artery disease include diabetes mellitus, dyslipidemia and male gender. The current treatment provides moderate improvement.   Hyperlipidemia  This is a chronic problem. The current episode started more than 1 year ago. The problem is controlled. Recent lipid tests were reviewed and are normal. Exacerbating diseases include diabetes and hypothyroidism. There are no known factors aggravating his hyperlipidemia. Pertinent negatives include no chest pain or shortness of breath. Current antihyperlipidemic treatment includes statins. The current treatment provides significant improvement of lipids. There are no compliance problems.  Risk factors for coronary artery disease

## 2024-06-11 ENCOUNTER — HOSPITAL ENCOUNTER (OUTPATIENT)
Dept: MRI IMAGING | Age: 79
Discharge: HOME | End: 2024-06-13
Payer: MEDICARE

## 2024-06-11 ENCOUNTER — HOSPITAL ENCOUNTER (OUTPATIENT)
Age: 79
Discharge: HOME | End: 2024-06-11
Payer: MEDICARE

## 2024-06-11 DIAGNOSIS — R19.5 ELEVATED FECAL CALPROTECTIN: ICD-10-CM

## 2024-06-11 DIAGNOSIS — R19.4 CHANGE IN BOWEL HABITS: ICD-10-CM

## 2024-06-11 LAB
CREAT SERPL-MCNC: 0.7 MG/DL (ref 0.7–1.2)
GFR, ESTIMATED: >90 ML/MIN/1.73M2

## 2024-06-11 PROCEDURE — 82565 ASSAY OF CREATININE: CPT

## 2024-06-11 PROCEDURE — 72197 MRI PELVIS W/O & W/DYE: CPT

## 2024-06-11 PROCEDURE — 6360000002 HC RX W HCPCS: Performed by: NURSE PRACTITIONER

## 2024-06-11 PROCEDURE — A9579 GAD-BASE MR CONTRAST NOS,1ML: HCPCS | Performed by: NURSE PRACTITIONER

## 2024-06-11 PROCEDURE — 2580000003 HC RX 258

## 2024-06-11 PROCEDURE — 6360000004 HC RX CONTRAST MEDICATION: Performed by: NURSE PRACTITIONER

## 2024-06-11 RX ORDER — WATER 10 ML/10ML
INJECTION INTRAMUSCULAR; INTRAVENOUS; SUBCUTANEOUS
Status: COMPLETED
Start: 2024-06-11 | End: 2024-06-11

## 2024-06-11 RX ORDER — GLUCAGON 1 MG/ML
1 KIT INJECTION ONCE
Status: COMPLETED | OUTPATIENT
Start: 2024-06-11 | End: 2024-06-11

## 2024-06-11 RX ORDER — ONDANSETRON 2 MG/ML
4 INJECTION INTRAMUSCULAR; INTRAVENOUS ONCE
Status: DISCONTINUED | OUTPATIENT
Start: 2024-06-11 | End: 2024-06-14 | Stop reason: HOSPADM

## 2024-06-11 RX ADMIN — WATER 10 ML: 1 INJECTION INTRAMUSCULAR; INTRAVENOUS; SUBCUTANEOUS at 10:40

## 2024-06-11 RX ADMIN — GLUCAGON 1 MG: 1 INJECTION, POWDER, LYOPHILIZED, FOR SOLUTION INTRAMUSCULAR; INTRAVENOUS at 10:39

## 2024-06-11 RX ADMIN — GADOTERIDOL 19 ML: 279.3 INJECTION, SOLUTION INTRAVENOUS at 10:19

## 2024-06-12 ENCOUNTER — OFFICE VISIT (OUTPATIENT)
Dept: GASTROENTEROLOGY | Age: 79
End: 2024-06-12
Payer: MEDICARE

## 2024-06-12 VITALS
OXYGEN SATURATION: 95 % | SYSTOLIC BLOOD PRESSURE: 145 MMHG | BODY MASS INDEX: 29.56 KG/M2 | RESPIRATION RATE: 18 BRPM | HEART RATE: 60 BPM | WEIGHT: 206 LBS | DIASTOLIC BLOOD PRESSURE: 64 MMHG

## 2024-06-12 DIAGNOSIS — R19.5 ELEVATED FECAL CALPROTECTIN: Primary | ICD-10-CM

## 2024-06-12 DIAGNOSIS — R19.4 CHANGE IN BOWEL HABITS: ICD-10-CM

## 2024-06-12 PROCEDURE — G8427 DOCREV CUR MEDS BY ELIG CLIN: HCPCS | Performed by: NURSE PRACTITIONER

## 2024-06-12 PROCEDURE — G8417 CALC BMI ABV UP PARAM F/U: HCPCS | Performed by: NURSE PRACTITIONER

## 2024-06-12 PROCEDURE — 1123F ACP DISCUSS/DSCN MKR DOCD: CPT | Performed by: NURSE PRACTITIONER

## 2024-06-12 PROCEDURE — 99213 OFFICE O/P EST LOW 20 MIN: CPT | Performed by: NURSE PRACTITIONER

## 2024-06-12 PROCEDURE — 1036F TOBACCO NON-USER: CPT | Performed by: NURSE PRACTITIONER

## 2024-06-12 PROCEDURE — 3077F SYST BP >= 140 MM HG: CPT | Performed by: NURSE PRACTITIONER

## 2024-06-12 PROCEDURE — 3078F DIAST BP <80 MM HG: CPT | Performed by: NURSE PRACTITIONER

## 2024-06-12 RX ORDER — POLYETHYLENE GLYCOL 3350, SODIUM CHLORIDE, SODIUM BICARBONATE, POTASSIUM CHLORIDE 420; 11.2; 5.72; 1.48 G/4L; G/4L; G/4L; G/4L
4000 POWDER, FOR SOLUTION ORAL ONCE
Qty: 4000 ML | Refills: 0 | Status: SHIPPED | OUTPATIENT
Start: 2024-06-12 | End: 2024-06-12

## 2024-06-12 NOTE — PROGRESS NOTES
[facility]   Preadmission Testing    Name: Julio Pepper  : 1945  Patient Phone: [phone] (home)     Procedure: Colonoscopy  Date of Procedure: 2024  Surgeon: Joanne Muñoz MD    Ht:  177.8 cm (5' 10\")  Wt: 95.3 kg (210 lb)  Wt method: Stated    Allergies:   Allergies   Allergen Reactions    Adhesive Tape Rash     Other reaction(s): Unknown    Wound Dressing Adhesive      Redness, irritation                There were no vitals filed for this visit.    No LMP for male patient.    Do you take blood thinners?   [x] Yes    [] No         Instructed to stop blood thinners prior to procedure?    [x] Yes    [] No      [] N/A  Stop Eliquis 3 days before  Stop Plavix 5 days before   Do you have sleep apnea?   [x] Yes    [] No     Do you have acid reflux ?   [x] Yes    [] No     Do you have  hiatal hernia?   [x] Yes    [] No    Do you ever experience motion sickness?   [] Yes    [x] No     Have you had a respiratory infection or sore throat in last 4 weeks before surgery?    [] Yes    [x] No     Do you have poorly controlled asthma or COPD?  Difficulty with intubation in past? [] Yes    [x] No      [] Yes    [x] No       Do you have a history of angina in the last month or symptomatic arrhythmia?   [] Yes    [x] No     Do you have significant central nervous system disease?   [] Yes    [x] No     Have you had an EKG, labs, or chest xray in last 12 months?  If yes provide copies to anesthesia   [x] Yes    [] No       [x] Lab    [x] EKG    [] CXR     Have you had a stress test?     [] Yes    [x] No    When/where:    Was it normal?    [] Yes    [] No     Do you or your family have a history of Malignant Hyperthermia?   [] Yes    [x] No           Do you smoke? [] Yes    [x] No      Please refrain from smoking on the day of surgery.      Patient instructed on: [x] NPO Status   [x] Meds to Take  [] Hold GLP-1 Receptor Agonist  [x] Ride Home  [x] No Jewelry/Contact Lenses/Nail Polish  [x]

## 2024-06-12 NOTE — PATIENT INSTRUCTIONS
SURVEY:    You may be receiving a survey from [facility] regarding your visit today.    You may get this in the mail, through your MyChart, or in your email.     Please complete the survey to enable us to provide the highest quality of care to you and your family.    If you cannot score us a very good (5 Stars) on any question, please call the office to discuss how we could of made your experience exceptional.    Thank you!    MD Alee Barba, APRN-KEMAR Wilde, JEAN Asencio LPN Brenda Boehler, LPN Jena Adams, MA    Phone: [phone]  Fax: [phone]    Office Hours:   M-TH 8-5, F: 8-12

## 2024-06-12 NOTE — PROGRESS NOTES
fecal incontinence with a history of IBS and constipation (2011).    GI follow-up OV, bowel habit changes/lab review. Both Julio and wife feel symptoms are related to constipation. Julio and wife voice that he has been taking MiraLAX daily and has not noticed any difference in bowel habits.  He has received cardiac clearance for colonoscopy (see as scanned).  Colonoscopy has been previously arranged, scheduled for Tuesday of next week with Dr. Muñoz.  Julio is planning to hold his Eliquis 3 days prior and hold Plavix 5 days prior to colonoscopy, per his cardiologist instructions. Review of labs, fecal calprotectin returned elevated and Julio endorses that he had his MRI enterography yesterday (not yet resulted).   Due to concern for underlying constipation with overflow, will recommend extended bowel prep with Nulytely.  Return for PCP as previously scheduled, As recommended post endoscopy.    ASA: 3  Mallampati Score: 2    Plan    1. Elevated fecal calprotectin  - polyethylene glycol-electrolytes (NULYTELY WITH FLAVOR PACKS) 420 g solution; Take 4,000 mLs by mouth once for 1 dose  Dispense: 4000 mL; Refill: 0    2. Change in bowel habits  - polyethylene glycol-electrolytes (NULYTELY WITH FLAVOR PACKS) 420 g solution; Take 4,000 mLs by mouth once for 1 dose  Dispense: 4000 mL; Refill: 0    3. Additional recommendations based on findings.      Informed consent was obtained with a discussion about potential risks and complications of the procedure. Patient verbalized understanding and willingness to continue with the procedure scheduling.       Spent 20 minutes with the patient with greater than 50 percent of the time was spent on face-to-face time in discussion with the patient regarding diagnostic options/results, treatment options, counseling, and follow-up plan.      Alee Jane, APRN - CNP    *This report was transcribed using voice recognition software. Every effort was made to ensure accuracy; however,

## 2024-06-13 ENCOUNTER — TELEPHONE (OUTPATIENT)
Dept: GASTROENTEROLOGY | Age: 79
End: 2024-06-13

## 2024-06-13 DIAGNOSIS — R19.5 ELEVATED FECAL CALPROTECTIN: Primary | ICD-10-CM

## 2024-06-13 DIAGNOSIS — R19.4 CHANGE IN BOWEL HABITS: ICD-10-CM

## 2024-06-13 RX ORDER — POLYETHYLENE GLYCOL 3350, SODIUM SULFATE ANHYDROUS, SODIUM BICARBONATE, SODIUM CHLORIDE, POTASSIUM CHLORIDE 236; 22.74; 6.74; 5.86; 2.97 G/4L; G/4L; G/4L; G/4L; G/4L
4 POWDER, FOR SOLUTION ORAL ONCE
Qty: 4000 ML | Refills: 0 | Status: SHIPPED | OUTPATIENT
Start: 2024-06-13 | End: 2024-06-13

## 2024-06-13 RX ORDER — POLYETHYLENE GLYCOL 3350, SODIUM CHLORIDE, SODIUM BICARBONATE, POTASSIUM CHLORIDE 420; 11.2; 5.72; 1.48 G/4L; G/4L; G/4L; G/4L
4000 POWDER, FOR SOLUTION ORAL ONCE
Qty: 4000 ML | Refills: 0 | Status: SHIPPED | OUTPATIENT
Start: 2024-06-13 | End: 2024-06-13

## 2024-06-13 NOTE — TELEPHONE ENCOUNTER
Patients RX was sent to wrong pharmacy, new med refill and pharmacy are pending in his chart from yesterday and old canceled Please sign off on that    Thanks

## 2024-06-13 NOTE — TELEPHONE ENCOUNTER
----- Message from KERWIN Barajas - CNP sent at 6/13/2024  1:13 PM EDT -----  Please notify Julio that his MRI enterography returned showing no evidence of inflammatory bowel disease.  He does have gallstones.  Thanks,Alee

## 2024-06-14 ENCOUNTER — ANESTHESIA EVENT (OUTPATIENT)
Dept: ENDOSCOPY | Age: 79
End: 2024-06-14
Payer: MEDICARE

## 2024-06-16 RX ORDER — POLYETHYLENE GLYCOL 3350, SODIUM CHLORIDE, SODIUM BICARBONATE, POTASSIUM CHLORIDE 420; 11.2; 5.72; 1.48 G/4L; G/4L; G/4L; G/4L
4000 POWDER, FOR SOLUTION ORAL ONCE
Qty: 4000 ML | Refills: 0 | Status: SHIPPED | OUTPATIENT
Start: 2024-06-16 | End: 2024-06-16

## 2024-06-18 ENCOUNTER — ANESTHESIA (OUTPATIENT)
Dept: ENDOSCOPY | Age: 79
End: 2024-06-18
Payer: MEDICARE

## 2024-06-18 ENCOUNTER — HOSPITAL ENCOUNTER (OUTPATIENT)
Age: 79
Setting detail: OUTPATIENT SURGERY
Discharge: HOME | End: 2024-06-18
Attending: INTERNAL MEDICINE | Admitting: INTERNAL MEDICINE
Payer: MEDICARE

## 2024-06-18 VITALS
WEIGHT: 198.4 LBS | RESPIRATION RATE: 17 BRPM | TEMPERATURE: 96.7 F | DIASTOLIC BLOOD PRESSURE: 81 MMHG | HEIGHT: 70 IN | SYSTOLIC BLOOD PRESSURE: 143 MMHG | HEART RATE: 73 BPM | BODY MASS INDEX: 28.4 KG/M2 | OXYGEN SATURATION: 95 %

## 2024-06-18 DIAGNOSIS — R19.4 CHANGE IN BOWEL HABIT: ICD-10-CM

## 2024-06-18 PROCEDURE — 7100000011 HC PHASE II RECOVERY - ADDTL 15 MIN: Performed by: INTERNAL MEDICINE

## 2024-06-18 PROCEDURE — 6370000000 HC RX 637 (ALT 250 FOR IP): Performed by: INTERNAL MEDICINE

## 2024-06-18 PROCEDURE — 3700000001 HC ADD 15 MINUTES (ANESTHESIA): Performed by: INTERNAL MEDICINE

## 2024-06-18 PROCEDURE — 3700000000 HC ANESTHESIA ATTENDED CARE: Performed by: INTERNAL MEDICINE

## 2024-06-18 PROCEDURE — 88305 TISSUE EXAM BY PATHOLOGIST: CPT

## 2024-06-18 PROCEDURE — 6360000002 HC RX W HCPCS: Performed by: NURSE ANESTHETIST, CERTIFIED REGISTERED

## 2024-06-18 PROCEDURE — 2580000003 HC RX 258: Performed by: INTERNAL MEDICINE

## 2024-06-18 PROCEDURE — 3609010700 HC COLONOSCOPY POLYPECTOMY REMOVAL SNARE/STOMA: Performed by: INTERNAL MEDICINE

## 2024-06-18 PROCEDURE — 2500000003 HC RX 250 WO HCPCS: Performed by: NURSE ANESTHETIST, CERTIFIED REGISTERED

## 2024-06-18 PROCEDURE — 2709999900 HC NON-CHARGEABLE SUPPLY: Performed by: INTERNAL MEDICINE

## 2024-06-18 PROCEDURE — 7100000010 HC PHASE II RECOVERY - FIRST 15 MIN: Performed by: INTERNAL MEDICINE

## 2024-06-18 RX ORDER — SIMETHICONE 40MG/0.6ML
SUSPENSION, DROPS(FINAL DOSAGE FORM)(ML) ORAL PRN
Status: DISCONTINUED | OUTPATIENT
Start: 2024-06-18 | End: 2024-06-18 | Stop reason: ALTCHOICE

## 2024-06-18 RX ORDER — PROPOFOL 10 MG/ML
INJECTION, EMULSION INTRAVENOUS PRN
Status: DISCONTINUED | OUTPATIENT
Start: 2024-06-18 | End: 2024-06-18 | Stop reason: SDUPTHER

## 2024-06-18 RX ORDER — PROPOFOL 10 MG/ML
INJECTION, EMULSION INTRAVENOUS CONTINUOUS PRN
Status: DISCONTINUED | OUTPATIENT
Start: 2024-06-18 | End: 2024-06-18 | Stop reason: SDUPTHER

## 2024-06-18 RX ORDER — MIDAZOLAM HYDROCHLORIDE 1 MG/ML
INJECTION INTRAMUSCULAR; INTRAVENOUS PRN
Status: DISCONTINUED | OUTPATIENT
Start: 2024-06-18 | End: 2024-06-18 | Stop reason: SDUPTHER

## 2024-06-18 RX ORDER — LIDOCAINE HYDROCHLORIDE 10 MG/ML
INJECTION, SOLUTION EPIDURAL; INFILTRATION; INTRACAUDAL; PERINEURAL PRN
Status: DISCONTINUED | OUTPATIENT
Start: 2024-06-18 | End: 2024-06-18 | Stop reason: SDUPTHER

## 2024-06-18 RX ORDER — SODIUM CHLORIDE, SODIUM LACTATE, POTASSIUM CHLORIDE, CALCIUM CHLORIDE 600; 310; 30; 20 MG/100ML; MG/100ML; MG/100ML; MG/100ML
INJECTION, SOLUTION INTRAVENOUS CONTINUOUS
Status: DISCONTINUED | OUTPATIENT
Start: 2024-06-18 | End: 2024-06-18 | Stop reason: HOSPADM

## 2024-06-18 RX ORDER — FENTANYL CITRATE 50 UG/ML
INJECTION, SOLUTION INTRAMUSCULAR; INTRAVENOUS PRN
Status: DISCONTINUED | OUTPATIENT
Start: 2024-06-18 | End: 2024-06-18 | Stop reason: SDUPTHER

## 2024-06-18 RX ADMIN — FENTANYL CITRATE 50 MCG: 50 INJECTION, SOLUTION INTRAMUSCULAR; INTRAVENOUS at 11:18

## 2024-06-18 RX ADMIN — MIDAZOLAM 2 MG: 1 INJECTION INTRAMUSCULAR; INTRAVENOUS at 11:14

## 2024-06-18 RX ADMIN — LIDOCAINE HYDROCHLORIDE 50 MG: 10 INJECTION, SOLUTION EPIDURAL; INFILTRATION; INTRACAUDAL; PERINEURAL at 11:18

## 2024-06-18 RX ADMIN — PROPOFOL 100 MG: 10 INJECTION, EMULSION INTRAVENOUS at 11:18

## 2024-06-18 RX ADMIN — PROPOFOL 100 MCG/KG/MIN: 10 INJECTION, EMULSION INTRAVENOUS at 11:18

## 2024-06-18 RX ADMIN — SODIUM CHLORIDE, POTASSIUM CHLORIDE, SODIUM LACTATE AND CALCIUM CHLORIDE: 600; 310; 30; 20 INJECTION, SOLUTION INTRAVENOUS at 09:56

## 2024-06-18 RX ADMIN — FENTANYL CITRATE 50 MCG: 50 INJECTION, SOLUTION INTRAMUSCULAR; INTRAVENOUS at 11:14

## 2024-06-18 ASSESSMENT — PAIN - FUNCTIONAL ASSESSMENT: PAIN_FUNCTIONAL_ASSESSMENT: 0-10

## 2024-06-18 NOTE — OP NOTE
[facility] ENDOSCOPY    COLONOSCOPY    PROCEDURE DATE: 06/18/24    REFERRING PHYSICIAN: No ref. provider found     PRIMARY CARE PROVIDER: Shilpa Robbins MD    ATTENDING PHYSICIAN: AUGUSTUS KHAN MD     HISTORY: Mr. Julio Pepper is a 78 y.o. male who presents to the  endoscopy unit for colonoscopy. The patient's clinical history is remarkable for hypertension. He is currently medically stable and appropriate for the planned procedure.       PREOPERATIVE DIAGNOSIS: screening for colon cancer.     PROCEDURES:   Transanal Colonoscopy with polypectomy (cold snare).     POSTPROCEDURE DIAGNOSIS:  Internal hemorrhoids  Colon polyp    MEDICATIONS:   MAC per anesthesia     EBL 2 ml      INSTRUMENT: Olympus CF-H190 AL Pediatric flexible Colonoscope.     PREPARATION: The nature and character of the procedure as well as risks, benefits, and alternatives were discussed with the patient and informed consent was obtained. Complications were said to include, but were not limited to: medication allergy, medication reaction, cardiovascular and respiratory problems, bleeding, perforation, infection, and/or missed diagnosis. Following arrival in the endoscopy room, the patient was placed in the left lateral decubitus position and final time-out accomplished in the presence of the nursing staff. Baseline vital signs were obtained and reviewed, and IV sedation was subsequently initiated.       FINDINGS: Rectal examination demonstrated a decreased anal sphincter tone and external hemorrhoids which were nonbleeding and digital palpation was unremarkable. Following adequate conscious sedation the colonoscope was introduced and advanced under direct visualization to the cecum, which was identified by the ileocecal valve and appendiceal orifice. The bowel preparation was felt to be fair - improved with scope powered irrigation and suction. Cecal intubation time was 2 minutes.     Once maximally inserted, the endoscope was withdrawn,

## 2024-06-18 NOTE — PROGRESS NOTES

## 2024-06-18 NOTE — DISCHARGE INSTRUCTIONS
Discharge Instructions for Colonoscopy    Do not drive or operate machinery for 24 hours.  Do not make important personal or business decisions for 24 hours.   Do not drink alcoholic beverages for 24 hours.  Do not smoke tobacco products for 24 hours.  It is normal to have a feeling of fullness or mild cramping in your abdomen afterwards due to air that is put into your bowel during the procedure. Mild activities such as walking will help you pass the air.  You may resume your regular diet.   Results from a biopsy may take up to 2 weeks to return from pathology.  Make a follow-up appointment with PCP to be seen in 2 weeks.     SEEK MEDICAL ATTENTION IF:    Chest Pain or trouble breathing.    Persistent and significant bleeding from your rectum, such as soaking through clothing and/or feeling dizzy and sweating.    A fever above 101F or if you have chills.    If symptoms are severe call 911 or go to the nearest Emergency Room.

## 2024-06-18 NOTE — ANESTHESIA PRE PROCEDURE
Ziyad Quick, DO at [facility] OR    SHOULDER ARTHROSCOPY Left     TONSILLECTOMY      UPPER GASTROINTESTINAL ENDOSCOPY      UPPER GASTROINTESTINAL ENDOSCOPY  2014    Dr. Jordan     UPPER GASTROINTESTINAL ENDOSCOPY  2016    Teddy JAIMES       Social History:    Social History     Tobacco Use    Smoking status: Former     Current packs/day: 0.00     Average packs/day: 0.5 packs/day for 40.3 years (20.1 ttl pk-yrs)     Types: Cigarettes     Start date:      Quit date: 2023     Years since quittin.2    Smokeless tobacco: Never   Substance Use Topics    Alcohol use: Yes     Comment: seldom                                Counseling given: Not Answered      Vital Signs (Current):   Vitals:    24 1358 24 0939 24 0946   BP:   (!) 177/97   Pulse:   94   Resp:   16   Temp:   36.3 °C (97.4 °F)   TempSrc:   Temporal   SpO2:   96%   Weight: 95.3 kg (210 lb) 90 kg (198 lb 6.4 oz)    Height: 1.778 m (5' 10\") 1.778 m (5' 10\")                                               BP Readings from Last 3 Encounters:   24 (!) 177/97   24 (!) 145/64   24 132/64       NPO Status: Time of last liquid consumption: 0530 (prep)                        Time of last solid consumption: 1800                        Date of last liquid consumption: 24                        Date of last solid food consumption: 06/15/24    BMI:   Wt Readings from Last 3 Encounters:   24 90 kg (198 lb 6.4 oz)   24 93.4 kg (206 lb)   24 94.3 kg (208 lb)     Body mass index is 28.47 kg/m².    CBC:   Lab Results   Component Value Date/Time    WBC 6.4 2022 08:49 AM    RBC 5.00 2022 08:49 AM    HGB 15.6 2022 08:49 AM    HCT 45.1 2022 08:49 AM    MCV 90.2 2022 08:49 AM    RDW 14.1 2022 08:49 AM     2022 08:49 AM       CMP:   Lab Results   Component Value Date/Time     2023 07:10 AM    K 4.0 2023 07:10 AM     2023

## 2024-06-18 NOTE — ANESTHESIA POSTPROCEDURE EVALUATION
Department of Anesthesiology  Postprocedure Note    Patient: Julio Pepper  MRN: [id number]  YOB: 1945  Date of evaluation: 6/18/2024    Procedure Summary       Date: 06/18/24 Room / Location: [place] [address]A / [facility] ENDOSCOPY    Anesthesia Start: 1115 Anesthesia Stop: 1139    Procedure: COLONOSCOPY (Abdomen) Diagnosis:       Change in bowel habit      (Change in bowel habit [R19.4])    Surgeons: Joanne Muñoz MD Responsible Provider:     Anesthesia Type: MAC ASA Status: 3            Anesthesia Type: No value filed.    Jerry Phase I: Jerry Score: 10    Jerry Phase II:      Anesthesia Post Evaluation    Patient location during evaluation: PACU  Patient participation: complete - patient participated  Level of consciousness: awake and lethargic  Pain score: 0  Airway patency: patent  Nausea & Vomiting: no nausea and no vomiting  Cardiovascular status: hemodynamically stable  Respiratory status: acceptable, room air and spontaneous ventilation  Hydration status: euvolemic  Pain management: adequate and satisfactory to patient    No notable events documented.

## 2024-06-18 NOTE — H&P
ENDOSCOPY      UPPER GASTROINTESTINAL ENDOSCOPY  2014    Dr. Jordan     UPPER GASTROINTESTINAL ENDOSCOPY  2016    Teddy JAIMES     Current Facility-Administered Medications   Medication Dose Route Frequency Provider Last Rate Last Admin    lactated ringers IV soln infusion   IntraVENous Continuous Joanne Muñoz  mL/hr at 24 0956 New Bag at 24 0956     Allergies   Allergen Reactions    Adhesive Tape Rash     Other reaction(s): Unknown    Wound Dressing Adhesive      Redness, irritation     Family History   Problem Relation Age of Onset    Cancer Mother         bone    Heart Disease Father         MI     Social History     Socioeconomic History    Marital status:      Spouse name: Not on file    Number of children: Not on file    Years of education: Not on file    Highest education level: Not on file   Occupational History    Not on file   Tobacco Use    Smoking status: Former     Current packs/day: 0.00     Average packs/day: 0.5 packs/day for 40.3 years (20.1 ttl pk-yrs)     Types: Cigarettes     Start date:      Quit date: 2023     Years since quittin.2    Smokeless tobacco: Never   Vaping Use    Vaping Use: Never used   Substance and Sexual Activity    Alcohol use: Yes     Comment: seldom    Drug use: No    Sexual activity: Not on file   Other Topics Concern    Not on file   Social History Narrative    Not on file     Social Determinants of Health     Financial Resource Strain: Low Risk  (2024)    Overall Financial Resource Strain (CARDIA)     Difficulty of Paying Living Expenses: Not hard at all   Food Insecurity: No Food Insecurity (2024)    Hunger Vital Sign     Worried About Running Out of Food in the Last Year: Never true     Ran Out of Food in the Last Year: Never true   Transportation Needs: Unknown (2024)    PRAPARE - Transportation     Lack of Transportation (Medical): Not on file     Lack of Transportation (Non-Medical): No   Physical

## 2024-06-20 ENCOUNTER — TELEPHONE (OUTPATIENT)
Age: 79
End: 2024-06-20

## 2024-06-20 ENCOUNTER — OFFICE VISIT (OUTPATIENT)
Age: 79
End: 2024-06-20

## 2024-06-20 VITALS
DIASTOLIC BLOOD PRESSURE: 80 MMHG | BODY MASS INDEX: 28.41 KG/M2 | WEIGHT: 198 LBS | HEART RATE: 77 BPM | OXYGEN SATURATION: 98 % | RESPIRATION RATE: 18 BRPM | SYSTOLIC BLOOD PRESSURE: 125 MMHG

## 2024-06-20 DIAGNOSIS — M79.18 MYOFASCIAL PAIN SYNDROME: ICD-10-CM

## 2024-06-20 DIAGNOSIS — M47.817 LUMBOSACRAL SPONDYLOSIS WITHOUT MYELOPATHY: Primary | ICD-10-CM

## 2024-06-20 DIAGNOSIS — M51.36 LUMBAR DEGENERATIVE DISC DISEASE: ICD-10-CM

## 2024-06-20 LAB — SURGICAL PATHOLOGY REPORT: NORMAL

## 2024-06-20 NOTE — PATIENT INSTRUCTIONS
Survey:    You may be receiving a survey from [facility] regarding your visit today.    Please complete the survey to enable us to provide the highest quality of care to you and your family.    If you cannot score us a very good on any question, please call the office to discuss how we could have made your experience a very good one.    Thank you.    [facility]  DO Nila Brian CMA Buffy Sexton, Practice Manager

## 2024-06-20 NOTE — TELEPHONE ENCOUNTER
SURGERY SCHEDULING FORM  [facility] Surgery   [address] [place] [address]    Phone [phone] Fax [phone]      Julio Pepper 1945 male    [address]  [place] [id number]   Marital Status:          Home Phone: [phone]      Cell Phone:    Telephone Information:   Mobile [phone]          Surgeon: Ryan   Surgery Date: 10/24/24     Time:     Procedure: bilateral lumbar L3, L4, L5 radiofrequency ablation     Diagnosis: M47.817     Important Medical History:  In Epic    Special Inst/Equip: Regular    CPT Codes:    63275 [id number]  Latex Allergy: No     Cardiac Device:  No    Anesthesia:  Moderate sedation         Admission Type:  Same Day                          Admit Prior to Day of Surgery: No    Case Location:  Ambulatory            Preadmission Testing:  Phone Call          PAT Date and Time:     Need Preop Cardiac Clearance: No, no need to hold eliquis or plavix     Does Patient have Cardiologist/physician?     Alberto Fofana

## 2024-06-20 NOTE — PROGRESS NOTES
PROCEDURE Bilateral 2023    Bilateral Lumbar L3, L4, L5 Medial Branch Radiofrequency Ablation performed by Jay Davis DO at [facility] OR    SC LASER VAPORIZATION OF PROSTATE FOR URINE FLOW N/A 2018    CYSTOSCOPY TRANSURETHRAL RESECTION PROSTATE LASER performed by Chivo rDake MD at [facility] OR    SHOULDER ARTHROSCOPY Right 10/14/2020    RIGHT SHOULDER ARTHROSCPY PROBABLE RCR.  Shoulder Scope Subacromial Decompression.  Partial Distal Claviclectomy.  Debreidement.  Mini RCR Open.  Bicep Tenodecis performed by Ziyad Quick DO at [facility] OR    SHOULDER ARTHROSCOPY Left     TONSILLECTOMY      UPPER GASTROINTESTINAL ENDOSCOPY      UPPER GASTROINTESTINAL ENDOSCOPY  2014    Dr. Jordan     UPPER GASTROINTESTINAL ENDOSCOPY  2016    Teddy JAIMES       Family History   Problem Relation Age of Onset    Cancer Mother         bone    Heart Disease Father         MI       Social History     Socioeconomic History    Marital status:      Spouse name: Not on file    Number of children: Not on file    Years of education: Not on file    Highest education level: Not on file   Occupational History    Not on file   Tobacco Use    Smoking status: Former     Current packs/day: 0.00     Average packs/day: 0.5 packs/day for 40.3 years (20.1 ttl pk-yrs)     Types: Cigarettes     Start date:      Quit date: 2023     Years since quittin.2    Smokeless tobacco: Never   Vaping Use    Vaping Use: Never used   Substance and Sexual Activity    Alcohol use: Yes     Comment: seldom    Drug use: No    Sexual activity: Not on file   Other Topics Concern    Not on file   Social History Narrative    Not on file     Social Determinants of Health     Financial Resource Strain: Low Risk  (2024)    Overall Financial Resource Strain (CARDIA)     Difficulty of Paying Living Expenses: Not hard at all   Food Insecurity: No Food Insecurity (2024)    Hunger Vital Sign     Worried About Running Out of

## 2024-07-02 ENCOUNTER — TELEPHONE (OUTPATIENT)
Dept: GASTROENTEROLOGY | Age: 79
End: 2024-07-02

## 2024-07-02 NOTE — TELEPHONE ENCOUNTER
----- Message from Joanne Muñoz MD sent at 7/1/2024  4:42 PM EDT -----  Please notify patient: Tubular adenomas are on a pre-cancerous spectrum. No lymphocytic colitis or inflammatory bowel disease Repeat interval for colonoscopy is 7 years. However, considering that his age would be 85 - his clinical and health status would determine the need to continue with a colonoscopy at that age

## 2024-07-17 ENCOUNTER — HOSPITAL ENCOUNTER (OUTPATIENT)
Age: 79
Discharge: HOME | End: 2024-07-17
Payer: MEDICARE

## 2024-07-17 DIAGNOSIS — E03.9 ACQUIRED HYPOTHYROIDISM: ICD-10-CM

## 2024-07-17 LAB
T4 FREE SERPL-MCNC: 1.2 NG/DL (ref 0.92–1.68)
TSH SERPL DL<=0.05 MIU/L-ACNC: 8.13 UIU/ML (ref 0.3–5)

## 2024-07-17 PROCEDURE — 84443 ASSAY THYROID STIM HORMONE: CPT

## 2024-07-17 PROCEDURE — 36415 COLL VENOUS BLD VENIPUNCTURE: CPT

## 2024-07-17 PROCEDURE — 84439 ASSAY OF FREE THYROXINE: CPT

## 2024-07-19 ENCOUNTER — TELEPHONE (OUTPATIENT)
Dept: FAMILY MEDICINE CLINIC | Age: 79
End: 2024-07-19

## 2024-07-19 DIAGNOSIS — E03.9 ACQUIRED HYPOTHYROIDISM: Primary | ICD-10-CM

## 2024-07-19 RX ORDER — LEVOTHYROXINE SODIUM 0.1 MG/1
100 TABLET ORAL DAILY
Qty: 90 TABLET | Refills: 0 | Status: SHIPPED | OUTPATIENT
Start: 2024-07-19

## 2024-07-19 NOTE — TELEPHONE ENCOUNTER
Advised pt of provider's notes, voiced understanding.         Labs and mediation pending for approval

## 2024-07-19 NOTE — TELEPHONE ENCOUNTER
----- Message from Shilpa Robbins MD sent at 7/18/2024  4:08 PM EDT -----  Please tell pt his thyroid blood test still showing higher TSH but free T4 ok. BUT since TSH still high means opposite and thyroid is lower.   So please have him STOP synthroid 88mcg  Then PEND medication for synthroid 100mcg one daily and gonzalo TSH and free T4 in 6-8wks -- pend labs too.

## 2024-07-24 ENCOUNTER — OFFICE VISIT (OUTPATIENT)
Dept: GASTROENTEROLOGY | Age: 79
End: 2024-07-24
Payer: MEDICARE

## 2024-07-24 VITALS
RESPIRATION RATE: 18 BRPM | OXYGEN SATURATION: 96 % | WEIGHT: 204 LBS | DIASTOLIC BLOOD PRESSURE: 68 MMHG | HEART RATE: 58 BPM | BODY MASS INDEX: 29.27 KG/M2 | SYSTOLIC BLOOD PRESSURE: 132 MMHG

## 2024-07-24 DIAGNOSIS — K52.9 CHRONIC DIARRHEA: Primary | ICD-10-CM

## 2024-07-24 DIAGNOSIS — K58.2 IRRITABLE BOWEL SYNDROME WITH BOTH CONSTIPATION AND DIARRHEA: ICD-10-CM

## 2024-07-24 DIAGNOSIS — Z90.49 HISTORY OF COLON RESECTION: ICD-10-CM

## 2024-07-24 PROCEDURE — 3075F SYST BP GE 130 - 139MM HG: CPT | Performed by: NURSE PRACTITIONER

## 2024-07-24 PROCEDURE — 1123F ACP DISCUSS/DSCN MKR DOCD: CPT | Performed by: NURSE PRACTITIONER

## 2024-07-24 PROCEDURE — 3078F DIAST BP <80 MM HG: CPT | Performed by: NURSE PRACTITIONER

## 2024-07-24 PROCEDURE — 1036F TOBACCO NON-USER: CPT | Performed by: NURSE PRACTITIONER

## 2024-07-24 PROCEDURE — 99213 OFFICE O/P EST LOW 20 MIN: CPT | Performed by: NURSE PRACTITIONER

## 2024-07-24 PROCEDURE — G8417 CALC BMI ABV UP PARAM F/U: HCPCS | Performed by: NURSE PRACTITIONER

## 2024-07-24 PROCEDURE — G8427 DOCREV CUR MEDS BY ELIG CLIN: HCPCS | Performed by: NURSE PRACTITIONER

## 2024-07-24 NOTE — PROGRESS NOTES
arterial  calcifications. Diffuse demineralization. Mild thoracic kyphosis.    FINDINGS: No lung nodules; nodule(s) with specific calcifications: Complete,  central, popcorn, concentric rings and fat containing nodules.    MANAGEMENT: Continue annual screening with LDCT in 12 months.    Impression  1. No evidence of pulmonary malignancy.    2. Left inferior pleural thickening with adjacent subpleural pulmonary  scarring.    3. Severe coronary arterial calcifications.    Lung-RADs 1      Assessment  Julio Pepper is a 78 y.o. old male who has a past medical history of arthritis, multiple myeloma, hyperlipidemia, hypertension, MI (4/10/2023 s/p x5 stents and on Eliquis and Plavix), atrial fib, hypothyroidism, bowel resection (2004 s/p fall)  presents with wife for GI follow-up for diarrhea stools, defecation urgency, and fecal incontinence.  Diarrhea improving after initiating imodium and metamucil. Colonoscopy completed  6/18/2024 without concerns - tubular adenoma on path.  Continues with RA infusions and planning for his 3rd.  [place] [facility] and would like a routine GI follow up prior.  Return in about 3 months (around 10/24/2024).      Plan    1. Chronic diarrhea  - Lifestyle modification including importance of adequate fluid intake through the day, regular exercise, good toilet hygiene discussed in detail  - Discussed fiber supplementation with psyllium husk to help bulk stools and keep stools soft and regular.  Patient advised to watch out for excessive bloating.  - Discussed Imodium including administration and side effects    2. Irritable bowel syndrome with both constipation and diarrhea  - FODMAP intolerance was discussed with the provision of the FODMAP pamphlet. Details were provided on foods to avoid for the next 6 weeks as well as foods that low or free of FODMAPs. Ideally, the dietary discretion is for 6 weeks.    3. History of colon

## 2024-07-27 ASSESSMENT — ENCOUNTER SYMPTOMS
DIARRHEA: 1
BLOOD IN STOOL: 0
ABDOMINAL PAIN: 0
RESPIRATORY NEGATIVE: 1
ANAL BLEEDING: 0

## 2024-08-29 ENCOUNTER — TELEPHONE (OUTPATIENT)
Dept: FAMILY MEDICINE CLINIC | Age: 79
End: 2024-08-29

## 2024-08-29 ENCOUNTER — OFFICE VISIT (OUTPATIENT)
Dept: UROLOGY | Age: 79
End: 2024-08-29

## 2024-08-29 ENCOUNTER — HOSPITAL ENCOUNTER (OUTPATIENT)
Age: 79
Discharge: HOME | End: 2024-08-29
Payer: MEDICARE

## 2024-08-29 VITALS
BODY MASS INDEX: 29.78 KG/M2 | HEIGHT: 70 IN | DIASTOLIC BLOOD PRESSURE: 80 MMHG | WEIGHT: 208 LBS | SYSTOLIC BLOOD PRESSURE: 122 MMHG

## 2024-08-29 DIAGNOSIS — N13.8 BPH WITH OBSTRUCTION/LOWER URINARY TRACT SYMPTOMS: Primary | ICD-10-CM

## 2024-08-29 DIAGNOSIS — R31.0 GROSS HEMATURIA: ICD-10-CM

## 2024-08-29 DIAGNOSIS — K59.09 OTHER CONSTIPATION: ICD-10-CM

## 2024-08-29 DIAGNOSIS — N40.1 BPH WITH OBSTRUCTION/LOWER URINARY TRACT SYMPTOMS: Primary | ICD-10-CM

## 2024-08-29 DIAGNOSIS — E03.9 ACQUIRED HYPOTHYROIDISM: ICD-10-CM

## 2024-08-29 LAB
-: ABNORMAL
BACTERIA URNS QL MICRO: ABNORMAL
BILIRUB UR QL STRIP: NEGATIVE
CLARITY UR: CLEAR
COLOR UR: YELLOW
COMMENT: ABNORMAL
EPI CELLS #/AREA URNS HPF: ABNORMAL /HPF
GLUCOSE UR STRIP-MCNC: NEGATIVE MG/DL
HGB UR QL STRIP.AUTO: NEGATIVE
KETONES UR STRIP-MCNC: NEGATIVE MG/DL
LEUKOCYTE ESTERASE UR QL STRIP: ABNORMAL
MUCOUS THREADS URNS QL MICRO: ABNORMAL
NITRITE UR QL STRIP: NEGATIVE
PH UR STRIP: 5 [PH] (ref 5–8)
PROT UR STRIP-MCNC: ABNORMAL MG/DL
RBC #/AREA URNS HPF: ABNORMAL /HPF (ref 0–2)
SP GR UR STRIP: 1.02 (ref 1–1.03)
T4 FREE SERPL-MCNC: 1.4 NG/DL (ref 0.92–1.68)
TSH SERPL DL<=0.05 MIU/L-ACNC: 5.5 UIU/ML (ref 0.3–5)
UROBILINOGEN UR STRIP-ACNC: NORMAL EU/DL (ref 0–1)
WBC #/AREA URNS HPF: ABNORMAL /HPF

## 2024-08-29 PROCEDURE — 81001 URINALYSIS AUTO W/SCOPE: CPT

## 2024-08-29 PROCEDURE — 36415 COLL VENOUS BLD VENIPUNCTURE: CPT

## 2024-08-29 PROCEDURE — 84439 ASSAY OF FREE THYROXINE: CPT

## 2024-08-29 PROCEDURE — 84443 ASSAY THYROID STIM HORMONE: CPT

## 2024-08-29 RX ORDER — INFLIXIMAB 100 MG/10ML
5 INJECTION, POWDER, LYOPHILIZED, FOR SOLUTION INTRAVENOUS SEE ADMIN INSTRUCTIONS
COMMUNITY

## 2024-08-29 RX ORDER — TAMSULOSIN HYDROCHLORIDE 0.4 MG/1
0.4 CAPSULE ORAL 2 TIMES DAILY
Qty: 180 CAPSULE | Refills: 3 | Status: SHIPPED | OUTPATIENT
Start: 2024-08-29

## 2024-08-29 ASSESSMENT — ENCOUNTER SYMPTOMS
BACK PAIN: 0
COLOR CHANGE: 0
WHEEZING: 0
CONSTIPATION: 0
SHORTNESS OF BREATH: 0
COUGH: 0
VOMITING: 0
EYE REDNESS: 0
ABDOMINAL PAIN: 0
NAUSEA: 0

## 2024-08-29 NOTE — TELEPHONE ENCOUNTER
----- Message from Dr. Shilpa Robbins MD sent at 8/29/2024  4:18 PM EDT -----  Tell pt his TSH is better from 8.13 to 5.50 which is basically normal and the free T4 is normal so pt to stay on the synthroid 100mcg and gonzalo labs in the spring . Take the synthroid 88mcg OFF his list

## 2024-08-29 NOTE — TELEPHONE ENCOUNTER
Message left for patient on VM regarding lab results and recommendations.  Levothyroxine 88 mcg removed from med list

## 2024-08-29 NOTE — PROGRESS NOTES
HPI:      Patient is a 79 y.o. male in no acute distress.  He is alert and oriented to person, place, and time.       History:  2014 Evaluated for hydrocele. Never opted for surgery and was lost to follow-up.      1/2016 sudden onset LUTS x 1 wk. IPSS 35 QOL 6 (compared to baseline 8/1). POC UA negative. PCP increased his Flomax to 0.8mg daily without much improvement. Does have perineal pain and pressure in the rectum. Has some pain with urination. Pt has never had prostatitis in past. . Recent PSA 1/6/16 - 2.92              Treated with 4 wks abx & 1 wk NSAIDs for suspected prostatitis.      1/3/2018 follow-up for urinary retention.  Patient was evaluated in the ER on 12/30/17 because he was unable to urinate.  He did have a Mejía catheter placed with over 1200 mL returned.  Prior to presenting to the ER he was evaluated in the urgent care for decreased urination.  He was started on empiric Cipro.  Urine culture from 12/28/17 was negative for infection.  Patient does not recall any perineal or rectal pain, but he did have suprapubic pain and the pain radiated to his right flank.  While in the ER they did complete a CT scan shows bilateral hydronephrosis and hydroureter.  There is no evidence of  calcifications.  The bladder is distended and there is a markedly enlarged prostate gland.  There is a moderate amount of stool as well.  Patient admits to only having a bowel movement every 3 days.  He is taking Flomax daily for history of BPH.  He denies any gross hematuria and he is tolerating the Mejía catheter without complaints.  His wife is concerned because they're planning on leaving for [place] on 1/18/18.     1/25/2018 PVP [facility]     4/2019 Resumed flomax due to post-void dribbling     8/2023 hospitalization for atrial fibrillation.  Patient also had fluid retention for which they diuresed him to remove approximately 10 pounds of fluid.  Patient chronically takes Flomax through our office.  He had a PVP  obstruction/lower urinary tract symptoms  US POST VOID RESIDUAL    tamsulosin (FLOMAX) 0.4 MG capsule      2. Gross hematuria  Urinalysis with Microscopic      3. Other constipation              PLAN:  Surveillance UA    Continue Flomax twice a day    Continue MiraLAX daily    Follow-up in 1 year with PVR and surveillance UA

## 2024-08-30 ENCOUNTER — TELEPHONE (OUTPATIENT)
Dept: UROLOGY | Age: 79
End: 2024-08-30

## 2024-08-30 DIAGNOSIS — K21.9 GASTROESOPHAGEAL REFLUX DISEASE WITHOUT ESOPHAGITIS: Primary | ICD-10-CM

## 2024-08-30 RX ORDER — LEVOTHYROXINE SODIUM 100 UG/1
100 TABLET ORAL DAILY
Qty: 90 TABLET | Refills: 1 | Status: SHIPPED | OUTPATIENT
Start: 2024-08-30

## 2024-08-30 RX ORDER — LEVOTHYROXINE SODIUM 100 UG/1
100 TABLET ORAL DAILY
Qty: 90 TABLET | Refills: 0 | OUTPATIENT
Start: 2024-08-30

## 2024-08-30 RX ORDER — SIMVASTATIN 20 MG
TABLET ORAL
Qty: 90 TABLET | Refills: 1 | Status: SHIPPED | OUTPATIENT
Start: 2024-08-30

## 2024-08-30 RX ORDER — PANTOPRAZOLE SODIUM 40 MG/1
TABLET, DELAYED RELEASE ORAL
Qty: 180 TABLET | Refills: 1 | Status: SHIPPED | OUTPATIENT
Start: 2024-08-30

## 2024-08-30 NOTE — TELEPHONE ENCOUNTER
Last OV: 6/4/2024  HTN THYROID REFLEX   Last RX:    Next scheduled apt: 12/5/2024   6 months chronic           Surescrip requesting a refill

## 2024-08-30 NOTE — TELEPHONE ENCOUNTER
Advised pr of provider's notes, voiced understanding    Pt need RX sent to  [facility]    The cataracts are creating some visual symptoms that are tolerable at this time.

## 2024-08-30 NOTE — TELEPHONE ENCOUNTER
----- Message from Phan Sawyer PA-C sent at 8/30/2024  9:20 AM EDT -----  Please let him know there is no significant blood seen in his urine

## 2024-09-24 ENCOUNTER — OFFICE VISIT (OUTPATIENT)
Dept: FAMILY MEDICINE CLINIC | Age: 79
End: 2024-09-24
Payer: MEDICARE

## 2024-09-24 VITALS
HEART RATE: 68 BPM | BODY MASS INDEX: 30.42 KG/M2 | OXYGEN SATURATION: 96 % | SYSTOLIC BLOOD PRESSURE: 120 MMHG | DIASTOLIC BLOOD PRESSURE: 82 MMHG | WEIGHT: 212 LBS

## 2024-09-24 DIAGNOSIS — R41.3 MEMORY CHANGES: Primary | ICD-10-CM

## 2024-09-24 DIAGNOSIS — J30.89 NON-SEASONAL ALLERGIC RHINITIS DUE TO OTHER ALLERGIC TRIGGER: ICD-10-CM

## 2024-09-24 PROCEDURE — G8417 CALC BMI ABV UP PARAM F/U: HCPCS | Performed by: FAMILY MEDICINE

## 2024-09-24 PROCEDURE — 3079F DIAST BP 80-89 MM HG: CPT | Performed by: FAMILY MEDICINE

## 2024-09-24 PROCEDURE — 99214 OFFICE O/P EST MOD 30 MIN: CPT | Performed by: FAMILY MEDICINE

## 2024-09-24 PROCEDURE — G8427 DOCREV CUR MEDS BY ELIG CLIN: HCPCS | Performed by: FAMILY MEDICINE

## 2024-09-24 PROCEDURE — 1123F ACP DISCUSS/DSCN MKR DOCD: CPT | Performed by: FAMILY MEDICINE

## 2024-09-24 PROCEDURE — 3074F SYST BP LT 130 MM HG: CPT | Performed by: FAMILY MEDICINE

## 2024-09-24 PROCEDURE — 1036F TOBACCO NON-USER: CPT | Performed by: FAMILY MEDICINE

## 2024-09-24 RX ORDER — DONEPEZIL HYDROCHLORIDE 5 MG/1
5 TABLET, FILM COATED ORAL NIGHTLY
Qty: 30 TABLET | Refills: 0 | Status: SHIPPED | OUTPATIENT
Start: 2024-09-24

## 2024-09-24 RX ORDER — DONEPEZIL HYDROCHLORIDE 5 MG/1
5 TABLET, FILM COATED ORAL NIGHTLY
Qty: 90 TABLET | Refills: 1 | Status: SHIPPED | OUTPATIENT
Start: 2024-09-24

## 2024-09-24 ASSESSMENT — ENCOUNTER SYMPTOMS
SHORTNESS OF BREATH: 0
SORE THROAT: 0
SINUS PRESSURE: 1
RHINORRHEA: 1
COUGH: 0
VOMITING: 0
EYE DISCHARGE: 0

## 2024-10-14 NOTE — PROGRESS NOTES
[facility]   Preadmission Testing    Name: Julio Pepper  : 1945  Patient Phone: [phone] (home)     Procedure: BILATERAL LUMBAR L3, L4, L5 RADIOFREQUENCY ABLATION - Bilateral   Date of Procedure: 10/24/2024  Surgeon: Jay Davis DO    Ht:  177.8 cm (5' 10\")  Wt: 90.7 kg (200 lb)  Wt method: Stated    Allergies:   Allergies   Allergen Reactions    Adhesive Tape Rash     Other reaction(s): Unknown    Wound Dressing Adhesive      Redness, irritation                There were no vitals filed for this visit.    No LMP for male patient.    Do you take blood thinners?   [x] Yes    [] No         Instructed to stop blood thinners prior to procedure?    [] Yes    [x] No      [] N/A    []Eliquis - 3 days  []Xarelto - 3 days  []Pradaxa - 5 days  []Coumadin - 5 days   []Plavix - 7 days  []ASA 325mg - 7 days  []Brillinta - 5 days  []Pletal - 2 days   Have you had a respiratory infection or sore throat in last 4 weeks before surgery?    [] Yes    [x] No     Patient instructed on: [x] NPO Status - if receiving sedation  [] Meds to Take  [x] Ride Home - sedation/ epidurals  []No Jewelry/Contact Lenses/Nail Polish     DOS Patient Needs [] HCG   [] Blood Sugar  [] PT/INR

## 2024-10-18 RX ORDER — DONEPEZIL HYDROCHLORIDE 5 MG/1
5 TABLET, FILM COATED ORAL NIGHTLY
Qty: 90 TABLET | Refills: 1 | Status: SHIPPED | OUTPATIENT
Start: 2024-10-18

## 2024-10-21 ENCOUNTER — OFFICE VISIT (OUTPATIENT)
Dept: GASTROENTEROLOGY | Age: 79
End: 2024-10-21
Payer: MEDICARE

## 2024-10-21 ENCOUNTER — TELEPHONE (OUTPATIENT)
Dept: PAIN MANAGEMENT | Age: 79
End: 2024-10-21

## 2024-10-21 VITALS
SYSTOLIC BLOOD PRESSURE: 118 MMHG | RESPIRATION RATE: 18 BRPM | DIASTOLIC BLOOD PRESSURE: 68 MMHG | WEIGHT: 204 LBS | OXYGEN SATURATION: 95 % | HEART RATE: 64 BPM | BODY MASS INDEX: 29.27 KG/M2

## 2024-10-21 DIAGNOSIS — Z90.49 HISTORY OF COLON RESECTION: ICD-10-CM

## 2024-10-21 DIAGNOSIS — K52.9 CHRONIC DIARRHEA: Primary | ICD-10-CM

## 2024-10-21 DIAGNOSIS — K58.2 IRRITABLE BOWEL SYNDROME WITH BOTH CONSTIPATION AND DIARRHEA: ICD-10-CM

## 2024-10-21 PROCEDURE — G8427 DOCREV CUR MEDS BY ELIG CLIN: HCPCS | Performed by: NURSE PRACTITIONER

## 2024-10-21 PROCEDURE — 1123F ACP DISCUSS/DSCN MKR DOCD: CPT | Performed by: NURSE PRACTITIONER

## 2024-10-21 PROCEDURE — 99213 OFFICE O/P EST LOW 20 MIN: CPT | Performed by: NURSE PRACTITIONER

## 2024-10-21 PROCEDURE — 3078F DIAST BP <80 MM HG: CPT | Performed by: NURSE PRACTITIONER

## 2024-10-21 PROCEDURE — G8417 CALC BMI ABV UP PARAM F/U: HCPCS | Performed by: NURSE PRACTITIONER

## 2024-10-21 PROCEDURE — 3074F SYST BP LT 130 MM HG: CPT | Performed by: NURSE PRACTITIONER

## 2024-10-21 PROCEDURE — G8484 FLU IMMUNIZE NO ADMIN: HCPCS | Performed by: NURSE PRACTITIONER

## 2024-10-21 PROCEDURE — 1036F TOBACCO NON-USER: CPT | Performed by: NURSE PRACTITIONER

## 2024-10-21 ASSESSMENT — ENCOUNTER SYMPTOMS
DIARRHEA: 1
BLOOD IN STOOL: 0
ABDOMINAL PAIN: 0
RESPIRATORY NEGATIVE: 1
CONSTIPATION: 1
ANAL BLEEDING: 0

## 2024-10-21 NOTE — PROGRESS NOTES
regular exercise, good toilet hygiene discussed in detail  - Discussed fiber supplementation with psyllium husk to help bulk stools and keep stools soft and regular.  Patient advised to watch out for excessive bloating.  - Discussed Imodium including administration and side effects    2. Irritable bowel syndrome with both constipation and diarrhea  - FODMAP intolerance was discussed with the provision of the FODMAP pamphlet. Details were provided on foods to avoid for the next 6 weeks as well as foods that low or free of FODMAPs. Ideally, the dietary discretion is for 6 weeks.    3. History of colon resection    Spent 20 minutes with the patient with greater than 50 percent of the time was spent on face-to-face time in discussion with the patient regarding diagnostic options/results, treatment options, counseling, and follow-up plan.      Alee Jane, APRN - CNP    *This report was transcribed using voice recognition software. Every effort was made to ensure accuracy; however, inadvertent computerized transcription errors may be present.

## 2024-10-21 NOTE — TELEPHONE ENCOUNTER
Patient contacted the office this morning questioning if he is to stop taking any blood thinning medications prior to procedure on Thursday -10/24- Bilateral Lumbar Radiofrequency Ablation. Please advise. Thank you.

## 2024-10-24 ENCOUNTER — APPOINTMENT (OUTPATIENT)
Dept: GENERAL RADIOLOGY | Age: 79
End: 2024-10-24
Attending: STUDENT IN AN ORGANIZED HEALTH CARE EDUCATION/TRAINING PROGRAM
Payer: MEDICARE

## 2024-10-24 ENCOUNTER — HOSPITAL ENCOUNTER (OUTPATIENT)
Age: 79
Setting detail: OUTPATIENT SURGERY
Discharge: HOME | End: 2024-10-24
Attending: STUDENT IN AN ORGANIZED HEALTH CARE EDUCATION/TRAINING PROGRAM | Admitting: STUDENT IN AN ORGANIZED HEALTH CARE EDUCATION/TRAINING PROGRAM
Payer: MEDICARE

## 2024-10-24 VITALS
HEART RATE: 56 BPM | BODY MASS INDEX: 30.37 KG/M2 | HEIGHT: 70 IN | TEMPERATURE: 97.3 F | SYSTOLIC BLOOD PRESSURE: 141 MMHG | DIASTOLIC BLOOD PRESSURE: 71 MMHG | RESPIRATION RATE: 19 BRPM | OXYGEN SATURATION: 95 % | WEIGHT: 212.1 LBS

## 2024-10-24 PROCEDURE — 64635 DESTROY LUMB/SAC FACET JNT: CPT | Performed by: STUDENT IN AN ORGANIZED HEALTH CARE EDUCATION/TRAINING PROGRAM

## 2024-10-24 PROCEDURE — 7100000011 HC PHASE II RECOVERY - ADDTL 15 MIN: Performed by: STUDENT IN AN ORGANIZED HEALTH CARE EDUCATION/TRAINING PROGRAM

## 2024-10-24 PROCEDURE — 64636 DESTROY L/S FACET JNT ADDL: CPT | Performed by: STUDENT IN AN ORGANIZED HEALTH CARE EDUCATION/TRAINING PROGRAM

## 2024-10-24 PROCEDURE — 2709999900 HC NON-CHARGEABLE SUPPLY: Performed by: STUDENT IN AN ORGANIZED HEALTH CARE EDUCATION/TRAINING PROGRAM

## 2024-10-24 PROCEDURE — 3600000058 HC PAIN LEVEL 5 BASE: Performed by: STUDENT IN AN ORGANIZED HEALTH CARE EDUCATION/TRAINING PROGRAM

## 2024-10-24 PROCEDURE — 7100000010 HC PHASE II RECOVERY - FIRST 15 MIN: Performed by: STUDENT IN AN ORGANIZED HEALTH CARE EDUCATION/TRAINING PROGRAM

## 2024-10-24 PROCEDURE — 99152 MOD SED SAME PHYS/QHP 5/>YRS: CPT | Performed by: STUDENT IN AN ORGANIZED HEALTH CARE EDUCATION/TRAINING PROGRAM

## 2024-10-24 PROCEDURE — 2500000003 HC RX 250 WO HCPCS: Performed by: STUDENT IN AN ORGANIZED HEALTH CARE EDUCATION/TRAINING PROGRAM

## 2024-10-24 PROCEDURE — 6360000002 HC RX W HCPCS: Performed by: STUDENT IN AN ORGANIZED HEALTH CARE EDUCATION/TRAINING PROGRAM

## 2024-10-24 PROCEDURE — 3600000059 HC PAIN LEVEL 5 ADDL 15 MIN: Performed by: STUDENT IN AN ORGANIZED HEALTH CARE EDUCATION/TRAINING PROGRAM

## 2024-10-24 RX ORDER — LIDOCAINE HYDROCHLORIDE 20 MG/ML
INJECTION, SOLUTION EPIDURAL; INFILTRATION; INTRACAUDAL; PERINEURAL PRN
Status: DISCONTINUED | OUTPATIENT
Start: 2024-10-24 | End: 2024-10-24 | Stop reason: ALTCHOICE

## 2024-10-24 RX ORDER — MIDAZOLAM HYDROCHLORIDE 1 MG/ML
INJECTION, SOLUTION INTRAMUSCULAR; INTRAVENOUS PRN
Status: DISCONTINUED | OUTPATIENT
Start: 2024-10-24 | End: 2024-10-24 | Stop reason: ALTCHOICE

## 2024-10-24 RX ORDER — LIDOCAINE HYDROCHLORIDE 10 MG/ML
INJECTION, SOLUTION EPIDURAL; INFILTRATION; INTRACAUDAL; PERINEURAL PRN
Status: DISCONTINUED | OUTPATIENT
Start: 2024-10-24 | End: 2024-10-24 | Stop reason: ALTCHOICE

## 2024-10-24 RX ORDER — TRIAMCINOLONE ACETONIDE 40 MG/ML
INJECTION, SUSPENSION INTRA-ARTICULAR; INTRAMUSCULAR PRN
Status: DISCONTINUED | OUTPATIENT
Start: 2024-10-24 | End: 2024-10-24 | Stop reason: ALTCHOICE

## 2024-10-24 ASSESSMENT — PAIN SCALES - GENERAL: PAINLEVEL_OUTOF10: 8

## 2024-10-24 ASSESSMENT — PAIN DESCRIPTION - LOCATION: LOCATION: BACK;KNEE;HIP

## 2024-10-24 ASSESSMENT — PAIN DESCRIPTION - ORIENTATION: ORIENTATION: LOWER;RIGHT;LEFT

## 2024-10-24 NOTE — H&P
Pain Pre-Op H&P Note    SUBJECTIVE:  No chief complaint on file.      History of Present Illness:   Julio Pepper is a 79 y.o. male who presents with chronic low back pain.    Past Medical History:   Diagnosis Date    Arthritis     osteoarthritis, rheumatoid    Atrial fibrillation (HCC) 07/2023    Cancer (HCC)     multiple myeloma    Fibromyalgia     GERD (gastroesophageal reflux disease)     History of blood transfusion     At birth     Hyperlipidemia     Hypertension     Hypothyroidism     IBS (irritable bowel syndrome)     MI, 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 Jordan     COLONOSCOPY N/A 6/18/2024    COLONOSCOPY performed by Joanne Muñoz MD at [facility] ENDOSCOPY    CT BONE MARROW BIOPSY  02/26/2021    CT BONE MARROW BIOPSY 2/26/2021    EYE SURGERY      cataract bilateral    GASTRIC FUNDOPLICATION  2000    HEMORRHOID SURGERY      HERNIA REPAIR      Hiatal Hernia repair, Esophageal repair     HERNIA REPAIR Bilateral     Inguinal     PAIN MANAGEMENT PROCEDURE Bilateral 09/17/2021    BILATERAL L2,L3,L4 MEDIAL BRANCH L5 DORSAL RAMI INJECTION performed by Abdon Pabon MD at [facility] OR    PAIN MANAGEMENT PROCEDURE N/A 11/04/2021    BILATERAL L2-3-4-5 MEDIAL BRANCH BLOCK performed by Abdon Pabon MD at [facility] OR    PAIN MANAGEMENT PROCEDURE Bilateral 12/03/2021    LUMBAR RFA L 2-3-4-5 BILATERAL MEDIAL BRANCH RFA performed by Abdon Pabon MD at [facility] OR    PAIN MANAGEMENT PROCEDURE Bilateral 12/08/2022    BILATERAL LUMBAR L3, L4M L5 MEDIAL BRANCH RADIOFREQUENCY ABLATION performed by Jay Davis DO at [facility] OR    PAIN MANAGEMENT PROCEDURE Bilateral 11/30/2023    Bilateral Lumbar L3, L4, L5 Medial Branch Radiofrequency Ablation performed by Jay Davis,

## 2024-10-24 NOTE — OP NOTE
same procedure was performed on the opposite side. The needle sites were dressed appropriately.  The patient did not experience any hemodynamic or neurologic sequelae.      The patient was transferred to the postoperative care unit in stable condition.  Written discharge instructions were given to the patient.    COMPLICATIONS:  There were no apparent complications.  The patient tolerated the procedure well.      SEDATION NOTE:     ASA CLASSIFICATION  2  MP   CLASSIFICATION  2     Moderate intravenous conscious sedation was supervised by Dr. Davis  The patient was independently monitored by a Registered Nurse assigned to the Procedure Room  Monitoring included automated blood pressure, continuous EKG, Capnography and continuous pulse oximetry.   The detailed Conscious Record is permanently stored in the Hospital Information System.      The following is the conscious sedation record:  Start Time:  1243  End Time:  1258  Duration:  15 minutes  MEDS GIVEN Versed 2 mg

## 2024-10-24 NOTE — PROGRESS NOTES
Spiritual Services Interventions  [facility] OR Pool/NONE   10/24/2024  Sr Carin Pepper  79 y.o. year old male    Encounter Summary  Encounter Overview/Reason: Initial Encounter  Service Provided For: Patient  Referral/Consult From: Aaliyah  Last Encounter : 10/24/24  Complexity of Encounter: Moderate  Begin Time: 1215  End Time : 1220  Total Time Calculated: 5 min     Spiritual/Emotional needs  Type: Spiritual Support                    Assessment/Intervention/Outcome  Assessment: Calm  Intervention: Prayer (assurance of)/Salome  Outcome: Expressed Gratitude

## 2024-12-05 ENCOUNTER — OFFICE VISIT (OUTPATIENT)
Dept: PAIN MANAGEMENT | Age: 79
End: 2024-12-05
Payer: MEDICARE

## 2024-12-05 ENCOUNTER — OFFICE VISIT (OUTPATIENT)
Dept: FAMILY MEDICINE CLINIC | Age: 79
End: 2024-12-05

## 2024-12-05 ENCOUNTER — HOSPITAL ENCOUNTER (OUTPATIENT)
Age: 79
Discharge: HOME | End: 2024-12-05
Payer: MEDICARE

## 2024-12-05 VITALS
HEART RATE: 64 BPM | BODY MASS INDEX: 30.06 KG/M2 | DIASTOLIC BLOOD PRESSURE: 78 MMHG | HEIGHT: 70 IN | WEIGHT: 210 LBS | OXYGEN SATURATION: 96 % | SYSTOLIC BLOOD PRESSURE: 120 MMHG

## 2024-12-05 VITALS
OXYGEN SATURATION: 98 % | WEIGHT: 210 LBS | RESPIRATION RATE: 18 BRPM | SYSTOLIC BLOOD PRESSURE: 148 MMHG | DIASTOLIC BLOOD PRESSURE: 88 MMHG | BODY MASS INDEX: 30.13 KG/M2 | HEART RATE: 60 BPM

## 2024-12-05 DIAGNOSIS — E11.9 CONTROLLED TYPE 2 DIABETES MELLITUS WITHOUT COMPLICATION, WITHOUT LONG-TERM CURRENT USE OF INSULIN (HCC): ICD-10-CM

## 2024-12-05 DIAGNOSIS — I10 ESSENTIAL HYPERTENSION, BENIGN: ICD-10-CM

## 2024-12-05 DIAGNOSIS — K21.9 GASTROESOPHAGEAL REFLUX DISEASE WITHOUT ESOPHAGITIS: ICD-10-CM

## 2024-12-05 DIAGNOSIS — E78.00 PURE HYPERCHOLESTEROLEMIA: ICD-10-CM

## 2024-12-05 DIAGNOSIS — M79.18 MYOFASCIAL PAIN SYNDROME: ICD-10-CM

## 2024-12-05 DIAGNOSIS — E03.9 ACQUIRED HYPOTHYROIDISM: ICD-10-CM

## 2024-12-05 DIAGNOSIS — Z79.891 CHRONIC USE OF OPIATE FOR THERAPEUTIC PURPOSE: ICD-10-CM

## 2024-12-05 DIAGNOSIS — M47.817 LUMBOSACRAL SPONDYLOSIS WITHOUT MYELOPATHY: Primary | ICD-10-CM

## 2024-12-05 DIAGNOSIS — R41.3 MEMORY CHANGES: ICD-10-CM

## 2024-12-05 DIAGNOSIS — Z00.00 MEDICARE ANNUAL WELLNESS VISIT, SUBSEQUENT: Primary | ICD-10-CM

## 2024-12-05 DIAGNOSIS — M51.369 DEGENERATION OF INTERVERTEBRAL DISC OF LUMBAR REGION, UNSPECIFIED WHETHER PAIN PRESENT: ICD-10-CM

## 2024-12-05 DIAGNOSIS — M47.817 LUMBOSACRAL SPONDYLOSIS WITHOUT MYELOPATHY: ICD-10-CM

## 2024-12-05 LAB — HBA1C MFR BLD: 6.9 %

## 2024-12-05 PROCEDURE — 80307 DRUG TEST PRSMV CHEM ANLYZR: CPT

## 2024-12-05 PROCEDURE — 1160F RVW MEDS BY RX/DR IN RCRD: CPT | Performed by: STUDENT IN AN ORGANIZED HEALTH CARE EDUCATION/TRAINING PROGRAM

## 2024-12-05 PROCEDURE — G0481 DRUG TEST DEF 8-14 CLASSES: HCPCS

## 2024-12-05 PROCEDURE — 1159F MED LIST DOCD IN RCRD: CPT | Performed by: STUDENT IN AN ORGANIZED HEALTH CARE EDUCATION/TRAINING PROGRAM

## 2024-12-05 PROCEDURE — G8417 CALC BMI ABV UP PARAM F/U: HCPCS | Performed by: STUDENT IN AN ORGANIZED HEALTH CARE EDUCATION/TRAINING PROGRAM

## 2024-12-05 PROCEDURE — 3077F SYST BP >= 140 MM HG: CPT | Performed by: STUDENT IN AN ORGANIZED HEALTH CARE EDUCATION/TRAINING PROGRAM

## 2024-12-05 PROCEDURE — 3079F DIAST BP 80-89 MM HG: CPT | Performed by: STUDENT IN AN ORGANIZED HEALTH CARE EDUCATION/TRAINING PROGRAM

## 2024-12-05 PROCEDURE — G8427 DOCREV CUR MEDS BY ELIG CLIN: HCPCS | Performed by: STUDENT IN AN ORGANIZED HEALTH CARE EDUCATION/TRAINING PROGRAM

## 2024-12-05 PROCEDURE — 1036F TOBACCO NON-USER: CPT | Performed by: STUDENT IN AN ORGANIZED HEALTH CARE EDUCATION/TRAINING PROGRAM

## 2024-12-05 PROCEDURE — 99214 OFFICE O/P EST MOD 30 MIN: CPT | Performed by: STUDENT IN AN ORGANIZED HEALTH CARE EDUCATION/TRAINING PROGRAM

## 2024-12-05 PROCEDURE — 1123F ACP DISCUSS/DSCN MKR DOCD: CPT | Performed by: STUDENT IN AN ORGANIZED HEALTH CARE EDUCATION/TRAINING PROGRAM

## 2024-12-05 PROCEDURE — G8484 FLU IMMUNIZE NO ADMIN: HCPCS | Performed by: STUDENT IN AN ORGANIZED HEALTH CARE EDUCATION/TRAINING PROGRAM

## 2024-12-05 RX ORDER — DONEPEZIL HYDROCHLORIDE 10 MG/1
10 TABLET, FILM COATED ORAL NIGHTLY
Qty: 90 TABLET | Refills: 1 | Status: SHIPPED | OUTPATIENT
Start: 2024-12-05

## 2024-12-05 RX ORDER — CARVEDILOL 12.5 MG/1
12.5 TABLET ORAL 2 TIMES DAILY WITH MEALS
COMMUNITY
Start: 2024-11-22

## 2024-12-05 RX ORDER — DONEPEZIL HYDROCHLORIDE 10 MG/1
10 TABLET, FILM COATED ORAL NIGHTLY
Qty: 90 TABLET | Refills: 1 | Status: SHIPPED | OUTPATIENT
Start: 2024-12-05 | End: 2024-12-05

## 2024-12-05 ASSESSMENT — ENCOUNTER SYMPTOMS
TROUBLE SWALLOWING: 0
HEARTBURN: 0
ABDOMINAL PAIN: 0
EYE REDNESS: 0
VOMITING: 0
NAUSEA: 0
EYE DISCHARGE: 0
BLOOD IN STOOL: 0
SHORTNESS OF BREATH: 0
CHOKING: 0
COUGH: 0
DIARRHEA: 0

## 2024-12-05 ASSESSMENT — PATIENT HEALTH QUESTIONNAIRE - PHQ9
SUM OF ALL RESPONSES TO PHQ QUESTIONS 1-9: 1
SUM OF ALL RESPONSES TO PHQ9 QUESTIONS 1 & 2: 1
2. FEELING DOWN, DEPRESSED OR HOPELESS: SEVERAL DAYS
SUM OF ALL RESPONSES TO PHQ QUESTIONS 1-9: 1
1. LITTLE INTEREST OR PLEASURE IN DOING THINGS: NOT AT ALL
SUM OF ALL RESPONSES TO PHQ QUESTIONS 1-9: 1
SUM OF ALL RESPONSES TO PHQ QUESTIONS 1-9: 1

## 2024-12-05 ASSESSMENT — LIFESTYLE VARIABLES
HOW MANY STANDARD DRINKS CONTAINING ALCOHOL DO YOU HAVE ON A TYPICAL DAY: PATIENT DOES NOT DRINK
HOW OFTEN DO YOU HAVE A DRINK CONTAINING ALCOHOL: NEVER

## 2024-12-05 NOTE — PATIENT INSTRUCTIONS
SURVEY:    You may be receiving a survey from [facility] regarding your visit today.    You may get this in the mail, through your MyChart, or in your email.     Please complete the survey to enable us to provide the highest quality of care to you and your family.    If you cannot score us a very good (5 Stars) on any question, please call the office to discuss how we could of made your experience exceptional.    Thank you!    MD Dr. Radha Mobley, DO  Dr. Rehan Kimbrough, DO    Dr. Jay Davis, DO    MD Alee Barba, APRN-KEMAR Wilde, JEAN Duran LPN Jena Adams, MA Emily Akers, MA    Phone: [phone]  Fax: [phone]    Office Hours:   M-TH 8-5, F: 8-12

## 2024-12-05 NOTE — PROGRESS NOTES
Medicare Annual Wellness Visit    uJlio Pepper is here for Medicare AWV, Diabetes Mellitus (Last A1C was 6.1 in 6/24), Gastroesophageal Reflux, Hypertension, Hypothyroidism, and Memory Loss (Discuss memory. Taking aricept 5 mg daily. Would like to discuss increasing the dose . Still feels like he has brain fog)    Assessment & Plan   Controlled type 2 diabetes mellitus without complication, without long-term current use of insulin ([facility])  -     POCT glycosylated hemoglobin (Hb A1C)  Gastroesophageal reflux disease without esophagitis  Essential hypertension, benign  Memory changes  Pure hypercholesterolemia  Acquired hypothyroidism  Medicare annual wellness visit, subsequent    Recommendations for Preventive Services Due: see orders and patient instructions/AVS.  Recommended screening schedule for the next 5-10 years is provided to the patient in written form: see Patient Instructions/AVS.     Return in about 6 months (around 6/5/2025) for DM, HTN, gerd, Hypothyroid, memory loss  .     Subjective   The following acute and/or chronic problems were also addressed today:  1.) medicare wellness  2.) see 6mos note for chronics    Patient's complete Health Risk Assessment and screening values have been reviewed and are found in Flowsheets. The following problems were reviewed today and where indicated follow up appointments were made and/or referrals ordered.    Positive Risk Factor Screenings with Interventions:          Controlled Medication Review:    Today's Pain Level: No data recorded   Opioid Risk: (Low risk score <55) Opioid risk score: 10    Patient is low risk for opioid use disorder or overdose.    Last PDMP Arpan as Reviewed:  Review User Review Instant Review Result   NIEVES SIMON 6/20/2024  1:33 PM     Reviewed PDMP [1]            Abnormal BMI (obese):  Body mass index is 30.13 kg/m². (!) Abnormal    Interventions:  Pt will walk more and eat better in [place]                           Objective 
BRANCH RADIOFREQUENCY ABLATION performed by Jay Davis DO at [facility] OR    PAIN MANAGEMENT PROCEDURE Bilateral 11/30/2023    Bilateral Lumbar L3, L4, L5 Medial Branch Radiofrequency Ablation performed by Jay Davis DO at [facility] OR    PAIN MANAGEMENT PROCEDURE Bilateral 10/24/2024    BILATERAL LUMBAR L3, L4, L5 RADIOFREQUENCY ABLATION performed by Jay Davis DO at [facility] OR    MA LASER VAPORIZATION OF PROSTATE FOR URINE FLOW N/A 01/25/2018    CYSTOSCOPY TRANSURETHRAL RESECTION PROSTATE LASER performed by Chivo Drake MD at [facility] OR    SHOULDER ARTHROSCOPY Right 10/14/2020    RIGHT SHOULDER ARTHROSCPY PROBABLE RCR.  Shoulder Scope Subacromial Decompression.  Partial Distal Claviclectomy.  Debreidement.  Mini RCR Open.  Bicep Tenodecis performed by Ziyad Quick DO at [facility] OR    SHOULDER ARTHROSCOPY Left 2021    TONSILLECTOMY      UPPER GASTROINTESTINAL ENDOSCOPY      UPPER GASTROINTESTINAL ENDOSCOPY  09/09/2014    Dr. Jordan     UPPER GASTROINTESTINAL ENDOSCOPY  12/14/2016    Teddy JAIMES        Medications:       Prior to Admission medications    Medication Sig Start Date End Date Taking? Authorizing Provider   carvedilol (COREG) 12.5 MG tablet Take 1 tablet by mouth 2 times daily (with meals) 11/22/24  Yes Lisa Villatoro MD   donepezil (ARICEPT) 10 MG tablet Take 1 tablet by mouth nightly 12/5/24  Yes Shilpa Robbins MD   simvastatin (ZOCOR) 20 MG tablet TAKE 1 TABLET EVERY NIGHT 8/30/24  Yes Shilpa Robbins MD   levothyroxine (SYNTHROID) 100 MCG tablet Take 1 tablet by mouth daily 8/30/24  Yes Shilpa Robbins MD   pantoprazole (PROTONIX) 40 MG tablet TAKE 1 TABLET TWICE DAILY 8/30/24  Yes Shilpa Robbins MD   inFLIXimab (REMICADE) 100 MG injection Infuse 5 mg/kg intravenously See Admin Instructions   Yes Lisa Villatoro MD   tamsulosin (FLOMAX) 0.4 MG capsule Take 1 capsule by mouth 2 times daily 8/29/24  Yes hPan Sawyer PA-C   ELIQUIS 5 MG TABS tablet 1 tablet 2

## 2024-12-05 NOTE — PROGRESS NOTES
PAIN      3. Degeneration of intervertebral disc of lumbar region, unspecified whether pain present  M51.369       4. Myofascial pain syndrome  M79.18         ASSESSMENT:    Julio Pepper is a 79 y.o.male who presents with chronic low back pain and opioid management visit. To review, patient has had low back pain for the last 5 years without injuries or trauma.  He denies any previous low back surgeries.    The patient's history and physical examination are consistent with lumbosacral spondylosis as the patient has axial low back pain that is mechanical in nature. There is tenderness to palpation along the lumbar paraspinal musculature with positive lumbar facet loading bilaterally.  The lumbar MRI on 8/9/2021 reveals multilevel degenerative changes with facet hypertrophy throughout the lumbar spine specifically at L4-5 and L5-S1 facet joints.  He underwent a bilateral lumbar L3, L4, L5 medial branch radiofrequency ablation on 10/24/2024 with 50% improvement in pain and function.    Neurologically, it appears the patient has full strength and normal sensation. There is no evidence of radiculopathy or myelopathy on examination. There are no red flags in the patient's history.     The patient continues to take opioid medications to improve pain, function and quality of life.  The patient denies any side effects from the medications including constipation or respiratory depression.  The patient reports adequate analgesia with the medication.  There is no evidence of aberrant behavior.  I ordered a urine drug screen at today's visit to ensure he is taking his medication as prescribed.  He reports taking Norco and gabapentin today.  If the urine drug screen comes back appropriate, I will continue Norco 5/325 mg 4 times daily as needed and gabapentin 300 mg 3 times daily.    PLAN:  Medications:    For nonopioid therapy, the following medications were prescribed:    -Continue medication prescribed by primary care

## 2024-12-09 LAB
6-ACETYLMORPHINE, UR: NOT DETECTED
7-AMINOCLONAZEPAM, URINE: NOT DETECTED
ALPHA-OH-ALPRAZ, URINE: NOT DETECTED
ALPHA-OH-MIDAZOLAM, URINE: NOT DETECTED
ALPRAZOLAM, URINE: NOT DETECTED
AMPHETAMINE, URINE: NOT DETECTED
BARBITURATES, URINE: NEGATIVE
BENZOYLECGONINE, UR: NEGATIVE
BUPRENORPHINE URINE: NOT DETECTED
CARISOPRODOL, UR: NEGATIVE
CLONAZEPAM, URINE: NOT DETECTED
CODEINE, URINE: NOT DETECTED
CREAT UR-MCNC: 255 MG/DL (ref 20–400)
DIAZEPAM, URINE: NOT DETECTED
DRUGS EXPECTED, UR: NORMAL
EER HI RES INTERP UR: NORMAL
ETHYL GLUCURONIDE UR: NORMAL
FENTANYL URINE: NOT DETECTED
GABAPENTIN: PRESENT
HYDROCODONE, URINE: PRESENT
HYDROMORPHONE, URINE: PRESENT
LORAZEPAM, URINE: NOT DETECTED
MARIJUANA METAB, UR: NEGATIVE
MDA, URINE: NOT DETECTED
MDEA, EVE, UR: NOT DETECTED
MDMA, URINE: NOT DETECTED
MEPERIDINE METAB, UR: NOT DETECTED
METHADONE, URINE: NEGATIVE
METHAMPHETAMINE, URINE: NOT DETECTED
METHYLPHENIDATE: NOT DETECTED
MIDAZOLAM, URINE: NOT DETECTED
MORPHINE, OPI1M: NOT DETECTED
NALOXONE URINE: NOT DETECTED
NORBUPRENORPHINE, URINE: NOT DETECTED
NORDIAZEPAM, URINE: NOT DETECTED
NORFENTANYL, URINE: NOT DETECTED
NORHYDROCODONE, URINE: PRESENT
NOROXYCODONE, URINE: NOT DETECTED
NOROXYMORPHONE, URINE: NOT DETECTED
OXAZEPAM, URINE: NOT DETECTED
OXYCODONE URINE: NOT DETECTED
OXYMORPHONE, URINE: NOT DETECTED
PAIN MANAGEMENT DRUG PANEL INTERP, URINE: NORMAL
PAIN MGT DRUG PANEL, HI RES, UR: NORMAL
PCP,URINE: NEGATIVE
PHENTERMINE, UR: NOT DETECTED
PREGABALIN: NOT DETECTED
TAPENTADOL, URINE: NOT DETECTED
TAPENTADOL-O-SULFATE, URINE: NOT DETECTED
TEMAZEPAM, URINE: NOT DETECTED
TRAMADOL, URINE: NEGATIVE
ZOLPIDEM METABOLITE (ZCA), URINE: NOT DETECTED
ZOLPIDEM, URINE: NOT DETECTED

## 2024-12-16 ENCOUNTER — OFFICE VISIT (OUTPATIENT)
Dept: GASTROENTEROLOGY | Age: 79
End: 2024-12-16
Payer: MEDICARE

## 2024-12-16 VITALS
OXYGEN SATURATION: 98 % | SYSTOLIC BLOOD PRESSURE: 128 MMHG | HEART RATE: 60 BPM | BODY MASS INDEX: 29.13 KG/M2 | DIASTOLIC BLOOD PRESSURE: 75 MMHG | WEIGHT: 203 LBS | RESPIRATION RATE: 18 BRPM

## 2024-12-16 DIAGNOSIS — K58.2 IRRITABLE BOWEL SYNDROME WITH BOTH CONSTIPATION AND DIARRHEA: Primary | ICD-10-CM

## 2024-12-16 DIAGNOSIS — K62.89 DECREASED ANAL SPHINCTER TONE: ICD-10-CM

## 2024-12-16 PROCEDURE — G8484 FLU IMMUNIZE NO ADMIN: HCPCS | Performed by: NURSE PRACTITIONER

## 2024-12-16 PROCEDURE — 1123F ACP DISCUSS/DSCN MKR DOCD: CPT | Performed by: NURSE PRACTITIONER

## 2024-12-16 PROCEDURE — 3078F DIAST BP <80 MM HG: CPT | Performed by: NURSE PRACTITIONER

## 2024-12-16 PROCEDURE — G8427 DOCREV CUR MEDS BY ELIG CLIN: HCPCS | Performed by: NURSE PRACTITIONER

## 2024-12-16 PROCEDURE — 99213 OFFICE O/P EST LOW 20 MIN: CPT | Performed by: NURSE PRACTITIONER

## 2024-12-16 PROCEDURE — 1160F RVW MEDS BY RX/DR IN RCRD: CPT | Performed by: NURSE PRACTITIONER

## 2024-12-16 PROCEDURE — 1159F MED LIST DOCD IN RCRD: CPT | Performed by: NURSE PRACTITIONER

## 2024-12-16 PROCEDURE — 1036F TOBACCO NON-USER: CPT | Performed by: NURSE PRACTITIONER

## 2024-12-16 PROCEDURE — G8417 CALC BMI ABV UP PARAM F/U: HCPCS | Performed by: NURSE PRACTITIONER

## 2024-12-16 PROCEDURE — 3074F SYST BP LT 130 MM HG: CPT | Performed by: NURSE PRACTITIONER

## 2024-12-16 ASSESSMENT — ENCOUNTER SYMPTOMS
CONSTIPATION: 1
ANAL BLEEDING: 0
BLOOD IN STOOL: 0
ABDOMINAL PAIN: 0
RESPIRATORY NEGATIVE: 1
DIARRHEA: 1

## 2024-12-16 NOTE — PROGRESS NOTES
[address] [id number]    Chief Complaint   Patient presents with    Follow-up     Patient here today for 3 month follow up.    Irritable Bowel Syndrome     Patient reports no change since last visit, symptoms are worse. Still doing FODMAP with no change. Has extreme has build up that \"explodes\" and uncontrollable. Pt no longer constipated but having explosive diarrhea. Has questions about taking miraLAX, still taking it everday.      HPI Julio Pepper is a 79 y.o. old male who has a past medical history of arthritis, multiple myeloma, hyperlipidemia, hypertension, MI (4/10/2023 s/p x5 stents and on Eliquis and Plavix), atrial fib, hypothyroidism, bowel resection (2004 s/p fall) presented to GI with concerns for change in bowel habits with diarrhea stools, defecation urgency, fecal incontinence with a history of IBS and constipation (2011).  Julio is accompanied by his wife who assists with HPI.  According to Julio, over the last 2 months he has began to experience increased diarrheal stools, up to 3-4 diarrheal stools per day.  He is concerned over the amount of defecation urgency and fecal incontinence that he has been experiencing.  He is mostly concerned that he is scheduled to start Remicade infusions 6/10/2024 at the [facility] and each infusion will take approximately 2 hours; and experiencing defecation urgency and fecal incontinence.  He is also concerned that he is planned for a trip out of state that will require a plane trip with his current symptoms.      Family history of upper GI cancer:  No.  Personal history of having esophagus \"rebuilt\" at [facility] 2000.  Review of EMR notes he had a gastric fundoplication (2000).  Family history of colon cancer: No  Blood in stool: No.  No melena  Unintentional weight loss: No  Abdominal pain: No  Prior colonoscopy: Yes  Prior EGD: Yes  Constipation history: No per Julio.  Review of EMR notes history of and wife is concerned

## 2024-12-16 NOTE — PATIENT INSTRUCTIONS
SURVEY:    You may be receiving a survey from [facility] regarding your visit today.    You may get this in the mail, through your MyChart, or in your email.     Please complete the survey to enable us to provide the highest quality of care to you and your family.    If you cannot score us a very good (5 Stars) on any question, please call the office to discuss how we could of made your experience exceptional.    Thank you!    MD Dr. Radha Mobley, DO  Dr. Rehan Kimbrough, DO    Dr. Jay Davis, DO    MD Alee Barba, APRN-KEMAR Wilde, JEAN Duran LPN Jena Adams, MA Emily Akers, MA    Phone: [phone]  Fax: [phone]    Office Hours:   M-TH 8-5, F: 8-12

## 2025-01-02 ENCOUNTER — OFFICE VISIT (OUTPATIENT)
Dept: PAIN MANAGEMENT | Age: 80
End: 2025-01-02

## 2025-01-02 VITALS
OXYGEN SATURATION: 96 % | WEIGHT: 208 LBS | SYSTOLIC BLOOD PRESSURE: 114 MMHG | DIASTOLIC BLOOD PRESSURE: 62 MMHG | BODY MASS INDEX: 29.84 KG/M2 | HEART RATE: 72 BPM

## 2025-01-02 DIAGNOSIS — M47.817 LUMBOSACRAL SPONDYLOSIS WITHOUT MYELOPATHY: ICD-10-CM

## 2025-01-02 DIAGNOSIS — M51.369 DEGENERATION OF INTERVERTEBRAL DISC OF LUMBAR REGION, UNSPECIFIED WHETHER PAIN PRESENT: ICD-10-CM

## 2025-01-02 DIAGNOSIS — Z79.891 CHRONIC USE OF OPIATE FOR THERAPEUTIC PURPOSE: Primary | ICD-10-CM

## 2025-01-02 DIAGNOSIS — M79.18 MYOFASCIAL PAIN SYNDROME: ICD-10-CM

## 2025-01-02 NOTE — PROGRESS NOTES
Chronic Pain Clinic Note     Encounter Date: 1/2/2025     SUBJECTIVE:  Chief Complaint   Patient presents with    Follow-up     History of Present Illness:   Julio Pepepr is a 79 y.o. male who presents for follow-up for low back pain. Had RFA on 10/24/24. Patient had drug screen on 12/5/24 for pain contract medication.     Medication Refill: n/a    Current Complaints of Pain:   Location: Low back  Radiation: None  Severity: moderate  Pain Numerical Score - 6   Average: 5   Highest: 8  Lowest: 5  Character/Quality: aching   Timing: Keeps awake at night at times  Associated symptoms: none  Numbness: no   Weakness: no  Exacerbating factors: activity, walking, patient also has RA  Alleviating factors: when is in [place]- the warmth  Length of time pain has been present: Started on years, chronic issue  Inciting event/injury: no  Bowel/Bladder incontinence: no  Falls: no  Physical Therapy: yes     History of Interventions:   Surgery: no  Injections: Yes, MBB, RFA     Imaging:    Lumbar MRI 8/9/2021    FINDINGS: Alignment normal. No spondylolysis. No spondylolisthesis. Vertebral    body heights are normal. There are a few incidental vertebral body hemangioma.    A vertebral body hemangioma in the L3 vertebral body measures 2.2 cm. A    vertebral body hemangioma in the L1 vertebral body measures 1.9 cm in diameter.    There are no suspicious osseous lesions. No compression fractures.       L5-S1, moderate degenerative disc disease. There is a shallow central    protrusion and annular tear. No spinal stenosis. Disc space narrowing results    in mild bilateral foraminal narrowing.       L4-L5, mild degenerative disc disease. Diffuse disc bulge. There is an annular    tear posteriorly in the disc annulus. No spinal stenosis. No foraminal    narrowing.       L3-L4, there is a shallow broad-based central protrusion mildly effacing the    ventral aspect of the thecal sac without significant spinal stenosis or nerve    root

## 2025-01-02 NOTE — PATIENT INSTRUCTIONS
SURVEY:    You may be receiving a survey from [facility] regarding your visit today.    Please complete the survey to enable us to provide the highest quality of care to you and your family.    If you cannot score us a very good on any question, please call the office to discuss how we could have made your experience a very good one.    Thank you.  [facility]  MD Joanne Wick, MD Jay Davis, DO  Sherman Jim, MD Alee Jane, APRN-CNP  Rosalina Juan, Practice Manager  Ann-Marie, CMA  Chani, CCMA  Johan, CMA  Leann, CMA  Marco, PSC   Kathryn, LPN  Jacqueline, CMA

## 2025-01-08 ENCOUNTER — TELEPHONE (OUTPATIENT)
Dept: PAIN MANAGEMENT | Age: 80
End: 2025-01-08

## 2025-01-08 DIAGNOSIS — M47.817 LUMBOSACRAL SPONDYLOSIS WITHOUT MYELOPATHY: Primary | ICD-10-CM

## 2025-01-08 NOTE — TELEPHONE ENCOUNTER
Patient would like his RX faxed to [facility] [address]  Fax number [phone]  Please advise

## 2025-01-08 NOTE — TELEPHONE ENCOUNTER
Patient contacted the office this afternoon to follow up on medication request for the Hydrocodone to [facility]. Please advise. Thank you.

## 2025-01-09 RX ORDER — HYDROCODONE BITARTRATE AND ACETAMINOPHEN 5; 325 MG/1; MG/1
1 TABLET ORAL EVERY 6 HOURS PRN
Qty: 120 TABLET | Refills: 0 | Status: SHIPPED | OUTPATIENT
Start: 2025-01-09 | End: 2025-02-08

## 2025-01-09 RX ORDER — HYDROCODONE BITARTRATE AND ACETAMINOPHEN 5; 325 MG/1; MG/1
1 TABLET ORAL 4 TIMES DAILY PRN
Qty: 120 TABLET | Refills: 1 | Status: CANCELLED | OUTPATIENT
Start: 2025-01-09 | End: 2025-03-10

## 2025-01-22 DIAGNOSIS — K21.9 GASTROESOPHAGEAL REFLUX DISEASE WITHOUT ESOPHAGITIS: ICD-10-CM

## 2025-01-22 RX ORDER — PANTOPRAZOLE SODIUM 40 MG/1
TABLET, DELAYED RELEASE ORAL
Qty: 180 TABLET | Refills: 1 | Status: SHIPPED | OUTPATIENT
Start: 2025-01-22

## 2025-01-22 RX ORDER — SIMVASTATIN 20 MG
TABLET ORAL
Qty: 90 TABLET | Refills: 1 | Status: SHIPPED | OUTPATIENT
Start: 2025-01-22

## 2025-01-22 NOTE — TELEPHONE ENCOUNTER
Last OV 12/5/24 for AWV and chronics  Requesting refill on pantoprazole via sure script  Next OV  6/4/25

## 2025-02-19 RX ORDER — LEVOTHYROXINE SODIUM 100 UG/1
100 TABLET ORAL DAILY
Qty: 90 TABLET | Refills: 1 | Status: SHIPPED | OUTPATIENT
Start: 2025-02-19

## 2025-03-05 DIAGNOSIS — M47.817 LUMBOSACRAL SPONDYLOSIS WITHOUT MYELOPATHY: ICD-10-CM

## 2025-03-05 NOTE — TELEPHONE ENCOUNTER
Health Maintenance   Topic Date Due    DTaP/Tdap/Td vaccine (1 - Tdap) Never done    Shingles vaccine (2 of 3) 03/26/2012    Respiratory Syncytial Virus (RSV) Pregnant or age 60 yrs+ (1 - 1-dose 75+ series) Never done    COVID-19 Vaccine (7 - 2024-25 season) 09/01/2024    Diabetic Alb to Cr ratio (uACR) test  11/27/2024    Lipids  11/27/2024    Lung Cancer Screening &/or Counseling  12/13/2024    GFR test (Diabetes, CKD 3-4, OR last GFR 15-59)  06/11/2025    Depression Screen  12/05/2025    Annual Wellness Visit (Medicare)  12/06/2025    Flu vaccine  Completed    Pneumococcal 50+ years Vaccine  Completed    Hepatitis C screen  Addressed    Hepatitis A vaccine  Aged Out    Hepatitis B vaccine  Aged Out    Hib vaccine  Aged Out    Polio vaccine  Aged Out    Meningococcal (ACWY) vaccine  Aged Out    Diabetic foot exam  Discontinued    A1C test (Diabetic or Prediabetic)  Discontinued    Diabetic retinal exam  Discontinued    Colorectal Cancer Screen  Discontinued    AAA screen  Discontinued             (applicable per patient's age: Cancer Screenings, Depression Screening, Fall Risk Screening, Immunizations)    Hemoglobin A1C (%)   Date Value   12/05/2024 6.9   06/04/2024 6.1 (H)   11/27/2023 6.2 (H)     AST (U/L)   Date Value   06/04/2024 25     ALT (U/L)   Date Value   06/04/2024 25     BUN (mg/dL)   Date Value   11/27/2023 19      (goal A1C is < 7)   (goal LDL is <100) need 30-50% reduction from baseline     BP Readings from Last 3 Encounters:   01/02/25 114/62   12/16/24 128/75   12/05/24 (!) 148/88    (goal /80)      All Future Testing planned in [facility]:  Lab Frequency Next Occurrence   COLONOSCOPY (Screening) Once 05/15/2024   FACET JOINT L/S SINGLE Once 06/20/2024   RADIOFREQUENCY L/S ADDITIONAL Once 06/20/2024   US POST VOID RESIDUAL Once 08/29/2024       Next Visit Date:  Future Appointments   Date Time Provider Department Center   4/7/2025 10:00 AM Alee Jane, [facility] - [facility]

## 2025-03-07 DIAGNOSIS — M47.817 LUMBOSACRAL SPONDYLOSIS WITHOUT MYELOPATHY: ICD-10-CM

## 2025-03-07 RX ORDER — HYDROCODONE BITARTRATE AND ACETAMINOPHEN 5; 325 MG/1; MG/1
1 TABLET ORAL EVERY 6 HOURS PRN
Qty: 120 TABLET | Refills: 0 | Status: SHIPPED | OUTPATIENT
Start: 2025-03-07 | End: 2025-04-06

## 2025-03-07 RX ORDER — HYDROCODONE BITARTRATE AND ACETAMINOPHEN 5; 325 MG/1; MG/1
1 TABLET ORAL EVERY 6 HOURS PRN
Qty: 120 TABLET | Refills: 0 | OUTPATIENT
Start: 2025-03-07 | End: 2025-04-06

## 2025-04-16 ENCOUNTER — HOSPITAL ENCOUNTER (OUTPATIENT)
Age: 80
Discharge: HOME | End: 2025-04-16
Payer: MEDICARE

## 2025-04-16 ENCOUNTER — OFFICE VISIT (OUTPATIENT)
Dept: GASTROENTEROLOGY | Age: 80
End: 2025-04-16
Payer: MEDICARE

## 2025-04-16 ENCOUNTER — RESULTS FOLLOW-UP (OUTPATIENT)
Dept: SURGERY | Age: 80
End: 2025-04-16

## 2025-04-16 VITALS
DIASTOLIC BLOOD PRESSURE: 70 MMHG | BODY MASS INDEX: 29.63 KG/M2 | WEIGHT: 207 LBS | OXYGEN SATURATION: 94 % | HEART RATE: 67 BPM | HEIGHT: 70 IN | SYSTOLIC BLOOD PRESSURE: 134 MMHG

## 2025-04-16 DIAGNOSIS — K90.41 NON-CELIAC GLUTEN SENSITIVITY: ICD-10-CM

## 2025-04-16 DIAGNOSIS — K62.89 DECREASED ANAL SPHINCTER TONE: ICD-10-CM

## 2025-04-16 DIAGNOSIS — R53.83 OTHER FATIGUE: ICD-10-CM

## 2025-04-16 DIAGNOSIS — K58.2 IRRITABLE BOWEL SYNDROME WITH BOTH CONSTIPATION AND DIARRHEA: Primary | ICD-10-CM

## 2025-04-16 LAB
25(OH)D3 SERPL-MCNC: 38 NG/ML (ref 30–100)
ANION GAP SERPL CALCULATED.3IONS-SCNC: 7 MMOL/L (ref 9–17)
BASOPHILS # BLD: 0.05 K/UL (ref 0–0.2)
BASOPHILS NFR BLD: 1 % (ref 0–2)
BUN SERPL-MCNC: 14 MG/DL (ref 8–23)
CALCIUM SERPL-MCNC: 9.2 MG/DL (ref 8.6–10.4)
CHLORIDE SERPL-SCNC: 104 MMOL/L (ref 98–107)
CO2 SERPL-SCNC: 27 MMOL/L (ref 20–31)
CREAT SERPL-MCNC: 0.8 MG/DL (ref 0.7–1.2)
EOSINOPHIL # BLD: 0.06 K/UL (ref 0–0.4)
EOSINOPHILS RELATIVE PERCENT: 1 % (ref 0–5)
ERYTHROCYTE [DISTWIDTH] IN BLOOD BY AUTOMATED COUNT: 14 % (ref 12.1–15.2)
GFR, ESTIMATED: >90 ML/MIN/1.73M2
GLUCOSE SERPL-MCNC: 95 MG/DL (ref 70–99)
HCT VFR BLD AUTO: 45.2 % (ref 41–53)
HGB BLD-MCNC: 15 G/DL (ref 13.5–17.5)
IMM GRANULOCYTES # BLD AUTO: 0 K/UL (ref 0–0.3)
IMM GRANULOCYTES NFR BLD: 0 % (ref 0–5)
LYMPHOCYTES NFR BLD: 1.3 K/UL (ref 1–4.8)
LYMPHOCYTES RELATIVE PERCENT: 29 % (ref 13–44)
MCH RBC QN AUTO: 30.5 PG (ref 26–34)
MCHC RBC AUTO-ENTMCNC: 33.2 G/DL (ref 31–37)
MCV RBC AUTO: 92.1 FL (ref 80–100)
MONOCYTES NFR BLD: 0.68 K/UL (ref 0–1)
MONOCYTES NFR BLD: 15 % (ref 5–9)
NEUTROPHILS NFR BLD: 54 % (ref 39–75)
NEUTS SEG NFR BLD: 2.47 K/UL (ref 2.1–6.5)
PLATELET # BLD AUTO: 192 K/UL (ref 140–450)
PMV BLD AUTO: 9.7 FL (ref 6–12)
POTASSIUM SERPL-SCNC: 4.6 MMOL/L (ref 3.7–5.3)
RBC # BLD AUTO: 4.91 M/UL (ref 4.5–5.9)
SODIUM SERPL-SCNC: 138 MMOL/L (ref 135–144)
WBC OTHER # BLD: 4.6 K/UL (ref 3.5–11)

## 2025-04-16 PROCEDURE — 3075F SYST BP GE 130 - 139MM HG: CPT | Performed by: NURSE PRACTITIONER

## 2025-04-16 PROCEDURE — G8417 CALC BMI ABV UP PARAM F/U: HCPCS | Performed by: NURSE PRACTITIONER

## 2025-04-16 PROCEDURE — 1036F TOBACCO NON-USER: CPT | Performed by: NURSE PRACTITIONER

## 2025-04-16 PROCEDURE — 36415 COLL VENOUS BLD VENIPUNCTURE: CPT

## 2025-04-16 PROCEDURE — G8427 DOCREV CUR MEDS BY ELIG CLIN: HCPCS | Performed by: NURSE PRACTITIONER

## 2025-04-16 PROCEDURE — 82306 VITAMIN D 25 HYDROXY: CPT

## 2025-04-16 PROCEDURE — 1123F ACP DISCUSS/DSCN MKR DOCD: CPT | Performed by: NURSE PRACTITIONER

## 2025-04-16 PROCEDURE — 80048 BASIC METABOLIC PNL TOTAL CA: CPT

## 2025-04-16 PROCEDURE — 99213 OFFICE O/P EST LOW 20 MIN: CPT | Performed by: NURSE PRACTITIONER

## 2025-04-16 PROCEDURE — 85025 COMPLETE CBC W/AUTO DIFF WBC: CPT

## 2025-04-16 PROCEDURE — 1159F MED LIST DOCD IN RCRD: CPT | Performed by: NURSE PRACTITIONER

## 2025-04-16 PROCEDURE — 3078F DIAST BP <80 MM HG: CPT | Performed by: NURSE PRACTITIONER

## 2025-04-16 ASSESSMENT — ENCOUNTER SYMPTOMS
DIARRHEA: 1
ABDOMINAL PAIN: 0
ANAL BLEEDING: 0
RESPIRATORY NEGATIVE: 1
CONSTIPATION: 1
BLOOD IN STOOL: 0

## 2025-04-16 NOTE — PROGRESS NOTES
elsewhere.  Extremities: no lower extremity edema.  Musculoskeletal: normal range of motion, no joint swelling.  Neurologic:  no cranial nerve deficit, gait, coordination and speech normal.      Lab Results   Component Value Date    WBC 6.4 08/24/2022    HGB 15.6 08/24/2022    HCT 45.1 08/24/2022    MCV 90.2 08/24/2022     08/24/2022        Lab Results   Component Value Date     04/16/2025    K 4.6 04/16/2025     04/16/2025    CO2 27 04/16/2025    BUN 14 04/16/2025    CREATININE 0.8 04/16/2025    GLUCOSE 95 04/16/2025    CALCIUM 9.2 04/16/2025    BILITOT 0.4 11/27/2023    ALKPHOS 82 11/27/2023    AST 25 06/04/2024    ALT 25 06/04/2024    LABGLOM >90 04/16/2025    GFRAA >60 08/24/2022       Assessment  Julio Pepper is a 79 y.o. old male who has a past medical history of arthritis, multiple myeloma, hyperlipidemia, hypertension, MI (4/10/2023 s/p x5 stents and on Eliquis and Plavix), atrial fib, hypothyroidism, bowel resection (2004)  presents with wife for follow-up IBS.  Julio endorses significant improvements with dietary discretions including low FODMAP, lactose and gluten-free diet.  Trending weights remain stable.  Last colonoscopy noted decreased anal sphincter tone, recommending Kegel exercises, referral to physical therapy for pelvic floor therapy placed.  Will refer to dietitian and check vitamin D, CBC and BMP today.     Plan    1. Irritable bowel syndrome with both constipation and diarrhea  - [facility] [place]  - [facility]- [place]  - Continue with low FODMAP diet and assess response.      2. Decreased anal sphincter tone  - [facility] [place]    3. Non-celiac gluten sensitivity  - Vitamin D 25 Hydroxy; Future    4. Other fatigue  - CBC with Auto Differential; Future  - Vitamin D 25 Hydroxy; Future  - Basic Metabolic Panel; Future    Spent 20 minutes with the patient with greater than 50 percent of the time was spent on

## 2025-04-24 ENCOUNTER — HOSPITAL ENCOUNTER (OUTPATIENT)
Dept: PHYSICAL THERAPY | Age: 80
Setting detail: THERAPIES SERIES
Discharge: HOME | End: 2025-04-24
Payer: MEDICARE

## 2025-04-24 ENCOUNTER — OFFICE VISIT (OUTPATIENT)
Dept: PAIN MANAGEMENT | Age: 80
End: 2025-04-24
Payer: MEDICARE

## 2025-04-24 VITALS
HEART RATE: 88 BPM | WEIGHT: 204 LBS | RESPIRATION RATE: 18 BRPM | DIASTOLIC BLOOD PRESSURE: 68 MMHG | BODY MASS INDEX: 29.27 KG/M2 | OXYGEN SATURATION: 98 % | SYSTOLIC BLOOD PRESSURE: 124 MMHG

## 2025-04-24 DIAGNOSIS — M51.369 DEGENERATION OF INTERVERTEBRAL DISC OF LUMBAR REGION, UNSPECIFIED WHETHER PAIN PRESENT: ICD-10-CM

## 2025-04-24 DIAGNOSIS — M79.18 MYOFASCIAL PAIN SYNDROME: ICD-10-CM

## 2025-04-24 DIAGNOSIS — M47.817 LUMBOSACRAL SPONDYLOSIS WITHOUT MYELOPATHY: ICD-10-CM

## 2025-04-24 DIAGNOSIS — Z79.891 CHRONIC USE OF OPIATE FOR THERAPEUTIC PURPOSE: Primary | ICD-10-CM

## 2025-04-24 PROCEDURE — 1160F RVW MEDS BY RX/DR IN RCRD: CPT | Performed by: STUDENT IN AN ORGANIZED HEALTH CARE EDUCATION/TRAINING PROGRAM

## 2025-04-24 PROCEDURE — G8417 CALC BMI ABV UP PARAM F/U: HCPCS | Performed by: STUDENT IN AN ORGANIZED HEALTH CARE EDUCATION/TRAINING PROGRAM

## 2025-04-24 PROCEDURE — 3078F DIAST BP <80 MM HG: CPT | Performed by: STUDENT IN AN ORGANIZED HEALTH CARE EDUCATION/TRAINING PROGRAM

## 2025-04-24 PROCEDURE — 1123F ACP DISCUSS/DSCN MKR DOCD: CPT | Performed by: STUDENT IN AN ORGANIZED HEALTH CARE EDUCATION/TRAINING PROGRAM

## 2025-04-24 PROCEDURE — 99214 OFFICE O/P EST MOD 30 MIN: CPT | Performed by: STUDENT IN AN ORGANIZED HEALTH CARE EDUCATION/TRAINING PROGRAM

## 2025-04-24 PROCEDURE — G8427 DOCREV CUR MEDS BY ELIG CLIN: HCPCS | Performed by: STUDENT IN AN ORGANIZED HEALTH CARE EDUCATION/TRAINING PROGRAM

## 2025-04-24 PROCEDURE — 97110 THERAPEUTIC EXERCISES: CPT

## 2025-04-24 PROCEDURE — 1036F TOBACCO NON-USER: CPT | Performed by: STUDENT IN AN ORGANIZED HEALTH CARE EDUCATION/TRAINING PROGRAM

## 2025-04-24 PROCEDURE — 1159F MED LIST DOCD IN RCRD: CPT | Performed by: STUDENT IN AN ORGANIZED HEALTH CARE EDUCATION/TRAINING PROGRAM

## 2025-04-24 PROCEDURE — 3074F SYST BP LT 130 MM HG: CPT | Performed by: STUDENT IN AN ORGANIZED HEALTH CARE EDUCATION/TRAINING PROGRAM

## 2025-04-24 PROCEDURE — 97162 PT EVAL MOD COMPLEX 30 MIN: CPT

## 2025-04-24 RX ORDER — GABAPENTIN 600 MG/1
600 TABLET ORAL 3 TIMES DAILY
Qty: 90 TABLET | Refills: 2 | Status: SHIPPED | OUTPATIENT
Start: 2025-04-24 | End: 2025-07-23

## 2025-04-24 RX ORDER — HYDROCODONE BITARTRATE AND ACETAMINOPHEN 5; 325 MG/1; MG/1
1 TABLET ORAL 4 TIMES DAILY PRN
Qty: 120 TABLET | Refills: 0 | Status: SHIPPED | OUTPATIENT
Start: 2025-04-24 | End: 2025-05-24

## 2025-04-24 ASSESSMENT — PAIN SCALES - GENERAL: PAINLEVEL_OUTOF10: 4

## 2025-04-24 ASSESSMENT — PAIN DESCRIPTION - LOCATION: LOCATION: BACK

## 2025-04-24 NOTE — PATIENT INSTRUCTIONS
SURVEY:    You may be receiving a survey from [facility] regarding your visit today.    You may get this in the mail, through your MyChart, or in your email.     Please complete the survey to enable us to provide the highest quality of care to you and your family.    If you cannot score us a very good (5 Stars) on any question, please call the office to discuss how we could of made your experience exceptional.    Thank you!    General Surgery    MD Dr. Radha Mobley, DO  Dr. Rehan Kimbrough, DO  Alee Jane, KERWIN-CNP    Pain Mgmt.  Dr. Jay Davis, DO  KEMAR Awan, JEAN Duran LPN Jena Adams, MA Emily Akers, MA    Phone: [phone]  Fax: [phone]    Office Hours:   M-TH 8-5, F: 8-12

## 2025-04-24 NOTE — THERAPY EVALUATION
Phone: [phone]                       [facility]         Fax: [phone]                      Outpatient Physical Therapy                                                                      Evaluation    Date: 2025  Patient: Julio Pepper  : 1945  ACCT #: [id number]    Referring Provider (secondary): TIM Barclay    Diagnosis: Irritable Bowel Syndrome    Treatment Diagnosis: Incontience of bowels, constipation, back pain  Onset Date: 25 (Referral)  PT Insurance Information: Ronny CHAVARRIA  Total # of Visits Approved: 10 Per Physician Order  Total # of Visits to Date: 1     Subjective     Additional Pertinent Hx: Main c/o leaking of stool/ diarrhea, started about 9 months ago. dx with IBS.  He has been on special diet since January which has helped some. Intermittent solid stool/ diarhea now, and at times gets constipated. C/o leaking gas alot. He is taking miralax and fiber gummies to help regulate bowels..  Before january  was unable to leave house  due to \"accidents of leaking stool\". Denies incontinence of urine. Constant back pain, 2-8/10. Has infusions for RA every 6 wks. Hx- RA since 2000 had surgery for esophagus repair and had diaphram nicked, chronic back pain, spinal ablusions, colonectomy 12 inches intestine removed  .  Pain Assessment  Pain Level: 4  Pain Location: Back  Social/Functional History  Lives With: Spouse  Occupation: Retired       Objective  Strength RLE  Strength RLE: Exception  R Hip Flexion: 4+/5  R Hip ABduction: 4-/5  R Hip ADduction: 4+/5  R Hip External Rotation: 4-/5  Strength LLE  Strength LLE: Exception  L Hip Flexion: 5/5  L Hip ABduction: 4-/5  L Hip ADduction: 5/5  L Hip External Rotation: 4-/5     PROM LLE (degrees)  LLE General PROM: some stiffness in most joints due to RA  PROM RLE (degrees)  RLE General PROM: some stiffness in most joints due to RA     Additional Measures  Special Tests: Continence Grading scale

## 2025-04-24 NOTE — PROGRESS NOTES
Chronic Pain Clinic Note     Encounter Date: 4/24/2025     SUBJECTIVE:  Chief Complaint   Patient presents with    Follow-up     History of Present Illness:   Julio Pepper is a 79 y.o. male who presents for follow-up for low back pain.    Medication Refill: HYDROcodone-acetaminophen (NORCO) 5-325 MG per tablet     Current Complaints of Pain:   Location: Low back  Radiation: None  Severity: moderate  Pain Numerical Score - 6   Average: 5   Highest: 8  Lowest: 5  Character/Quality: aching   Timing: Keeps awake at night at times  Associated symptoms: none  Numbness: no   Weakness: no  Exacerbating factors: activity, walking, patient also has RA  Alleviating factors: when is in [place]- the warmth  Length of time pain has been present: Started on years, chronic issue  Inciting event/injury: no  Bowel/Bladder incontinence: no  Falls: no  Physical Therapy: yes     History of Interventions:   Surgery: no  Injections: Yes, MBB, RFA     Imaging:    Lumbar MRI 8/9/2021    Past Medical History:   Diagnosis Date    Arthritis     osteoarthritis, rheumatoid    Atrial fibrillation (HCC) 07/2023    Cancer (HCC)     multiple myeloma    Fibromyalgia     GERD (gastroesophageal reflux disease)     History of blood transfusion     At birth     Hyperlipidemia     Hypertension     Hypothyroidism     IBS (irritable bowel syndrome)     MI, 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 Jordan     COLONOSCOPY N/A 6/18/2024    COLONOSCOPY performed by Joanne Muñoz MD at [facility] ENDOSCOPY    CT BIOPSY BONE MARROW  02/26/2021    CT BONE MARROW BIOPSY 2/26/2021    EYE SURGERY      cataract bilateral    GASTRIC FUNDOPLICATION  2000    HEMORRHOID SURGERY      HERNIA REPAIR      Hiatal Hernia repair, Esophageal repair

## 2025-04-24 NOTE — PLAN OF CARE
[facility]       Phone: [phone]   Date: 2025                      Outpatient Physical Therapy  Fax: [phone]    ACCT #: [id number]                     Plan of Care  [facility]#: [id number]  Patient: Julio Pepper  : 1945    Referring Provider (secondary): TIM Barclay    Diagnosis: Irritable Bowel Syndrome  Onset Date: 25 (Referral)  Treatment Diagnosis: Incontience of bowels, constipation, back pain    Assessment  Body Structures, Functions, Activity Limitations Requiring Skilled Therapeutic Intervention: Decreased ADL status, Decreased ROM, Decreased strength, Increased pain  Assessment: Patient has complex medical hx with main c/o incontinence of stool. Patient completed therex per Doc Flow. Educated patient on and issued HEP handout. Plan for therex, HEP, manual therapy.  Therapy Prognosis: Good    Treatment Plan   Days: 2 times per week  x5 wks  Total # of Visits Approved: 10    Patient Education/HEP, Therapeutic Exercise, Manual Therapy: Myofacial Release/Cupping, and Manual Therapy: Mobilization/Manipulation     Goals  Short Term Goals  Time Frame for Short Term Goals: 8  Short Term Goal 1: Patient to be educated on and independent with HEP  Short Term Goal 2: Increase strength B hip ER 4+/5 for improved pelvic stability  Short Term Goal 3: Patient to report no incontinence of stool x1 wk  Long Term Goals  Time Frame for Long Term Goals : 10  Long Term Goal 1: Patient to have improve continence with Contience Grading scale <6/24 (from 10/24)     Adrienne Moore, PT   Date: 2025    ______________________________________ Date: 2025  Physician Signature  By signing above or cosigning electronically, I have reviewed this Plan of Care and certify a need for medically necessary rehabilitation services.       "Additional Information   Negative: Is this related to a work injury?   Negative: Is this related to an MVA?   Negative: Are you currently recieving homecare services?    Background - Initial Assessment  Clinical complaint: ED visit on 06/16 due to Right Sided Lower Back Pain since Sunday 06/16. Hx of herniated discs 5-6 years ago. Patient states new flare up radiates into her right buttock, hip and down to her leg up to her knee. Numbness \"sometimes\" in both legs. No tingling sensation. NKI. Not seeing a spine specialist for this pain at the moment. Patient states pain is constant , worse with movement \"I can't stand for too long\". Patient described pain as aching, burning, sharp, shooting, throbbing, stabbing, throbbing. No prior back sx.  Date of onset: Sunday 06/16/24 ( new flare up)  Frequency of pain: constant  Quality of pain: aching, burning, dull, numb, sharp, shooting, stabbing, and throbbing.    Protocols used: [facility] Protocol    "

## 2025-04-28 ENCOUNTER — HOSPITAL ENCOUNTER (OUTPATIENT)
Dept: PHYSICAL THERAPY | Age: 80
Setting detail: THERAPIES SERIES
Discharge: HOME | End: 2025-04-28
Payer: MEDICARE

## 2025-04-28 ENCOUNTER — TELEPHONE (OUTPATIENT)
Dept: FAMILY MEDICINE CLINIC | Age: 80
End: 2025-04-28

## 2025-04-28 DIAGNOSIS — M47.817 LUMBOSACRAL SPONDYLOSIS WITHOUT MYELOPATHY: ICD-10-CM

## 2025-04-28 PROCEDURE — 97110 THERAPEUTIC EXERCISES: CPT

## 2025-04-28 ASSESSMENT — PAIN SCALES - GENERAL: PAINLEVEL_OUTOF10: 7

## 2025-04-28 NOTE — TELEPHONE ENCOUNTER
Medication was sent to the incorrect pharmacy.    RX sent to [facility], patient prefers [facility].     Pharmacy fixed in system.    Thank you.

## 2025-04-28 NOTE — TELEPHONE ENCOUNTER
Patient phoned into office stating he would like his prescription HYDROcodone-acetaminophen (NORCO) 5-325 MG sent to [facility] in [place].     Please advise

## 2025-04-28 NOTE — PROGRESS NOTES
Phone: [phone]                 [facility]      Fax: [phone]                            Outpatient Physical Therapy                                                                            Daily Note    Date: 2025  Patient Name: Julio Pepper        MRN: [id number]   ACCT#:  [id number]  : 1945  (79 y.o.)    Referring Provider (secondary): TIM Barclay         Diagnosis: Irritable Bowel Syndrome  Treatment Diagnosis: Incontience of bowels, constipation, back pain    Onset Date: 25 (Referral)  PT Insurance Information: [employer], [employer]  Total # of Visits Approved: 10 Per Physician Order  Total # of Visits to Date: 2  Plan of Care/Certification Expiration Date: 25    Pre-Treatment Pain:  6.5/10     Assessment  Assessment: Pt reports compliance with HEP. He notes he is still learning which foods and how much he can have with his IBS. Performed ex as outlined . Cues for breathing. Will progress with education.    Plan  Continue with current plan of care    Exercises/Modalities/Manual:  See DocFlow Sheet    Education: breathing            Goals  (Total # of Visits to Date: 2)   Short Term Goals  Time Frame for Short Term Goals: 8  Short Term Goal 1: Patient to be educated on and independent with HEP-met  Short Term Goal 2: Increase strength B hip ER 4+/5 for improved pelvic stability  Short Term Goal 3: Patient to report no incontinence of stool x1 wk    Long Term Goals  Time Frame for Long Term Goals : 10  Long Term Goal 1: Patient to have improve continence with Contience Grading scale <6/24 (from 10/24)    Treatment Tolerance:  Treatment Tolerance: Tolerated treatment well.    Post Treatment Pain:  6.5/10    Time In: 0950    Time Out : 1025        Timed Code Treatment Minutes: 35 Minutes  Total Treatment Time: 35 Minutes    Jessie Rubin    PTA Date: 2025

## 2025-04-29 RX ORDER — HYDROCODONE BITARTRATE AND ACETAMINOPHEN 5; 325 MG/1; MG/1
1 TABLET ORAL 4 TIMES DAILY PRN
Qty: 120 TABLET | Refills: 0 | Status: SHIPPED | OUTPATIENT
Start: 2025-04-29 | End: 2025-05-29

## 2025-05-02 ENCOUNTER — HOSPITAL ENCOUNTER (OUTPATIENT)
Dept: PHYSICAL THERAPY | Age: 80
Setting detail: THERAPIES SERIES
Discharge: HOME | End: 2025-05-02
Payer: MEDICARE

## 2025-05-02 PROCEDURE — 97140 MANUAL THERAPY 1/> REGIONS: CPT

## 2025-05-02 PROCEDURE — 97110 THERAPEUTIC EXERCISES: CPT

## 2025-05-02 ASSESSMENT — PAIN DESCRIPTION - LOCATION: LOCATION: BACK

## 2025-05-02 ASSESSMENT — PAIN SCALES - GENERAL: PAINLEVEL_OUTOF10: 6

## 2025-05-02 NOTE — PROGRESS NOTES
Phone: [phone]                 [facility]      Fax: [phone]                            Outpatient Physical Therapy                                                                            Daily Note    Date: 2025  Patient Name: Julio Pepper        MRN: [id number]   ACCT#:  [id number]  : 1945  (79 y.o.)  Referring Provider (secondary): TIM Barclay         Diagnosis: Irritable Bowel Syndrome  Treatment Diagnosis: Incontience of bowels, constipation, back pain    Onset Date: 25 (Referral)  PT Insurance Information: [employer], Humana  Total # of Visits Approved: 10 Per Physician Order  Total # of Visits to Date: 3  Plan of Care/Certification Expiration Date: 25    Pre-Treatment Pain:  6/10     Assessment  Assessment: Pain 6/10 in low back. Completed therex and manual therapy per Doc Flow.  Education on kegels in sitting and standing and to work on kegels 3-4 times a day and other ex just once a day. Patient reports understanding and  compliance with HEP.    Plan  Continue with current plan of care    Exercises/Modalities/Manual:  See DocFlow Sheet    Education: .  Education on kegels in sitting and standing and to work on kegels 3-4 times a day and other ex just once a day       Goals  (Total # of Visits to Date: 3)   Short Term Goals  Time Frame for Short Term Goals: 8  Short Term Goal 1: Patient to be educated on and independent with HEP-met  Short Term Goal 2: Increase strength B hip ER 4+/5 for improved pelvic stability  Short Term Goal 3: Patient to report no incontinence of stool x1 wk    Long Term Goals  Time Frame for Long Term Goals : 10  Long Term Goal 1: Patient to have improve continence with Contience Grading scale < (from 10/24)    Treatment Tolerance:  Treatment Tolerance: Tolerated treatment well.    Post Treatment Pain:  6/10    Time In: 8:17    Time Out : 8:57        Timed Code Treatment Minutes: 40 Minutes  Total Treatment Time: 40

## 2025-05-06 ENCOUNTER — HOSPITAL ENCOUNTER (OUTPATIENT)
Dept: PHYSICAL THERAPY | Age: 80
Setting detail: THERAPIES SERIES
Discharge: HOME | End: 2025-05-06
Payer: MEDICARE

## 2025-05-06 PROCEDURE — 97110 THERAPEUTIC EXERCISES: CPT

## 2025-05-06 ASSESSMENT — PAIN SCALES - GENERAL: PAINLEVEL_OUTOF10: 8

## 2025-05-06 ASSESSMENT — PAIN DESCRIPTION - LOCATION: LOCATION: BACK

## 2025-05-06 NOTE — PROGRESS NOTES
Phone: [phone]                 [facility]      Fax: [phone]                            Outpatient Physical Therapy                                                                            Daily Note    Date: 2025  Patient Name: Julio Pepper        MRN: [id number]   ACCT#:  [id number]  : 1945  (79 y.o.)    Referring Provider (secondary): TIM Barclay         Diagnosis: Irritable Bowel Syndrome  Treatment Diagnosis: Incontience of bowels, constipation, back pain    Onset Date: 25 (Referral)  PT Insurance Information: [employer], [employer]  Total # of Visits Approved: 10 Per Physician Order  Total # of Visits to Date: 4  Plan of Care/Certification Expiration Date: 25    Pre-Treatment Pain:  8/10     Assessment  Assessment: Pain 8/10 in low back. Completed therex and manual therapy per Doc Flow. Strength hip ERR  4+/5, L 4/5.. Patient gets infusion for arthritis tommorow.  Continue per plan.    Plan  Continue with current plan of care    Exercises/Modalities/Manual:  See DocFlow Sheet    Education: On ex form       Goals  (Total # of Visits to Date: 4)   Short Term Goals  Time Frame for Short Term Goals: 8  Short Term Goal 1: Patient to be educated on and independent with HEP-met  Short Term Goal 2: Increase strength B hip ER 4+/5 for improved pelvic stability  Short Term Goal 3: Patient to report no incontinence of stool x1 wk    Long Term Goals  Time Frame for Long Term Goals : 10  Long Term Goal 1: Patient to have improve continence with Contience Grading scale <6/24 (from 10/24)    Treatment Tolerance:  Treatment Tolerance: Tolerated treatment well.    Post Treatment Pain:  8/10    Time In: 10:30    Time Out : 11:10        Timed Code Treatment Minutes: 40 Minutes  Total Treatment Time: 40 Minutes    Adrienne Moore, PT     Date: 2025

## 2025-05-08 ENCOUNTER — HOSPITAL ENCOUNTER (OUTPATIENT)
Dept: PHYSICAL THERAPY | Age: 80
Setting detail: THERAPIES SERIES
Discharge: HOME | End: 2025-05-08
Payer: MEDICARE

## 2025-05-08 PROCEDURE — 97110 THERAPEUTIC EXERCISES: CPT

## 2025-05-08 ASSESSMENT — PAIN SCALES - GENERAL: PAINLEVEL_OUTOF10: 4

## 2025-05-08 NOTE — PROGRESS NOTES
Phone: [phone]                 [facility]      Fax: [phone]                            Outpatient Physical Therapy                                                                            Daily Note    Date: 2025  Patient Name: Julio Pepper        MRN: [id number]   ACCT#:  [id number]  : 1945  (79 y.o.)    Referring Provider (secondary): TIM Barclay         Diagnosis: Irritable Bowel Syndrome  Treatment Diagnosis: Incontience of bowels, constipation, back pain    Onset Date: 25 (Referral)  PT Insurance Information: [employer], [employer]  Total # of Visits Approved: 10 Per Physician Order  Total # of Visits to Date: 5  Plan of Care/Certification Expiration Date: 25    Pre-Treatment Pain:  4/10     Assessment  Assessment: Pain in low back 4/10 , had infusion for arthritis yesterday. He notes no overall change in IBS symptoms. Performed ex/manual(<8 min) as outlined.  Pt with technique with HEP. Pt requests to cont 1 more week and then DC d/t will be out of town.    Plan  Continue with current plan of care    Exercises/Modalities/Manual:  See DocFlow Sheet    Education: cont hep            Goals  (Total # of Visits to Date: 5)   Short Term Goals  Time Frame for Short Term Goals: 8  Short Term Goal 1: Patient to be educated on and independent with HEP-met  Short Term Goal 2: Increase strength B hip ER 4+/5 for improved pelvic stability  Short Term Goal 3: Patient to report no incontinence of stool x1 wk    Long Term Goals  Time Frame for Long Term Goals : 10  Long Term Goal 1: Patient to have improve continence with Contience Grading scale </24 (from 10/24)    Treatment Tolerance:  Treatment Tolerance: Tolerated treatment well.    Post Treatment Pain:  4/10    Time In: 1030    Time Out : 1110        Timed Code Treatment Minutes: 40 Minutes  Total Treatment Time: 40 Minutes    Jessie Rubin  PTA   Date: 2025

## 2025-05-13 ENCOUNTER — HOSPITAL ENCOUNTER (OUTPATIENT)
Dept: PHYSICAL THERAPY | Age: 80
Setting detail: THERAPIES SERIES
Discharge: HOME | End: 2025-05-13
Payer: MEDICARE

## 2025-05-13 PROCEDURE — 97110 THERAPEUTIC EXERCISES: CPT

## 2025-05-13 ASSESSMENT — PAIN SCALES - GENERAL: PAINLEVEL_OUTOF10: 5

## 2025-05-13 NOTE — PROGRESS NOTES
Phone: [phone]                 [facility]      Fax: [phone]                            Outpatient Physical Therapy                                                                            Daily Note    Date: 2025  Patient Name: Julio Pepper        MRN: [id number]   ACCT#:  [id number]  : 1945  (79 y.o.)    Referring Provider (secondary): TIM Barclay         Diagnosis: Irritable Bowel Syndrome  Treatment Diagnosis: Incontience of bowels, constipation, back pain    Onset Date: 25 (Referral)  PT Insurance Information: [employer], [employer]  Total # of Visits Approved: 10 Per Physician Order  Total # of Visits to Date: 6  Plan of Care/Certification Expiration Date: 25    Pre-Treatment Pain:  5/10     Assessment  Assessment: Pt rates pain 5/10. Hip ER strength with MMT 4+/5.Continence of stool no more than 2 days. Performed ex as outlined. Pt issued continence scale to return for DC next visit.    Plan  Continue with current plan of care    Exercises/Modalities/Manual:  See DocFlow Sheet    Education: cont hep            Goals  (Total # of Visits to Date: 6)   Short Term Goals  Time Frame for Short Term Goals: 8  Short Term Goal 1: Patient to be educated on and independent with HEP-met  Short Term Goal 2: Increase strength B hip ER 4+/5 for improved pelvic stability-met  Short Term Goal 3: Patient to report no incontinence of stool x1 wk-not met    Long Term Goals  Time Frame for Long Term Goals : 10  Long Term Goal 1: Patient to have improve continence with Contience Grading scale <6/24 (from 10/24)    Treatment Tolerance:  Treatment Tolerance: Tolerated treatment well.    Post Treatment Pain:  5/10    Time In: 1025    Time Out : 1100   Timed and total 35 min    Jessie Rubin    PTA Date: 2025

## 2025-05-15 ENCOUNTER — HOSPITAL ENCOUNTER (OUTPATIENT)
Dept: PHYSICAL THERAPY | Age: 80
Setting detail: THERAPIES SERIES
Discharge: HOME | End: 2025-05-15
Payer: MEDICARE

## 2025-05-15 PROCEDURE — 97110 THERAPEUTIC EXERCISES: CPT

## 2025-05-15 ASSESSMENT — PAIN SCALES - GENERAL: PAINLEVEL_OUTOF10: 5

## 2025-05-15 NOTE — PROGRESS NOTES
Phone: [phone]                 [facility]      Fax: [phone]                            Outpatient Physical Therapy                                                                            Daily Note    Date: 5/15/2025  Patient Name: Julio Pepper        MRN: [id number]   ACCT#:  [id number]  : 1945  (79 y.o.)    Referring Provider (secondary): TIM Barclay         Diagnosis: Irritable Bowel Syndrome  Treatment Diagnosis: Incontience of bowels, constipation, back pain    Onset Date: 25 (Referral)  PT Insurance Information: [employer], [employer]  Total # of Visits Approved: 10 Per Physician Order  Total # of Visits to Date: 7  Plan of Care/Certification Expiration Date: 25    Pre-Treatment Pain:  5/10     Assessment  Assessment: Continence Grading scale  (pt forgot to bring back but remembers score). Reviewed HEP with good understanding. Pt with no questions. DC    Plan  Continue with current plan of care    Exercises/Modalities/Manual:  See DocFlow Sheet    Education: reviewed hep            Goals  (Total # of Visits to Date: 7)   Short Term Goals  Time Frame for Short Term Goals: 8  Short Term Goal 1: Patient to be educated on and independent with HEP-met  Short Term Goal 2: Increase strength B hip ER 4+/5 for improved pelvic stability-met  Short Term Goal 3: Patient to report no incontinence of stool x1 wk-not met    Long Term Goals  Time Frame for Long Term Goals : 10  Long Term Goal 1: Patient to have improve continence with Contience Grading scale <6/24 (from 10/24)-not met    Treatment Tolerance:  Treatment Tolerance: Tolerated treatment well.    Post Treatment Pain:  5/10    Time In: 1030    Time Out : 1055        Timed Code Treatment Minutes: 25 Minutes  Total Treatment Time: 25 Minutes    Jessie Rubin PTA    Date: 5/15/2025

## 2025-06-04 ENCOUNTER — OFFICE VISIT (OUTPATIENT)
Dept: FAMILY MEDICINE CLINIC | Age: 80
End: 2025-06-04

## 2025-06-04 VITALS
HEART RATE: 68 BPM | DIASTOLIC BLOOD PRESSURE: 64 MMHG | SYSTOLIC BLOOD PRESSURE: 128 MMHG | WEIGHT: 199 LBS | OXYGEN SATURATION: 97 % | BODY MASS INDEX: 28.55 KG/M2

## 2025-06-04 DIAGNOSIS — M25.561 ACUTE PAIN OF RIGHT KNEE: ICD-10-CM

## 2025-06-04 DIAGNOSIS — E03.9 ACQUIRED HYPOTHYROIDISM: ICD-10-CM

## 2025-06-04 DIAGNOSIS — N40.1 BPH WITH OBSTRUCTION/LOWER URINARY TRACT SYMPTOMS: ICD-10-CM

## 2025-06-04 DIAGNOSIS — E11.9 CONTROLLED TYPE 2 DIABETES MELLITUS WITHOUT COMPLICATION, WITHOUT LONG-TERM CURRENT USE OF INSULIN (HCC): Primary | ICD-10-CM

## 2025-06-04 DIAGNOSIS — I10 ESSENTIAL HYPERTENSION, BENIGN: ICD-10-CM

## 2025-06-04 DIAGNOSIS — K21.9 GASTROESOPHAGEAL REFLUX DISEASE WITHOUT ESOPHAGITIS: ICD-10-CM

## 2025-06-04 DIAGNOSIS — N13.8 BPH WITH OBSTRUCTION/LOWER URINARY TRACT SYMPTOMS: ICD-10-CM

## 2025-06-04 DIAGNOSIS — K58.8 OTHER IRRITABLE BOWEL SYNDROME: ICD-10-CM

## 2025-06-04 LAB — HBA1C MFR BLD: 5.9 %

## 2025-06-04 SDOH — ECONOMIC STABILITY: FOOD INSECURITY: WITHIN THE PAST 12 MONTHS, THE FOOD YOU BOUGHT JUST DIDN'T LAST AND YOU DIDN'T HAVE MONEY TO GET MORE.: NEVER TRUE

## 2025-06-04 SDOH — ECONOMIC STABILITY: FOOD INSECURITY: WITHIN THE PAST 12 MONTHS, YOU WORRIED THAT YOUR FOOD WOULD RUN OUT BEFORE YOU GOT MONEY TO BUY MORE.: NEVER TRUE

## 2025-06-04 ASSESSMENT — ENCOUNTER SYMPTOMS
SORE THROAT: 0
COUGH: 0
EYE DISCHARGE: 0
NAUSEA: 0
ABDOMINAL PAIN: 0
SHORTNESS OF BREATH: 0
DIARRHEA: 0
BLOOD IN STOOL: 0
EYE REDNESS: 0
CHOKING: 0
HEARTBURN: 0
TROUBLE SWALLOWING: 0
VOMITING: 0

## 2025-06-04 ASSESSMENT — PATIENT HEALTH QUESTIONNAIRE - PHQ9
1. LITTLE INTEREST OR PLEASURE IN DOING THINGS: NOT AT ALL
SUM OF ALL RESPONSES TO PHQ QUESTIONS 1-9: 0
2. FEELING DOWN, DEPRESSED OR HOPELESS: NOT AT ALL

## 2025-06-04 NOTE — PATIENT INSTRUCTIONS
SURVEY:    You may be receiving a survey from [employer] regarding your visit today.    Please complete the survey to enable us to provide the highest quality of care to you and your family.      Thank you.    Clinical Care Team: MD Damien Fuentes LPN              Triage: Katiana Brown CMA              Clerical Team: Katiana Mercedes

## 2025-06-04 NOTE — PROGRESS NOTES
HPI Notes    Name: Julio Pepper  : 1945        Chief Complaint:     Chief Complaint   Patient presents with    Gastroesophageal Reflux     6 month follow up    Diabetes Mellitus     Last A1C was 6.9 on 24. On HCC gap    Hypertension    Irritable Bowel Syndrome    Hypothyroidism    Benign Prostatic Hypertrophy    Knee Pain     Patient complains of left knee pain and swelling. Started a couple weeks ago.        History of Present Illness:     Julio Pepper is a 79 y.o.  male who presents with Gastroesophageal Reflux (6 month follow up), Diabetes Mellitus (Last A1C was 6.9 on 24. On HCC gap), Hypertension, Irritable Bowel Syndrome, Hypothyroidism, Benign Prostatic Hypertrophy, and Knee Pain (Patient complains of left knee pain and swelling. Started a couple weeks ago. )      Gastroesophageal Reflux  He reports no abdominal pain, no chest pain, no choking, no coughing, no dysphagia, no heartburn, no nausea or no sore throat. This is a chronic problem. The current episode started more than 1 year ago. The problem has been unchanged. Pertinent negatives include no fatigue or melena. Pt has been working to lose weight . He has tried a PPI for the symptoms. The treatment provided significant relief.   Hypertension  This is a chronic problem. The current episode started more than 1 year ago. The problem is unchanged. The problem is controlled. Pertinent negatives include no chest pain, headaches, palpitations, peripheral edema or shortness of breath. Risk factors for coronary artery disease include diabetes mellitus, dyslipidemia and male gender. The current treatment provides significant improvement.   Benign Prostatic Hypertrophy  This is a chronic problem. The current episode started more than 1 year ago. The problem is unchanged. Pertinent negatives include no chills, dysuria, hematuria, nausea or vomiting. Past treatments include tamsulosin. The treatment provided significant relief.

## 2025-06-12 ENCOUNTER — OFFICE VISIT (OUTPATIENT)
Dept: PAIN MANAGEMENT | Age: 80
End: 2025-06-12
Payer: MEDICARE

## 2025-06-12 VITALS
DIASTOLIC BLOOD PRESSURE: 62 MMHG | BODY MASS INDEX: 28.55 KG/M2 | SYSTOLIC BLOOD PRESSURE: 108 MMHG | WEIGHT: 199 LBS | OXYGEN SATURATION: 98 % | HEART RATE: 66 BPM

## 2025-06-12 DIAGNOSIS — Z79.891 CHRONIC USE OF OPIATE FOR THERAPEUTIC PURPOSE: Primary | ICD-10-CM

## 2025-06-12 DIAGNOSIS — M47.817 LUMBOSACRAL SPONDYLOSIS WITHOUT MYELOPATHY: ICD-10-CM

## 2025-06-12 DIAGNOSIS — M79.18 MYOFASCIAL PAIN SYNDROME: ICD-10-CM

## 2025-06-12 DIAGNOSIS — M51.369 DEGENERATION OF INTERVERTEBRAL DISC OF LUMBAR REGION, UNSPECIFIED WHETHER PAIN PRESENT: ICD-10-CM

## 2025-06-12 PROCEDURE — 1036F TOBACCO NON-USER: CPT | Performed by: STUDENT IN AN ORGANIZED HEALTH CARE EDUCATION/TRAINING PROGRAM

## 2025-06-12 PROCEDURE — 3074F SYST BP LT 130 MM HG: CPT | Performed by: STUDENT IN AN ORGANIZED HEALTH CARE EDUCATION/TRAINING PROGRAM

## 2025-06-12 PROCEDURE — 1159F MED LIST DOCD IN RCRD: CPT | Performed by: STUDENT IN AN ORGANIZED HEALTH CARE EDUCATION/TRAINING PROGRAM

## 2025-06-12 PROCEDURE — 1160F RVW MEDS BY RX/DR IN RCRD: CPT | Performed by: STUDENT IN AN ORGANIZED HEALTH CARE EDUCATION/TRAINING PROGRAM

## 2025-06-12 PROCEDURE — 1123F ACP DISCUSS/DSCN MKR DOCD: CPT | Performed by: STUDENT IN AN ORGANIZED HEALTH CARE EDUCATION/TRAINING PROGRAM

## 2025-06-12 PROCEDURE — G8427 DOCREV CUR MEDS BY ELIG CLIN: HCPCS | Performed by: STUDENT IN AN ORGANIZED HEALTH CARE EDUCATION/TRAINING PROGRAM

## 2025-06-12 PROCEDURE — 99214 OFFICE O/P EST MOD 30 MIN: CPT | Performed by: STUDENT IN AN ORGANIZED HEALTH CARE EDUCATION/TRAINING PROGRAM

## 2025-06-12 PROCEDURE — 3078F DIAST BP <80 MM HG: CPT | Performed by: STUDENT IN AN ORGANIZED HEALTH CARE EDUCATION/TRAINING PROGRAM

## 2025-06-12 PROCEDURE — G8417 CALC BMI ABV UP PARAM F/U: HCPCS | Performed by: STUDENT IN AN ORGANIZED HEALTH CARE EDUCATION/TRAINING PROGRAM

## 2025-06-12 RX ORDER — HYDROCODONE BITARTRATE AND ACETAMINOPHEN 5; 325 MG/1; MG/1
1 TABLET ORAL EVERY 6 HOURS PRN
Qty: 120 TABLET | Refills: 0 | Status: SHIPPED | OUTPATIENT
Start: 2025-06-12 | End: 2025-07-12

## 2025-06-12 NOTE — PATIENT INSTRUCTIONS
SURVEY:    You may be receiving a survey from [facility] regarding your visit today.    Please complete the survey to enable us to provide the highest quality of care to you and your family.    If you cannot score us a very good on any question, please call the office to discuss how we could have made your experience a very good one.    Thank you.    [facility]  MD Jay Wick, DO Karis Lemons, CNP  Sherman Jim, MD Alee Jane, APRN-CNP  Rosalina Juan, Practice Manager  Ann-Marie, KAROL Wilde, CCMCHELI Prado, CMA  Leann, CMA  Marco, PSC   Kathryn, LPN  Jacqueline, CMA

## 2025-06-12 NOTE — H&P (VIEW-ONLY)
2/26/2021    EYE SURGERY      cataract bilateral    GASTRIC FUNDOPLICATION  2000    HEMORRHOID SURGERY      HERNIA REPAIR      Hiatal Hernia repair, Esophageal repair     HERNIA REPAIR Bilateral     Inguinal     PAIN MANAGEMENT PROCEDURE Bilateral 09/17/2021    BILATERAL L2,L3,L4 MEDIAL BRANCH L5 DORSAL RAMI INJECTION performed by Abdon Pabon MD at [facility] OR    PAIN MANAGEMENT PROCEDURE N/A 11/04/2021    BILATERAL L2-3-4-5 MEDIAL BRANCH BLOCK performed by Abdon Pabon MD at [facility] OR    PAIN MANAGEMENT PROCEDURE Bilateral 12/03/2021    LUMBAR RFA L 2-3-4-5 BILATERAL MEDIAL BRANCH RFA performed by Abdon Pabon MD at [facility] OR    PAIN MANAGEMENT PROCEDURE Bilateral 12/08/2022    BILATERAL LUMBAR L3, L4M L5 MEDIAL BRANCH RADIOFREQUENCY ABLATION performed by Jay Davis DO at [facility] OR    PAIN MANAGEMENT PROCEDURE Bilateral 11/30/2023    Bilateral Lumbar L3, L4, L5 Medial Branch Radiofrequency Ablation performed by Jay Davis DO at [facility] OR    PAIN MANAGEMENT PROCEDURE Bilateral 10/24/2024    BILATERAL LUMBAR L3, L4, L5 RADIOFREQUENCY ABLATION performed by Jay Davis DO at [facility] OR    MN LASER VAPORIZATION OF PROSTATE FOR URINE FLOW N/A 01/25/2018    CYSTOSCOPY TRANSURETHRAL RESECTION PROSTATE LASER performed by Chivo Drake MD at [facility] OR    SHOULDER ARTHROSCOPY Right 10/14/2020    RIGHT SHOULDER ARTHROSCPY PROBABLE RCR.  Shoulder Scope Subacromial Decompression.  Partial Distal Claviclectomy.  Debreidement.  Mini RCR Open.  Bicep Tenodecis performed by Ziyad Quick DO at [facility] OR    SHOULDER ARTHROSCOPY Left 2021    TONSILLECTOMY      UPPER GASTROINTESTINAL ENDOSCOPY      UPPER GASTROINTESTINAL ENDOSCOPY  09/09/2014    Dr. Jordan     UPPER GASTROINTESTINAL ENDOSCOPY  12/14/2016    Teddy JAIMES       Family History   Problem Relation Age of Onset    Cancer Mother         bone    Heart Disease Father         MI       Social History     Socioeconomic History    Marital  dilTIAZem (TIAZAC) 180 mg, DAILY    donepezil (ARICEPT) 10 mg, Oral, NIGHTLY    Eliquis 5 mg, 2 TIMES DAILY    gabapentin (NEURONTIN) 600 mg, Oral, 3 times daily    HYDROcodone-acetaminophen (NORCO) 5-325 MG per tablet 1 tablet, Oral, EVERY 6 HOURS PRN, Intended supply: 30 days. Take lowest dose possible to manage pain    hydrocortisone 2.5 % cream 2 TIMES DAILY    hydroxychloroquine (PLAQUENIL) 200 mg tablet TAKE 1 TABLET BY MOUTH TWICE A DAY    inFLIXimab (REMICADE) 100 MG injection 5 mg/kg, IntraVENous, SEE ADMIN INSTRUCTIONS, Every 6 weeks     leucovorin calcium (WELLCOVORIN) 5 mg, WEEKLY    levothyroxine (SYNTHROID) 100 mcg, Oral, DAILY    lidocaine (LMX) 4 % cream PRN    methotrexate (RHEUMATREX) 2.5 MG chemo tablet 10 tablets once a week    metoprolol tartrate (LOPRESSOR) 50 mg, 2 TIMES DAILY    Multiple Vitamins-Minerals (MULTI COMPLETE PO) Take by mouth    pantoprazole (PROTONIX) 40 MG tablet TAKE 1 TABLET TWICE DAILY    polyethylene glycol (GLYCOLAX) 17 g, PRN    simvastatin (ZOCOR) 20 MG tablet TAKE 1 TABLET EVERY NIGHT    tamsulosin (FLOMAX) 0.4 mg, Oral, 2 TIMES DAILY       Allergies   Allergen Reactions    Cortisone Other (See Comments)     Irritates \"GERD\"     Other reaction(s): Unknown    Other Reaction(s): Unknown    Adhesive Tape Rash     Other reaction(s): Unknown    Wound Dressing Adhesive      Redness, irritation       Review of Systems:   Musculoskeletal: positive for low back pain  Neurological: negative for radicular leg pain, leg weakness or numbness/tingling    OBJECTIVE:    Vitals:    06/12/25 0906   BP: 108/62   Pulse: 66   SpO2: 98%         PHYSICAL EXAM    GENERAL: No acute distress, pleasant, well-appearing  HEENT: Normocephalic, atraumatic, Pupils equal and round  CARDIOVASCULAR: Well perfused, No peripheral cyanosis  PULMONARY: Good chest wall excursion, breathing unlabored  PSYCH: Appropriate affect and insight, non-pressured speech  SKIN: No rashes or

## 2025-06-12 NOTE — PROGRESS NOTES
Intended supply: 30 days. Take lowest dose possible to manage pain Max Daily Amount: 4 tablets     Dispense:  120 tablet     Refill:  0     Reduce doses taken as pain becomes manageable

## 2025-06-22 DIAGNOSIS — N40.1 BPH WITH OBSTRUCTION/LOWER URINARY TRACT SYMPTOMS: ICD-10-CM

## 2025-06-22 DIAGNOSIS — N13.8 BPH WITH OBSTRUCTION/LOWER URINARY TRACT SYMPTOMS: ICD-10-CM

## 2025-06-23 RX ORDER — TAMSULOSIN HYDROCHLORIDE 0.4 MG/1
0.4 CAPSULE ORAL 2 TIMES DAILY
Qty: 180 CAPSULE | Refills: 0 | Status: SHIPPED | OUTPATIENT
Start: 2025-06-23

## 2025-06-28 DIAGNOSIS — K21.9 GASTROESOPHAGEAL REFLUX DISEASE WITHOUT ESOPHAGITIS: ICD-10-CM

## 2025-06-30 RX ORDER — SIMVASTATIN 20 MG
20 TABLET ORAL NIGHTLY
Qty: 90 TABLET | Refills: 3 | Status: SHIPPED | OUTPATIENT
Start: 2025-06-30

## 2025-06-30 RX ORDER — PANTOPRAZOLE SODIUM 40 MG/1
40 TABLET, DELAYED RELEASE ORAL 2 TIMES DAILY
Qty: 180 TABLET | Refills: 3 | Status: SHIPPED | OUTPATIENT
Start: 2025-06-30

## 2025-06-30 NOTE — TELEPHONE ENCOUNTER
Last OV 6/4/25 for chronics  Requesting refill on pantoprazole and simvastatin via sure script  Next OV 12/8/25

## 2025-07-03 ENCOUNTER — HOSPITAL ENCOUNTER (OUTPATIENT)
Age: 80
Setting detail: OUTPATIENT SURGERY
Discharge: HOME | End: 2025-07-03
Attending: STUDENT IN AN ORGANIZED HEALTH CARE EDUCATION/TRAINING PROGRAM | Admitting: STUDENT IN AN ORGANIZED HEALTH CARE EDUCATION/TRAINING PROGRAM
Payer: MEDICARE

## 2025-07-03 ENCOUNTER — APPOINTMENT (OUTPATIENT)
Dept: GENERAL RADIOLOGY | Age: 80
End: 2025-07-03
Attending: STUDENT IN AN ORGANIZED HEALTH CARE EDUCATION/TRAINING PROGRAM
Payer: MEDICARE

## 2025-07-03 VITALS
BODY MASS INDEX: 29.55 KG/M2 | WEIGHT: 195 LBS | HEART RATE: 63 BPM | RESPIRATION RATE: 19 BRPM | HEIGHT: 68 IN | DIASTOLIC BLOOD PRESSURE: 70 MMHG | OXYGEN SATURATION: 96 % | SYSTOLIC BLOOD PRESSURE: 129 MMHG | TEMPERATURE: 98.6 F

## 2025-07-03 PROCEDURE — 7100000010 HC PHASE II RECOVERY - FIRST 15 MIN: Performed by: STUDENT IN AN ORGANIZED HEALTH CARE EDUCATION/TRAINING PROGRAM

## 2025-07-03 PROCEDURE — 3600000058 HC PAIN LEVEL 5 BASE: Performed by: STUDENT IN AN ORGANIZED HEALTH CARE EDUCATION/TRAINING PROGRAM

## 2025-07-03 PROCEDURE — 6360000002 HC RX W HCPCS: Performed by: STUDENT IN AN ORGANIZED HEALTH CARE EDUCATION/TRAINING PROGRAM

## 2025-07-03 PROCEDURE — 99152 MOD SED SAME PHYS/QHP 5/>YRS: CPT | Performed by: STUDENT IN AN ORGANIZED HEALTH CARE EDUCATION/TRAINING PROGRAM

## 2025-07-03 PROCEDURE — 64635 DESTROY LUMB/SAC FACET JNT: CPT | Performed by: STUDENT IN AN ORGANIZED HEALTH CARE EDUCATION/TRAINING PROGRAM

## 2025-07-03 PROCEDURE — 2709999900 HC NON-CHARGEABLE SUPPLY: Performed by: STUDENT IN AN ORGANIZED HEALTH CARE EDUCATION/TRAINING PROGRAM

## 2025-07-03 PROCEDURE — 64636 DESTROY L/S FACET JNT ADDL: CPT | Performed by: STUDENT IN AN ORGANIZED HEALTH CARE EDUCATION/TRAINING PROGRAM

## 2025-07-03 PROCEDURE — 7100000011 HC PHASE II RECOVERY - ADDTL 15 MIN: Performed by: STUDENT IN AN ORGANIZED HEALTH CARE EDUCATION/TRAINING PROGRAM

## 2025-07-03 PROCEDURE — 3600000059 HC PAIN LEVEL 5 ADDL 15 MIN: Performed by: STUDENT IN AN ORGANIZED HEALTH CARE EDUCATION/TRAINING PROGRAM

## 2025-07-03 RX ORDER — TRIAMCINOLONE ACETONIDE 40 MG/ML
INJECTION, SUSPENSION INTRA-ARTICULAR; INTRAMUSCULAR PRN
Status: DISCONTINUED | OUTPATIENT
Start: 2025-07-03 | End: 2025-07-03 | Stop reason: ALTCHOICE

## 2025-07-03 RX ORDER — LIDOCAINE HYDROCHLORIDE 10 MG/ML
INJECTION, SOLUTION EPIDURAL; INFILTRATION; INTRACAUDAL; PERINEURAL PRN
Status: DISCONTINUED | OUTPATIENT
Start: 2025-07-03 | End: 2025-07-03 | Stop reason: ALTCHOICE

## 2025-07-03 RX ORDER — LIDOCAINE HYDROCHLORIDE 20 MG/ML
INJECTION, SOLUTION EPIDURAL; INFILTRATION; INTRACAUDAL; PERINEURAL PRN
Status: DISCONTINUED | OUTPATIENT
Start: 2025-07-03 | End: 2025-07-03 | Stop reason: ALTCHOICE

## 2025-07-03 RX ORDER — MIDAZOLAM HYDROCHLORIDE 1 MG/ML
INJECTION, SOLUTION INTRAMUSCULAR; INTRAVENOUS PRN
Status: DISCONTINUED | OUTPATIENT
Start: 2025-07-03 | End: 2025-07-03 | Stop reason: ALTCHOICE

## 2025-07-03 ASSESSMENT — PAIN SCALES - GENERAL: PAINLEVEL_OUTOF10: 0

## 2025-07-03 ASSESSMENT — PAIN DESCRIPTION - DESCRIPTORS: DESCRIPTORS: DISCOMFORT;ACHING

## 2025-07-03 ASSESSMENT — PAIN - FUNCTIONAL ASSESSMENT
PAIN_FUNCTIONAL_ASSESSMENT: 0-10
PAIN_FUNCTIONAL_ASSESSMENT: PREVENTS OR INTERFERES SOME ACTIVE ACTIVITIES AND ADLS

## 2025-07-03 NOTE — INTERVAL H&P NOTE
Update History & Physical    The patient's History and Physical of June 12, 2025 was reviewed with the patient and I examined the patient. There was no change. The surgical site was confirmed by the patient and me.     Plan: The risks, benefits, expected outcome, and alternative to the recommended procedure have been discussed with the patient. Patient understands and wants to proceed with the procedure.     ASA CLASSIFICATION  2  MP   CLASSIFICATION  2    Electronically signed by Jay Davis DO on 7/3/2025 at 1:16 PM

## 2025-07-03 NOTE — OP NOTE
PROCEDURE PERFORMED: Bilateral Lumbar Medial Branch Radiofrequency Ablation using Fluoroscopy    PREOPERATIVE DIAGNOSIS: Lumbosacral spondylosis    INDICATIONS: Chronic low back pain    The patient's history and physical exam were reviewed.  The risk, benefits, and alternatives of the procedure were discussed and all questions were answered to the patient's satisfaction.  The patient agreed to proceed and written informed consent was obtained.    POSTOPERATIVE DIAGNOSIS: Lumbosacral spondylosis    PHYSICIAN:  Dr. Jay Davis DO    ANESTHESIA:  LOCAL    ASSISTANT:  NONE    PATHOLOGY:  NONE    ESTIMATED BLOOD LOSS:  N/A    IMPLANTS:  NONE    PROCEDURE DESCRIPTION: Bilateral lumbar medial branch radiofrequency ablation using fluoroscopy    The patient was placed on the operative bed in prone position.  The area was prepped with  Chlorhexidine.  The area was then draped in a sterile fashion.    Targeted levels: Bilateral lumbar L3, L4, L5 medial branch radiofrequency ablation    An AP  fluoroscopy image was used to identify and kasey Chambers's point at the L3, L4 levels on the targeted side.  Additionally, the junction of the SAP and sacral ala was also marked on the same side.  The skin and subcutaneous tissues were then anesthetized with 1% lidocaine. At each lumbar medial branch, a 20-gauge, 100 mm curved with 10 mm active tip probe was advanced under AP fluoroscopic projections until bone was contacted along the medial aspect of the transverse process.  Aspiration of each needle was negative for blood, CSF and paresthesia prior to injection.   Motor stimulation at 2 Hz and 1.2 V was negative.  Then, after negative aspiration, 1 mL lidocaine 2% was injected at each level prior to lesioning which was performed at 80°C for 90 seconds.  Once the lesion was complete, injectate solution containing Kenalog 40 mg and 2 mL lidocaine 1% was injected at each site with a total of 1 mL.  The probes were then removed. The

## 2025-07-11 NOTE — TELEPHONE ENCOUNTER
Acute Care - Speech Language Pathology   Swallow Re-Evaluation [facility]     Patient Name: Lorena Valdes  : 1939  MRN: [id number]  Today's Date: 2025               Admit Date: 2025  SPEECH-LANGUAGE PATHOLOGY EVALUTION - MBS/VFSS  Subjective: The patient was seen on this date for a VFSS(Videofluoroscopic Swallowing Study).  Patient was alert and cooperative.    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 Presented to the hospital due to dysphagia. S/p G-tube placement on 7/10.   Objective: Risks/benefits were reviewed with the patient and family, and consent was obtained. Oral motor examination results: generalized weakness. The study was completed with SLP and Radiologist present. The patient was seen in lateral view(s). Textures given included nectar thick liquid, honey thick liquid, and puree consistency.  Assessment: Difficulties were noted with nectar thick liquid, honey thick liquid, and puree consistency, characterized by penetration, aspiration, and significant pharyngeal residue.   Observations:       Esophageal screen was not performed; however, retention is noted in the upper esophagus across trials.   SLP Findings: Patient presents with profound pharyngeal dysphagia with esophageal component.   Comments: Discussion with patient and family regarding her wishes. High risk for aspiration with any consistency. Patient may be able to attempt small amounts of puree with recommended compensations and without adverse effect in medical status.   Recommendations: Diet Textures: NPO with long term alternate means of nutrition. Medications should be taken  by alternate means. May have ice chips between meals and/or after oral care, under staff or family supervision and with the recommended strategies for safe swallowing.  Recommended Strategies: Upright for PO, small bites/sips, reflux precautions, and at least 3 extra  Last appt   8/29/2024   Next appt   9/4/2025     Medication is active on current medication list.       swallows per bite/sip.  Oral care at least 3x per day but also prior to any PO intake for pleasure/comfort.  Other Recommended Evaluations: None    Dysphagia therapy is recommended.      Carmelita Pizarro MS CCC-SLP 7/11/2025 11:02 CDT    Visit Dx:     ICD-10-CM ICD-9-CM   1. Dysphagia, unspecified type  R13.10 787.20   2. Failure to thrive in adult  R62.7 783.7   3. Pharyngoesophageal dysphagia [R13.14]  R13.14 787.24     Patient Active Problem List   Diagnosis    Malignant neoplasm of central portion of left female breast    Malignant neoplasm of upper-outer quadrant of right female breast    Osteopenia    Encounter for care related to vascular access port    Colon cancer screening    Chronic kidney disease, stage 3b    Diabetic renal disease    Hypertensive renal disease    Estrogen receptor negative tumor status    Squamous cell esophageal cancer    Weight loss    Dysphagia    Severe protein-calorie malnutrition     Past Medical History:   Diagnosis Date    Bladder disorder     Chronic kidney disease, stage 3b     Diverticulosis     Emphysema lung     Fibrocystic disease of breast     CONNIE (generalized anxiety disorder)     GERD (gastroesophageal reflux disease)     Heart disease     History of bone density study 2011    History of colon polyps     Hyperglycemia     Hyperlipidemia     Hypotension     Left ventricular diastolic dysfunction     Malignant neoplasm of central portion of left female breast 11/09/2016    Osteopenia 08/29/2017    Restrictive lung disease     Type 2 diabetes mellitus     Uterine prolapse      Past Surgical History:   Procedure Laterality Date    BREAST LUMPECTOMY  2008    CATARACT EXTRACTION Right 2021    COLONOSCOPY  09/16/2010    Diverticulosis; Repeat 10 years    COLONOSCOPY N/A 11/11/2020    One 6mm inflamed hyperplastic polyp in the cecum; Diverticulosis in the left colon; Non-bleeding internal hemorrhoids; Repeat 5 years    ENDOSCOPY N/A 06/12/2025    Exophytic mass found in the  larynx, not obstructing the airway; Partially obstructing, rule out malignancy, esophageal tumor was found in the proximal esophagus-biopsied; Medium-sized HH; Normal stomach; Two non-bleeding angiodysplastic lesions in the duodenum-biopsied    ENDOSCOPY W/ PEG TUBE PLACEMENT N/A 7/10/2025    Procedure: ESOPHAGOGASTRODUODENOSCOPY WITH PERCUTANEOUS ENDOSCOPIC GASTROSTOMY TUBE INSERTION WITH ANESTHESIA;  Surgeon: Christine Valdovinos MD;  Location: [facility] ENDOSCOPY;  Service: Gastroenterology;  Laterality: N/A;  pre op:peg placement  post op: esophageal mass  PCP:Luly Diez MD    GTUBE INSERTION N/A 7/10/2025    Procedure: LAPAROSCOPIC GASTROSTOMY FEEDING TUBE WITH DAVINCI ROBOT;  Surgeon: Willie Weber MD;  Location: [facility] OR;  Service: Robotics - DaVinci;  Laterality: N/A;    MAMMO BILATERAL  07/17/2013    Dr. Sabillon    MASTECTOMY Left 08/28/2008    PAP SMEAR  2010    SKIN CANCER EXCISION         SLP Recommendation and Plan  SLP Swallowing Diagnosis: mild, oral dysphagia, profound, pharyngeal dysphagia, esophageal dysphagia (07/11/25 0959)  SLP Diet Recommendation: NPO, ice chips between meals after oral care, with supervision, long term alternate methods of nutrition/hydration (comfort PO if patient wishes to pursue) (07/11/25 0959)  Recommended Precautions and Strategies: upright posture during/after eating, small bites of food and sips of liquid, general aspiration precautions, fatigue precautions, volitional throat clear, multiple swallows per bite of food, reflux precautions (07/11/25 0959)  SLP Rec. for Method of Medication Administration: meds via alternate route (07/11/25 0959)     Monitor for Signs of Aspiration: yes, notify SLP if any concerns (07/11/25 0959)  Recommended Diagnostics: VFSS (MBS) (07/11/25 0700)  Swallow Criteria for Skilled Therapeutic Interventions Met: demonstrates skilled criteria (07/11/25 0959)  Anticipated Discharge Disposition (SLP): unknown (07/11/25 0959)  Rehab  Potential/Prognosis, Swallowing: adequate, monitor progress closely (07/11/25 0959)  Therapy Frequency (Swallow): PRN (07/11/25 0959)  Predicted Duration Therapy Intervention (Days): until discharge (07/11/25 0959)  Oral Care Recommendations: Oral Care BID/PRN, Toothbrush (07/11/25 0959)                                        Progress: no change      SWALLOW EVALUATION (Last 72 Hours)       SLP Adult Swallow Evaluation       Row Name 07/11/25 0959 07/11/25 0700 07/10/25 0850             Rehab Evaluation    Document Type re-evaluation  -MG evaluation  -MG --      Subjective Information no complaints  -MG no complaints  -MG --      Patient Observations alert;cooperative;agree to therapy  -MG alert;cooperative;agree to therapy  -MG --      Patient/Family/Caregiver Comments/Observations -- Daughter present  -MG --      Session Not Performed -- -- patient unavailable for evaluation  -MG      Patient Effort good  -MG good  -MG --      Comment -- -- NPO for possible procedure  -MG      Symptoms Noted During/After Treatment none  -MG none  -MG --         General Information    Patient Profile Reviewed yes  -MG yes  -MG --      Pertinent History Of Current Problem 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 Presented to the hospital due to dysphagia. S/p G-tube placement on 7/10.  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 Presented to the hospital due to dysphagia. S/p G-tube placement on 7/10.  -MG --      Current Method of Nutrition NPO  -MG NPO  -MG --      Precautions/Limitations, Vision WFL;for purposes of eval  -MG WFL;for purposes of eval  -MG --      Precautions/Limitations, Hearing WFL;for purposes of eval  -MG WFL;for purposes of eval  -MG --      Prior Level of Function-Communication WFL  -MG WFL  -MG --      Prior Level of Function-Swallowing no diet consistency restrictions  -MG no diet consistency  restrictions  -MG --      Plans/Goals Discussed with patient;agreed upon  -MG patient and family;agreed upon  -MG --      Barriers to Rehab medically complex  -MG medically complex  -MG --      Patient's Goals for Discharge return to PO diet  -MG return to PO diet  -MG --      Family Goals for Discharge -- family did not state  -MG --         Pain    Additional Documentation Pain Scale: FACES Pre/Post-Treatment (Group)  -MG Pain Scale: FACES Pre/Post-Treatment (Group)  -MG --         Pain Scale: FACES Pre/Post-Treatment    Pain: FACES Scale, Pretreatment 0-->no hurt  -MG 0-->no hurt  -MG --      Posttreatment Pain Rating 0-->no hurt  -MG 0-->no hurt  -MG --         Oral Motor Structure and Function    Dentition Assessment missing teeth;teeth are in poor condition  -MG missing teeth;teeth are in poor condition  -MG --      Secretion Management problems swallowing secretions;wet vocal quality;requires suctioning to control secretions  -MG problems swallowing secretions;wet vocal quality;requires suctioning to control secretions  -MG --      Mucosal Quality dry  -MG dry  -MG --      Volitional Swallow delayed;weak  -MG delayed;weak  -MG --      Volitional Cough WFL  -MG WFL  -MG --         Oral Musculature and Cranial Nerve Assessment    Oral Motor General Assessment generalized oral motor weakness  -MG generalized oral motor weakness  -MG --         General Eating/Swallowing Observations    Respiratory Support Currently in Use -- nasal cannula  -MG --      O2 Liters -- 2L  -MG --      Eating/Swallowing Skills -- fed by SLP  -MG --      Positioning During Eating -- upright in bed  -MG --      Utensils Used -- spoon  -MG --      Consistencies Trialed -- ice chips;pureed  -MG --         Clinical Swallow Eval    Oral Prep Phase -- WFL  -MG --      Oral Transit -- WFL  -MG --      Oral Residue -- WFL  -MG --      Pharyngeal Phase -- suspected pharyngeal impairment  -MG --      Esophageal Phase -- suspected esophageal  impairment  -MG --      Clinical Swallow Evaluation Summary -- See note  -MG --         Pharyngeal Phase Concerns    Pharyngeal Phase Concerns -- multiple swallows;throat clear;wet vocal quality  -MG --      Wet Vocal Quality -- thin  -MG --      Multiple Swallows -- thin;pudding  -MG --      Throat Clear -- thin  -MG --         Esophageal Phase Concerns    Esophageal Phase Concerns -- sensation of material sticking  -MG --      Sensation of Material Sticking -- pudding  -MG --         MBS/VFSS    Utensils Used spoon  -MG -- --      Consistencies Trialed honey-thick liquids;nectar/syrup-thick liquids;pureed  -MG -- --         MBS/VFSS Interpretation    Oral Prep Phase WFL  -MG -- --      Oral Transit Phase WFL  -MG -- --      Oral Residue WFL  -MG -- --      VFSS Summary See note  -MG -- --         Initiation of Pharyngeal Swallow    Initiation of Pharyngeal Swallow WFL  -MG -- --      Pharyngeal Phase impaired pharyngeal phase of swallowing  -MG -- --         Esophageal Phase    Esophageal Phase esophageal retention with retrograde flow through PES  -MG -- --         SLP Evaluation Clinical Impression    SLP Swallowing Diagnosis mild;oral dysphagia;profound;pharyngeal dysphagia;esophageal dysphagia  -MG functional oral phase;suspected pharyngeal dysphagia;suspected esophageal dysphagia  -MG --      Functional Impact risk of aspiration/pneumonia  -MG risk of aspiration/pneumonia  -MG --      Rehab Potential/Prognosis, Swallowing adequate, monitor progress closely  -MG adequate, monitor progress closely  -MG --      Swallow Criteria for Skilled Therapeutic Interventions Met demonstrates skilled criteria  -MG demonstrates skilled criteria  -MG --         Recommendations    Therapy Frequency (Swallow) PRN  -MG PRN  -MG --      Predicted Duration Therapy Intervention (Days) until discharge  -MG until discharge  -MG --      SLP Diet Recommendation NPO;ice chips between meals after oral care, with supervision;long term  alternate methods of nutrition/hydration  comfort PO if patient wishes to pursue  -MG NPO;ice chips between meals after oral care, with supervision;long term alternate methods of nutrition/hydration  -MG --      Recommended Diagnostics -- VFSS (MBS)  -MG --      Recommended Precautions and Strategies upright posture during/after eating;small bites of food and sips of liquid;general aspiration precautions;fatigue precautions;volitional throat clear;multiple swallows per bite of food;reflux precautions  -MG upright posture during/after eating;small bites of food and sips of liquid;general aspiration precautions;fatigue precautions;volitional throat clear  -MG --      Oral Care Recommendations Oral Care BID/PRN;Toothbrush  -MG Oral Care BID/PRN;Suction toothbrush  -MG --      SLP Rec. for Method of Medication Administration meds via alternate route  -MG meds via alternate route  -MG --      Monitor for Signs of Aspiration yes;notify SLP if any concerns  -MG yes;notify SLP if any concerns  -MG --      Anticipated Discharge Disposition (SLP) unknown  -MG unknown  -MG --         Swallow Goals (SLP)    Swallow LTGs Swallow Long Term Goal (free text)  -MG Swallow Long Term Goal (free text)  -MG --      Swallow STGs diet tolerance goal selection (SLP);swallow compensatory strategies goal selection (SLP)  -MG diet tolerance goal selection (SLP)  -MG --      Diet Tolerance Goal Selection (SLP) Patient will tolerate trials of  -MG Patient will tolerate trials of  -MG --      Swallow Compensatory Strategies Goal Selection (SLP) swallow compensatory strategies, SLP goal 1  -MG -- --         (LTG) Swallow    (LTG) Swallow Patient will tolerate least restrictive diet for pleasure PO without adverse effects.  -MG Patient will tolerate least restrictive diet for pleasure PO without adverse effects.  -MG --      [place] (Swallow Long Term Goal) independently (over 90% accuracy)  -MG independently (over 90% accuracy)  -MG --       Time Frame (Swallow Long Term Goal) by discharge  -MG by discharge  -MG --      Barriers (Swallow Long Term Goal) medically complex  -MG medically complex  -MG --      Progress/Outcomes (Swallow Long Term Goal) new goal  -MG new goal  -MG --         (STG) Patient will tolerate trials of    Consistencies Trialed (Tolerate trials) pureed textures;pudding/ extremely thick liquids  -MG pureed textures;thin liquids  -MG --      Desired Outcome (Tolerate trials) without signs/symptoms of aspiration;without signs of distress;with adequate oral prep/transit/clearance;for pleasure/comfort;with use of compensatory strategies (see comments)  3x multiple swallow with each  -MG without signs/symptoms of aspiration;without signs of distress;with adequate oral prep/transit/clearance  -MG --      [place] (Tolerate trials) independently (over 90% accuracy)  -MG independently (over 90% accuracy)  -MG --      Time Frame (Tolerate trials) by discharge  -MG by discharge  -MG --      Progress/Outcomes (Tolerate trials) new goal  -MG new goal  -MG --         (STG) Swallow Compensatory Strategies Goal 1 (SLP)    Activity (Swallow Compensatory Strategies/Techniques Goal 1, SLP) aspiration precautions;reflux precautions;compensatory strategies;postural techniques;small bites;effortful swallow;extra swallow per bolus;throat clear/extra swallow  -MG -- --      [place]/Accuracy (Swallow Compensatory Strategies/Techniques Goal 1, SLP) independently (over 90% accuracy)  -MG -- --      Time Frame (Swallow Compensatory Strategies/Techniques Goal 1, SLP) by discharge  -MG -- --      Barriers (Swallow Compensatory Strategies/Techniques Goal 1, SLP) none  -MG -- --      Progress/Outcomes (Swallow Compensatory Strategies/Techniques Goal 1, SLP) new goal  -MG -- --                User Key  (r) = Recorded By, (t) = Taken By, (c) = Cosigned By      Initials Name Effective Dates    MG Carmelita Pizarro MS [facility]SLP 07/11/23 -                      EDUCATION  The patient has been educated in the following areas:   Dysphagia (Swallowing Impairment) Oral Care/Hydration NPO rationale.        SLP GOALS       Row Name 07/11/25 0959 07/11/25 0700          (LTG) Swallow    (LTG) Swallow Patient will tolerate least restrictive diet for pleasure PO without adverse effects.  -MG Patient will tolerate least restrictive diet for pleasure PO without adverse effects.  -MG     [place] (Swallow Long Term Goal) independently (over 90% accuracy)  -MG independently (over 90% accuracy)  -MG     Time Frame (Swallow Long Term Goal) by discharge  -MG by discharge  -MG     Barriers (Swallow Long Term Goal) medically complex  -MG medically complex  -MG     Progress/Outcomes (Swallow Long Term Goal) new goal  -MG new goal  -MG        (STG) Patient will tolerate trials of    Consistencies Trialed (Tolerate trials) pureed textures;pudding/ extremely thick liquids  -MG pureed textures;thin liquids  -MG     Desired Outcome (Tolerate trials) without signs/symptoms of aspiration;without signs of distress;with adequate oral prep/transit/clearance;for pleasure/comfort;with use of compensatory strategies (see comments)  3x multiple swallow with each  -MG without signs/symptoms of aspiration;without signs of distress;with adequate oral prep/transit/clearance  -MG     [place] (Tolerate trials) independently (over 90% accuracy)  -MG independently (over 90% accuracy)  -MG     Time Frame (Tolerate trials) by discharge  -MG by discharge  -MG     Progress/Outcomes (Tolerate trials) new goal  -MG new goal  -MG        (STG) Swallow Compensatory Strategies Goal 1 (SLP)    Activity (Swallow Compensatory Strategies/Techniques Goal 1, SLP) aspiration precautions;reflux precautions;compensatory strategies;postural techniques;small bites;effortful swallow;extra swallow per bolus;throat clear/extra swallow  -MG --     [place]/Accuracy (Swallow Compensatory Strategies/Techniques Goal 1,  SLP) independently (over 90% accuracy)  -MG --     Time Frame (Swallow Compensatory Strategies/Techniques Goal 1, SLP) by discharge  -MG --     Barriers (Swallow Compensatory Strategies/Techniques Goal 1, SLP) none  -MG --     Progress/Outcomes (Swallow Compensatory Strategies/Techniques Goal 1, SLP) new goal  -MG --               User Key  (r) = Recorded By, (t) = Taken By, (c) = Cosigned By      Initials Name Provider Type    Carmelita Roca MS CCC-SLP Speech and Language Pathologist                         Time Calculation:    Time Calculation- SLP       Row Name 07/11/25 1101 07/11/25 0826          Time Calculation- SLP    SLP Start Time 0959  -MG 0700  -MG     SLP Stop Time 1110  -MG 0826  -MG     SLP Time Calculation (min) 71 min  -MG 86 min  -MG     SLP Received On 07/11/25  -MG 07/11/25  -MG     SLP Goal Re-Cert Due Date 07/21/25  -MG 07/21/25  -MG        Untimed Charges    SLP Eval/Re-eval  -- ST Eval Oral Pharyng Swallow - 58836  -MG     08445-GL Eval Oral Pharyng Swallow Minutes -- 86  -MG     62236-XZ Motion Fluoro Eval Swallow Minutes 71  -MG --        Total Minutes    Untimed Charges Total Minutes 71  -MG 86  -MG      Total Minutes 71  -MG 86  -MG               User Key  (r) = Recorded By, (t) = Taken By, (c) = Cosigned By      Initials Name Provider Type    Carmelita Roca MS CCC-SLP Speech and Language Pathologist                    Therapy Charges for Today       Code Description Service Date Service Provider Modifiers Qty    87495147117 HC ST EVAL ORAL PHARYNG SWALLOW 6 7/11/2025 Carmelita Pizarro MS CCC-SLP GN 1    04889169915 HC ST MOTION FLUORO EVAL SWALLOW 5 7/11/2025 Carmelita Pizarro MS CCC-ANATOLIY GN 1                 Carmelita Pizarro MS CCC-ANATOLIY  7/11/2025

## 2025-07-16 ENCOUNTER — OFFICE VISIT (OUTPATIENT)
Dept: GASTROENTEROLOGY | Age: 80
End: 2025-07-16
Payer: MEDICARE

## 2025-07-16 VITALS
RESPIRATION RATE: 18 BRPM | SYSTOLIC BLOOD PRESSURE: 116 MMHG | OXYGEN SATURATION: 97 % | DIASTOLIC BLOOD PRESSURE: 72 MMHG | WEIGHT: 194 LBS | HEART RATE: 69 BPM | BODY MASS INDEX: 29.5 KG/M2

## 2025-07-16 DIAGNOSIS — K58.2 IRRITABLE BOWEL SYNDROME WITH BOTH CONSTIPATION AND DIARRHEA: Primary | ICD-10-CM

## 2025-07-16 DIAGNOSIS — E03.9 ACQUIRED HYPOTHYROIDISM: Primary | ICD-10-CM

## 2025-07-16 PROCEDURE — 3074F SYST BP LT 130 MM HG: CPT | Performed by: NURSE PRACTITIONER

## 2025-07-16 PROCEDURE — 1123F ACP DISCUSS/DSCN MKR DOCD: CPT | Performed by: NURSE PRACTITIONER

## 2025-07-16 PROCEDURE — G8427 DOCREV CUR MEDS BY ELIG CLIN: HCPCS | Performed by: NURSE PRACTITIONER

## 2025-07-16 PROCEDURE — 1159F MED LIST DOCD IN RCRD: CPT | Performed by: NURSE PRACTITIONER

## 2025-07-16 PROCEDURE — 99213 OFFICE O/P EST LOW 20 MIN: CPT | Performed by: NURSE PRACTITIONER

## 2025-07-16 PROCEDURE — 1036F TOBACCO NON-USER: CPT | Performed by: NURSE PRACTITIONER

## 2025-07-16 PROCEDURE — 3078F DIAST BP <80 MM HG: CPT | Performed by: NURSE PRACTITIONER

## 2025-07-16 PROCEDURE — G8417 CALC BMI ABV UP PARAM F/U: HCPCS | Performed by: NURSE PRACTITIONER

## 2025-07-16 RX ORDER — LEVOTHYROXINE SODIUM 100 UG/1
100 TABLET ORAL DAILY
Qty: 90 TABLET | Refills: 3 | Status: SHIPPED | OUTPATIENT
Start: 2025-07-16 | End: 2026-07-11

## 2025-07-16 ASSESSMENT — ENCOUNTER SYMPTOMS
CONSTIPATION: 1
ANAL BLEEDING: 0
RESPIRATORY NEGATIVE: 1
BLOOD IN STOOL: 0
DIARRHEA: 1
ABDOMINAL PAIN: 0

## 2025-07-16 NOTE — TELEPHONE ENCOUNTER
Last OV: 6/4/2025    Next scheduled apt: 12/8/2025      Surescripts requesting refill  Medication pending

## 2025-07-16 NOTE — PROGRESS NOTES
[address] [address]    Chief Complaint   Patient presents with    Follow-up     Patient doing well on FODMAP diet and slowly adding more foods into the mix. Reports not change in BM since last visit.      HPI Julio Pepper is a 79 y.o. old male who has a past medical history of arthritis, multiple myeloma, hyperlipidemia, hypertension, MI (4/10/2023 s/p x5 stents and on Eliquis and Plavix), atrial fib, hypothyroidism, bowel resection (2004 s/p fall with removal of 12 inches of colon) initially presented to GI with concerns for change in bowel habits with diarrhea stools, defecation urgency, fecal incontinence with a history of IBS and constipation (2011).      Family history of upper GI cancer:  No.  Personal history of having esophagus \"rebuilt\" at [facility] 2000.  Review of EMR notes he had a gastric fundoplication (2000).  Family history of colon cancer: No  Blood in stool: No.  No melena  Unintentional weight loss: No  Abdominal pain: No  Prior colonoscopy: Yes 6/18/2024- Decreased anal sphincter tone, External hemorrhoids, Sigmoid polyp  Prior EGD: Yes    Interval history 7/16/25:  Follow-up OV, IBS, wife is present and assists with HPI.  Julio voices that he is doing well, continues with a low FODMAP diet; although has been slowly adding foods back into his diet.  He voices that he still cannot tolerate lactose,  fructose or fresh onions.  Has learned that he can cook with dehydrated onions, and garlic.  Has been able to tolerate small amounts of gluten.  Continues with portion control and is happy with his weight loss. Continues with a daily bowel regimen.   Plans to return to [place] in January, and returning in April.       Interval history 4/16/25:  Follow-up OV accompanied by wife who assists with HPI.  Reviewed initial HPI and GI specific history with Julio.   According to Julio and wife, they endorse a serious trial of FODMAP diet, continued lactose-free and gluten-free dietary

## 2025-07-16 NOTE — PATIENT INSTRUCTIONS
SURVEY:    You may be receiving a survey from [facility] regarding your visit today.    You may get this in the mail, through your MyChart, or in your email.     Please complete the survey to enable us to provide the highest quality of care to you and your family.    If you cannot score us a very good (5 Stars) on any question, please call the office to discuss how we could of made your experience exceptional.    Thank you!    General Surgery    MD Dr. Radha Mobley, DO  Dr. Rehan Kimbrough, DO  Alee Jane, KERWIN-CNP    Pain Mgmt.  Dr. Jay Davis, DO  KEMAR Awan, JEAN Duran LPN Jena Adams, MA Emily Akers, MA    Phone: [phone]  Fax: [phone]    Office Hours:   M-TH 8-5, F: 8-12

## 2025-07-24 ENCOUNTER — OFFICE VISIT (OUTPATIENT)
Dept: PAIN MANAGEMENT | Age: 80
End: 2025-07-24
Payer: MEDICARE

## 2025-07-24 VITALS
HEART RATE: 65 BPM | OXYGEN SATURATION: 96 % | BODY MASS INDEX: 29.5 KG/M2 | DIASTOLIC BLOOD PRESSURE: 70 MMHG | RESPIRATION RATE: 18 BRPM | WEIGHT: 194 LBS | SYSTOLIC BLOOD PRESSURE: 132 MMHG

## 2025-07-24 DIAGNOSIS — Z79.891 CHRONIC USE OF OPIATE FOR THERAPEUTIC PURPOSE: Primary | ICD-10-CM

## 2025-07-24 DIAGNOSIS — M51.369 DEGENERATION OF INTERVERTEBRAL DISC OF LUMBAR REGION, UNSPECIFIED WHETHER PAIN PRESENT: ICD-10-CM

## 2025-07-24 DIAGNOSIS — M79.18 MYOFASCIAL PAIN SYNDROME: ICD-10-CM

## 2025-07-24 DIAGNOSIS — M47.817 LUMBOSACRAL SPONDYLOSIS WITHOUT MYELOPATHY: ICD-10-CM

## 2025-07-24 PROCEDURE — 3075F SYST BP GE 130 - 139MM HG: CPT | Performed by: STUDENT IN AN ORGANIZED HEALTH CARE EDUCATION/TRAINING PROGRAM

## 2025-07-24 PROCEDURE — 1159F MED LIST DOCD IN RCRD: CPT | Performed by: STUDENT IN AN ORGANIZED HEALTH CARE EDUCATION/TRAINING PROGRAM

## 2025-07-24 PROCEDURE — 1123F ACP DISCUSS/DSCN MKR DOCD: CPT | Performed by: STUDENT IN AN ORGANIZED HEALTH CARE EDUCATION/TRAINING PROGRAM

## 2025-07-24 PROCEDURE — 1036F TOBACCO NON-USER: CPT | Performed by: STUDENT IN AN ORGANIZED HEALTH CARE EDUCATION/TRAINING PROGRAM

## 2025-07-24 PROCEDURE — 3078F DIAST BP <80 MM HG: CPT | Performed by: STUDENT IN AN ORGANIZED HEALTH CARE EDUCATION/TRAINING PROGRAM

## 2025-07-24 PROCEDURE — 1160F RVW MEDS BY RX/DR IN RCRD: CPT | Performed by: STUDENT IN AN ORGANIZED HEALTH CARE EDUCATION/TRAINING PROGRAM

## 2025-07-24 PROCEDURE — G8427 DOCREV CUR MEDS BY ELIG CLIN: HCPCS | Performed by: STUDENT IN AN ORGANIZED HEALTH CARE EDUCATION/TRAINING PROGRAM

## 2025-07-24 PROCEDURE — 99214 OFFICE O/P EST MOD 30 MIN: CPT | Performed by: STUDENT IN AN ORGANIZED HEALTH CARE EDUCATION/TRAINING PROGRAM

## 2025-07-24 PROCEDURE — G8417 CALC BMI ABV UP PARAM F/U: HCPCS | Performed by: STUDENT IN AN ORGANIZED HEALTH CARE EDUCATION/TRAINING PROGRAM

## 2025-07-24 NOTE — PATIENT INSTRUCTIONS
SURVEY:    You may be receiving a survey from [facility] regarding your visit today.    You may get this in the mail, through your MyChart, or in your email.     Please complete the survey to enable us to provide the highest quality of care to you and your family.    If you cannot score us a very good (5 Stars) on any question, please call the office to discuss how we could of made your experience exceptional.    Thank you!    General Surgery    MD Dr. Radha Mobley, DO  Dr. Rehan Kimbrough, DO  Alee Jane, KERWIN-CNP    Pain Mgmt.  Dr. Jay Davis, DO  KEMAR Awan, JEAN Duran LPN Jena Adams, MA Emily Akers, MA    Phone: [phone]  Fax: [phone]    Office Hours:   M-TH 8-5, F: 8-12

## 2025-07-24 NOTE — PROGRESS NOTES
[facility]ronic Pain Clinic Note     Encounter Date: 7/24/2025     SUBJECTIVE:  Chief Complaint   Patient presents with    Follow-up     History of Present Illness:   Julio Pepper is a 79 y.o. male who presents for follow-up after RFA.     Medication Refill:      Current Complaints of Pain:   Location: Low back  Radiation: None  Severity: moderate  Pain Numerical Score - 6   Average: 5   Highest: 8  Lowest: 5  Character/Quality: aching   Timing: Keeps awake at night at times  Associated symptoms: none  Numbness: no   Weakness: no  Exacerbating factors: activity, walking, patient also has RA  Alleviating factors: when is in [place]- the warmth  Length of time pain has been present: Started on years, chronic issue  Inciting event/injury: no  Bowel/Bladder incontinence: no  Falls: no  Physical Therapy: yes     History of Interventions:   Surgery: no  Injections: Yes, MBB, RFA     Imaging:    Lumbar MRI 8/9/2021    Past Medical History:   Diagnosis Date    Arthritis     osteoarthritis, rheumatoid    Atrial fibrillation (HCC) 07/2023    Cancer (HCC)     multiple myeloma    Fibromyalgia     GERD (gastroesophageal reflux disease)     History of blood transfusion     At birth     Hyperlipidemia     Hypertension     Hypothyroidism     IBS (irritable bowel syndrome)     MI, 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 Jordan     COLONOSCOPY N/A 6/18/2024    COLONOSCOPY performed by Joanne Muñoz MD at [facility] ENDOSCOPY    CT BIOPSY BONE MARROW  02/26/2021    CT BONE MARROW BIOPSY 2/26/2021    EYE SURGERY      cataract bilateral    GASTRIC FUNDOPLICATION  2000    HEMORRHOID SURGERY      HERNIA REPAIR      Hiatal Hernia repair, Esophageal repair     HERNIA REPAIR Bilateral     Inguinal     PAIN MANAGEMENT

## 2025-08-08 ENCOUNTER — OFFICE VISIT (OUTPATIENT)
Dept: FAMILY MEDICINE CLINIC | Age: 80
End: 2025-08-08

## 2025-08-08 VITALS
TEMPERATURE: 98.5 F | OXYGEN SATURATION: 95 % | BODY MASS INDEX: 30.16 KG/M2 | HEART RATE: 71 BPM | DIASTOLIC BLOOD PRESSURE: 80 MMHG | SYSTOLIC BLOOD PRESSURE: 126 MMHG | HEIGHT: 68 IN | WEIGHT: 199 LBS

## 2025-08-08 DIAGNOSIS — J06.9 VIRAL URI: Primary | ICD-10-CM

## 2025-08-08 DIAGNOSIS — R09.82 PND (POST-NASAL DRIP): ICD-10-CM

## 2025-08-08 LAB
EXP DATE SOLUTION: NORMAL
EXP DATE SWAB: NORMAL
EXPIRATION DATE: NORMAL
INFLUENZA A RNA, POC: NORMAL
INFLUENZA B RNA, POC: NORMAL
LOT NUMBER POC: NORMAL
LOT NUMBER SOLUTION: NORMAL
LOT NUMBER SWAB: NORMAL
SARS-COV-2 RNA, POC: NEGATIVE
VALID INTERNAL CONTROL: PRESENT

## 2025-08-08 ASSESSMENT — ENCOUNTER SYMPTOMS
DIARRHEA: 0
NAUSEA: 0
COUGH: 1
SORE THROAT: 1
VOMITING: 0
SHORTNESS OF BREATH: 0
RHINORRHEA: 1
SINUS PAIN: 1

## 2025-08-08 ASSESSMENT — PATIENT HEALTH QUESTIONNAIRE - PHQ9
2. FEELING DOWN, DEPRESSED OR HOPELESS: NOT AT ALL
SUM OF ALL RESPONSES TO PHQ QUESTIONS 1-9: 0
1. LITTLE INTEREST OR PLEASURE IN DOING THINGS: NOT AT ALL
SUM OF ALL RESPONSES TO PHQ QUESTIONS 1-9: 0

## 2025-08-09 ENCOUNTER — HOSPITAL ENCOUNTER (EMERGENCY)
Age: 80
Discharge: ANOTHER HOSPITAL | End: 2025-08-10
Attending: FAMILY MEDICINE
Payer: MEDICARE

## 2025-08-09 ENCOUNTER — APPOINTMENT (OUTPATIENT)
Dept: GENERAL RADIOLOGY | Age: 80
End: 2025-08-09
Payer: MEDICARE

## 2025-08-09 ENCOUNTER — APPOINTMENT (OUTPATIENT)
Dept: CT IMAGING | Age: 80
End: 2025-08-09
Payer: MEDICARE

## 2025-08-09 DIAGNOSIS — R79.82 ELEVATED C-REACTIVE PROTEIN (CRP): ICD-10-CM

## 2025-08-09 DIAGNOSIS — J03.90 PHLEGMONOUS TONSILLITIS: Primary | ICD-10-CM

## 2025-08-09 DIAGNOSIS — I48.91 ATRIAL FIBRILLATION WITH RVR (HCC): ICD-10-CM

## 2025-08-09 DIAGNOSIS — Z79.01 LONG TERM CURRENT USE OF ANTICOAGULANT: ICD-10-CM

## 2025-08-09 LAB
ALBUMIN SERPL-MCNC: 4.3 G/DL (ref 3.5–5.2)
ALBUMIN/GLOB SERPL: 1.1 {RATIO} (ref 1–2.5)
ALP SERPL-CCNC: 83 U/L (ref 40–129)
ALT SERPL-CCNC: 24 U/L (ref 5–41)
ANION GAP SERPL CALCULATED.3IONS-SCNC: 18 MMOL/L (ref 9–17)
AST SERPL-CCNC: 21 U/L
BASOPHILS # BLD: 0.01 K/UL (ref 0–0.2)
BASOPHILS NFR BLD: 0 % (ref 0–2)
BILIRUB SERPL-MCNC: 0.9 MG/DL (ref 0.3–1.2)
BNP SERPL-MCNC: 361 PG/ML
BUN SERPL-MCNC: 15 MG/DL (ref 8–23)
CALCIUM SERPL-MCNC: 9.8 MG/DL (ref 8.6–10.4)
CHLORIDE SERPL-SCNC: 99 MMOL/L (ref 98–107)
CO2 SERPL-SCNC: 21 MMOL/L (ref 20–31)
CREAT SERPL-MCNC: 0.8 MG/DL (ref 0.7–1.2)
CRP SERPL HS-MCNC: 66.9 MG/L (ref 0–5)
EOSINOPHIL # BLD: 0.02 K/UL (ref 0–0.4)
EOSINOPHILS RELATIVE PERCENT: 0 % (ref 0–5)
ERYTHROCYTE [DISTWIDTH] IN BLOOD BY AUTOMATED COUNT: 14.7 % (ref 12.1–15.2)
ERYTHROCYTE [SEDIMENTATION RATE] IN BLOOD BY PHOTOMETRIC METHOD: 23 MM/HR (ref 0–20)
GFR, ESTIMATED: >90 ML/MIN/1.73M2
GLUCOSE SERPL-MCNC: 119 MG/DL (ref 70–99)
HCT VFR BLD AUTO: 53.3 % (ref 41–53)
HGB BLD-MCNC: 17.7 G/DL (ref 13.5–17.5)
IMM GRANULOCYTES # BLD AUTO: 0.03 K/UL (ref 0–0.3)
IMM GRANULOCYTES NFR BLD: 0 % (ref 0–5)
INR PPP: 1.3
LYMPHOCYTES NFR BLD: 1.33 K/UL (ref 1–4.8)
LYMPHOCYTES RELATIVE PERCENT: 11 % (ref 13–44)
MCH RBC QN AUTO: 29.9 PG (ref 26–34)
MCHC RBC AUTO-ENTMCNC: 33.2 G/DL (ref 31–37)
MCV RBC AUTO: 90 FL (ref 80–100)
MONOCYTES NFR BLD: 1.27 K/UL (ref 0–1)
MONOCYTES NFR BLD: 11 % (ref 5–9)
NEUTROPHILS NFR BLD: 78 % (ref 39–75)
NEUTS SEG NFR BLD: 9.14 K/UL (ref 2.1–6.5)
PARTIAL THROMBOPLASTIN TIME: 35.4 SEC (ref 23.9–33.8)
PLATELET # BLD AUTO: 217 K/UL (ref 140–450)
PMV BLD AUTO: 8.7 FL (ref 6–12)
POTASSIUM SERPL-SCNC: 3.9 MMOL/L (ref 3.7–5.3)
PROT SERPL-MCNC: 8.1 G/DL (ref 6.4–8.3)
PROTHROMBIN TIME: 16.6 SEC (ref 11.5–14.2)
RBC # BLD AUTO: 5.92 M/UL (ref 4.5–5.9)
SODIUM SERPL-SCNC: 138 MMOL/L (ref 135–144)
TROPONIN I SERPL HS-MCNC: 11 NG/L (ref 0–22)
WBC OTHER # BLD: 11.8 K/UL (ref 3.5–11)

## 2025-08-09 PROCEDURE — 6360000002 HC RX W HCPCS: Performed by: FAMILY MEDICINE

## 2025-08-09 PROCEDURE — 71045 X-RAY EXAM CHEST 1 VIEW: CPT

## 2025-08-09 PROCEDURE — 84484 ASSAY OF TROPONIN QUANT: CPT

## 2025-08-09 PROCEDURE — 70491 CT SOFT TISSUE NECK W/DYE: CPT

## 2025-08-09 PROCEDURE — 85730 THROMBOPLASTIN TIME PARTIAL: CPT

## 2025-08-09 PROCEDURE — 85610 PROTHROMBIN TIME: CPT

## 2025-08-09 PROCEDURE — 93005 ELECTROCARDIOGRAM TRACING: CPT | Performed by: FAMILY MEDICINE

## 2025-08-09 PROCEDURE — 80053 COMPREHEN METABOLIC PANEL: CPT

## 2025-08-09 PROCEDURE — 36415 COLL VENOUS BLD VENIPUNCTURE: CPT

## 2025-08-09 PROCEDURE — 99285 EMERGENCY DEPT VISIT HI MDM: CPT

## 2025-08-09 PROCEDURE — 96375 TX/PRO/DX INJ NEW DRUG ADDON: CPT

## 2025-08-09 PROCEDURE — 86140 C-REACTIVE PROTEIN: CPT

## 2025-08-09 PROCEDURE — 83880 ASSAY OF NATRIURETIC PEPTIDE: CPT

## 2025-08-09 PROCEDURE — 85025 COMPLETE CBC W/AUTO DIFF WBC: CPT

## 2025-08-09 PROCEDURE — 6360000004 HC RX CONTRAST MEDICATION: Performed by: FAMILY MEDICINE

## 2025-08-09 PROCEDURE — 96365 THER/PROPH/DIAG IV INF INIT: CPT

## 2025-08-09 PROCEDURE — 2580000003 HC RX 258: Performed by: FAMILY MEDICINE

## 2025-08-09 PROCEDURE — 2500000003 HC RX 250 WO HCPCS: Performed by: FAMILY MEDICINE

## 2025-08-09 PROCEDURE — 85652 RBC SED RATE AUTOMATED: CPT

## 2025-08-09 RX ORDER — CLINDAMYCIN PHOSPHATE 900 MG/50ML
900 INJECTION, SOLUTION INTRAVENOUS ONCE
Status: COMPLETED | OUTPATIENT
Start: 2025-08-09 | End: 2025-08-10

## 2025-08-09 RX ORDER — IOPAMIDOL 755 MG/ML
75 INJECTION, SOLUTION INTRAVASCULAR
Status: COMPLETED | OUTPATIENT
Start: 2025-08-09 | End: 2025-08-09

## 2025-08-09 RX ORDER — DIPHENHYDRAMINE HYDROCHLORIDE 50 MG/ML
25 INJECTION, SOLUTION INTRAMUSCULAR; INTRAVENOUS ONCE
Status: COMPLETED | OUTPATIENT
Start: 2025-08-09 | End: 2025-08-09

## 2025-08-09 RX ORDER — DILTIAZEM HYDROCHLORIDE 5 MG/ML
10 INJECTION INTRAVENOUS ONCE
Status: COMPLETED | OUTPATIENT
Start: 2025-08-09 | End: 2025-08-10

## 2025-08-09 RX ORDER — METOPROLOL TARTRATE 1 MG/ML
5 INJECTION, SOLUTION INTRAVENOUS ONCE
Status: COMPLETED | OUTPATIENT
Start: 2025-08-09 | End: 2025-08-10

## 2025-08-09 RX ADMIN — IOPAMIDOL 75 ML: 755 INJECTION, SOLUTION INTRAVENOUS at 22:57

## 2025-08-09 RX ADMIN — WATER 125 MG: 1 INJECTION INTRAMUSCULAR; INTRAVENOUS; SUBCUTANEOUS at 22:35

## 2025-08-09 RX ADMIN — DIPHENHYDRAMINE HYDROCHLORIDE 25 MG: 50 INJECTION INTRAMUSCULAR; INTRAVENOUS at 22:36

## 2025-08-09 RX ADMIN — TRANEXAMIC ACID 1000 MG: 100 INJECTION, SOLUTION INTRAVENOUS at 22:41

## 2025-08-09 ASSESSMENT — PAIN DESCRIPTION - PAIN TYPE: TYPE: ACUTE PAIN

## 2025-08-09 ASSESSMENT — PAIN SCALES - GENERAL: PAINLEVEL_OUTOF10: 0

## 2025-08-09 ASSESSMENT — PAIN - FUNCTIONAL ASSESSMENT: PAIN_FUNCTIONAL_ASSESSMENT: 0-10

## 2025-08-10 VITALS
HEART RATE: 95 BPM | WEIGHT: 190.8 LBS | SYSTOLIC BLOOD PRESSURE: 154 MMHG | RESPIRATION RATE: 20 BRPM | HEIGHT: 68 IN | OXYGEN SATURATION: 95 % | TEMPERATURE: 97.6 F | DIASTOLIC BLOOD PRESSURE: 64 MMHG | BODY MASS INDEX: 28.92 KG/M2

## 2025-08-10 LAB
ANION GAP SERPL CALCULATED.3IONS-SCNC: 21 MMOL/L (ref 9–17)
BASOPHILS # BLD: 0.02 K/UL (ref 0–0.2)
BASOPHILS NFR BLD: 0 % (ref 0–2)
BUN SERPL-MCNC: 16 MG/DL (ref 8–23)
CALCIUM SERPL-MCNC: 9.3 MG/DL (ref 8.6–10.4)
CHLORIDE SERPL-SCNC: 100 MMOL/L (ref 98–107)
CO2 SERPL-SCNC: 16 MMOL/L (ref 20–31)
CREAT SERPL-MCNC: 0.8 MG/DL (ref 0.7–1.2)
CRP SERPL HS-MCNC: 71.5 MG/L (ref 0–5)
EOSINOPHIL # BLD: 0 K/UL (ref 0–0.4)
EOSINOPHILS RELATIVE PERCENT: 0 % (ref 0–5)
ERYTHROCYTE [DISTWIDTH] IN BLOOD BY AUTOMATED COUNT: 14.8 % (ref 12.1–15.2)
GFR, ESTIMATED: >90 ML/MIN/1.73M2
GLUCOSE SERPL-MCNC: 186 MG/DL (ref 70–99)
HCT VFR BLD AUTO: 51.9 % (ref 41–53)
HGB BLD-MCNC: 17.4 G/DL (ref 13.5–17.5)
IMM GRANULOCYTES # BLD AUTO: 0.03 K/UL (ref 0–0.3)
IMM GRANULOCYTES NFR BLD: 0 % (ref 0–5)
LYMPHOCYTES NFR BLD: 0.83 K/UL (ref 1–4.8)
LYMPHOCYTES RELATIVE PERCENT: 8 % (ref 13–44)
MCH RBC QN AUTO: 29.9 PG (ref 26–34)
MCHC RBC AUTO-ENTMCNC: 33.5 G/DL (ref 31–37)
MCV RBC AUTO: 89.2 FL (ref 80–100)
MONOCYTES NFR BLD: 0.21 K/UL (ref 0–1)
MONOCYTES NFR BLD: 2 % (ref 5–9)
NEUTROPHILS NFR BLD: 90 % (ref 39–75)
NEUTS SEG NFR BLD: 8.87 K/UL (ref 2.1–6.5)
PLATELET # BLD AUTO: 220 K/UL (ref 140–450)
PMV BLD AUTO: 9 FL (ref 6–12)
POTASSIUM SERPL-SCNC: 4.2 MMOL/L (ref 3.7–5.3)
RBC # BLD AUTO: 5.82 M/UL (ref 4.5–5.9)
SODIUM SERPL-SCNC: 137 MMOL/L (ref 135–144)
TROPONIN I SERPL HS-MCNC: 8 NG/L (ref 0–22)
WBC OTHER # BLD: 10 K/UL (ref 3.5–11)

## 2025-08-10 PROCEDURE — 36415 COLL VENOUS BLD VENIPUNCTURE: CPT

## 2025-08-10 PROCEDURE — 93005 ELECTROCARDIOGRAM TRACING: CPT | Performed by: FAMILY MEDICINE

## 2025-08-10 PROCEDURE — 84484 ASSAY OF TROPONIN QUANT: CPT

## 2025-08-10 PROCEDURE — 85025 COMPLETE CBC W/AUTO DIFF WBC: CPT

## 2025-08-10 PROCEDURE — 96367 TX/PROPH/DG ADDL SEQ IV INF: CPT

## 2025-08-10 PROCEDURE — 2580000003 HC RX 258: Performed by: FAMILY MEDICINE

## 2025-08-10 PROCEDURE — 2500000003 HC RX 250 WO HCPCS: Performed by: FAMILY MEDICINE

## 2025-08-10 PROCEDURE — 86140 C-REACTIVE PROTEIN: CPT

## 2025-08-10 PROCEDURE — 96376 TX/PRO/DX INJ SAME DRUG ADON: CPT

## 2025-08-10 PROCEDURE — 96375 TX/PRO/DX INJ NEW DRUG ADDON: CPT

## 2025-08-10 PROCEDURE — 6360000002 HC RX W HCPCS: Performed by: FAMILY MEDICINE

## 2025-08-10 PROCEDURE — 80048 BASIC METABOLIC PNL TOTAL CA: CPT

## 2025-08-10 PROCEDURE — 96372 THER/PROPH/DIAG INJ SC/IM: CPT

## 2025-08-10 RX ORDER — METOPROLOL TARTRATE 1 MG/ML
5 INJECTION, SOLUTION INTRAVENOUS ONCE
Status: COMPLETED | OUTPATIENT
Start: 2025-08-10 | End: 2025-08-10

## 2025-08-10 RX ORDER — ENOXAPARIN SODIUM 100 MG/ML
1 INJECTION SUBCUTANEOUS ONCE
Status: COMPLETED | OUTPATIENT
Start: 2025-08-10 | End: 2025-08-10

## 2025-08-10 RX ORDER — SODIUM CHLORIDE 9 MG/ML
INJECTION, SOLUTION INTRAVENOUS CONTINUOUS
Status: DISCONTINUED | OUTPATIENT
Start: 2025-08-10 | End: 2025-08-10 | Stop reason: HOSPADM

## 2025-08-10 RX ORDER — DILTIAZEM HYDROCHLORIDE 5 MG/ML
10 INJECTION INTRAVENOUS ONCE
Status: COMPLETED | OUTPATIENT
Start: 2025-08-10 | End: 2025-08-10

## 2025-08-10 RX ADMIN — METOPROLOL TARTRATE 5 MG: 5 INJECTION INTRAVENOUS at 00:21

## 2025-08-10 RX ADMIN — DILTIAZEM HYDROCHLORIDE 10 MG: 5 INJECTION, SOLUTION INTRAVENOUS at 00:23

## 2025-08-10 RX ADMIN — METOPROLOL TARTRATE 5 MG: 5 INJECTION INTRAVENOUS at 06:07

## 2025-08-10 RX ADMIN — DILTIAZEM HYDROCHLORIDE 10 MG: 5 INJECTION, SOLUTION INTRAVENOUS at 06:05

## 2025-08-10 RX ADMIN — SODIUM CHLORIDE 40 MG: 9 INJECTION INTRAMUSCULAR; INTRAVENOUS; SUBCUTANEOUS at 06:09

## 2025-08-10 RX ADMIN — SODIUM CHLORIDE: 0.9 INJECTION, SOLUTION INTRAVENOUS at 01:52

## 2025-08-10 RX ADMIN — ENOXAPARIN SODIUM 90 MG: 100 INJECTION SUBCUTANEOUS at 06:20

## 2025-08-10 RX ADMIN — CLINDAMYCIN PHOSPHATE 900 MG: 900 INJECTION, SOLUTION INTRAVENOUS at 00:21

## 2025-08-11 LAB
EKG ATRIAL RATE: 100 BPM
EKG P AXIS: 26 DEGREES
EKG P-R INTERVAL: 172 MS
EKG Q-T INTERVAL: 316 MS
EKG Q-T INTERVAL: 354 MS
EKG QRS DURATION: 84 MS
EKG QRS DURATION: 86 MS
EKG QTC CALCULATION (BAZETT): 456 MS
EKG QTC CALCULATION (BAZETT): 487 MS
EKG R AXIS: -9 DEGREES
EKG R AXIS: 127 DEGREES
EKG T AXIS: -23 DEGREES
EKG T AXIS: 13 DEGREES
EKG VENTRICULAR RATE: 100 BPM
EKG VENTRICULAR RATE: 143 BPM

## 2025-08-11 PROCEDURE — 93010 ELECTROCARDIOGRAM REPORT: CPT | Performed by: INTERNAL MEDICINE

## 2025-08-25 ENCOUNTER — OFFICE VISIT (OUTPATIENT)
Dept: PAIN MANAGEMENT | Age: 80
End: 2025-08-25
Payer: MEDICARE

## 2025-08-25 VITALS
SYSTOLIC BLOOD PRESSURE: 110 MMHG | DIASTOLIC BLOOD PRESSURE: 80 MMHG | RESPIRATION RATE: 18 BRPM | WEIGHT: 199 LBS | BODY MASS INDEX: 30.26 KG/M2

## 2025-08-25 DIAGNOSIS — M47.817 LUMBOSACRAL SPONDYLOSIS WITHOUT MYELOPATHY: ICD-10-CM

## 2025-08-25 DIAGNOSIS — M51.369 DEGENERATION OF INTERVERTEBRAL DISC OF LUMBAR REGION, UNSPECIFIED WHETHER PAIN PRESENT: ICD-10-CM

## 2025-08-25 DIAGNOSIS — Z79.891 CHRONIC USE OF OPIATE FOR THERAPEUTIC PURPOSE: Primary | ICD-10-CM

## 2025-08-25 DIAGNOSIS — M79.18 MYOFASCIAL PAIN SYNDROME: ICD-10-CM

## 2025-08-25 PROCEDURE — 3079F DIAST BP 80-89 MM HG: CPT | Performed by: NURSE PRACTITIONER

## 2025-08-25 PROCEDURE — 1159F MED LIST DOCD IN RCRD: CPT | Performed by: NURSE PRACTITIONER

## 2025-08-25 PROCEDURE — G8427 DOCREV CUR MEDS BY ELIG CLIN: HCPCS | Performed by: NURSE PRACTITIONER

## 2025-08-25 PROCEDURE — 1036F TOBACCO NON-USER: CPT | Performed by: NURSE PRACTITIONER

## 2025-08-25 PROCEDURE — 99214 OFFICE O/P EST MOD 30 MIN: CPT | Performed by: NURSE PRACTITIONER

## 2025-08-25 PROCEDURE — G8417 CALC BMI ABV UP PARAM F/U: HCPCS | Performed by: NURSE PRACTITIONER

## 2025-08-25 PROCEDURE — 1123F ACP DISCUSS/DSCN MKR DOCD: CPT | Performed by: NURSE PRACTITIONER

## 2025-08-25 PROCEDURE — 3074F SYST BP LT 130 MM HG: CPT | Performed by: NURSE PRACTITIONER

## 2025-08-25 PROCEDURE — 1160F RVW MEDS BY RX/DR IN RCRD: CPT | Performed by: NURSE PRACTITIONER

## 2025-08-25 RX ORDER — HYDROCODONE BITARTRATE AND ACETAMINOPHEN 5; 325 MG/1; MG/1
1 TABLET ORAL EVERY 6 HOURS PRN
Qty: 120 TABLET | Refills: 0 | Status: SHIPPED | OUTPATIENT
Start: 2025-08-25 | End: 2025-09-24

## 2025-09-05 DIAGNOSIS — N13.8 BPH WITH OBSTRUCTION/LOWER URINARY TRACT SYMPTOMS: ICD-10-CM

## 2025-09-05 DIAGNOSIS — N40.1 BPH WITH OBSTRUCTION/LOWER URINARY TRACT SYMPTOMS: ICD-10-CM

## 2025-09-05 RX ORDER — TAMSULOSIN HYDROCHLORIDE 0.4 MG/1
0.4 CAPSULE ORAL 2 TIMES DAILY
Qty: 180 CAPSULE | Refills: 0 | Status: SHIPPED | OUTPATIENT
Start: 2025-09-05

## (undated) DEVICE — ELECTRODE ES AD CRD L15FT DISP FOR PT BELOW 30LB REM

## (undated) DEVICE — GLOVE SURG SZ 85 CRM LTX FREE POLYISOPRENE POLYMER BEAD ANTI

## (undated) DEVICE — NEEDLE SPNL 22GA L35IN PNCL PNT ATRAUM TIP PENCAN

## (undated) DEVICE — FORCEPS BX L240CM JAW DIA2.4MM ORNG L CAP W/ NDL DISP RAD

## (undated) DEVICE — NEEDLE HYPO 18GA L1IN PNK POLYPR HUB S STL REG BVL STR W/O

## (undated) DEVICE — Device

## (undated) DEVICE — GLOVE SURG SZ 8 L12IN THK75MIL DK GRN LTX FREE

## (undated) DEVICE — TOWEL,OR,DSP,ST,NATURAL,DLX,4/PK,20PK/CS: Brand: MEDLINE

## (undated) DEVICE — NERVE BLOCK TRAY: Brand: MEDLINE INDUSTRIES, INC.

## (undated) DEVICE — BANDAGE ADH W1XL3IN NAT FAB WVN FLX DURABLE N ADH PD SEAL

## (undated) DEVICE — CHLORAPREP 26ML ORANGE

## (undated) DEVICE — DRAPE SURG L 60X76IN SMS FAB UNIV ANCIL RESIST FLAME TEARING

## (undated) DEVICE — 3M™ COBAN™ NL STERILE NON-LATEX SELF-ADHERENT WRAP, 2086S, 6 IN X 5 YD (15 CM X 4,5 M), 12 ROLLS/CASE: Brand: 3M™ COBAN™

## (undated) DEVICE — SHEET,DRAPE,53X77,STERILE: Brand: MEDLINE

## (undated) DEVICE — STRAIGHT RF CANNULA, RADIOPAQUE MARKER: Brand: RADIOPAQUE RADIOFREQUENCY CANNULA

## (undated) DEVICE — DRESSING PETRO W3XL3IN OIL EMUL N ADH GZ KNIT IMPREG CELOS

## (undated) DEVICE — MASK ADLT DISP

## (undated) DEVICE — TOWEL,OR,DSP,ST,BLUE,STD,4/PK,20PK/CS: Brand: MEDLINE

## (undated) DEVICE — SOLUTION IV IRRIG LACTATED RINGERS 3000ML 2B7487

## (undated) DEVICE — INTENDED FOR TISSUE SEPARATION, AND OTHER PROCEDURES THAT REQUIRE A SHARP SURGICAL BLADE TO PUNCTURE OR CUT.: Brand: BARD-PARKER ® CARBON RIB-BACK BLADES

## (undated) DEVICE — MAT SUCTIONER SURG MAT 22INX35IN TOT SZ W/ DRI-SAFE PD

## (undated) DEVICE — GLOVE SURG SZ 75 L12IN FNGR THK79MIL GRN LTX FREE

## (undated) DEVICE — GLOVE SURG SZ 8 CRM LTX FREE POLYISOPRENE POLYMER BEAD ANTI

## (undated) DEVICE — 3M™ STERI-STRIP™ REINFORCED ADHESIVE SKIN CLOSURES, R1547, 1/2 IN X 4 IN (12 MM X 100 MM), 6 STRIPS/ENVELOPE: Brand: 3M™ STERI-STRIP™

## (undated) DEVICE — Device: Brand: DEFENDO VALVE AND CONNECTOR KIT

## (undated) DEVICE — SYRINGE MED 10ML LUERLOCK TIP W/O SFTY DISP

## (undated) DEVICE — TRAP SPEC POLYP REM STRNR CLN DSGN MAGNIFYING WIND DISP

## (undated) DEVICE — BANDAGE,ADHESIVE,FABRIC,1"X3",STRL,LF: Brand: CURAD

## (undated) DEVICE — SUTURE MCRYL SZ 4-0 L18IN ABSRB UD L19MM PS-2 3/8 CIR PRIM Y496G

## (undated) DEVICE — CUSTOM PACK: Brand: UNBRANDED

## (undated) DEVICE — APPLICATOR MEDICATED 10.5 CC SOLUTION HI LT ORNG CHLORAPREP

## (undated) DEVICE — PROBE ABLAT XL 90DEG ASPIR BPLR RF 1 PC ELECTRD ERGO HNDL

## (undated) DEVICE — GLOVE SURG SZ 75 CRM LTX FREE POLYISOPRENE POLYMER BEAD ANTI

## (undated) DEVICE — CANNULA NSL AD TUBE L7FT END TDL CO2 SAMP STD CONN DISP

## (undated) DEVICE — ELECTRODE PT RET AD L9FT HI MOIST COND ADH HYDRGEL CORDED

## (undated) DEVICE — NEEDLE HYPO 22GA L1.5IN BLK POLYPR HUB S STL REG BVL STR

## (undated) DEVICE — SOLUTION IRRIG 1000ML STRL H2O USP PLAS POUR BTL

## (undated) DEVICE — SYRINGE MED 30ML STD CLR PLAS LUERLOCK TIP N CTRL DISP

## (undated) DEVICE — NEEDLE,BLUNT,FILTER, 18GX1.5": Brand: MEDLINE

## (undated) DEVICE — NEEDLE SUT PASS FOR ROT CUF LABRAL REP SUREFIRE SCORPION

## (undated) DEVICE — 3M™ STERI-DRAPE™ U-DRAPE 1067 1067 5/BX 4BX/CS/CTN&#X20;: Brand: STERI-DRAPE™

## (undated) DEVICE — Z DISCONTINUED BY MEDLINE USE 2711682 TRAY SKIN PREP DRY W/ PREM GLV

## (undated) DEVICE — 4-PORT MANIFOLD: Brand: NEPTUNE 2

## (undated) DEVICE — COOLER THER 32-48IN 6IN ABV PAT 1 SZ FIT MOST CRYO COMB W/

## (undated) DEVICE — TUBING PMP L16FT MAIN DISP FOR AR-6400 AR-6475

## (undated) DEVICE — CURVED SHARP RF CANNULA, RADIOPAQUE MARKER: Brand: RADIOPAQUE RADIOFREQUENCY CANNULA

## (undated) DEVICE — 3M™ STERI-STRIP™ COMPOUND BENZOIN TINCTURE 40 BAGS/CARTON 4 CARTONS/CASE C1544: Brand: 3M™ STERI-STRIP™

## (undated) DEVICE — DALE FOLEY CATHETER HOLDER, LEGBAND, FITS UP TO 30": Brand: DALE FOLEY CATHETER HOLDER

## (undated) DEVICE — SEAL ENDO INSTR SELF SEAL UROLOGY

## (undated) DEVICE — DRAPE SURG LG

## (undated) DEVICE — BLADE SHV L13CM DIA5.5MM BNE CUT AGG DEB COOLCUT

## (undated) DEVICE — GLOVE SURG SZ 7 CRM LTX FREE POLYISOPRENE POLYMER BEAD ANTI

## (undated) DEVICE — MEDI-VAC NON-CONDUCTIVE SUCTION TUBING 6MM X 6.1M (20 FT.) L: Brand: CARDINAL HEALTH

## (undated) DEVICE — 3M™ MICROFOAM™ SURGICAL TAPE 4 ROLLS/CARTON 6 CARTONS/CASE 1528-3: Brand: 3M™ MICROFOAM™

## (undated) DEVICE — SOLUTION SURG PREP CHLORHEXIDINE GLUC 4% 8 OZ EXIDINE

## (undated) DEVICE — PAD GRND FOR RF PAIN MGMT DISP

## (undated) DEVICE — Z DISCONTINUED USE 2624853 GLOVE SURG SZ 75 L12IN THK91MIL BRN LTX FREE

## (undated) DEVICE — FLUID CONTROL SHOULDER DRAPE PACK: Brand: CONVERTORS

## (undated) DEVICE — SYRINGE CATH TIP 50ML

## (undated) DEVICE — SYRINGE, LUER LOCK, 10ML: Brand: MEDLINE

## (undated) DEVICE — BLADE SHV L13CM DIA4MM TAPR TIP SCIS LIKE CUT OVL OUTER

## (undated) DEVICE — NEEDLE SPINAL 22GA L3.5IN SPINOCAN

## (undated) DEVICE — Y-TYPE TUR/BLADDER IRRIGATION SET, REGULATING CLAMP

## (undated) DEVICE — YANKAUER OPEN TIP WITH ON/OFF SWITCH: Brand: ARGYLE

## (undated) DEVICE — GLOVE ORANGE PI 7   MSG9070

## (undated) DEVICE — SUTURE VCRL SZ 3-0 L27IN ABSRB UD L36MM CT-1 1/2 CIR J258H

## (undated) DEVICE — PLUG CATH F UNIV ENDCAP FOR OCCL DRNGE LUMN DISP

## (undated) DEVICE — NEEDLE SPNL 25GA L3.5IN BLU HUB S STL REG WALL FIT STYL W/

## (undated) DEVICE — HYBRID TUBING WITH CO2 CONNECTION FOR OLYMPUS 140/160/180/190 SERIES GI ENDOSCOPES WITH OLYMPUS AFU-100 , OLYMPUS OFP: Brand: ENDOGATOR HYBRID IRRIGATION TUBING

## (undated) DEVICE — SUTURE VCRL SZ 0 L27IN ABSRB UD L36MM CT-1 1/2 CIR J260H

## (undated) DEVICE — GOWN,AURORA,NON-REINFORCED,2XL: Brand: MEDLINE

## (undated) DEVICE — SOLUTION IRRIG 1500ML STRL H2O USP POUR PLAS BTL

## (undated) DEVICE — ENDO KIT W/SYRINGE: Brand: MEDLINE INDUSTRIES, INC.

## (undated) DEVICE — Device: Brand: DISPOSABLE ELECTROSURGICAL SNARE

## (undated) DEVICE — SYRINGE MED 5ML STD CLR PLAS LUERLOCK TIP N CTRL DISP

## (undated) DEVICE — TIP ELECSURG BOVIE

## (undated) DEVICE — GAUZE,SPONGE,4"X4",16PLY,XRAY,STRL,LF: Brand: MEDLINE

## (undated) DEVICE — SOLUTION IV 1000ML 0.9% SOD CHL PH 5 INJ USP VIAFLX PLAS

## (undated) DEVICE — NEEDLE,18GX1.0",REG,BEVEL,ST: Brand: MEDLINE

## (undated) DEVICE — SUTURE ETHLN SZ 4-0 L18IN NONABSORBABLE BLK L19MM PS-2 3/8 1667H

## (undated) DEVICE — PAD,ABDOMINAL,8"X7.5",ST,LF,20/BX: Brand: MEDLINE INDUSTRIES, INC.

## (undated) DEVICE — SYRINGE MED 50ML LUERSLIP TIP

## (undated) DEVICE — Z DISCONTINUED USE 2272117 DRAPE SURG 3 QTR N INVASIVE 2 LAYR DISP

## (undated) DEVICE — GOWN ISOLATN REG BLU POLYETH THMB LOOP PROTCT FOR VIVARIUM

## (undated) DEVICE — SET GRAV VENT NVENT CK VLV 3 NDL FREE PRT 10 GTT

## (undated) DEVICE — DOVER HYDROGEL COATED LATEX FOLEY CATHETER, 30 ML, 3-WAY 22 FR/CH (7.3 MM): Brand: DOVER

## (undated) DEVICE — Z INACTIVE USE 2660664 SOLUTION IRRIG 3000ML 0.9% SOD CHL USP UROMATIC PLAS CONT

## (undated) DEVICE — NEEDLE SPNL L3.5IN PNK HUB S STL REG WALL FIT STYL W/ QNCKE

## (undated) DEVICE — SYRINGE MEDICAL 3ML CLEAR PLASTIC STANDARD NON CONTROL LUERLOCK TIP DISPOSABLE

## (undated) DEVICE — BAG DRNGE 2000ML ROUNDED TEARDROP W/ ANTIREFLX CHMBR DISP